# Patient Record
Sex: FEMALE | Race: BLACK OR AFRICAN AMERICAN | Employment: UNEMPLOYED | URBAN - METROPOLITAN AREA
[De-identification: names, ages, dates, MRNs, and addresses within clinical notes are randomized per-mention and may not be internally consistent; named-entity substitution may affect disease eponyms.]

---

## 2017-01-12 ENCOUNTER — HOSPITAL ENCOUNTER (EMERGENCY)
Age: 37
Discharge: HOME | End: 2017-01-12
Attending: EMERGENCY MEDICINE
Payer: MEDICAID

## 2017-01-12 VITALS
HEART RATE: 77 BPM | RESPIRATION RATE: 16 BRPM | WEIGHT: 235 LBS | SYSTOLIC BLOOD PRESSURE: 151 MMHG | DIASTOLIC BLOOD PRESSURE: 93 MMHG | TEMPERATURE: 98 F | OXYGEN SATURATION: 97 % | BODY MASS INDEX: 40.12 KG/M2 | HEIGHT: 64 IN

## 2017-01-12 DIAGNOSIS — F43.9 STRESS AT HOME: ICD-10-CM

## 2017-01-12 DIAGNOSIS — R73.9 HYPERGLYCEMIA: ICD-10-CM

## 2017-01-12 DIAGNOSIS — M10.072 ACUTE IDIOPATHIC GOUT INVOLVING TOE OF LEFT FOOT: Primary | ICD-10-CM

## 2017-01-12 LAB
GLUCOSE BLD STRIP.AUTO-MCNC: 463 MG/DL (ref 65–100)
SERVICE CMNT-IMP: ABNORMAL

## 2017-01-12 PROCEDURE — 99283 EMERGENCY DEPT VISIT LOW MDM: CPT

## 2017-01-12 PROCEDURE — 82962 GLUCOSE BLOOD TEST: CPT

## 2017-01-12 PROCEDURE — 90791 PSYCH DIAGNOSTIC EVALUATION: CPT

## 2017-01-12 RX ORDER — TRAMADOL HYDROCHLORIDE 50 MG/1
50 TABLET ORAL
Qty: 20 TAB | Refills: 0 | Status: SHIPPED | OUTPATIENT
Start: 2017-01-12 | End: 2017-05-09

## 2017-01-12 RX ORDER — COLCHICINE 0.6 MG/1
0.6 CAPSULE ORAL DAILY
Qty: 3 CAP | Refills: 0 | Status: SHIPPED | OUTPATIENT
Start: 2017-01-12 | End: 2020-10-13

## 2017-01-12 NOTE — ED PROVIDER NOTES
HPI Comments: 39 y.o. female with past medical history significant for DM, gout, hypertension, UTI, depression and PTSD who presents from home with chief complaint of toe pain. Patient arrives today complaining of 3 days of left great toe pain. She rates her pain at 6/10 and states it is tender to the touch and painful with movement. She reports history of gout and has taken colchicine in the past. She denies any trauma or injury to the foot. Patient additionally states she has not been compliant with her insulin recently. She reports her blood sugars normally run in the 200s-300s but recent have been higher due to not taking insulin because of stress. She says she is on a sliding scale, has not taken her insulin yet today but will as soon as she gets home. She denies fever, chills, nausea, vomiting, diarrhea, constipation and shortness of breath. There are no other acute medical concerns at this time. PCP: Jackeline Shirley MD    Note written by reina De La Cruz, as dictated by Albina Baum MD 9:10 AM      The history is provided by the patient.         Past Medical History:   Diagnosis Date    Depression     Diabetes ([facility] [address]     Edema     Gout     Herpes genitalis     Hypertension     Ill-defined condition      gout    Mood disorder (HCC)     Other ill-defined conditions(799.89)      cholesterol     Psychiatric disorder      depression    Psychiatric disorder      PTSD    Sleep disorder     UTI (lower urinary tract infection)        Past Surgical History:   Procedure Laterality Date    Hx gyn        X 3         Family History:   Problem Relation Age of Onset    Hypertension Mother     Coronary Artery Disease Mother     Diabetes Father     Stroke Father 46     pt estimates    Kidney Disease Father      secondary to diabetes       Social History     Social History    Marital status: SINGLE     Spouse name: N/A    Number of children: N/A    Years of education: N/A     Occupational History    [employer]      [employer]    nursing student      [employer]     Social History Main Topics    Smoking status: Never Smoker    Smokeless tobacco: Never Used    Alcohol use No    Drug use: No    Sexual activity: Yes     Partners: Male     Birth control/ protection: IUD     Other Topics Concern    Exercise Yes     Line Dancing with Son 1x/week, walking regularly, yoga occasionally     Social History Narrative    Lives with son (12 yo). Boyfriend x 2 years is currently staying with pt and helping her. ALLERGIES: Review of patient's allergies indicates no known allergies. Review of Systems   Constitutional: Negative for chills, diaphoresis and fever. HENT: Negative for congestion, postnasal drip, rhinorrhea and sore throat. Eyes: Negative for photophobia, discharge, redness and visual disturbance. Respiratory: Negative for cough, chest tightness, shortness of breath and wheezing. Cardiovascular: Negative for chest pain, palpitations and leg swelling. Gastrointestinal: Negative for abdominal distention, abdominal pain, blood in stool, constipation, diarrhea, nausea and vomiting. Genitourinary: Negative for difficulty urinating, dysuria, frequency, hematuria and urgency. Musculoskeletal: Positive for arthralgias and joint swelling. Negative for back pain and myalgias. Skin: Negative for color change and rash. Neurological: Negative for dizziness, speech difficulty, weakness, light-headedness, numbness and headaches. Psychiatric/Behavioral: Negative for confusion. The patient is not nervous/anxious. All other systems reviewed and are negative. Vitals:    01/12/17 0836   BP: (!) 151/93   Pulse: 77   Resp: 16   Temp: 98 °F (36.7 °C)   SpO2: 97%   Weight: 106.6 kg (235 lb)   Height: 5' 4\" (1.626 m)            Physical Exam   Constitutional: She is oriented to person, place, and time. She appears well-developed and well-nourished. No distress.    HENT:   Head: Normocephalic and atraumatic. Right Ear: External ear normal.   Left Ear: External ear normal.   Nose: Nose normal.   Mouth/Throat: Oropharynx is clear and moist.   Eyes: Conjunctivae and EOM are normal. Pupils are equal, round, and reactive to light. No scleral icterus. Neck: Normal range of motion. Neck supple. No JVD present. No tracheal deviation present. No thyromegaly present. Cardiovascular: Normal rate, regular rhythm and normal heart sounds. Exam reveals no gallop and no friction rub. No murmur heard. Pulmonary/Chest: Effort normal and breath sounds normal. No respiratory distress. She has no wheezes. She has no rales. She exhibits no tenderness. Abdominal: Soft. Bowel sounds are normal. She exhibits no distension and no mass. There is no tenderness. There is no rebound and no guarding. Musculoskeletal: Normal range of motion. She exhibits tenderness. She exhibits no edema. Left great toe; tender to the touch and pain with movement, no erythema or increased heat. Lymphadenopathy:     She has no cervical adenopathy. Neurological: She is alert and oriented to person, place, and time. She has normal strength. She displays no atrophy and no tremor. No cranial nerve deficit. She exhibits normal muscle tone. Coordination and gait normal.   Skin: Skin is warm and dry. No rash noted. She is not diaphoretic. No erythema. Psychiatric: She has a normal mood and affect. Her behavior is normal. Judgment and thought content normal.   Nursing note and vitals reviewed. Note written by reina Schumacher, as dictated by Madisyn Price MD 9:12 AM      MDM  Number of Diagnoses or Management Options  Acute idiopathic gout involving toe of left foot:   Hyperglycemia:   Stress at home:   Diagnosis management comments: Impression: 67-year-old female here for painful left great toe at the MTP joint all consistent with an exacerbation of her acute gout.  In addition the patient is hyperglycemic has not used her insulin this morning and would like to speak with a rales. Physical examination is consistent with an acute gouty flare, we'll start treatment patient will use her insulin when she returns home to follow prescribed outpatient plan for behavioral health.     ED Course       Procedures
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
## 2017-01-12 NOTE — LETTER
[address] 
[address] [facility] [id number] 
[phone] Work/School Note Date: 1/12/2017 To Whom It May concern: 
 
Audi Titus was seen and treated today in the emergency room by the following provider(s): 
Attending Provider: Elia Ding MD. uAdi Titus may return to work on 1/14/17.  
 
Sincerely, 
 
 
 
 
Elia Ding MD

## 2017-01-12 NOTE — DISCHARGE INSTRUCTIONS
We hope that we have addressed all of your medical concerns. The examination and treatment you received in the Emergency Department were for an emergent problem and were not intended as complete care. It is important that you follow up with your healthcare provider(s) for ongoing care. If your symptoms worsen or do not improve as expected, and you are unable to reach your usual health care provider(s), you should return to the Emergency Department. Today's healthcare is undergoing tremendous change, and patient satisfaction surveys are one of the many tools to assess the quality of medical care. You may receive a survey from the [facility] regarding your experience in the Emergency Department. I hope that your experience has been completely positive, particularly the medical care that I provided. As such, please participate in the survey; anything less than excellent does not meet my expectations or intentions. [facility] and [address] [place] [address] participate in nationally recognized quality of care measures. If your blood pressure is greater than 120/80, as reported below, we urge that you seek medical care to address the potential of high blood pressure, commonly known as hypertension. Hypertension can be hereditary or can be caused by certain medical conditions, pain, stress, or \"white coat syndrome. \"       Please make an appointment with your health care provider(s) for follow up of your Emergency Department visit. VITALS:   Patient Vitals for the past 8 hrs:   Temp Pulse Resp BP SpO2   01/12/17 0836 98 °F (36.7 °C) 77 16 (!) 151/93 97 %          Thank you for allowing us to provide you with medical care today. We realize that you have many choices for your emergency care needs. Please choose us in the future for any continued health care needs. Carly Angeles M.D.  [facility] [place]: [phone]            Recent Results (from the past 24 hour(s))   GLUCOSE, POC    Collection Time: 01/12/17  8:57 AM   Result Value Ref Range    Glucose (POC) 463 (H) 65 - 100 mg/dL    Performed by Ariana Dowd        No results found. Gout: Care Instructions  Your Care Instructions  Gout is a form of arthritis caused by a buildup of uric acid crystals in a joint. It causes sudden attacks of pain, swelling, redness, and stiffness, usually in one joint, especially the big toe. Gout usually comes on without a cause. But it can be brought on by drinking alcohol (especially beer) or eating seafood and red meat. Taking certain medicines, such as diuretics or aspirin, also can bring on an attack of gout. Taking your medicines as prescribed and following up with your doctor regularly can help you avoid gout attacks in the future. Follow-up care is a key part of your treatment and safety. Be sure to make and go to all appointments, and call your doctor if you are having problems. Its also a good idea to know your test results and keep a list of the medicines you take. How can you care for yourself at home? · If the joint is swollen, put ice or a cold pack on the area for 10 to 20 minutes at a time. Put a thin cloth between the ice and your skin. · Prop up the sore limb on a pillow when you ice it or anytime you sit or lie down during the next 3 days. Try to keep it above the level of your heart. This will help reduce swelling. · Rest sore joints. Avoid activities that put weight or strain on the joints for a few days. Take short rest breaks from your regular activities during the day. · Take your medicines exactly as prescribed. Call your doctor if you think you are having a problem with your medicine. · Take pain medicines exactly as directed. ¨ If the doctor gave you a prescription medicine for pain, take it as prescribed.   ¨ If you are not taking a prescription pain medicine, ask your doctor if you can take an over-the-counter medicine. · Eat less seafood and red meat. · Check with your doctor before drinking alcohol. · Losing weight, if you are overweight, may help reduce attacks of gout. But do not go on a [employer]. \" Losing a lot of weight in a short amount of time can cause a gout attack. When should you call for help? Call your doctor now or seek immediate medical care if:  · You have a fever. · The joint is so painful you cannot use it. · You have sudden, unexplained swelling, redness, warmth, or severe pain in one or more joints. Watch closely for changes in your health, and be sure to contact your doctor if:  · You have joint pain. · Your symptoms get worse or are not improving after 2 or 3 days. Where can you learn more? Go to http://tommyJobTalentszane.info/. Enter V387 in the search box to learn more about \"Gout: Care Instructions. \"  Current as of: February 24, 2016  Content Version: 11.1  © 3105-1663 [employer]. Care instructions adapted under license by [employer] (which disclaims liability or warranty for this information). If you have questions about a medical condition or this instruction, always ask your healthcare professional. [address] any warranty or liability for your use of this information. Learning About High Blood Sugar  What is high blood sugar? Your body turns the food you eat into glucose (sugar), which it uses for energy. But if your body isn't able to use the sugar right away, it can build up in your blood and lead to high blood sugar. When the amount of sugar in your blood stays too high for too much of the time, you may have diabetes. Diabetes is a disease that can cause serious health problems. The good news is that lifestyle changes may help you get your blood sugar back to normal and avoid or delay diabetes. What causes high blood sugar?   Sugar (glucose) can build up in your blood if you:  · Are overweight. · Have a family history of diabetes. · Take certain medicines, such as steroids. What are the symptoms? Having high blood sugar may not cause any symptoms at all. Or it may make you feel very thirsty or very hungry. You may also urinate more often than usual, have blurry vision, or lose weight without trying. How is high blood sugar treated? You can take steps to lower your blood sugar level if you understand what makes it get higher. Your doctor may want you to learn how to test your blood sugar level at home. Then you can see how illness, stress, or different kinds of food or medicine raise or lower your blood sugar level. Other tests may be needed to see if you have diabetes. How can you prevent high blood sugar? · Watch your weight. If you're overweight, losing just a small amount of weight may help. Reducing fat around your waist is most important. · Limit the amount of calories, sweets, and unhealthy fat you eat. Ask your doctor if a dietitian can help you. A registered dietitian can help you create meal plans that fit your lifestyle. · Get at least 30 minutes of exercise on most days of the week. Exercise helps control your blood sugar. It also helps you maintain a healthy weight. Walking is a good choice. You also may want to do other activities, such as running, swimming, cycling, or playing tennis or team sports. · If your doctor prescribed medicines, take them exactly as prescribed. Call your doctor if you think you are having a problem with your medicine. You will get more details on the specific medicines your doctor prescribes. Follow-up care is a key part of your treatment and safety. Be sure to make and go to all appointments, and call your doctor if you are having problems. It's also a good idea to know your test results and keep a list of the medicines you take. Where can you learn more?   Go to http://tommy-zane.info/. Enter O108 in the search box to learn more about \"Learning About High Blood Sugar. \"  Current as of: May 23, 2016  Content Version: 11.1  © 2294-8442 [employer]. Care instructions adapted under license by [employer] (which disclaims liability or warranty for this information). If you have questions about a medical condition or this instruction, always ask your healthcare professional. [place] [address] any warranty or liability for your use of this information.

## 2017-01-12 NOTE — ED TRIAGE NOTES
Pt arrives with L toe pain which pt believe to be a gout flare. Pt also reports feeling like her BG is elevated because she just \"doesn't feel good\". Can not recall last time she checked her BG although she does take insulin. Pt also wanting to speak with mental health counselor regarding stress. Denies SI/HI.

## 2017-01-23 ENCOUNTER — HOSPITAL ENCOUNTER (EMERGENCY)
Age: 37
Discharge: HOME | End: 2017-01-23
Attending: EMERGENCY MEDICINE

## 2017-01-23 VITALS
OXYGEN SATURATION: 97 % | HEART RATE: 77 BPM | WEIGHT: 235 LBS | HEIGHT: 64 IN | TEMPERATURE: 97 F | BODY MASS INDEX: 40.12 KG/M2 | RESPIRATION RATE: 18 BRPM | DIASTOLIC BLOOD PRESSURE: 95 MMHG | SYSTOLIC BLOOD PRESSURE: 139 MMHG

## 2017-01-23 DIAGNOSIS — M54.6 ACUTE MIDLINE THORACIC BACK PAIN: Primary | ICD-10-CM

## 2017-01-23 RX ORDER — HYDROCODONE BITARTRATE AND ACETAMINOPHEN 5; 325 MG/1; MG/1
1 TABLET ORAL AS NEEDED
COMMUNITY
End: 2017-05-09

## 2017-01-23 RX ORDER — CYCLOBENZAPRINE HCL 5 MG
10 TABLET ORAL
COMMUNITY
End: 2017-05-09

## 2017-01-23 RX ORDER — NAPROXEN 500 MG/1
500 TABLET ORAL 2 TIMES DAILY WITH MEALS
Qty: 20 TAB | Refills: 0 | Status: SHIPPED | OUTPATIENT
Start: 2017-01-23 | End: 2017-02-02

## 2017-01-23 NOTE — DISCHARGE INSTRUCTIONS
Back Pain: Care Instructions  Your Care Instructions    Back pain has many possible causes. It is often related to problems with muscles and ligaments of the back. It may also be related to problems with the nerves, discs, or bones of the back. Moving, lifting, standing, sitting, or sleeping in an awkward way can strain the back. Sometimes you don't notice the injury until later. Arthritis is another common cause of back pain. Although it may hurt a lot, back pain usually improves on its own within several weeks. Most people recover in 12 weeks or less. Using good home treatment and being careful not to stress your back can help you feel better sooner. Follow-up care is a key part of your treatment and safety. Be sure to make and go to all appointments, and call your doctor if you are having problems. Its also a good idea to know your test results and keep a list of the medicines you take. How can you care for yourself at home? · Sit or lie in positions that are most comfortable and reduce your pain. Try one of these positions when you lie down:  ¨ Lie on your back with your knees bent and supported by large pillows. ¨ Lie on the floor with your legs on the seat of a sofa or chair. Jolyne Laser on your side with your knees and hips bent and a pillow between your legs. ¨ Lie on your stomach if it does not make pain worse. · Do not sit up in bed, and avoid soft couches and twisted positions. Bed rest can help relieve pain at first, but it delays healing. Avoid bed rest after the first day of back pain. · Change positions every 30 minutes. If you must sit for long periods of time, take breaks from sitting. Get up and walk around, or lie in a comfortable position. · Try using a heating pad on a low or medium setting for 15 to 20 minutes every 2 or 3 hours. Try a warm shower in place of one session with the heating pad. · You can also try an ice pack for 10 to 15 minutes every 2 to 3 hours.  Put a thin cloth between the ice pack and your skin. · Take pain medicines exactly as directed. ¨ If the doctor gave you a prescription medicine for pain, take it as prescribed. ¨ If you are not taking a prescription pain medicine, ask your doctor if you can take an over-the-counter medicine. · Take short walks several times a day. You can start with 5 to 10 minutes, 3 or 4 times a day, and work up to longer walks. Walk on level surfaces and avoid hills and stairs until your back is better. · Return to work and other activities as soon as you can. Continued rest without activity is usually not good for your back. · To prevent future back pain, do exercises to stretch and strengthen your back and stomach. Learn how to use good posture, safe lifting techniques, and proper body mechanics. When should you call for help? Call your doctor now or seek immediate medical care if:  · You have new or worsening numbness in your legs. · You have new or worsening weakness in your legs. (This could make it hard to stand up.)  · You lose control of your bladder or bowels. Watch closely for changes in your health, and be sure to contact your doctor if:  · Your pain gets worse. · You are not getting better after 2 weeks. Where can you learn more? Go to http://tommy-zane.info/. Enter O119 in the search box to learn more about \"Back Pain: Care Instructions. \"  Current as of: May 23, 2016  Content Version: 11.1  © 0029-3088 [employer]. Care instructions adapted under license by [employer] (which disclaims liability or warranty for this information). If you have questions about a medical condition or this instruction, always ask your healthcare professional. [address] any warranty or liability for your use of this information.
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
## 2017-01-23 NOTE — LETTER
[place] [address] [address] [address] [facility] [address] 
[phone] Work/School Note Date: 1/23/2017 To Whom It May concern: 
 
Edmund Ruiz was seen and treated today in the emergency room by the following provider(s): 
Attending Provider: Abhishek Stewart DO Physician Assistant: Leola Spurling, PA. Edmund Ruiz may return to work on 01/25/17. Sincerely, Leola Spurling, [place]

## 2017-02-27 ENCOUNTER — OFFICE VISIT (OUTPATIENT)
Dept: BEHAVIORAL/MENTAL HEALTH CLINIC | Age: 37
End: 2017-02-27

## 2017-02-27 VITALS
OXYGEN SATURATION: 98 % | SYSTOLIC BLOOD PRESSURE: 153 MMHG | HEIGHT: 64 IN | DIASTOLIC BLOOD PRESSURE: 85 MMHG | BODY MASS INDEX: 40.12 KG/M2 | HEART RATE: 85 BPM | WEIGHT: 235 LBS

## 2017-02-27 DIAGNOSIS — F33.2 SEVERE EPISODE OF RECURRENT MAJOR DEPRESSIVE DISORDER, WITHOUT PSYCHOTIC FEATURES (HCC): ICD-10-CM

## 2017-02-27 RX ORDER — TRAZODONE HYDROCHLORIDE 50 MG/1
TABLET ORAL
Qty: 30 TAB | Refills: 1 | Status: SHIPPED | OUTPATIENT
Start: 2017-02-27 | End: 2017-04-11 | Stop reason: SDUPTHER

## 2017-02-27 RX ORDER — FLUOXETINE 20 MG/1
20 TABLET ORAL DAILY
Qty: 60 TAB | Refills: 1 | Status: SHIPPED | OUTPATIENT
Start: 2017-02-27 | End: 2017-04-11 | Stop reason: SDUPTHER
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
## 2017-03-02 ENCOUNTER — DOCUMENTATION ONLY (OUTPATIENT)
Dept: BEHAVIORAL/MENTAL HEALTH CLINIC | Age: 37
End: 2017-03-02

## 2017-03-02 NOTE — PROGRESS NOTES
Message received this AM that patient had called a suicide hotline last night and said that she was thinking of drinking antifreeze. 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 Called her when I got in and left . Called her mother Anshul Emery, #[phone], release in chart) and she reports patient texted her that she was going to the hospital. Called patient back who answered and affirmed that a  had dropped her off at [facility]. She is tearful and affirms SI and is checking herself in. She gives verbal ok to discuss details with her mother. Her children are in school. Her mother is in the process of driving down from [place] to meet her at the hospital and will then take care of the children.

## 2017-03-16 ENCOUNTER — OFFICE VISIT (OUTPATIENT)
Dept: INTERNAL MEDICINE CLINIC | Facility: CLINIC | Age: 37
End: 2017-03-16

## 2017-03-16 VITALS
RESPIRATION RATE: 18 BRPM | TEMPERATURE: 97.8 F | BODY MASS INDEX: 39.78 KG/M2 | DIASTOLIC BLOOD PRESSURE: 82 MMHG | HEIGHT: 64 IN | SYSTOLIC BLOOD PRESSURE: 122 MMHG | WEIGHT: 233 LBS | OXYGEN SATURATION: 98 % | HEART RATE: 89 BPM

## 2017-03-16 DIAGNOSIS — A08.4 VIRAL GASTROENTERITIS: Primary | ICD-10-CM

## 2017-03-16 DIAGNOSIS — Z13.5 SCREENING FOR EYE CONDITION: ICD-10-CM

## 2017-03-16 RX ORDER — ONDANSETRON 4 MG/1
4 TABLET, ORALLY DISINTEGRATING ORAL
Qty: 30 TAB | Refills: 0 | Status: SHIPPED | OUTPATIENT
Start: 2017-03-16 | End: 2017-05-09

## 2017-03-16 NOTE — MR AVS SNAPSHOT
Visit Information Date & Time Provider Department Dept. Phone Encounter #  
 3/16/2017  3:00 PM Mally Knowles PA-C [facility] [phone] [id number] Follow-up Instructions Return if symptoms worsen or fail to improve. Your Appointments 4/11/2017  3:30 PM  
ESTABLISHED PATIENT with Sixto Rudolph NP [facility] ([address] [facility]) Appt Note: 6 week follow-up [address] [facility] [address] [facility] [address]  
[phone]  
  
   
 [address] [facility] [address]  
  
    
 4/18/2017  2:30 PM  
COUNSELING with Pauline Garcia LCSW [facility] ([facility]) Appt Note: new pt for therapy referred by ySeda Marie [address] [place] [address] [address] [facility] Rosalia Moran [address]  
  
   
 [address] [place] [address] [address] [facility] [address] [facility] [phone] Upcoming Health Maintenance Date Due  
 FOOT EXAM Q1 8/3/1989 EYE EXAM RETINAL OR DILATED Q1 8/3/1989 MICROALBUMIN Q1 11/25/2016 LIPID PANEL Q1 11/25/2016 HEMOGLOBIN A1C Q6M 5/3/2017 PAP AKA CERVICAL CYTOLOGY 1/26/2018 DTaP/Tdap/Td series (2 - Td) 1/5/2026 Allergies as of 3/16/2017  Review Complete On: 3/16/2017 By: Mally Knowles PA-C No Known Allergies Current Immunizations  Reviewed on 11/3/2016 Name Date Influenza Vaccine 10/1/2014 Influenza Vaccine Cutchoguesandeep Obrien) 11/3/2016 Pneumococcal Polysaccharide (PPSV-23) 6/30/2014  9:23 PM  
 Tdap 1/5/2016 Not reviewed this visit You Were Diagnosed With   
  
 Codes Comments Viral gastroenteritis    -  Primary ICD-10-CM: A08.4 ICD-9-CM: 946. 8 Screening for eye condition     ICD-10-CM: Z13.5 ICD-9-CM: V80.2 Vitals BP Pulse Temp Resp Height(growth percentile) Weight(growth percentile) 122/82 89 97.8 °F (36.6 °C) (Oral) 18 5' 4\" (1.626 m) 233 lb (105.7 kg) SpO2 BMI OB Status Smoking Status 98% 39.99 kg/m2 IUD Never Smoker Vitals History BMI and BSA Data Body Mass Index Body Surface Area  
 39.99 kg/m 2 2.18 m 2 Preferred Pharmacy Pharmacy Name Phone Marialuisa Dejesus [address], [address] [facility] [phone] Your Updated Medication List  
  
   
This list is accurate as of: 3/16/17  3:20 PM.  Always use your most recent med list.  
  
  
  
  
 aspirin delayed-release 81 mg tablet Take 1 Tab by mouth daily. colchicine 0.6 mg capsule Commonly known as:  Finis Spark Take 1 Cap by mouth daily. Take two tablets by mouth now and the one tablet by mouth one hour later  
  
 cyclobenzaprine 5 mg tablet Commonly known as:  FLEXERIL Take 10 mg by mouth three (3) times daily as needed for Muscle Spasm(s). FLUoxetine 20 mg tablet Commonly known as:  PROzac Take 1 Tab by mouth daily. furosemide 20 mg tablet Commonly known as:  LASIX Take 1 Tab by mouth daily. gabapentin 300 mg capsule Commonly known as:  NEURONTIN Take 1 Cap by mouth three (3) times daily. HumaLOG KwikPen 100 unit/mL kwikpen Generic drug:  insulin lispro  
by SubCUTAneous route Before breakfast, lunch, dinner and at bedtime. Sliding Scale HYDROcodone-acetaminophen 5-325 mg per tablet Commonly known as:  Barnet Cuff Take 1 Tab by mouth as needed for Pain. ibuprofen 200 mg tablet Commonly known as:  MOTRIN Take 3 Tabs by mouth three (3) times daily (with meals). LANTUS SOLOSTAR 100 unit/mL (3 mL) pen Generic drug:  insulin glargine INJECT 40 UNITS UNDER THE SKIN D  
  
 levonorgestrel 20 mcg/24 hr (5 years) IUD Commonly known as:  MIRENA  
1 Each by IntraUTERine route once. lisinopril 10 mg tablet Commonly known as:  Ace Economy Take 1 Tab by mouth daily. ondansetron 4 mg disintegrating tablet Commonly known as:  ZOFRAN ODT Take 1 Tab by mouth every eight (8) hours as needed for Nausea. [facility] LANCETS [address] Generic drug:  lancets TEST SIX TIMES A DAY  
  
 ONETOUCH VERIO strip Generic drug:  glucose blood VI test strips TEST SIX TIMES A DAY  
  
 simvastatin 20 mg tablet Commonly known as:  ZOCOR Take 1 Tab by mouth nightly. traMADol 50 mg tablet Commonly known as:  ULTRAM  
Take 1 Tab by mouth every six (6) hours as needed for Pain. Max Daily Amount: 200 mg.  
  
 traZODone 50 mg tablet Commonly known as:  Norma Cast Take 1/2 to 1 tablet by mouth at bedtime PRN insomnia. valACYclovir 1 gram tablet Commonly known as:  VALTREX Take 1 Tab by mouth daily. Prescriptions Sent to Pharmacy Refills  
 ondansetron (ZOFRAN ODT) 4 mg disintegrating tablet 0 Sig: Take 1 Tab by mouth every eight (8) hours as needed for Nausea. Class: Normal  
 Pharmacy: [address] [place] [address] #: [phone] Route: Oral  
  
We Performed the Following REFERRAL TO OPTOMETRY [id number] Custom] Comments:  
 Or  
Dr. General Hernández 
[address] [place] [address] Or Dr. Nichole Alcocer at [place] [address] Phone: [phone] Follow-up Instructions Return if symptoms worsen or fail to improve. Referral Information Referral ID Referred By Referred To  
  
 [id number] Lexi Medeiros MD   
   [address]  Phone: [phone] Fax: [phone] Visits Status Start Date End Date 1 New Request 3/16/17 3/16/18 If your referral has a status of pending review or denied, additional information will be sent to support the outcome of this decision. Patient Instructions Gastroenteritis: Care Instructions Your Care Instructions Gastroenteritis is an illness that may cause nausea, vomiting, and diarrhea. It is sometimes called \"stomach flu. \" It can be caused by bacteria or a virus. You will probably begin to feel better in 1 to 2 days. In the meantime, get plenty of rest and make sure you do not become dehydrated. Dehydration occurs when your body loses too much fluid. Follow-up care is a key part of your treatment and safety. Be sure to make and go to all appointments, and call your doctor if you are having problems. Its also a good idea to know your test results and keep a list of the medicines you take. How can you care for yourself at home? · If your doctor prescribed antibiotics, take them as directed. Do not stop taking them just because you feel better. You need to take the full course of antibiotics. · Drink plenty of fluids to prevent dehydration, enough so that your urine is light yellow or clear like water. Choose water and other caffeine-free clear liquids until you feel better. If you have kidney, heart, or liver disease and have to limit fluids, talk with your doctor before you increase your fluid intake. · Drink fluids slowly, in frequent, small amounts, because drinking too much too fast can cause vomiting. · Begin eating mild foods, such as dry toast, yogurt, applesauce, bananas, and rice. Avoid spicy, hot, or high-fat foods, and do not drink alcohol or caffeine for a day or two. Do not drink milk or eat ice cream until you are feeling better. How to prevent gastroenteritis · Keep hot foods hot and cold foods cold. · Do not eat meats, dressings, salads, or other foods that have been kept at room temperature for more than 2 hours. · Use a thermometer to check your refrigerator. It should be between 34°F and 40°F. 
· Defrost meats in the refrigerator or microwave, not on the kitchen counter. · Keep your hands and your kitchen clean. Wash your hands, cutting boards, and countertops with hot soapy water frequently. · Cook meat until it is well done. · Do not eat raw eggs or uncooked sauces made with raw eggs. · Do not take chances. If food looks or tastes spoiled, throw it out. When should you call for help? Call 911 anytime you think you may need emergency care. For example, call if: 
· You vomit blood or what looks like coffee grounds. · You passed out (lost consciousness). · You pass maroon or very bloody stools. Call your doctor now or seek immediate medical care if: 
· You have severe belly pain. · You have signs of needing more fluids. You have sunken eyes, a dry mouth, and pass only a little dark urine. · You feel like you are going to faint. · You have increased belly pain that does not go away in 1 to 2 days. · You have new or increased nausea, or you are vomiting. · You have a new or higher fever. · Your stools are black and tarlike or have streaks of blood. Watch closely for changes in your health, and be sure to contact your doctor if: 
· You are dizzy or lightheaded. · You urinate less than usual, or your urine is dark yellow or brown. · You do not feel better with each day that goes by. Where can you learn more? Go to http://tommy-zane.info/. Enter N142 in the search box to learn more about \"Gastroenteritis: Care Instructions. \" Current as of: May 24, 2016 Content Version: 11.1 © 0003-2636 [employer]attood[employer]. Care instructions adapted under license by [employer]ention[employer] (which disclaims liability or warranty for this information). If you have questions about a medical condition or this instruction, always ask your healthcare professional. [address] any warranty or liability for your use of this information. Introducing [facility]! [facility] introduces First Coverage patient portal. Now you can access parts of your medical record, email your doctor's office, and request medication refills online.    
 
1. In your internet browser, go to [email or web address] 2. Click on the First Time User? Click Here link in the Sign In box. You will see the New Member Sign Up page. 3. Enter your StageMark Access Code exactly as it appears below. You will not need to use this code after youve completed the sign-up process. If you do not sign up before the expiration date, you must request a new code. · StageMark Access Code: [email or web address] Expires: 6/14/2017  3:20 PM 
 
4. Enter the last four digits of your Social Security Number (xxxx) and Date of Birth (mm/dd/yyyy) as indicated and click Submit. You will be taken to the next sign-up page. 5. Create a Appiest ID. This will be your StageMark login ID and cannot be changed, so think of one that is secure and easy to remember. 6. Create a StageMark password. You can change your password at any time. 7. Enter your Password Reset Question and Answer. This can be used at a later time if you forget your password. 8. Enter your e-mail address. You will receive e-mail notification when new information is available in [address] 9. Click Sign Up. You can now view and download portions of your medical record. 10. Click the Download Summary menu link to download a portable copy of your medical information. If you have questions, please visit the Frequently Asked Questions section of the StageMark website. Remember, StageMark is NOT to be used for urgent needs. For medical emergencies, dial 911. Now available from your iPhone and Android! Please provide this summary of care documentation to your next provider. Your primary care clinician is listed as Tyrone Hanson. If you have any questions after today's visit, please call [phone].

## 2017-03-16 NOTE — PROGRESS NOTES
Room 8    Chief Complaint   Patient presents with    Vomiting     Patient states she started having diarrhea at work yesterday     1. Have you been to the ER, urgent care clinic since your last visit? Hospitalized since your last visit? No    2. Have you seen or consulted any other health care providers outside of the [address] [place] [address] since your last visit? Include any pap smears or colon screening.  No     Health Maintenance Due   Topic Date Due    FOOT EXAM Q1  08/03/1989    EYE EXAM RETINAL OR DILATED Q1  08/03/1989    MICROALBUMIN Q1  11/25/2016    LIPID PANEL Q1  11/25/2016

## 2017-03-16 NOTE — PATIENT INSTRUCTIONS
Gastroenteritis: Care Instructions  Your Care Instructions  Gastroenteritis is an illness that may cause nausea, vomiting, and diarrhea. It is sometimes called \"stomach flu. \" It can be caused by bacteria or a virus. You will probably begin to feel better in 1 to 2 days. In the meantime, get plenty of rest and make sure you do not become dehydrated. Dehydration occurs when your body loses too much fluid. Follow-up care is a key part of your treatment and safety. Be sure to make and go to all appointments, and call your doctor if you are having problems. Its also a good idea to know your test results and keep a list of the medicines you take. How can you care for yourself at home? · If your doctor prescribed antibiotics, take them as directed. Do not stop taking them just because you feel better. You need to take the full course of antibiotics. · Drink plenty of fluids to prevent dehydration, enough so that your urine is light yellow or clear like water. Choose water and other caffeine-free clear liquids until you feel better. If you have kidney, heart, or liver disease and have to limit fluids, talk with your doctor before you increase your fluid intake. · Drink fluids slowly, in frequent, small amounts, because drinking too much too fast can cause vomiting. · Begin eating mild foods, such as dry toast, yogurt, applesauce, bananas, and rice. Avoid spicy, hot, or high-fat foods, and do not drink alcohol or caffeine for a day or two. Do not drink milk or eat ice cream until you are feeling better. How to prevent gastroenteritis  · Keep hot foods hot and cold foods cold. · Do not eat meats, dressings, salads, or other foods that have been kept at room temperature for more than 2 hours. · Use a thermometer to check your refrigerator. It should be between 34°F and 40°F.  · Defrost meats in the refrigerator or microwave, not on the kitchen counter. · Keep your hands and your kitchen clean.  Wash your hands, cutting boards, and countertops with hot soapy water frequently. · Cook meat until it is well done. · Do not eat raw eggs or uncooked sauces made with raw eggs. · Do not take chances. If food looks or tastes spoiled, throw it out. When should you call for help? Call 911 anytime you think you may need emergency care. For example, call if:  · You vomit blood or what looks like coffee grounds. · You passed out (lost consciousness). · You pass maroon or very bloody stools. Call your doctor now or seek immediate medical care if:  · You have severe belly pain. · You have signs of needing more fluids. You have sunken eyes, a dry mouth, and pass only a little dark urine. · You feel like you are going to faint. · You have increased belly pain that does not go away in 1 to 2 days. · You have new or increased nausea, or you are vomiting. · You have a new or higher fever. · Your stools are black and tarlike or have streaks of blood. Watch closely for changes in your health, and be sure to contact your doctor if:  · You are dizzy or lightheaded. · You urinate less than usual, or your urine is dark yellow or brown. · You do not feel better with each day that goes by. Where can you learn more? Go to http://tommy-zane.info/. Enter N142 in the search box to learn more about \"Gastroenteritis: Care Instructions. \"  Current as of: May 24, 2016  Content Version: 11.1  © 7881-0308 [employer]. Care instructions adapted under license by [employer] (which disclaims liability or warranty for this information). If you have questions about a medical condition or this instruction, always ask your healthcare professional. [address] any warranty or liability for your use of this information.

## 2017-03-16 NOTE — LETTER
NOTIFICATION RETURN TO WORK / SCHOOL 
 
3/16/2017 3:20 PM 
 
Ms. Dulce Cordova [facility] [address] 
[address] [phone] To Whom It May Concern: 
 
Dulce Cordova is currently under the care of Meche Bernard.. Please excuse Ms. Verlin Koyanagi from work & school 3/15/17-3/17/17. She will return to work/school on: 3/18/17. If there are questions or concerns please have the patient contact our office. Sincerely, Erika Flanagan PA-C

## 2017-03-16 NOTE — PROGRESS NOTES
HISTORY OF PRESENT ILLNESS  Alma Garcia is a 39 y.o. female. HPI   1) Vomiting & Diarrhea - started yesterday evening during her shift at work. Last episode Vomitin AM last night - given 2 Zofran at work (coworker's Rx) - still vomited once afterwards. Last episode Diarrhea: This morning. Stomach still feels \"gurgely\". Hasn't eaten anything. Last Urinated: This morning. Trying to drink fluids, but hasn't actually had much to drink because afraid of vomiting. Still nauseated. 2) R inner eye has been red x 1 month. Asymptomatic. Due for eye exam.     Review of Systems   Constitutional: Negative for fever. Eyes: Positive for redness. Negative for blurred vision, pain and discharge. Gastrointestinal: Positive for diarrhea, nausea and vomiting. Negative for abdominal pain. Physical Exam   Constitutional: She is oriented to person, place, and time. She appears well-developed and well-nourished. No distress. HENT:   Head: Normocephalic and atraumatic. Eyes:   B sclera slightly red. No discharge. Conjunctiva normal.    Neck: Neck supple. No JVD present. Cardiovascular: Normal rate, regular rhythm and normal heart sounds. Pulmonary/Chest: Effort normal and breath sounds normal. No respiratory distress. Abdominal: Soft. Bowel sounds are normal. She exhibits no distension and no mass. There is no tenderness. There is no rebound and no guarding. Musculoskeletal: She exhibits no edema. Neurological: She is alert and oriented to person, place, and time. Skin: Skin is warm and dry. Psychiatric: She has a normal mood and affect. Her behavior is normal. Judgment and thought content normal.   Nursing note and vitals reviewed. ASSESSMENT and PLAN    ICD-10-CM ICD-9-CM    1. Viral gastroenteritis A08.4 008.8 ondansetron (ZOFRAN ODT) 4 mg disintegrating tablet    Rest, fluids, school & work notes written.     2. Screening for eye condition Z13.5 V80.2 REFERRAL TO OPTOMETRY

## 2017-04-11 ENCOUNTER — OFFICE VISIT (OUTPATIENT)
Dept: BEHAVIORAL/MENTAL HEALTH CLINIC | Age: 37
End: 2017-04-11

## 2017-04-11 VITALS
BODY MASS INDEX: 40.8 KG/M2 | DIASTOLIC BLOOD PRESSURE: 91 MMHG | SYSTOLIC BLOOD PRESSURE: 131 MMHG | WEIGHT: 239 LBS | OXYGEN SATURATION: 97 % | HEART RATE: 97 BPM | HEIGHT: 64 IN

## 2017-04-11 DIAGNOSIS — F33.2 SEVERE EPISODE OF RECURRENT MAJOR DEPRESSIVE DISORDER, WITHOUT PSYCHOTIC FEATURES (HCC): ICD-10-CM

## 2017-04-11 RX ORDER — TRAZODONE HYDROCHLORIDE 50 MG/1
TABLET ORAL
Qty: 30 TAB | Refills: 1 | Status: SHIPPED | OUTPATIENT
Start: 2017-04-11 | End: 2017-07-11 | Stop reason: SDUPTHER

## 2017-04-11 RX ORDER — FLUOXETINE HYDROCHLORIDE 60 MG/1
60 TABLET, FILM COATED ORAL; ORAL DAILY
Qty: 30 TAB | Refills: 1 | Status: SHIPPED | OUTPATIENT
Start: 2017-04-11 | End: 2017-07-11 | Stop reason: SDUPTHER
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
## 2017-04-21 ENCOUNTER — OFFICE VISIT (OUTPATIENT)
Dept: BEHAVIORAL/MENTAL HEALTH CLINIC | Age: 37
End: 2017-04-21

## 2017-04-21 VITALS — HEIGHT: 64 IN

## 2017-04-21 DIAGNOSIS — F43.10 PTSD (POST-TRAUMATIC STRESS DISORDER): ICD-10-CM

## 2017-04-21 DIAGNOSIS — F33.2 SEVERE EPISODE OF RECURRENT MAJOR DEPRESSIVE DISORDER, WITHOUT PSYCHOTIC FEATURES (HCC): Primary | ICD-10-CM

## 2017-04-21 DIAGNOSIS — F60.9 PERSONALITY DISORDER (HCC): ICD-10-CM
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
## 2017-04-21 NOTE — MR AVS SNAPSHOT
Visit Information Date & Time Provider Department Dept. Phone Encounter #  
 4/21/2017  9:30 AM Giovanna Notice, [facility] [phone] [id number] Your Appointments 5/10/2017  1:00 PM  
ESTABLISHED PATIENT with Daylin Blanchard NP [facility] ([address]) Appt Note: follow up [address] [facility] [address] [facility] Rulibia Silva [address]  
  
   
 [address] [facility] [address] [facility] [address] [facility] [address] Upcoming Health Maintenance Date Due  
 FOOT EXAM Q1 8/3/1989 EYE EXAM RETINAL OR DILATED Q1 8/3/1989 MICROALBUMIN Q1 11/25/2016 LIPID PANEL Q1 11/25/2016 HEMOGLOBIN A1C Q6M 5/3/2017 PAP AKA CERVICAL CYTOLOGY 1/26/2018 DTaP/Tdap/Td series (2 - Td) 1/5/2026 Allergies as of 4/21/2017  Review Complete On: 4/11/2017 By: Daylin Blanchard NP No Known Allergies Current Immunizations  Reviewed on 11/3/2016 Name Date Influenza Vaccine 10/1/2014 Influenza Vaccine Estanislado Pineda) 11/3/2016 Pneumococcal Polysaccharide (PPSV-23) 6/30/2014  9:23 PM  
 Tdap 1/5/2016 Not reviewed this visit Vitals Height(growth percentile) OB Status Smoking Status 5' 4\" (1.626 m) IUD Never Smoker Preferred Pharmacy Pharmacy Name Phone [facility] [address], [address] [place] [address] [phone] Your Updated Medication List  
  
   
This list is accurate as of: 4/21/17 10:16 AM.  Always use your most recent med list.  
  
  
  
  
 aspirin delayed-release 81 mg tablet Take 1 Tab by mouth daily. colchicine 0.6 mg capsule Commonly known as:  Ameena Thien Take 1 Cap by mouth daily. Take two tablets by mouth now and the one tablet by mouth one hour later  
  
 cyclobenzaprine 5 mg tablet Commonly known as:  FLEXERIL Take 10 mg by mouth three (3) times daily as needed for Muscle Spasm(s). FLUoxetine 60 mg Tab Take 60 mg by mouth daily. furosemide 20 mg tablet Commonly known as:  LASIX Take 1 Tab by mouth daily. gabapentin 300 mg capsule Commonly known as:  NEURONTIN Take 1 Cap by mouth three (3) times daily. HumaLOG KwikPen 100 unit/mL kwikpen Generic drug:  insulin lispro  
by SubCUTAneous route Before breakfast, lunch, dinner and at bedtime. Sliding Scale HYDROcodone-acetaminophen 5-325 mg per tablet Commonly known as:  [place] Take 1 Tab by mouth as needed for Pain. ibuprofen 200 mg tablet Commonly known as:  MOTRIN Take 3 Tabs by mouth three (3) times daily (with meals). LANTUS SOLOSTAR 100 unit/mL (3 mL) pen Generic drug:  insulin glargine INJECT 40 UNITS UNDER THE SKIN D  
  
 levonorgestrel 20 mcg/24 hr (5 years) IUD Commonly known as:  MIRENA  
1 Each by IntraUTERine route once. lisinopril 10 mg tablet Commonly known as:  Catrachito Headings Take 1 Tab by mouth daily. ondansetron 4 mg disintegrating tablet Commonly known as:  ZOFRAN ODT Take 1 Tab by mouth every eight (8) hours as needed for Nausea. [place] Mathews LANCETS [facility] Generic drug:  lancets TEST SIX TIMES A DAY  
  
 ONETOUCH VERIO strip Generic drug:  glucose blood VI test strips TEST SIX TIMES A DAY  
  
 simvastatin 20 mg tablet Commonly known as:  ZOCOR Take 1 Tab by mouth nightly. traMADol 50 mg tablet Commonly known as:  ULTRAM  
Take 1 Tab by mouth every six (6) hours as needed for Pain. Max Daily Amount: 200 mg.  
  
 traZODone 50 mg tablet Commonly known as:  Maralyn Lowers Take 1/2 to 1 tablet by mouth at bedtime PRN insomnia. valACYclovir 1 gram tablet Commonly known as:  VALTREX Take 1 Tab by mouth daily. Introducing [facility]! [place] [employer] introduces [employer] patient portal. Now you can access parts of your medical record, email your doctor's office, and request medication refills online. 1. In your internet browser, go to [email or web address] 2. Click on the First Time User? Click Here link in the Sign In box. You will see the New Member Sign Up page. 3. Enter your [employer] Access Code exactly as it appears below. You will not need to use this code after youve completed the sign-up process. If you do not sign up before the expiration date, you must request a new code. · [employer] Access Code: [email or web address] Expires: 6/14/2017  3:20 PM 
 
4. Enter the last four digits of your Social Security Number (xxxx) and Date of Birth (mm/dd/yyyy) as indicated and click Submit. You will be taken to the next sign-up page. 5. Create a Jobspottingt ID. This will be your [employer] login ID and cannot be changed, so think of one that is secure and easy to remember. 6. Create a [employer] password. You can change your password at any time. 7. Enter your Password Reset Question and Answer. This can be used at a later time if you forget your password. 8. Enter your e-mail address. You will receive e-mail notification when new information is available in [address] 9. Click Sign Up. You can now view and download portions of your medical record. 10. Click the Download Summary menu link to download a portable copy of your medical information. If you have questions, please visit the Frequently Asked Questions section of the [employer] website. Remember, [employer] is NOT to be used for urgent needs. For medical emergencies, dial 911. Now available from your iPhone and Android! Please provide this summary of care documentation to your next provider. Your primary care clinician is listed as Frances Lopez. If you have any questions after today's visit, please call [phone].

## 2017-05-09 ENCOUNTER — APPOINTMENT (OUTPATIENT)
Dept: GENERAL RADIOLOGY | Age: 37
End: 2017-05-09
Attending: NURSE PRACTITIONER

## 2017-05-09 ENCOUNTER — HOSPITAL ENCOUNTER (EMERGENCY)
Age: 37
Discharge: OTHER INSTITUTION | End: 2017-05-09
Attending: FAMILY MEDICINE

## 2017-05-09 ENCOUNTER — HOSPITAL ENCOUNTER (EMERGENCY)
Age: 37
Discharge: HOME | End: 2017-05-09
Attending: INTERNAL MEDICINE
Payer: MEDICAID

## 2017-05-09 VITALS
DIASTOLIC BLOOD PRESSURE: 89 MMHG | OXYGEN SATURATION: 99 % | HEIGHT: 64 IN | TEMPERATURE: 98.9 F | HEART RATE: 88 BPM | WEIGHT: 240 LBS | SYSTOLIC BLOOD PRESSURE: 144 MMHG | RESPIRATION RATE: 18 BRPM | BODY MASS INDEX: 40.97 KG/M2

## 2017-05-09 VITALS
RESPIRATION RATE: 18 BRPM | BODY MASS INDEX: 40.97 KG/M2 | OXYGEN SATURATION: 98 % | SYSTOLIC BLOOD PRESSURE: 138 MMHG | WEIGHT: 240 LBS | DIASTOLIC BLOOD PRESSURE: 88 MMHG | TEMPERATURE: 97.9 F | HEIGHT: 64 IN | HEART RATE: 92 BPM

## 2017-05-09 DIAGNOSIS — E11.65 UNCONTROLLED TYPE 2 DIABETES MELLITUS WITH COMPLICATION, UNSPECIFIED LONG TERM INSULIN USE STATUS: ICD-10-CM

## 2017-05-09 DIAGNOSIS — E11.8 UNCONTROLLED TYPE 2 DIABETES MELLITUS WITH COMPLICATION, UNSPECIFIED LONG TERM INSULIN USE STATUS: ICD-10-CM

## 2017-05-09 DIAGNOSIS — J06.9 ACUTE UPPER RESPIRATORY INFECTION: Primary | ICD-10-CM

## 2017-05-09 DIAGNOSIS — R05.9 COUGH: ICD-10-CM

## 2017-05-09 DIAGNOSIS — R73.9 HYPERGLYCEMIA: Primary | ICD-10-CM

## 2017-05-09 LAB
BASOPHILS # BLD AUTO: 0 K/UL (ref 0–0.1)
BASOPHILS # BLD: 0 % (ref 0–1)
EOSINOPHIL # BLD: 0.2 K/UL (ref 0–0.4)
EOSINOPHIL NFR BLD: 2 % (ref 0–7)
ERYTHROCYTE [DISTWIDTH] IN BLOOD BY AUTOMATED COUNT: 13.4 % (ref 11.5–14.5)
GLUCOSE BLD STRIP.AUTO-MCNC: 302 MG/DL (ref 65–100)
GLUCOSE BLD STRIP.AUTO-MCNC: 348 MG/DL (ref 65–100)
GLUCOSE BLD STRIP.AUTO-MCNC: 469 MG/DL (ref 65–100)
GLUCOSE BLD STRIP.AUTO-MCNC: 498 MG/DL (ref 65–100)
HCT VFR BLD AUTO: 33.6 % (ref 35–47)
HGB BLD-MCNC: 11.1 G/DL (ref 11.5–16)
KETONES SERPL QL: NEGATIVE
LYMPHOCYTES # BLD AUTO: 26 % (ref 12–49)
LYMPHOCYTES # BLD: 2.8 K/UL (ref 0.8–3.5)
MCH RBC QN AUTO: 25.6 PG (ref 26–34)
MCHC RBC AUTO-ENTMCNC: 33 G/DL (ref 30–36.5)
MCV RBC AUTO: 77.4 FL (ref 80–99)
MONOCYTES # BLD: 0.6 K/UL (ref 0–1)
MONOCYTES NFR BLD AUTO: 6 % (ref 5–13)
NEUTS SEG # BLD: 7.2 K/UL (ref 1.8–8)
NEUTS SEG NFR BLD AUTO: 66 % (ref 32–75)
PLATELET # BLD AUTO: 219 K/UL (ref 150–400)
RBC # BLD AUTO: 4.34 M/UL (ref 3.8–5.2)
SERVICE CMNT-IMP: ABNORMAL
WBC # BLD AUTO: 10.7 K/UL (ref 3.6–11)

## 2017-05-09 PROCEDURE — 74011636637 HC RX REV CODE- 636/637: Performed by: INTERNAL MEDICINE

## 2017-05-09 PROCEDURE — 36415 COLL VENOUS BLD VENIPUNCTURE: CPT | Performed by: INTERNAL MEDICINE

## 2017-05-09 PROCEDURE — 82962 GLUCOSE BLOOD TEST: CPT

## 2017-05-09 PROCEDURE — 99284 EMERGENCY DEPT VISIT MOD MDM: CPT

## 2017-05-09 PROCEDURE — 96361 HYDRATE IV INFUSION ADD-ON: CPT

## 2017-05-09 PROCEDURE — 85025 COMPLETE CBC W/AUTO DIFF WBC: CPT | Performed by: INTERNAL MEDICINE

## 2017-05-09 PROCEDURE — 74011250636 HC RX REV CODE- 250/636: Performed by: INTERNAL MEDICINE

## 2017-05-09 PROCEDURE — 96374 THER/PROPH/DIAG INJ IV PUSH: CPT

## 2017-05-09 PROCEDURE — 80047 BASIC METABLC PNL IONIZED CA: CPT

## 2017-05-09 PROCEDURE — 82009 KETONE BODYS QUAL: CPT | Performed by: INTERNAL MEDICINE

## 2017-05-09 RX ORDER — FLUTICASONE PROPIONATE 50 MCG
2 SPRAY, SUSPENSION (ML) NASAL DAILY
Qty: 1 BOTTLE | Refills: 0 | Status: SHIPPED | OUTPATIENT
Start: 2017-05-09 | End: 2017-05-14

## 2017-05-09 RX ORDER — SODIUM CHLORIDE 0.9 % (FLUSH) 0.9 %
5-10 SYRINGE (ML) INJECTION AS NEEDED
Status: DISCONTINUED | OUTPATIENT
Start: 2017-05-09 | End: 2017-05-10 | Stop reason: HOSPADM

## 2017-05-09 RX ORDER — SODIUM CHLORIDE 0.9 % (FLUSH) 0.9 %
5-10 SYRINGE (ML) INJECTION EVERY 8 HOURS
Status: DISCONTINUED | OUTPATIENT
Start: 2017-05-09 | End: 2017-05-10 | Stop reason: HOSPADM

## 2017-05-09 RX ORDER — HYDROCODONE POLISTIREX AND CHLORPHENIRAMINE POLISTIREX 10; 8 MG/5ML; MG/5ML
2.5 SUSPENSION, EXTENDED RELEASE ORAL
Qty: 15 ML | Refills: 0 | Status: SHIPPED | OUTPATIENT
Start: 2017-05-09 | End: 2017-05-12

## 2017-05-09 RX ADMIN — SODIUM CHLORIDE 1000 ML: 900 INJECTION, SOLUTION INTRAVENOUS at 20:02

## 2017-05-09 RX ADMIN — HUMAN INSULIN 11 UNITS: 100 INJECTION, SOLUTION SUBCUTANEOUS at 20:02

## 2017-05-09 NOTE — DISCHARGE INSTRUCTIONS
Cough: Care Instructions  Your Care Instructions  A cough is your body's response to something that bothers your throat or airways. Many things can cause a cough. You might cough because of a cold or the flu, bronchitis, or asthma. Smoking, postnasal drip, allergies, and stomach acid that backs up into your throat also can cause coughs. A cough is a symptom, not a disease. Most coughs stop when the cause, such as a cold, goes away. You can take a few steps at home to cough less and feel better. Follow-up care is a key part of your treatment and safety. Be sure to make and go to all appointments, and call your doctor if you are having problems. It's also a good idea to know your test results and keep a list of the medicines you take. How can you care for yourself at home? · Drink lots of water and other fluids. This helps thin the mucus and soothes a dry or sore throat. Honey or lemon juice in hot water or tea may ease a dry cough. · Take cough medicine as directed by your doctor. · Prop up your head on pillows to help you breathe and ease a dry cough. · Try cough drops to soothe a dry or sore throat. Cough drops don't stop a cough. Medicine-flavored cough drops are no better than candy-flavored drops or hard candy. · Do not smoke. Avoid secondhand smoke. If you need help quitting, talk to your doctor about stop-smoking programs and medicines. These can increase your chances of quitting for good. When should you call for help? Call 911 anytime you think you may need emergency care. For example, call if:  · You have severe trouble breathing. Call your doctor now or seek immediate medical care if:  · You cough up blood. · You have new or worse trouble breathing. · You have a new or higher fever. · You have a new rash.   Watch closely for changes in your health, and be sure to contact your doctor if:  · You cough more deeply or more often, especially if you notice more mucus or a change in the color of your mucus. · You have new symptoms, such as a sore throat, an earache, or sinus pain. · You do not get better as expected. Where can you learn more? Go to http://tommy-zane.info/. Enter D279 in the search box to learn more about \"Cough: Care Instructions. \"  Current as of: May 27, 2016  Content Version: 11.2  © 2112-3112 [employer]. Care instructions adapted under license by [employer] (which disclaims liability or warranty for this information). If you have questions about a medical condition or this instruction, always ask your healthcare professional. [place] [address] any warranty or liability for your use of this information. Learning About High Blood Sugar  What is high blood sugar? Your body turns the food you eat into glucose (sugar), which it uses for energy. But if your body isn't able to use the sugar right away, it can build up in your blood and lead to high blood sugar. When the amount of sugar in your blood stays too high for too much of the time, you may have diabetes. Diabetes is a disease that can cause serious health problems. The good news is that lifestyle changes may help you get your blood sugar back to normal and avoid or delay diabetes. What causes high blood sugar? Sugar (glucose) can build up in your blood if you:  · Are overweight. · Have a family history of diabetes. · Take certain medicines, such as steroids. What are the symptoms? Having high blood sugar may not cause any symptoms at all. Or it may make you feel very thirsty or very hungry. You may also urinate more often than usual, have blurry vision, or lose weight without trying. How is high blood sugar treated? You can take steps to lower your blood sugar level if you understand what makes it get higher. Your doctor may want you to learn how to test your blood sugar level at home.  Then you can see how illness, stress, or different kinds of food or medicine raise or lower your blood sugar level. Other tests may be needed to see if you have diabetes. How can you prevent high blood sugar? · Watch your weight. If you're overweight, losing just a small amount of weight may help. Reducing fat around your waist is most important. · Limit the amount of calories, sweets, and unhealthy fat you eat. Ask your doctor if a dietitian can help you. A registered dietitian can help you create meal plans that fit your lifestyle. · Get at least 30 minutes of exercise on most days of the week. Exercise helps control your blood sugar. It also helps you maintain a healthy weight. Walking is a good choice. You also may want to do other activities, such as running, swimming, cycling, or playing tennis or team sports. · If your doctor prescribed medicines, take them exactly as prescribed. Call your doctor if you think you are having a problem with your medicine. You will get more details on the specific medicines your doctor prescribes. Follow-up care is a key part of your treatment and safety. Be sure to make and go to all appointments, and call your doctor if you are having problems. It's also a good idea to know your test results and keep a list of the medicines you take. Where can you learn more? Go to http://tommy-zane.info/. Enter O108 in the search box to learn more about \"Learning About High Blood Sugar. \"  Current as of: May 23, 2016  Content Version: 11.2  © 2672-0907 [employer]. Care instructions adapted under license by [employer] (which disclaims liability or warranty for this information). If you have questions about a medical condition or this instruction, always ask your healthcare professional. [address] any warranty or liability for your use of this information.

## 2017-05-09 NOTE — UC PROVIDER NOTE
Patient is a 39 y.o. female presenting with cold symptoms. The history is provided by the patient. No  was used. Cold Symptoms    This is a new problem. The current episode started more than 1 week ago. The problem has not changed since onset. There has been no fever. Associated symptoms include congestion, sinus pain and cough. Pertinent negatives include no chest pain, no abdominal pain, no diarrhea, no nausea and no wheezing. Associated symptoms comments: Elevated blood sugar. She has tried nothing for the symptoms. The treatment provided no relief. Past Medical History:   Diagnosis Date    Depression     Diabetes ([address]     Edema     Gout     Herpes genitalis     Hypertension     Ill-defined condition     gout    Mood disorder (HCC)     Other ill-defined conditions     cholesterol     Psychiatric disorder     depression    Psychiatric disorder     PTSD    Sleep disorder     UTI (lower urinary tract infection)         Past Surgical History:   Procedure Laterality Date    HX GYN       X 3         Family History   Problem Relation Age of Onset    Hypertension Mother     Coronary Artery Disease Mother     Diabetes Father     Stroke Father 46     pt estimates    Kidney Disease Father      secondary to diabetes        Social History     Social History    Marital status: SINGLE     Spouse name: N/A    Number of children: N/A    Years of education: N/A     Occupational History    CNA      [employer]    nursing student      [employer]     Social History Main Topics    Smoking status: Never Smoker    Smokeless tobacco: Never Used    Alcohol use No    Drug use: No    Sexual activity: Yes     Partners: Male     Birth control/ protection: IUD     Other Topics Concern    Exercise Yes     Line Dancing with Son 1x/week, walking regularly, yoga occasionally     Social History Narrative    Lives with son (12 yo).      Boyfriend x 2 years is currently staying with pt and helping her. ALLERGIES: Review of patient's allergies indicates no known allergies. Review of Systems   Constitutional: Negative. HENT: Positive for congestion and sinus pressure. Eyes: Negative. Respiratory: Positive for cough. Negative for wheezing. Cardiovascular: Negative. Negative for chest pain. Gastrointestinal: Negative. Negative for abdominal pain, diarrhea and nausea. Endocrine: Negative. Genitourinary: Negative. Musculoskeletal: Negative. Skin: Negative. Allergic/Immunologic: Negative. Neurological: Negative. Hematological: Negative. Psychiatric/Behavioral: Negative. Vitals:    05/09/17 1801   BP: 138/88   Pulse: 92   Resp: 18   Temp: 97.9 °F (36.6 °C)   SpO2: 98%   Weight: 108.9 kg (240 lb)   Height: 5' 4\" (1.626 m)       Physical Exam   Constitutional: She is oriented to person, place, and time. She appears well-developed and well-nourished. HENT:   Head: Normocephalic and atraumatic. Right Ear: External ear normal.   Left Ear: External ear normal.   Nose: Nose normal.   Mouth/Throat: Oropharynx is clear and moist.   Eyes: Conjunctivae and EOM are normal. Pupils are equal, round, and reactive to light. Neck: Normal range of motion. Neck supple. Cardiovascular: Normal rate, regular rhythm and normal heart sounds. Pulmonary/Chest: Effort normal and breath sounds normal.   Musculoskeletal: Normal range of motion. Neurological: She is alert and oriented to person, place, and time. Skin: Skin is warm and dry. Psychiatric: She has a normal mood and affect. Her behavior is normal. Judgment and thought content normal.   Nursing note and vitals reviewed. MDM     Differential Diagnosis; Clinical Impression; Plan:     CLINICAL IMPRESSION:  Hyperglycemia  (primary encounter diagnosis)  Cough    Plan:  1. Uncontrolled Diabetes in T2DM  2. I am recommending that you go to the ER for further management of your diabetes.  You have conveyed an understanding and state that you will go to [facility]. 3.   Amount and/or Complexity of Data Reviewed:   Clinical lab tests:  Ordered and reviewed  Tests in the radiology section of CPT®:  Ordered and reviewed  Risk of Significant Complications, Morbidity, and/or Mortality:   Presenting problems: Moderate  Diagnostic procedures:   Moderate  Progress:   Patient progress:  Stable      Procedures

## 2017-05-09 NOTE — ED PROVIDER NOTES
HPI Comments: Noah Jasmine is a 39 y.o. female with PMHx of DM / HTN / gout who presents ambulatory to the ED seeking treatment for elevated blood glucose levels. Pt states that she has also been experiencing chills, unproductive cough, and generalized myalgia x 1 week. Pt states that she went to the [facility] clinic earlier today seeking treatment for cold symptoms. She notes that they tested her BGL while she was there and found that it was 498, at which time they referred her to the ED. Pt notes that she has a history of DM and regularly takes Humalog and takes Lantus insulin every evening. She states that she tests her BGL before and after every meal and denies any recent changes in her eating habits. Pt states that she has BL ankle edema but that this is not beyond baseline. She states that her LMP was two years ago but that this is typical since she has started treatment with Mirena. Pt specifically denies nausea, vomiting, diarrhea, SOB, or CP. Social hx: - Tobacco use, - EtOH use, - Illicit drug use    PCP: Eugene Montemayor MD    There are no other complaints, changes or physical findings at this time. The history is provided by the patient. No  was used.         Past Medical History:   Diagnosis Date    Depression     Diabetes ([facility] [address]     Edema     Gout     Herpes genitalis     Hypertension     Ill-defined condition     gout    Mood disorder (HCC)     Other ill-defined conditions     cholesterol     Psychiatric disorder     depression    Psychiatric disorder     PTSD    Sleep disorder     UTI (lower urinary tract infection)        Past Surgical History:   Procedure Laterality Date    HX GYN       X 3         Family History:   Problem Relation Age of Onset    Hypertension Mother     Coronary Artery Disease Mother     Diabetes Father     Stroke Father 46     pt estimates    Kidney Disease Father      secondary to diabetes       Social History Social History    Marital status: SINGLE     Spouse name: N/A    Number of children: N/A    Years of education: N/A     Occupational History    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 Boyfriend x 2 years is currently staying with pt and helping her. ALLERGIES: Review of patient's allergies indicates no known allergies. Review of Systems   Constitutional: Positive for chills. Negative for fever. HENT: Negative. Eyes: Negative. Respiratory: Positive for cough. Negative for shortness of breath and wheezing. Cardiovascular: Negative. Negative for chest pain. Gastrointestinal: Negative. Negative for abdominal pain, diarrhea, nausea and vomiting. Genitourinary: Negative. Negative for difficulty urinating, dysuria and vaginal pain. Musculoskeletal: Positive for myalgias. Skin: Negative. Neurological: Negative. Psychiatric/Behavioral: Negative. Patient Vitals for the past 12 hrs:   Temp Pulse Resp BP SpO2   05/09/17 1934 98.9 °F (37.2 °C) 88 18 144/89 99 %       Physical Exam   Constitutional: She is oriented to person, place, and time. She appears well-developed and well-nourished. No distress. Moderate obesity   HENT:   Head: Normocephalic and atraumatic. Nasal congestion   Eyes: EOM are normal. Pupils are equal, round, and reactive to light. Neck: Normal range of motion. Neck supple. Cardiovascular: Normal rate, normal heart sounds and intact distal pulses.     1+ pitting edema BL ankles   Pulmonary/Chest: Effort normal and breath sounds normal. No respiratory distress. Abdominal: Soft. Bowel sounds are normal. She exhibits no distension. There is no tenderness. Musculoskeletal: Normal range of motion. She exhibits no edema or tenderness. Neurological: She is alert and oriented to person, place, and time. Skin: Skin is warm and dry. No rash noted. Psychiatric: She has a normal mood and affect. Her behavior is normal.   Nursing note and vitals reviewed. MDM  Number of Diagnoses or Management Options  Diagnosis management comments: DDx: diabetics ketoacidosis, uncontrolled type II DM, hyperosmolar state, URI, PNA       Amount and/or Complexity of Data Reviewed  Clinical lab tests: ordered and reviewed  Review and summarize past medical records: yes    Patient Progress  Patient progress: stable    ED Course     Procedures    LABORATORY TESTS:  Recent Results (from the past 12 hour(s))   GLUCOSE, POC    Collection Time: 05/09/17  6:14 PM   Result Value Ref Range    Glucose (POC) 498 (H) 65 - 100 mg/dL    Performed by Ezio Buenrostro, POC    Collection Time: 05/09/17  7:40 PM   Result Value Ref Range    Glucose (POC) 469 (H) 65 - 100 mg/dL    Performed by Dayna Barajas    CBC WITH AUTOMATED DIFF    Collection Time: 05/09/17  8:02 PM   Result Value Ref Range    WBC 10.7 3.6 - 11.0 K/uL    RBC 4.34 3.80 - 5.20 M/uL    HGB 11.1 (L) 11.5 - 16.0 g/dL    HCT 33.6 (L) 35.0 - 47.0 %    MCV 77.4 (L) 80.0 - 99.0 FL    MCH 25.6 (L) 26.0 - 34.0 PG    MCHC 33.0 30.0 - 36.5 g/dL    RDW 13.4 11.5 - 14.5 %    PLATELET 610 608 - 835 K/uL    NEUTROPHILS 66 32 - 75 %    LYMPHOCYTES 26 12 - 49 %    MONOCYTES 6 5 - 13 %    EOSINOPHILS 2 0 - 7 %    BASOPHILS 0 0 - 1 %    ABS. NEUTROPHILS 7.2 1.8 - 8.0 K/UL    ABS. LYMPHOCYTES 2.8 0.8 - 3.5 K/UL    ABS. MONOCYTES 0.6 0.0 - 1.0 K/UL    ABS. EOSINOPHILS 0.2 0.0 - 0.4 K/UL    ABS.  BASOPHILS 0.0 0.0 - 0.1 K/UL   ACETONE/KETONE, QL    Collection Time: 05/09/17  8:03 PM   Result Value Ref Range    Acetone/Ketone serum, Ql. NEGATIVE  NEG        GLUCOSE, POC    Collection Time: 05/09/17  8:31 PM   Result Value Ref Range    Glucose (POC) 348 (H) 65 - 100 mg/dL    Performed by Rachana Espana    GLUCOSE, POC    Collection Time: 05/09/17  9:04 PM   Result Value Ref Range    Glucose (POC) 302 (H) 65 - 100 mg/dL    Performed by Rachana Espana        MEDICATIONS GIVEN:  Medications   sodium chloride (NS) flush 5-10 mL (not administered)   sodium chloride (NS) flush 5-10 mL (not administered)   sodium chloride 0.9 % bolus infusion 1,000 mL (1,000 mL IntraVENous New Bag 5/9/17 2002)   insulin regular (NOVOLIN R, HUMULIN R) injection 11 Units (11 Units IntraVENous Given 5/9/17 2002)       IMPRESSION:  No diagnosis found. PLAN:  1. Current Discharge Medication List        2. Follow-up Information     None        Return to ED if worse     DISCHARGE NOTE  9:08 PM  The patient has been re-evaluated and is ready for discharge. Reviewed available results with patient. Counseled pt on diagnosis and care plan. Pt has expressed understanding, and all questions have been answered. Pt agrees with plan and agrees to F/U as recommended, or return to the ED if their sxs worsen. Discharge instructions have been provided and explained to the pt, along with reasons to return to the ED. This note is prepared by Pat Sams, acting as Scribe for Nathalie Carrington MD.    Nathalie Carrington MD: The scribe's documentation has been prepared under my direction and personally reviewed by me in its entirety. I confirm that the note above accurately reflects all work, treatment, procedures, and medical decision making performed by me.

## 2017-05-10 LAB
ANION GAP BLD CALC-SCNC: 16 MMOL/L (ref 5–15)
BUN BLD-MCNC: 7 MG/DL (ref 9–20)
CA-I BLD-MCNC: 1.18 MMOL/L (ref 1.12–1.32)
CHLORIDE BLD-SCNC: 97 MMOL/L (ref 98–107)
CO2 BLD-SCNC: 25 MMOL/L (ref 21–32)
CREAT BLD-MCNC: 0.6 MG/DL (ref 0.6–1.3)
GLUCOSE BLD-MCNC: 493 MG/DL (ref 65–100)
HCT VFR BLD CALC: 36 % (ref 35–47)
HGB BLD-MCNC: 12.2 GM/DL (ref 11.5–16)
POTASSIUM BLD-SCNC: 3.9 MMOL/L (ref 3.5–5.1)
SERVICE CMNT-IMP: ABNORMAL
SODIUM BLD-SCNC: 133 MMOL/L (ref 136–145)

## 2017-05-10 NOTE — ED NOTES
Pt presents ambulatory to ED complaining of hyperglycemia. Pt states she was seen at urgent care today for her cold symptoms, however they stated they could not treat her due to her BG being elevated. Pt states she did take her insulin today and her lantus last night. Pt is alert and oriented x 4, RR even and unlabored, skin is warm and dry. Assesment completed and pt updated on plan of care. Emergency Department Nursing Plan of Care       The Nursing Plan of Care is developed from the Nursing assessment and Emergency Department Attending provider initial evaluation. The plan of care may be reviewed in the ED Provider note.     The Plan of Care was developed with the following considerations:   Patient / Family readiness to learn indicated by:verbalized understanding  Persons(s) to be included in education: patient  Barriers to Learning/Limitations:No    Signed     Ashlyn Gallagher RN    5/9/2017   8:18 PM

## 2017-05-10 NOTE — DISCHARGE INSTRUCTIONS
Learning About Diabetes Food Guidelines  Your Care Instructions  Meal planning is important to manage diabetes. It helps keep your blood sugar at a target level (which you set with your doctor). You don't have to eat special foods. You can eat what your family eats, including sweets once in a while. But you do have to pay attention to how often you eat and how much you eat of certain foods. You may want to work with a dietitian or a certified diabetes educator (CDE) to help you plan meals and snacks. A dietitian or CDE can also help you lose weight if that is one of your goals. What should you know about eating carbs? Managing the amount of carbohydrate (carbs) you eat is an important part of healthy meals when you have diabetes. Carbohydrate is found in many foods. · Learn which foods have carbs. And learn the amounts of carbs in different foods. ¨ Bread, cereal, pasta, and rice have about 15 grams of carbs in a serving. A serving is 1 slice of bread (1 ounce), ½ cup of cooked cereal, or 1/3 cup of cooked pasta or rice. ¨ Fruits have 15 grams of carbs in a serving. A serving is 1 small fresh fruit, such as an apple or orange; ½ of a banana; ½ cup of cooked or canned fruit; ½ cup of fruit juice; 1 cup of melon or raspberries; or 2 tablespoons of dried fruit. ¨ Milk and no-sugar-added yogurt have 15 grams of carbs in a serving. A serving is 1 cup of milk or 2/3 cup of no-sugar-added yogurt. ¨ Starchy vegetables have 15 grams of carbs in a serving. A serving is ½ cup of mashed potatoes or sweet potato; 1 cup winter squash; ½ of a small baked potato; ½ cup of cooked beans; or ½ cup cooked corn or green peas. · Learn how much carbs to eat each day and at each meal. A dietitian or CDE can teach you how to keep track of the amount of carbs you eat. This is called carbohydrate counting. · If you are not sure how to count carbohydrate grams, use the Plate Method to plan meals.  It is a good, quick way to make sure that you have a balanced meal. It also helps you spread carbs throughout the day. ¨ Divide your plate by types of foods. Put non-starchy vegetables on half the plate, meat or other protein food on one-quarter of the plate, and a grain or starchy vegetable in the final quarter of the plate. To this you can add a small piece of fruit and 1 cup of milk or yogurt, depending on how many carbs you are supposed to eat at a meal.  · Try to eat about the same amount of carbs at each meal. Do not \"save up\" your daily allowance of carbs to eat at one meal.  · Proteins have very little or no carbs per serving. Examples of proteins are beef, chicken, turkey, fish, eggs, tofu, cheese, cottage cheese, and peanut butter. A serving size of meat is 3 ounces, which is about the size of a deck of cards. Examples of meat substitute serving sizes (equal to 1 ounce of meat) are 1/4 cup of cottage cheese, 1 egg, 1 tablespoon of peanut butter, and ½ cup of tofu. How can you eat out and still eat healthy? · Learn to estimate the serving sizes of foods that have carbohydrate. If you measure food at home, it will be easier to estimate the amount in a serving of restaurant food. · If the meal you order has too much carbohydrate (such as potatoes, corn, or baked beans), ask to have a low-carbohydrate food instead. Ask for a salad or green vegetables. · If you use insulin, check your blood sugar before and after eating out to help you plan how much to eat in the future. · If you eat more carbohydrate at a meal than you had planned, take a walk or do other exercise. This will help lower your blood sugar. What else should you know? · Limit saturated fat, such as the fat from meat and dairy products. This is a healthy choice because people who have diabetes are at higher risk of heart disease. So choose lean cuts of meat and nonfat or low-fat dairy products. Use olive or canola oil instead of butter or shortening when cooking.   · Don't skip meals. Your blood sugar may drop too low if you skip meals and take insulin or certain medicines for diabetes. · Check with your doctor before you drink alcohol. Alcohol can cause your blood sugar to drop too low. Alcohol can also cause a bad reaction if you take certain diabetes medicines. Follow-up care is a key part of your treatment and safety. Be sure to make and go to all appointments, and call your doctor if you are having problems. It's also a good idea to know your test results and keep a list of the medicines you take. Where can you learn more? Go to http://tommyOpen Utilityzane.info/. Enter K121 in the search box to learn more about \"Learning About Diabetes Food Guidelines. \"  Current as of: May 23, 2016  Content Version: 11.2  © 3713-4322 [email or web address]. Care instructions adapted under license by [employer] (which disclaims liability or warranty for this information). If you have questions about a medical condition or this instruction, always ask your healthcare professional. [place] [address] any warranty or liability for your use of this information. Learning About COPD and Upper Respiratory Infections  What are upper respiratory infections? An upper respiratory infection, also called a URI, is an infection of the nose, sinuses, or throat. Viruses or bacteria can cause URIs. Colds, the flu, and sinusitis are examples of URIs. These infections are spread by coughs, sneezes, and close contact. How do these infections affect COPD? A URI can worsen COPD symptoms, such as having too much mucus in your lungs, coughing, or being short of breath. What can you do to manage most infections at home? · Do not smoke. Avoid secondhand smoke. · Take an over-the-counter pain medicine, such as acetaminophen (Tylenol), ibuprofen (Advil, Motrin), or naproxen (Aleve). Read and follow all instructions on the label.   · Be careful when taking over-the-counter cold or flu medicines and Tylenol at the same time. Many of these medicines have acetaminophen, which is Tylenol. Read the labels to make sure that you are not taking more than the recommended dose. Too much acetaminophen (Tylenol) can be harmful. · If your doctor prescribed antibiotics, take them as directed. Do not stop taking them just because you feel better. You need to take the full course of antibiotics. · Ask your doctor about cough medicines and decongestants. Some doctors recommend these medicines, while others feel that they do not help. · Learn breathing techniques for COPD, such as breathing through pursed lips. These techniques can help you breathe easier. What can you do to prevent these infections? Stay healthy  · Do not smoke. This is the most important step you can take to prevent more damage to your lungs. If you need help quitting, talk to your doctor about stop-smoking programs and medicines. These can increase your chances of quitting for good. · Avoid secondhand smoke, air pollution, and high altitudes. Also avoid cold, dry air and hot, humid air. Stay at home with your windows closed when air pollution is bad. · Get a flu shot every year. · Get a pneumococcal vaccine shot. If you have had one before, ask your doctor whether you need another dose. · If you must be around people with colds or the flu, wash your hands often. Exercise and eat well  · If your doctor recommends it, get more exercise. Walking is a good choice. Bit by bit, increase the amount you walk every day. Try for at least 30 minutes on most days of the week. · Eat regular, well-balanced meals. Eating right keeps your energy levels up and helps your body fight infection. · Get plenty of rest and sleep. Follow-up care is a key part of your treatment and safety. Be sure to make and go to all appointments, and call your doctor if you are having problems.  It's also a good idea to know your test results and keep a list of the medicines you take. Where can you learn more? Go to http://tommy-zane.info/. Enter R221 in the search box to learn more about \"Learning About COPD and Upper Respiratory Infections. \"  Current as of: May 23, 2016  Content Version: 11.2  © 4429-8294 [employer]. Care instructions adapted under license by [employer] (which disclaims liability or warranty for this information). If you have questions about a medical condition or this instruction, always ask your healthcare professional. [address] any warranty or liability for your use of this information. Upper Respiratory Infection (Cold): Care Instructions  Your Care Instructions    An upper respiratory infection, or URI, is an infection of the nose, sinuses, or throat. URIs are spread by coughs, sneezes, and direct contact. The common cold is the most frequent kind of URI. The flu and sinus infections are other kinds of URIs. Almost all URIs are caused by viruses. Antibiotics won't cure them. But you can treat most infections with home care. This may include drinking lots of fluids and taking over-the-counter pain medicine. You will probably feel better in 4 to 10 days. The doctor has checked you carefully, but problems can develop later. If you notice any problems or new symptoms, get medical treatment right away. Follow-up care is a key part of your treatment and safety. Be sure to make and go to all appointments, and call your doctor if you are having problems. It's also a good idea to know your test results and keep a list of the medicines you take. How can you care for yourself at home? · To prevent dehydration, drink plenty of fluids, enough so that your urine is light yellow or clear like water. Choose water and other caffeine-free clear liquids until you feel better.  If you have kidney, heart, or liver disease and have to limit fluids, talk with your doctor before you increase the amount of fluids you drink. · Take an over-the-counter pain medicine, such as acetaminophen (Tylenol), ibuprofen (Advil, Motrin), or naproxen (Aleve). Read and follow all instructions on the label. · Before you use cough and cold medicines, check the label. These medicines may not be safe for young children or for people with certain health problems. · Be careful when taking over-the-counter cold or flu medicines and Tylenol at the same time. Many of these medicines have acetaminophen, which is Tylenol. Read the labels to make sure that you are not taking more than the recommended dose. Too much acetaminophen (Tylenol) can be harmful. · Get plenty of rest.  · Do not smoke or allow others to smoke around you. If you need help quitting, talk to your doctor about stop-smoking programs and medicines. These can increase your chances of quitting for good. When should you call for help? Call 911 anytime you think you may need emergency care. For example, call if:  · You have severe trouble breathing. Call your doctor now or seek immediate medical care if:  · You seem to be getting much sicker. · You have new or worse trouble breathing. · You have a new or higher fever. · You have a new rash. Watch closely for changes in your health, and be sure to contact your doctor if:  · You have a new symptom, such as a sore throat, an earache, or sinus pain. · You cough more deeply or more often, especially if you notice more mucus or a change in the color of your mucus. · You do not get better as expected. Where can you learn more? Go to http://tommy-zane.info/. Enter F042 in the search box to learn more about \"Upper Respiratory Infection (Cold): Care Instructions. \"  Current as of: June 30, 2016  Content Version: 11.2  © 2813-0326 [employer].  Care instructions adapted under license by [employer] (which disclaims liability or warranty for this information). If you have questions about a medical condition or this instruction, always ask your healthcare professional. [address] any warranty or liability for your use of this information. Upper Respiratory Infection (Cold): Care Instructions  Your Care Instructions    An upper respiratory infection, or URI, is an infection of the nose, sinuses, or throat. URIs are spread by coughs, sneezes, and direct contact. The common cold is the most frequent kind of URI. The flu and sinus infections are other kinds of URIs. Almost all URIs are caused by viruses. Antibiotics won't cure them. But you can treat most infections with home care. This may include drinking lots of fluids and taking over-the-counter pain medicine. You will probably feel better in 4 to 10 days. The doctor has checked you carefully, but problems can develop later. If you notice any problems or new symptoms, get medical treatment right away. Follow-up care is a key part of your treatment and safety. Be sure to make and go to all appointments, and call your doctor if you are having problems. It's also a good idea to know your test results and keep a list of the medicines you take. How can you care for yourself at home? · To prevent dehydration, drink plenty of fluids, enough so that your urine is light yellow or clear like water. Choose water and other caffeine-free clear liquids until you feel better. If you have kidney, heart, or liver disease and have to limit fluids, talk with your doctor before you increase the amount of fluids you drink. · Take an over-the-counter pain medicine, such as acetaminophen (Tylenol), ibuprofen (Advil, Motrin), or naproxen (Aleve). Read and follow all instructions on the label. · Before you use cough and cold medicines, check the label. These medicines may not be safe for young children or for people with certain health problems.   · Be careful when taking over-the-counter cold or flu medicines and Tylenol at the same time. Many of these medicines have acetaminophen, which is Tylenol. Read the labels to make sure that you are not taking more than the recommended dose. Too much acetaminophen (Tylenol) can be harmful. · Get plenty of rest.  · Do not smoke or allow others to smoke around you. If you need help quitting, talk to your doctor about stop-smoking programs and medicines. These can increase your chances of quitting for good. When should you call for help? Call 911 anytime you think you may need emergency care. For example, call if:  · You have severe trouble breathing. Call your doctor now or seek immediate medical care if:  · You seem to be getting much sicker. · You have new or worse trouble breathing. · You have a new or higher fever. · You have a new rash. Watch closely for changes in your health, and be sure to contact your doctor if:  · You have a new symptom, such as a sore throat, an earache, or sinus pain. · You cough more deeply or more often, especially if you notice more mucus or a change in the color of your mucus. · You do not get better as expected. Where can you learn more? Go to http://tommy-zane.info/. Enter U720 in the search box to learn more about \"Upper Respiratory Infection (Cold): Care Instructions. \"  Current as of: June 30, 2016  Content Version: 11.2  © 7415-2873 [employer]. Care instructions adapted under license by [employer] (which disclaims liability or warranty for this information). If you have questions about a medical condition or this instruction, always ask your healthcare professional. [address] any warranty or liability for your use of this information.

## 2017-05-10 NOTE — ED NOTES
Discharge instructions were given to the patient by Radha Pulliam RN. The patient left the Emergency Department ambulatory, alert and oriented and in no acute distress with 2 prescriptions. The patient was encouraged to call or return to the ED for worsening issues or problems and was encouraged to schedule a follow up appointment for continuing care. The patient verbalized understanding of discharge instructions and prescriptions, all questions were answered. The patient has no further concerns at this time.

## 2017-05-10 NOTE — ED NOTES
Pt noted to be resting comfortably with family at bedside. Pt updated on plan of care, has no questions or concerns at this time.

## 2017-05-14 ENCOUNTER — APPOINTMENT (OUTPATIENT)
Dept: GENERAL RADIOLOGY | Age: 37
End: 2017-05-14
Attending: EMERGENCY MEDICINE
Payer: MEDICAID

## 2017-05-14 ENCOUNTER — HOSPITAL ENCOUNTER (EMERGENCY)
Age: 37
Discharge: HOME | End: 2017-05-15
Attending: EMERGENCY MEDICINE | Admitting: EMERGENCY MEDICINE
Payer: MEDICAID

## 2017-05-14 DIAGNOSIS — R73.9 HYPERGLYCEMIA: Primary | ICD-10-CM

## 2017-05-14 LAB
ANION GAP BLD CALC-SCNC: 11 MMOL/L (ref 5–15)
APPEARANCE UR: CLEAR
BACTERIA URNS QL MICRO: ABNORMAL /HPF
BASOPHILS # BLD AUTO: 0 K/UL (ref 0–0.1)
BASOPHILS # BLD: 0 % (ref 0–1)
BILIRUB UR QL: NEGATIVE
BUN SERPL-MCNC: 7 MG/DL (ref 6–20)
BUN/CREAT SERPL: 7 (ref 12–20)
CALCIUM SERPL-MCNC: 8.8 MG/DL (ref 8.5–10.1)
CHLORIDE SERPL-SCNC: 99 MMOL/L (ref 97–108)
CO2 SERPL-SCNC: 28 MMOL/L (ref 21–32)
COLOR UR: ABNORMAL
CREAT SERPL-MCNC: 1 MG/DL (ref 0.55–1.02)
EOSINOPHIL # BLD: 0.2 K/UL (ref 0–0.4)
EOSINOPHIL NFR BLD: 2 % (ref 0–7)
EPITH CASTS URNS QL MICRO: ABNORMAL /LPF
ERYTHROCYTE [DISTWIDTH] IN BLOOD BY AUTOMATED COUNT: 13.4 % (ref 11.5–14.5)
GLUCOSE BLD STRIP.AUTO-MCNC: 383 MG/DL (ref 65–100)
GLUCOSE BLD STRIP.AUTO-MCNC: 500 MG/DL (ref 65–100)
GLUCOSE BLD STRIP.AUTO-MCNC: 512 MG/DL (ref 65–100)
GLUCOSE SERPL-MCNC: 493 MG/DL (ref 65–100)
GLUCOSE UR STRIP.AUTO-MCNC: >1000 MG/DL
HCG UR QL: NEGATIVE
HCT VFR BLD AUTO: 35.5 % (ref 35–47)
HGB BLD-MCNC: 11.7 G/DL (ref 11.5–16)
HGB UR QL STRIP: ABNORMAL
KETONES UR QL STRIP.AUTO: NEGATIVE MG/DL
LEUKOCYTE ESTERASE UR QL STRIP.AUTO: NEGATIVE
LYMPHOCYTES # BLD AUTO: 25 % (ref 12–49)
LYMPHOCYTES # BLD: 2.3 K/UL (ref 0.8–3.5)
MCH RBC QN AUTO: 25.4 PG (ref 26–34)
MCHC RBC AUTO-ENTMCNC: 33 G/DL (ref 30–36.5)
MCV RBC AUTO: 77.2 FL (ref 80–99)
MONOCYTES # BLD: 0.5 K/UL (ref 0–1)
MONOCYTES NFR BLD AUTO: 5 % (ref 5–13)
NEUTS SEG # BLD: 6.2 K/UL (ref 1.8–8)
NEUTS SEG NFR BLD AUTO: 68 % (ref 32–75)
NITRITE UR QL STRIP.AUTO: NEGATIVE
PH UR STRIP: 6 [PH] (ref 5–8)
PLATELET # BLD AUTO: 243 K/UL (ref 150–400)
POTASSIUM SERPL-SCNC: 3.9 MMOL/L (ref 3.5–5.1)
PROT UR STRIP-MCNC: NEGATIVE MG/DL
RBC # BLD AUTO: 4.6 M/UL (ref 3.8–5.2)
RBC #/AREA URNS HPF: ABNORMAL /HPF (ref 0–5)
SERVICE CMNT-IMP: ABNORMAL
SODIUM SERPL-SCNC: 138 MMOL/L (ref 136–145)
SP GR UR REFRACTOMETRY: 1.01 (ref 1–1.03)
UA: UC IF INDICATED,UAUC: ABNORMAL
UROBILINOGEN UR QL STRIP.AUTO: 0.2 EU/DL (ref 0.2–1)
WBC # BLD AUTO: 9.2 K/UL (ref 3.6–11)
WBC URNS QL MICRO: ABNORMAL /HPF (ref 0–4)
YEAST URNS QL MICRO: PRESENT

## 2017-05-14 PROCEDURE — 74011250637 HC RX REV CODE- 250/637: Performed by: EMERGENCY MEDICINE

## 2017-05-14 PROCEDURE — 96361 HYDRATE IV INFUSION ADD-ON: CPT

## 2017-05-14 PROCEDURE — 36415 COLL VENOUS BLD VENIPUNCTURE: CPT | Performed by: EMERGENCY MEDICINE

## 2017-05-14 PROCEDURE — 87086 URINE CULTURE/COLONY COUNT: CPT | Performed by: EMERGENCY MEDICINE

## 2017-05-14 PROCEDURE — 74011636637 HC RX REV CODE- 636/637: Performed by: EMERGENCY MEDICINE

## 2017-05-14 PROCEDURE — 71010 XR CHEST PORT: CPT

## 2017-05-14 PROCEDURE — 80048 BASIC METABOLIC PNL TOTAL CA: CPT | Performed by: EMERGENCY MEDICINE

## 2017-05-14 PROCEDURE — 81025 URINE PREGNANCY TEST: CPT

## 2017-05-14 PROCEDURE — 74011250636 HC RX REV CODE- 250/636: Performed by: EMERGENCY MEDICINE

## 2017-05-14 PROCEDURE — 81001 URINALYSIS AUTO W/SCOPE: CPT | Performed by: EMERGENCY MEDICINE

## 2017-05-14 PROCEDURE — 85025 COMPLETE CBC W/AUTO DIFF WBC: CPT | Performed by: EMERGENCY MEDICINE

## 2017-05-14 PROCEDURE — 82962 GLUCOSE BLOOD TEST: CPT

## 2017-05-14 PROCEDURE — 96374 THER/PROPH/DIAG INJ IV PUSH: CPT

## 2017-05-14 PROCEDURE — 96376 TX/PRO/DX INJ SAME DRUG ADON: CPT

## 2017-05-14 PROCEDURE — 99285 EMERGENCY DEPT VISIT HI MDM: CPT

## 2017-05-14 RX ORDER — ACETAMINOPHEN 500 MG
1000 TABLET ORAL ONCE
Status: COMPLETED | OUTPATIENT
Start: 2017-05-14 | End: 2017-05-14

## 2017-05-14 RX ORDER — IBUPROFEN 400 MG/1
800 TABLET ORAL
Status: COMPLETED | OUTPATIENT
Start: 2017-05-14 | End: 2017-05-14

## 2017-05-14 RX ADMIN — ACETAMINOPHEN 1000 MG: 500 TABLET ORAL at 21:55

## 2017-05-14 RX ADMIN — HUMAN INSULIN 12 UNITS: 100 INJECTION, SOLUTION SUBCUTANEOUS at 23:54

## 2017-05-14 RX ADMIN — HUMAN INSULIN 14 UNITS: 100 INJECTION, SOLUTION SUBCUTANEOUS at 21:43

## 2017-05-14 RX ADMIN — SODIUM CHLORIDE 1000 ML: 900 INJECTION, SOLUTION INTRAVENOUS at 21:43

## 2017-05-14 RX ADMIN — SODIUM CHLORIDE 1000 ML: 900 INJECTION, SOLUTION INTRAVENOUS at 22:39

## 2017-05-14 RX ADMIN — SODIUM CHLORIDE 1000 ML: 900 INJECTION, SOLUTION INTRAVENOUS at 23:50

## 2017-05-14 RX ADMIN — IBUPROFEN 800 MG: 400 TABLET, FILM COATED ORAL at 21:55

## 2017-05-14 NOTE — LETTER
[facility] EMERGENCY DEPT 
[address] [facility] [address] [id number] [phone] Work/School Note Date: 5/14/2017 To Whom It May concern: 
 
Jany Shanks was seen and treated today in the emergency room by the following provider(s): 
Attending Provider: Vance Gregory MD. Jany Shanks may return to work on 05/16/2017. Sincerely, Aleksandr Gipson RN

## 2017-05-15 VITALS
WEIGHT: 240 LBS | HEART RATE: 77 BPM | DIASTOLIC BLOOD PRESSURE: 90 MMHG | TEMPERATURE: 97.9 F | HEIGHT: 69 IN | BODY MASS INDEX: 35.55 KG/M2 | SYSTOLIC BLOOD PRESSURE: 156 MMHG | OXYGEN SATURATION: 98 % | RESPIRATION RATE: 18 BRPM

## 2017-05-15 LAB
GLUCOSE BLD STRIP.AUTO-MCNC: 289 MG/DL (ref 65–100)
SERVICE CMNT-IMP: ABNORMAL

## 2017-05-15 PROCEDURE — 82962 GLUCOSE BLOOD TEST: CPT

## 2017-05-15 NOTE — ED NOTES
Pt presents to ED ambulatory with complaint(s) of high blood pressure, high blood sugar, headache, and productive cough x 1 day. Pt report last dose of insulin was this morning Humalog 12 units. Pt reports she takes her medication everyday. Pt is alert and oriented x4 and in no apparent distress. Emergency Department Nursing Plan of Care       The Nursing Plan of Care is developed from the Nursing assessment and Emergency Department Attending provider initial evaluation. The plan of care may be reviewed in the ED Provider note.     The Plan of Care was developed with the following considerations:   Patient / Family readiness to learn indicated by:verbalized understanding  Persons(s) to be included in education: patient  Barriers to Learning/Limitations:No    Signed     Aleksandr Gipson RN    5/14/2017   9:07 PM

## 2017-05-15 NOTE — DISCHARGE INSTRUCTIONS
Learning About High Blood Sugar  What is high blood sugar? Your body turns the food you eat into glucose (sugar), which it uses for energy. But if your body isn't able to use the sugar right away, it can build up in your blood and lead to high blood sugar. When the amount of sugar in your blood stays too high for too much of the time, you may have diabetes. Diabetes is a disease that can cause serious health problems. The good news is that lifestyle changes may help you get your blood sugar back to normal and avoid or delay diabetes. What causes high blood sugar? Sugar (glucose) can build up in your blood if you:  · Are overweight. · Have a family history of diabetes. · Take certain medicines, such as steroids. What are the symptoms? Having high blood sugar may not cause any symptoms at all. Or it may make you feel very thirsty or very hungry. You may also urinate more often than usual, have blurry vision, or lose weight without trying. How is high blood sugar treated? You can take steps to lower your blood sugar level if you understand what makes it get higher. Your doctor may want you to learn how to test your blood sugar level at home. Then you can see how illness, stress, or different kinds of food or medicine raise or lower your blood sugar level. Other tests may be needed to see if you have diabetes. How can you prevent high blood sugar? · Watch your weight. If you're overweight, losing just a small amount of weight may help. Reducing fat around your waist is most important. · Limit the amount of calories, sweets, and unhealthy fat you eat. Ask your doctor if a dietitian can help you. A registered dietitian can help you create meal plans that fit your lifestyle. · Get at least 30 minutes of exercise on most days of the week. Exercise helps control your blood sugar. It also helps you maintain a healthy weight. Walking is a good choice.  You also may want to do other activities, such as running, swimming, cycling, or playing tennis or team sports. · If your doctor prescribed medicines, take them exactly as prescribed. Call your doctor if you think you are having a problem with your medicine. You will get more details on the specific medicines your doctor prescribes. Follow-up care is a key part of your treatment and safety. Be sure to make and go to all appointments, and call your doctor if you are having problems. It's also a good idea to know your test results and keep a list of the medicines you take. Where can you learn more? Go to http://tommy-zane.info/. Enter O108 in the search box to learn more about \"Learning About High Blood Sugar. \"  Current as of: May 23, 2016  Content Version: 11.2  © 2970-6988 [employer]. Care instructions adapted under license by [employer] (which disclaims liability or warranty for this information). If you have questions about a medical condition or this instruction, always ask your healthcare professional. [address] any warranty or liability for your use of this information.

## 2017-05-15 NOTE — ED PROVIDER NOTES
HPI Comments: Mahendra Foley is a 39 y.o. female with hx of IDDM, HTN, HLD who presents via EMS to the ED c/o hyperglycemia and HTN since 9:30AM today. Pt reports her glucose level was in the 505 this morning and her BP level was in the 150's. She also notes associated symptoms of swelling in BL legs, productive cough, urinary frequency and headache since 5:30PM. Per ED notes, pt visited [facility] ED 17 for elevated glucose levels and was prescribed flonase and hydrocodone/chlorphen. Pt states she is currently taking gabapentin, lisinopril, simvastatin, trazodone, and flonase. She specifically denies fever, chills, N/V/D, and dysuria. Social hx: - Tobacco use, - EtOH use, - Illicit drug use    PCP: Titus Centeno MD    There are no other complaints, changes or physical findings at this time. The history is provided by the patient. No  was used.         Past Medical History:   Diagnosis Date    Depression     Diabetes ([address]     Edema     Gout     Herpes genitalis     Hypertension     Ill-defined condition     gout    Mood disorder (HCC)     Other ill-defined conditions     cholesterol     Psychiatric disorder     depression    Psychiatric disorder     PTSD    Sleep disorder     UTI (lower urinary tract infection)        Past Surgical History:   Procedure Laterality Date    HX GYN       X 3         Family History:   Problem Relation Age of Onset    Hypertension Mother     Coronary Artery Disease Mother     Diabetes Father     Stroke Father 46     pt estimates    Kidney Disease Father      secondary to diabetes       Social History     Social History    Marital status: SINGLE     Spouse name: N/A    Number of children: N/A    Years of education: N/A     Occupational History    [employer]      [employer]    nursing student      [employer]     Social History Main Topics    Smoking status: Never Smoker    Smokeless tobacco: Never Used    Alcohol use No    Drug use: No    Sexual activity: Yes     Partners: Male     Birth control/ protection: IUD     Other Topics Concern    Exercise Yes     Line Dancing with Son 1x/week, walking regularly, yoga occasionally     Social History Narrative    Lives with son (14 yo). Boyfriend x 2 years is currently staying with pt and helping her. ALLERGIES: Review of patient's allergies indicates no known allergies. Review of Systems   Constitutional: Negative for appetite change, chills, diaphoresis, fatigue and fever. HENT: Negative for sore throat and trouble swallowing. Respiratory: Negative for cough and shortness of breath. Cardiovascular: Positive for leg swelling (BL). Negative for chest pain. Positive for HTN   Gastrointestinal: Negative for abdominal pain, diarrhea, nausea and vomiting. Endocrine:        Positive for hyperglycemia   Genitourinary: Positive for frequency. Negative for difficulty urinating and dysuria. Musculoskeletal: Negative for arthralgias, back pain and myalgias. Skin: Negative for color change and rash. Neurological: Positive for headaches. Negative for weakness and numbness. Hematological: Does not bruise/bleed easily. All other systems reviewed and are negative. Vitals:    05/14/17 2103 05/14/17 2349 05/14/17 2350 05/14/17 2350   BP: (!) 142/91 156/90  156/90   Pulse: 85   77   Resp: 18   18   Temp: 97.9 °F (36.6 °C)      SpO2: 96%  98% 98%   Weight: 108.9 kg (240 lb)      Height: 5' 9\" (1.753 m)               Physical Exam   Constitutional: She is oriented to person, place, and time. She appears well-developed and well-nourished. No distress. Hyperglycemic    HENT:   Head: Normocephalic. Right Ear: External ear normal.   Left Ear: External ear normal.   Nose: Nose normal.   Mouth/Throat: Oropharynx is clear and moist and mucous membranes are normal. No oropharyngeal exudate, posterior oropharyngeal edema or posterior oropharyngeal erythema.    Congestion Cardiovascular: Normal rate, regular rhythm and normal heart sounds. Exam reveals no gallop, no distant heart sounds and no friction rub. No murmur heard. Pulmonary/Chest: Effort normal and breath sounds normal. No respiratory distress. She has no decreased breath sounds. She has no wheezes. She has no rhonchi. She has no rales. Abdominal: Soft. Normal appearance and bowel sounds are normal. She exhibits no distension. There is no tenderness. There is no rebound, no guarding and no CVA tenderness. Musculoskeletal:   No bony or point tenderness  Mild edema to lower BL extermities   Neurological: She is alert and oriented to person, place, and time. Skin: Skin is warm and dry. She is not diaphoretic. Nursing note and vitals reviewed.        MDM  Number of Diagnoses or Management Options  Diagnosis management comments: DDx: URI, hypoglycemia, DM, UTI       Amount and/or Complexity of Data Reviewed  Clinical lab tests: reviewed and ordered  Tests in the radiology section of CPT®: ordered and reviewed  Review and summarize past medical records: yes    Patient Progress  Patient progress: stable    ED Course       Procedures    LABORATORY TESTS:  Recent Results (from the past 12 hour(s))   GLUCOSE, POC    Collection Time: 05/14/17  9:04 PM   Result Value Ref Range    Glucose (POC) 512 (H) 65 - 100 mg/dL    Performed by Doris Mar, POC    Collection Time: 05/14/17  9:05 PM   Result Value Ref Range    Glucose (POC) 500 (H) 65 - 100 mg/dL    Performed by Liliana Longoria    CBC WITH AUTOMATED DIFF    Collection Time: 05/14/17  9:33 PM   Result Value Ref Range    WBC 9.2 3.6 - 11.0 K/uL    RBC 4.60 3.80 - 5.20 M/uL    HGB 11.7 11.5 - 16.0 g/dL    HCT 35.5 35.0 - 47.0 %    MCV 77.2 (L) 80.0 - 99.0 FL    MCH 25.4 (L) 26.0 - 34.0 PG    MCHC 33.0 30.0 - 36.5 g/dL    RDW 13.4 11.5 - 14.5 %    PLATELET 653 489 - 672 K/uL    NEUTROPHILS 68 32 - 75 %    LYMPHOCYTES 25 12 - 49 %    MONOCYTES 5 5 - 13 % EOSINOPHILS 2 0 - 7 %    BASOPHILS 0 0 - 1 %    ABS. NEUTROPHILS 6.2 1.8 - 8.0 K/UL    ABS. LYMPHOCYTES 2.3 0.8 - 3.5 K/UL    ABS. MONOCYTES 0.5 0.0 - 1.0 K/UL    ABS. EOSINOPHILS 0.2 0.0 - 0.4 K/UL    ABS.  BASOPHILS 0.0 0.0 - 0.1 K/UL   METABOLIC PANEL, BASIC    Collection Time: 05/14/17  9:33 PM   Result Value Ref Range    Sodium 138 136 - 145 mmol/L    Potassium 3.9 3.5 - 5.1 mmol/L    Chloride 99 97 - 108 mmol/L    CO2 28 21 - 32 mmol/L    Anion gap 11 5 - 15 mmol/L    Glucose 493 (H) 65 - 100 mg/dL    BUN 7 6 - 20 MG/DL    Creatinine 1.00 0.55 - 1.02 MG/DL    BUN/Creatinine ratio 7 (L) 12 - 20      GFR est AA >60 >60 ml/min/1.73m2    GFR est non-AA >60 >60 ml/min/1.73m2    Calcium 8.8 8.5 - 10.1 MG/DL   HCG URINE, QL. - POC    Collection Time: 05/14/17 10:14 PM   Result Value Ref Range    Pregnancy test,urine (POC) NEGATIVE  NEG     URINALYSIS W/ REFLEX CULTURE    Collection Time: 05/14/17 10:16 PM   Result Value Ref Range    Color YELLOW/STRAW      Appearance CLEAR CLEAR      Specific gravity 1.010 1.003 - 1.030      pH (UA) 6.0 5.0 - 8.0      Protein NEGATIVE  NEG mg/dL    Glucose >1000 (A) NEG mg/dL    Ketone NEGATIVE  NEG mg/dL    Bilirubin NEGATIVE  NEG      Blood LARGE (A) NEG      Urobilinogen 0.2 0.2 - 1.0 EU/dL    Nitrites NEGATIVE  NEG      Leukocyte Esterase NEGATIVE  NEG      WBC 0-4 0 - 4 /hpf    RBC 5-10 0 - 5 /hpf    Epithelial cells MANY (A) FEW /lpf    Bacteria 1+ (A) NEG /hpf    UA:UC IF INDICATED URINE CULTURE ORDERED (A) CNI      Yeast w/hyphae PRESENT (A) NEG     GLUCOSE, POC    Collection Time: 05/14/17 11:27 PM   Result Value Ref Range    Glucose (POC) 383 (H) 65 - 100 mg/dL    Performed by Carlos Contreras    GLUCOSE, POC    Collection Time: 05/15/17 12:57 AM   Result Value Ref Range    Glucose (POC) 289 (H) 65 - 100 mg/dL    Performed by Zulema Dodd (PCT)        IMAGING RESULTS:    CXR Results  (Last 48 hours)               05/14/17 2231  XR CHEST PORT Final result    Impression: IMPRESSION: Normal chest. No change compared with 5 days ago. Narrative:  EXAM:  XR CHEST PORT. INDICATION: Chest pain. COMPARISON: 5/9/2017. FINDINGS:    A portable AP radiograph of the chest was obtained at 1030 hours. The   positioning is lordotic. Lines and tubes: None. Lungs: The lungs are clear. Pleura: There is no pneumothorax or pleural effusion. Mediastinum: The cardiac and mediastinal contours and pulmonary vascularity are   normal.   Bones and soft tissues: The bones and soft tissues are grossly within normal   limits. MEDICATIONS GIVEN:  Medications   sodium chloride 0.9 % bolus infusion 1,000 mL (0 mL IntraVENous IV Completed 5/14/17 2320)   sodium chloride 0.9 % bolus infusion 1,000 mL (0 mL IntraVENous IV Completed 5/14/17 2239)   sodium chloride 0.9 % bolus infusion 1,000 mL (0 mL IntraVENous IV Completed 5/15/17 0055)   insulin regular (NOVOLIN R, HUMULIN R) injection 14 Units (14 Units IntraVENous Given 5/14/17 2143)   acetaminophen (TYLENOL) tablet 1,000 mg (1,000 mg Oral Given 5/14/17 2155)   ibuprofen (MOTRIN) tablet 800 mg (800 mg Oral Given 5/14/17 2155)   insulin regular (NOVOLIN R, HUMULIN R) injection 12 Units (12 Units IntraVENous Given 5/14/17 2354)       IMPRESSION:  1. Hyperglycemia        PLAN:  1. Discharge home  Current Discharge Medication List        2. Follow-up Information     Follow up With Details [address] [place], [place] [address]P[place]mouthKobi Piedra MD   [address],[facility] [address]  [address] [place] [address]  [phone]          3. Return to ED if worse     DISCHARGE NOTE  1:02 AM  The patient has been re-evaluated and is ready for discharge. Reviewed available results with patient. Counseled pt on diagnosis and care plan. Pt has expressed understanding, and all questions have been answered. Pt agrees with plan and agrees to F/U as recommended, or return to the ED if their sxs worsen.  Discharge instructions have been provided and explained to the pt, along with reasons to return to the ED. This note is prepared by Netta Fritz, acting as Scribe for Patric Ramirez MD.    Patric Ramirez MD: The scribe's documentation has been prepared under my direction and personally reviewed by me in its entirety. I confirm that the note above accurately reflects all work, treatment, procedures, and medical decision making performed by me.

## 2017-05-15 NOTE — ED NOTES
Patient given copy of dc instructions and 0 paper script(s) and 0 electronic scripts. Patient verbalized understanding of instructions and script(s). Patient given a current medication reconciliation form and verbalized understanding of their medications. Patient verbalized understanding of the importance of discussing medications with his or her physician or clinic they will be following up with. Patient alert and oriented and in no acute distress. Patient verbalizes pain of 0 out of 10. Patient offered wheelchair from treatment area to hospital entrance, patient declined wheelchair. Patient discharged home with friend.

## 2017-05-16 LAB
BACTERIA SPEC CULT: ABNORMAL
BACTERIA SPEC CULT: ABNORMAL
CC UR VC: ABNORMAL
SERVICE CMNT-IMP: ABNORMAL

## 2017-06-07 ENCOUNTER — OFFICE VISIT (OUTPATIENT)
Dept: INTERNAL MEDICINE CLINIC | Facility: CLINIC | Age: 37
End: 2017-06-07

## 2017-06-07 VITALS
SYSTOLIC BLOOD PRESSURE: 107 MMHG | RESPIRATION RATE: 18 BRPM | HEART RATE: 100 BPM | TEMPERATURE: 98.6 F | DIASTOLIC BLOOD PRESSURE: 71 MMHG | WEIGHT: 243 LBS | HEIGHT: 69 IN | BODY MASS INDEX: 35.99 KG/M2

## 2017-06-07 DIAGNOSIS — I10 ESSENTIAL HYPERTENSION WITH GOAL BLOOD PRESSURE LESS THAN 130/85: ICD-10-CM

## 2017-06-07 DIAGNOSIS — Z79.4 TYPE 2 DIABETES MELLITUS WITH HYPOGLYCEMIA WITHOUT COMA, WITH LONG-TERM CURRENT USE OF INSULIN (HCC): Primary | ICD-10-CM

## 2017-06-07 DIAGNOSIS — R60.0 LOCALIZED EDEMA: ICD-10-CM

## 2017-06-07 DIAGNOSIS — E11.649 TYPE 2 DIABETES MELLITUS WITH HYPOGLYCEMIA WITHOUT COMA, WITH LONG-TERM CURRENT USE OF INSULIN (HCC): Primary | ICD-10-CM

## 2017-06-07 DIAGNOSIS — E66.09 OBESITY DUE TO EXCESS CALORIES, UNSPECIFIED OBESITY SEVERITY: ICD-10-CM

## 2017-06-07 LAB
BILIRUB UR QL STRIP: NORMAL
GLUCOSE POC: 289 MG/DL
GLUCOSE UR-MCNC: NORMAL MG/DL
HBA1C MFR BLD HPLC: 13.3 %
KETONES P FAST UR STRIP-MCNC: NEGATIVE MG/DL
PH UR STRIP: 5.5 [PH] (ref 4.6–8)
PROT UR QL STRIP: NORMAL MG/DL
SP GR UR STRIP: 1.03 (ref 1–1.03)
UA UROBILINOGEN AMB POC: NORMAL (ref 0.2–1)
URINALYSIS CLARITY POC: CLEAR
URINALYSIS COLOR POC: NORMAL
URINE BLOOD POC: NEGATIVE
URINE LEUKOCYTES POC: NEGATIVE
URINE NITRITES POC: NEGATIVE

## 2017-06-07 RX ORDER — FUROSEMIDE 20 MG/1
20 TABLET ORAL DAILY
Qty: 30 TAB | Refills: 3 | Status: SHIPPED | OUTPATIENT
Start: 2017-06-07 | End: 2017-11-06 | Stop reason: SDUPTHER

## 2017-06-07 NOTE — MR AVS SNAPSHOT
Visit Information Date & Time Provider Department Dept. Phone Encounter #  
 6/7/2017  1:30 PM Mikael Edge, [address] [facility] [phone] [id number] Follow-up Instructions Return in about 1 week (around 6/14/2017) for follow up diabetes. Your Appointments 6/21/2017  9:30 AM  
COUNSELING with Marcello Titus LCSW [facility] ([facility]) Appt Note: counseling [address] [facility] [address] [facility] [address]  
[phone]  
  
   
 [address] [facility] [address] [place] [id number]  
  
    
 6/22/2017  4:00 PM  
ESTABLISHED PATIENT with Michael Mera NP [facility] ([address] [facility]) Appt Note: follow-up; follow-up; follow-up [address] [facility] [address] [facility] Rosalia Silva 178  
  
   
 [address] [facility] [id number] Upcoming Health Maintenance Date Due  
 FOOT EXAM Q1 8/3/1989 EYE EXAM RETINAL OR DILATED Q1 8/3/1989 MICROALBUMIN Q1 11/25/2016 LIPID PANEL Q1 11/25/2016 HEMOGLOBIN A1C Q6M 5/3/2017 INFLUENZA AGE 9 TO ADULT 8/1/2017 PAP AKA CERVICAL CYTOLOGY 1/26/2018 DTaP/Tdap/Td series (2 - Td) 1/5/2026 Allergies as of 6/7/2017  Review Complete On: 5/14/2017 By: Alyssa Fletcher RN No Known Allergies Current Immunizations  Reviewed on 11/3/2016 Name Date Influenza Vaccine 10/1/2014 Influenza Vaccine Santiago Lass) 11/3/2016 Pneumococcal Polysaccharide (PPSV-23) 6/30/2014  9:23 PM  
 Tdap 1/5/2016 Not reviewed this visit You Were Diagnosed With   
  
 Codes Comments Type 2 diabetes mellitus with hypoglycemia without coma, with long-term current use of insulin (HCC)    -  Primary ICD-10-CM: E11.649, Z79.4 ICD-9-CM: 250.80, 251.2, V58.67 Essential hypertension with goal blood pressure less than 130/85     ICD-10-CM: I10 
ICD-9-CM: 401.9  Obesity due to excess calories, unspecified obesity severity ICD-10-CM: E66.09 
ICD-9-CM: 278.00 Localized edema     ICD-10-CM: R60.0 ICD-9-CM: 156. 3 Vitals BP Pulse Temp Resp Height(growth percentile) Weight(growth percentile) 107/71 (BP 1 Location: Left arm, BP Patient Position: Sitting) 100 98.6 °F (37 °C) (Oral) 18 5' 9\" (1.753 m) 243 lb (110.2 kg) BMI OB Status Smoking Status 35.88 kg/m2 IUD Never Smoker Vitals History BMI and BSA Data Body Mass Index Body Surface Area  
 35.88 kg/m 2 2.32 m 2 Preferred Pharmacy Pharmacy Name Phone [facility]  [address], [address] [place] [address] [phone] Your Updated Medication List  
  
   
This list is accurate as of: 6/7/17  3:03 PM.  Always use your most recent med list.  
  
  
  
  
 aspirin delayed-release 81 mg tablet Take 1 Tab by mouth daily. colchicine 0.6 mg capsule Commonly known as:  Keri Quiet Take 1 Cap by mouth daily. Take two tablets by mouth now and the one tablet by mouth one hour later FLUoxetine 60 mg Tab Take 60 mg by mouth daily. * furosemide 20 mg tablet Commonly known as:  LASIX Take 1 Tab by mouth daily. * furosemide 20 mg tablet Commonly known as:  LASIX Take 1 Tab by mouth daily. gabapentin 300 mg capsule Commonly known as:  NEURONTIN Take 1 Cap by mouth three (3) times daily. HumaLOG KwikPen 100 unit/mL kwikpen Generic drug:  insulin lispro  
by SubCUTAneous route Before breakfast, lunch, dinner and at bedtime. Sliding Scale  
  
 ibuprofen 200 mg tablet Commonly known as:  MOTRIN Take 3 Tabs by mouth three (3) times daily (with meals). LANTUS SOLOSTAR 100 unit/mL (3 mL) Inpn Generic drug:  insulin glargine INJECT 40 UNITS UNDER THE SKIN D  
  
 levonorgestrel 20 mcg/24 hr (5 years) IUD Commonly known as:  MIRENA  
1 Each by IntraUTERine route once. lisinopril 10 mg tablet Commonly known as:  Deja Knapp  
 Take 1 Tab by mouth daily. Ellen Poole LANCETS [address] Generic drug:  lancets TEST SIX TIMES A DAY  
  
 ONETOUCH VERIO strip Generic drug:  glucose blood VI test strips TEST SIX TIMES A DAY  
  
 simvastatin 20 mg tablet Commonly known as:  ZOCOR Take 1 Tab by mouth nightly. traZODone 50 mg tablet Commonly known as:  Kaylee Chroman Take 1/2 to 1 tablet by mouth at bedtime PRN insomnia. valACYclovir 1 gram tablet Commonly known as:  VALTREX Take 1 Tab by mouth daily. * Notice: This list has 2 medication(s) that are the same as other medications prescribed for you. Read the directions carefully, and ask your doctor or other care provider to review them with you. Prescriptions Sent to Pharmacy Refills  
 furosemide (LASIX) 20 mg tablet 3 Sig: Take 1 Tab by mouth daily. Class: Normal  
 Pharmacy: [facility] [address] [place] [address] #: [phone] Route: Oral  
  
We Performed the Following AMB POC GLUCOSE BLOOD, BY GLUCOSE MONITORING DEVICE [14097 CPT(R)] AMB POC HEMOGLOBIN A1C [03077 CPT(R)] AMB POC URINALYSIS DIP STICK AUTO W/O MICRO [36125 CPT(R)] METABOLIC PANEL, BASIC [31850 CPT(R)] REFERRAL TO DIABETIC EDUCATION [REF20 Custom] Comments:  
 [facility] SUPPORT [facility]  
  
Follow-up Instructions Return in about 1 week (around 6/14/2017) for follow up diabetes. Referral Information Referral ID Referred By Referred To  
  
 [id number] Aliya Engel Not Available Visits Status Start Date End Date 1 New Request 6/7/17 6/7/18 If your referral has a status of pending review or denied, additional information will be sent to support the outcome of this decision. Introducing [facility]!    
 [facility] introduces "[employer] patient portal. Now you can access parts of your medical record, email your doctor's office, and request medication refills online. 1. In your internet browser, go to [email or web address] 2. Click on the First Time User? Click Here link in the Sign In box. You will see the New Member Sign Up page. 3. Enter your [employer] Access Code exactly as it appears below. You will not need to use this code after youve completed the sign-up process. If you do not sign up before the expiration date, you must request a new code. · [employer] Access Code: [email or web address] Expires: 6/14/2017  3:20 PM 
 
4. Enter the last four digits of your Social Security Number (xxxx) and Date of Birth (mm/dd/yyyy) as indicated and click Submit. You will be taken to the next sign-up page. 5. Create a [employer] ID. This will be your [employer] login ID and cannot be changed, so think of one that is secure and easy to remember. 6. Create a [employer] password. You can change your password at any time. 7. Enter your Password Reset Question and Answer. This can be used at a later time if you forget your password. 8. Enter your e-mail address. You will receive e-mail notification when new information is available in [address] 9. Click Sign Up. You can now view and download portions of your medical record. 10. Click the Download Summary menu link to download a portable copy of your medical information. If you have questions, please visit the Frequently Asked Questions section of the [employer] website. Remember, [employer]pan is NOT to be used for urgent needs. For medical emergencies, dial 911. Now available from your iPhone and Android! Please provide this summary of care documentation to your next provider. Your primary care clinician is listed as Sallie Brand. If you have any questions after today's visit, please call [phone].

## 2017-06-07 NOTE — PROGRESS NOTES
Subjective:      iVjay Go is a 39 y.o. female who presents today for   Chief Complaint   Patient presents with   [facility] Follow Up     Patient in today for f/u she was seen at ER yesterday. [facility]. She went there after stopping at [facility] due to not feeling well. Her glucose was 498 and bp was also elevated. She went to hospital for eval she was given iv fluids and insulin. When she went home from hospital  last night she took 40 units of Lantus. This morning blood glucose had come down to 266. She has taken humalog also which she takes 12 units with each meal. She has been seeing Dr. Arlene Treadwell at [facility]. That doctor has now left the practice. She also has noticed more swelling in her legs and feet. She says EKG was done at hospital and was normal  Of note on review of EMR it shows she was also evaluated on 5/14/17 at  [facility] for glucose of 500. Last evaluated here in November and A1C 12.5--   A1C is 13.3 today    Patient brings labs for review from ER visit  Glucose 426  urnalysis >500 glucose  Wbc 13  Troponin <0.015  BNP< 15  Normal kidney and liver function    She says she has gained  A significant amount of weight. not \"eating like she should\" but has been compliant with her insulin          Patient Active Problem List    Diagnosis Date Noted    PTSD (post-traumatic stress disorder) 04/26/2016    Essential hypertension with goal blood pressure less than 130/85 04/25/2016    Benign heart murmur 02/12/2016    Genital herpes 01/22/2016    Type 2 diabetes mellitus with hypoglycemia without coma ([facility] 01/05/2016    Diabetes mellitus type 2 with neurological manifestations ([facility] 01/05/2016    Type II diabetes mellitus with complication, uncontrolled ([facility] 01/05/2016    Depression 08/07/2014    Personality disorder 06/30/2014    Obesity 06/30/2014     Current Outpatient Prescriptions   Medication Sig Dispense Refill    FLUoxetine 60 mg tab Take 60 mg by mouth daily.  30 Tab 1    traZODone (DESYREL) 50 mg tablet Take 1/2 to 1 tablet by mouth at bedtime PRN insomnia. 30 Tab 1    colchicine (MITIGARE) 0.6 mg capsule Take 1 Cap by mouth daily. Take two tablets by mouth now and the one tablet by mouth one hour later 3 Cap 0    LANTUS SOLOSTAR 100 unit/mL (3 mL) pen INJECT 40 UNITS UNDER THE SKIN D  11    ONETOUCH VERIO strip TEST SIX TIMES A DAY  3    ONETOUCH DELICA LANCETS 30 gauge misc TEST SIX TIMES A DAY  3    ibuprofen (MOTRIN) 200 mg tablet Take 3 Tabs by mouth three (3) times daily (with meals).  furosemide (LASIX) 20 mg tablet Take 1 Tab by mouth daily. (Patient taking differently: Take 40 mg by mouth daily.) 30 Tab 1    gabapentin (NEURONTIN) 300 mg capsule Take 1 Cap by mouth three (3) times daily. 90 Cap 3    lisinopril (PRINIVIL, ZESTRIL) 10 mg tablet Take 1 Tab by mouth daily. 30 Tab 2    HUMALOG KWIKPEN 100 unit/mL kwikpen by SubCUTAneous route Before breakfast, lunch, dinner and at bedtime. Sliding Scale      valACYclovir (VALTREX) 1 gram tablet Take 1 Tab by mouth daily. (Patient taking differently: Take 1,000 mg by mouth as needed.) 30 Tab 5    levonorgestrel (MIRENA) 20 mcg/24 hr (5 years) IUD 1 Each by IntraUTERine route once.  aspirin delayed-release 81 mg tablet Take 1 Tab by mouth daily. 30 Tab 11    simvastatin (ZOCOR) 20 mg tablet Take 1 Tab by mouth nightly.  30 Tab 3     No Known Allergies  Past Medical History:   Diagnosis Date    Depression     Diabetes ([address]     Edema     Gout     Herpes genitalis     Hypertension     Ill-defined condition     gout    Mood disorder (HCC)     Other ill-defined conditions     cholesterol     Psychiatric disorder     depression    Psychiatric disorder     PTSD    Sleep disorder     UTI (lower urinary tract infection)      Past Surgical History:   Procedure Laterality Date    HX GYN       X 3     Family History   Problem Relation Age of Onset    Hypertension Mother     Coronary Artery Disease Mother    [address] [facility] Diabetes Father     Stroke Father 46     pt estimates    Kidney Disease Father      secondary to diabetes     Social History   Substance Use Topics    Smoking status: Never Smoker    Smokeless tobacco: Never Used    Alcohol use No        Review of Systems    A comprehensive review of systems was negative except for that written in the HPI. Objective:     Visit Vitals    /71 (BP 1 Location: Left arm, BP Patient Position: Sitting)    Pulse 100    Temp 98.6 °F (37 °C) (Oral)    Resp 18    Ht 5' 9\" (1.753 m)    Wt 243 lb (110.2 kg)    BMI 35.88 kg/m2     General:  Alert, cooperative, no distress, appears stated age. Seems lethargic   Head:  Normocephalic, without obvious abnormality, atraumatic. Eyes:  Conjunctivae/corneas clear. PERRL, EOMs intact. Fundi benign. Ears:  Normal TMs and external ear canals both ears. Nose: Nares normal. Septum midline. Mucosa normal. No drainage or sinus tenderness. Throat: Lips, mucosa, and tongue normal. Teeth and gums normal.   Neck: Supple, symmetrical, trachea midline, no adenopathy, thyroid: no enlargement/tenderness/nodules, no carotid bruit and no JVD. Back:   Symmetric, no curvature. ROM normal. No CVA tenderness. Lungs:   Clear to auscultation bilaterally. Chest wall:  No tenderness or deformity. Heart:  Regular rate and rhythm, S1, S2 normal, no murmur, click, rub or gallop. Abdomen:   Soft, non-tender. Obese. Bowel sounds normal. No masses,  No organomegaly. Extremities: Extremities normal, atraumatic, no cyanosis , bilateral 2+ lower extremity edema   Pulses: 2+ and symmetric all extremities. Skin: Skin color, texture, turgor normal. No rashes or lesions. Lymph nodes: Cervical, supraclavicular, and axillary nodes normal.   Neurologic: CNII-XII intact. Normal strength, sensation and reflexes throughout. Assessment/Plan:       ICD-10-CM ICD-9-CM    1.  Type 2 diabetes mellitus with hypoglycemia without coma, with long-term current use of insulin ([facility]) E11.649 250.80 AMB POC HEMOGLOBIN A1C    H12.8 593.1 METABOLIC PANEL, BASIC     V58.67 AMB POC GLUCOSE BLOOD, BY GLUCOSE MONITORING DEVICE      AMB POC URINALYSIS DIP STICK AUTO W/O MICRO      REFERRAL TO DIABETIC EDUCATION   2. Essential hypertension with goal blood pressure less than 130/85 I10 401.9    3. Obesity due to excess calories, unspecified obesity severity E66.09 278.00    4. Localized edema R60.0 782.3      Monitor diet  Counseled regarding diet and stressed importance of compliance  Set up Diabetic teaching at [facility]  Patient has an appt to see endocrine in july    Every 2 days increase lantus by 2 units to reach goal of < 150 fasting  Follow up here in one week    Increase lasix to 40 mg for next 3 days then resu,me 20 mg daily  Keep legs elevated  Reduce salt  Compression stockings    Provided work note to excuse  6/5/17 through 6/12/17    Follow-up Disposition: Not on File   Advised her to call back or return to office if symptoms worsen/change/persist.  Discussed expected course/resolution/complications of diagnosis in detail with patient. Medication risks/benefits/costs/interactions/alternatives discussed with patient. She was given an after visit summary which includes diagnoses, current medications, & vitals. She expressed understanding with the diagnosis and plan.

## 2017-06-07 NOTE — LETTER
NOTIFICATION OF RETURN TO WORK / SCHOOL 
 
6/7/2017 Ms. Saw Richardson [address] 
[phone] To Whom It May Concern: 
 
Saw Richardson was under the care of [facility]. Please excuse from work 6/5/17 through 6/12/17 due to illness. If there are questions or concerns please have the patient contact our office. Sincerely, Melly Nunes MD

## 2017-06-07 NOTE — PROGRESS NOTES
Chief Complaint   Patient presents with   [facility] Follow Up     1. Have you been to the ER, urgent care clinic since your last visit? Hospitalized since your last visit? Yes When: 6/6/17 Where: [address] [facility] Reason for visit: Hyperglycemia    2. Have you seen or consulted any other health care providers outside of the [address] [place] [address] since your last visit? Include any pap smears or colon screening.  No

## 2017-06-08 LAB
BUN SERPL-MCNC: 8 MG/DL (ref 6–20)
BUN/CREAT SERPL: 13 (ref 9–23)
CALCIUM SERPL-MCNC: 9.2 MG/DL (ref 8.7–10.2)
CHLORIDE SERPL-SCNC: 100 MMOL/L (ref 96–106)
CO2 SERPL-SCNC: 23 MMOL/L (ref 18–29)
CREAT SERPL-MCNC: 0.62 MG/DL (ref 0.57–1)
GLUCOSE SERPL-MCNC: 271 MG/DL (ref 65–99)
POTASSIUM SERPL-SCNC: 4.1 MMOL/L (ref 3.5–5.2)
SODIUM SERPL-SCNC: 141 MMOL/L (ref 134–144)

## 2017-06-14 ENCOUNTER — OFFICE VISIT (OUTPATIENT)
Dept: INTERNAL MEDICINE CLINIC | Facility: CLINIC | Age: 37
End: 2017-06-14

## 2017-06-14 ENCOUNTER — TELEPHONE (OUTPATIENT)
Dept: INTERNAL MEDICINE CLINIC | Facility: CLINIC | Age: 37
End: 2017-06-14

## 2017-06-14 VITALS
BODY MASS INDEX: 36.73 KG/M2 | WEIGHT: 248 LBS | RESPIRATION RATE: 18 BRPM | DIASTOLIC BLOOD PRESSURE: 87 MMHG | HEIGHT: 69 IN | HEART RATE: 94 BPM | SYSTOLIC BLOOD PRESSURE: 154 MMHG | TEMPERATURE: 98.2 F

## 2017-06-14 DIAGNOSIS — R60.0 BILATERAL EDEMA OF LOWER EXTREMITY: ICD-10-CM

## 2017-06-14 DIAGNOSIS — E11.649 TYPE 2 DIABETES MELLITUS WITH HYPOGLYCEMIA WITHOUT COMA, WITH LONG-TERM CURRENT USE OF INSULIN (HCC): Primary | ICD-10-CM

## 2017-06-14 DIAGNOSIS — I10 ESSENTIAL HYPERTENSION WITH GOAL BLOOD PRESSURE LESS THAN 130/85: ICD-10-CM

## 2017-06-14 DIAGNOSIS — Z79.4 TYPE 2 DIABETES MELLITUS WITH HYPOGLYCEMIA WITHOUT COMA, WITH LONG-TERM CURRENT USE OF INSULIN (HCC): Primary | ICD-10-CM

## 2017-06-14 NOTE — TELEPHONE ENCOUNTER
Patient called to give report of blood sugars. Reported that current bood sugar reading was 381.   Please advise with any further instructions/information

## 2017-06-14 NOTE — PROGRESS NOTES
Chief Complaint   Patient presents with    Diabetes     1. Have you been to the ER, urgent care clinic since your last visit? Hospitalized since your last visit? No    2. Have you seen or consulted any other health care providers outside of the [address] [place] [address] since your last visit? Include any pap smears or colon screening.  No

## 2017-06-14 NOTE — PROGRESS NOTES
Subjective:      Sugar Sanz is a 39 y.o. female who presents today for   Chief Complaint   Patient presents with    Diabetes     Patient in today for follow up. She says her sugars have been going down since her last visit on 6/7. She has gone from 40 to 46 units of Lantus since her last visit. She has been titrating up. She has diabetic education scheduled for the end of the month. She denies excessive urination or thirst.  It is noted that today at office glucose is undetectable on monitor. She says she did not eat last night did not take her humalog or Lantus since yesterday morning. 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 She does not eat regularly and sometimes misses her insulin    Edema improved with short course of lasix. She increased it from 20- 40 mg  for a few days. . She has been wearing her compression stockings. htn- bp is up but she has not taken her meds today    Patient Active Problem List    Diagnosis Date Noted    PTSD (post-traumatic stress disorder) 04/26/2016    Essential hypertension with goal blood pressure less than 130/85 04/25/2016    Benign heart murmur 02/12/2016    Genital herpes 01/22/2016    Type 2 diabetes mellitus with hypoglycemia without coma ([facility] 01/05/2016    Diabetes mellitus type 2 with neurological manifestations ([facility] 01/05/2016    Type II diabetes mellitus with complication, uncontrolled ([facility] 01/05/2016    Depression 08/07/2014    Personality disorder 06/30/2014    Obesity 06/30/2014     Current Outpatient Prescriptions   Medication Sig Dispense Refill    furosemide (LASIX) 20 mg tablet Take 1 Tab by mouth daily. 30 Tab 3    FLUoxetine 60 mg tab Take 60 mg by mouth daily. 30 Tab 1    traZODone (DESYREL) 50 mg tablet Take 1/2 to 1 tablet by mouth at bedtime PRN insomnia. 30 Tab 1    colchicine (MITIGARE) 0.6 mg capsule Take 1 Cap by mouth daily.  Take two tablets by mouth now and the one tablet by mouth one hour later 3 Cap 0    LANTUS SOLOSTAR 100 unit/mL (3 mL) pen INJECT 40 UNITS UNDER THE SKIN D  11    ONETOUCH VERIO strip TEST SIX TIMES A DAY  3    ONETOUCH DELICA LANCETS 30 gauge misc TEST SIX TIMES A DAY  3    ibuprofen (MOTRIN) 200 mg tablet Take 3 Tabs by mouth three (3) times daily (with meals).  furosemide (LASIX) 20 mg tablet Take 1 Tab by mouth daily. (Patient taking differently: Take 40 mg by mouth daily.) 30 Tab 1    gabapentin (NEURONTIN) 300 mg capsule Take 1 Cap by mouth three (3) times daily. 90 Cap 3    lisinopril (PRINIVIL, ZESTRIL) 10 mg tablet Take 1 Tab by mouth daily. 30 Tab 2    HUMALOG KWIKPEN 100 unit/mL kwikpen by SubCUTAneous route Before breakfast, lunch, dinner and at bedtime. Sliding Scale      valACYclovir (VALTREX) 1 gram tablet Take 1 Tab by mouth daily. (Patient taking differently: Take 1,000 mg by mouth as needed.) 30 Tab 5    levonorgestrel (MIRENA) 20 mcg/24 hr (5 years) IUD 1 Each by IntraUTERine route once.  aspirin delayed-release 81 mg tablet Take 1 Tab by mouth daily. 30 Tab 11    simvastatin (ZOCOR) 20 mg tablet Take 1 Tab by mouth nightly.  30 Tab 3     No Known Allergies  Past Medical History:   Diagnosis Date    Depression     Diabetes ([address]     Edema     Gout     Herpes genitalis     Hypertension     Ill-defined condition     gout    Mood disorder (HCC)     Other ill-defined conditions     cholesterol     Psychiatric disorder     depression    Psychiatric disorder     PTSD    Sleep disorder     UTI (lower urinary tract infection)      Past Surgical History:   Procedure Laterality Date    HX GYN       X 3     Family History   Problem Relation Age of Onset    Hypertension Mother     Coronary Artery Disease Mother     Diabetes Father     Stroke Father 46     pt estimates    Kidney Disease Father      secondary to diabetes     Social History   Substance Use Topics    Smoking status: Never Smoker    Smokeless tobacco: Never Used    Alcohol use No Review of Systems    A comprehensive review of systems was negative except for that written in the HPI. Objective:     Visit Vitals    /87 (BP 1 Location: Left arm, BP Patient Position: Sitting)    Pulse 94    Temp 98.2 °F (36.8 °C) (Oral)    Resp 18    Ht 5' 9\" (1.753 m)    Wt 248 lb (112.5 kg)    BMI 36.62 kg/m2     General:  Alert, cooperative, no distress, appears stated age. Head:  Normocephalic, without obvious abnormality, atraumatic. Eyes:  Conjunctivae/corneas clear. PERRL, EOMs intact. Fundi benign. Ears:  Normal TMs and external ear canals both ears. Nose: Nares normal. Septum midline. Mucosa normal. No drainage or sinus tenderness. Throat: Lips, mucosa, and tongue normal. Teeth and gums normal.   Neck: Supple, symmetrical, trachea midline, no adenopathy, thyroid: no enlargement/tenderness/nodules, no carotid bruit and no JVD. Back:   Symmetric, no curvature. ROM normal. No CVA tenderness. Lungs:   Clear to auscultation bilaterally. Chest wall:  No tenderness or deformity. Heart:  Regular rate and rhythm, S1, S2 normal, no murmur, click, rub or gallop. Abdomen:   Soft, non-tender. Bowel sounds normal. No masses,  No organomegaly. Extremities: Extremities normal, atraumatic, no cyanosis or edema. Pulses: 2+ and symmetric all extremities. Skin: Skin color, texture, turgor normal. No rashes or lesions. Lymph nodes: Cervical, supraclavicular, and axillary nodes normal.   Neurologic: CNII-XII intact. Normal strength, sensation and reflexes throughout. Assessment/Plan:       ICD-10-CM ICD-9-CM    1. Type 2 diabetes mellitus with hypoglycemia without coma, with long-term current use of insulin ([facility]) L21.059 910.37 METABOLIC PANEL, BASIC    Z62.3 251. 2      V58.67    2. Bilateral edema of lower extremity R25.9 466.5 METABOLIC PANEL, BASIC   3.  Essential hypertension with goal blood pressure less than 130/85 T41 366.7 METABOLIC PANEL, BASIC     Significant glucose elevation, no ketones in urine  Take humalog and Lantus when she gets home this morning this morning  Call back at noon with repeat blood glucose  Eat meals on regular basis and take humalog  Continue titrating up on Lantus to goal of fasting glucose <150 as we discussed    Follow-up Disposition: Not on File   Advised her to call back or return to office if symptoms worsen/change/persist.  Discussed expected course/resolution/complications of diagnosis in detail with patient. Medication risks/benefits/costs/interactions/alternatives discussed with patient. She was given an after visit summary which includes diagnoses, current medications, & vitals. She expressed understanding with the diagnosis and plan.

## 2017-06-14 NOTE — MR AVS SNAPSHOT
Visit Information Date & Time Provider Department Dept. Phone Encounter #  
 6/14/2017  9:15 AM Eloy Llanos  [facility] [phone] [id number] Follow-up Instructions Return in about 7 days (around 6/21/2017) for follow up diabetes and bp check. Your Appointments 6/21/2017  9:30 AM  
COUNSELING with Adrian Greer LCSW [facility] ([address] [place] [address]) Appt Note: counseling [address] [facility] [address] [facility] [address]  
[phone]  
  
   
 [address] [place] [address] [address] [place] [id number]  
  
    
 6/22/2017  4:00 PM  
ESTABLISHED PATIENT with Ronal Ruggiero NP [facility] ([address] [place] [address]) Appt Note: follow-up; follow-up; follow-up [address] [facility] [address] [facility] Rosalia Silva [address]  
  
   
 [address] [place] [address] [address] [facility] [phone] Upcoming Health Maintenance Date Due  
 FOOT EXAM Q1 8/3/1989 EYE EXAM RETINAL OR DILATED Q1 8/3/1989 MICROALBUMIN Q1 11/25/2016 LIPID PANEL Q1 11/25/2016 INFLUENZA AGE 9 TO ADULT 8/1/2017 HEMOGLOBIN A1C Q6M 12/7/2017 PAP AKA CERVICAL CYTOLOGY 1/26/2018 DTaP/Tdap/Td series (2 - Td) 1/5/2026 Allergies as of 6/14/2017  Review Complete On: 5/14/2017 By: Mac Santos RN No Known Allergies Current Immunizations  Reviewed on 11/3/2016 Name Date Influenza Vaccine 10/1/2014 Influenza Vaccine Darrel Camps) 11/3/2016 Pneumococcal Polysaccharide (PPSV-23) 6/30/2014  9:23 PM  
 Tdap 1/5/2016 Not reviewed this visit You Were Diagnosed With   
  
 Codes Comments Type 2 diabetes mellitus with hypoglycemia without coma, with long-term current use of insulin (HCC)    -  Primary ICD-10-CM: E11.649, Z79.4 ICD-9-CM: 250.80, 251.2, V58.67 Bilateral edema of lower extremity     ICD-10-CM: R60.0 ICD-9-CM: 782.3  Essential hypertension with goal blood pressure less than 130/85 ICD-10-CM: I10 
ICD-9-CM: 401.9 Vitals BP Pulse Temp Resp Height(growth percentile) Weight(growth percentile) 154/87 (BP 1 Location: Left arm, BP Patient Position: Sitting) 94 98.2 °F (36.8 °C) (Oral) 18 5' 9\" (1.753 m) 248 lb (112.5 kg) BMI OB Status Smoking Status 36.62 kg/m2 IUD Never Smoker Vitals History BMI and BSA Data Body Mass Index Body Surface Area  
 36.62 kg/m 2 2.34 m 2 Preferred Pharmacy Pharmacy Name Phone [facility] [address], [address] [facility] [phone] Your Updated Medication List  
  
   
This list is accurate as of: 6/14/17 10:03 AM.  Always use your most recent med list.  
  
  
  
  
 aspirin delayed-release 81 mg tablet Take 1 Tab by mouth daily. colchicine 0.6 mg capsule Commonly known as:  Melissa Calico Take 1 Cap by mouth daily. Take two tablets by mouth now and the one tablet by mouth one hour later FLUoxetine 60 mg Tab Take 60 mg by mouth daily. * furosemide 20 mg tablet Commonly known as:  LASIX Take 1 Tab by mouth daily. * furosemide 20 mg tablet Commonly known as:  LASIX Take 1 Tab by mouth daily. gabapentin 300 mg capsule Commonly known as:  NEURONTIN Take 1 Cap by mouth three (3) times daily. HumaLOG KwikPen 100 unit/mL kwikpen Generic drug:  insulin lispro  
by SubCUTAneous route Before breakfast, lunch, dinner and at bedtime. Sliding Scale  
  
 ibuprofen 200 mg tablet Commonly known as:  MOTRIN Take 3 Tabs by mouth three (3) times daily (with meals). LANTUS SOLOSTAR 100 unit/mL (3 mL) Inpn Generic drug:  insulin glargine INJECT 40 UNITS UNDER THE SKIN D  
  
 levonorgestrel 20 mcg/24 hr (5 years) IUD Commonly known as:  MIRENA  
1 Each by IntraUTERine route once. lisinopril 10 mg tablet Commonly known as:  Hawthorne Neve Take 1 Tab by mouth daily. Huong Avendaño LANCETS [facility] Generic drug:  lancets TEST SIX TIMES A DAY  
  
 ONETOUCH VERIO strip Generic drug:  glucose blood VI test strips TEST SIX TIMES A DAY  
  
 simvastatin 20 mg tablet Commonly known as:  ZOCOR Take 1 Tab by mouth nightly. traZODone 50 mg tablet Commonly known as:  Pedro Cushing Take 1/2 to 1 tablet by mouth at bedtime PRN insomnia. valACYclovir 1 gram tablet Commonly known as:  VALTREX Take 1 Tab by mouth daily. * Notice: This list has 2 medication(s) that are the same as other medications prescribed for you. Read the directions carefully, and ask your doctor or other care provider to review them with you. Follow-up Instructions Return in about 7 days (around 6/21/2017) for follow up diabetes and bp check. To-Do List   
 07/06/2017 8:00 AM  
  Appointment with Augusta Bourgeois RD at [address] ([phone]) Introducing [facility] SERVICES! [employer] introduces [employer] patient portal. Now you can access parts of your medical record, email your doctor's office, and request medication refills online. 1. In your internet browser, go to [email or web address] 2. Click on the First Time User? Click Here link in the Sign In box. You will see the New Member Sign Up page. 3. Enter your [employer] Access Code exactly as it appears below. You will not need to use this code after youve completed the sign-up process. If you do not sign up before the expiration date, you must request a new code. · [employer] Access Code: [email or web address] Expires: 6/14/2017  3:20 PM 
 
4. Enter the last four digits of your Social Security Number (xxxx) and Date of Birth (mm/dd/yyyy) as indicated and click Submit. You will be taken to the next sign-up page. 5. Create a [employer] ID. This will be your [employer] login ID and cannot be changed, so think of one that is secure and easy to remember. 6. Create a [employer] password. You can change your password at any time. 7. Enter your Password Reset Question and Answer. This can be used at a later time if you forget your password. 8. Enter your e-mail address. You will receive e-mail notification when new information is available in [address] 9. Click Sign Up. You can now view and download portions of your medical record. 10. Click the Download Summary menu link to download a portable copy of your medical information. If you have questions, please visit the Frequently Asked Questions section of the [employer] website. Remember, [employer] is NOT to be used for urgent needs. For medical emergencies, dial 911. Now available from your iPhone and Android! Please provide this summary of care documentation to your next provider. Your primary care clinician is listed as Tiffany Ledezma. If you have any questions after today's visit, please call [phone].

## 2017-06-15 LAB
BUN SERPL-MCNC: 10 MG/DL (ref 6–20)
BUN/CREAT SERPL: 14 (ref 9–23)
CALCIUM SERPL-MCNC: 9.4 MG/DL (ref 8.7–10.2)
CHLORIDE SERPL-SCNC: 95 MMOL/L (ref 96–106)
CO2 SERPL-SCNC: 23 MMOL/L (ref 18–29)
CREAT SERPL-MCNC: 0.74 MG/DL (ref 0.57–1)
GLUCOSE SERPL-MCNC: 430 MG/DL (ref 65–99)
POTASSIUM SERPL-SCNC: 4.3 MMOL/L (ref 3.5–5.2)
SODIUM SERPL-SCNC: 137 MMOL/L (ref 134–144)

## 2017-06-16 LAB
BILIRUB UR QL STRIP: NEGATIVE
GLUCOSE UR-MCNC: NORMAL MG/DL
KETONES P FAST UR STRIP-MCNC: NEGATIVE MG/DL
PH UR STRIP: 5 [PH] (ref 4.6–8)
PROT UR QL STRIP: NEGATIVE MG/DL
SP GR UR STRIP: 1.01 (ref 1–1.03)
UA UROBILINOGEN AMB POC: NORMAL (ref 0.2–1)
URINALYSIS CLARITY POC: CLEAR
URINALYSIS COLOR POC: YELLOW
URINE BLOOD POC: NEGATIVE
URINE LEUKOCYTES POC: NEGATIVE
URINE NITRITES POC: NEGATIVE

## 2017-06-21 ENCOUNTER — OFFICE VISIT (OUTPATIENT)
Dept: BEHAVIORAL/MENTAL HEALTH CLINIC | Age: 37
End: 2017-06-21

## 2017-06-21 VITALS — HEIGHT: 69 IN

## 2017-06-21 DIAGNOSIS — F43.10 PTSD (POST-TRAUMATIC STRESS DISORDER): Primary | ICD-10-CM

## 2017-06-21 DIAGNOSIS — F33.2 SEVERE EPISODE OF RECURRENT MAJOR DEPRESSIVE DISORDER, WITHOUT PSYCHOTIC FEATURES (HCC): ICD-10-CM
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
## 2017-07-11 ENCOUNTER — OFFICE VISIT (OUTPATIENT)
Dept: BEHAVIORAL/MENTAL HEALTH CLINIC | Age: 37
End: 2017-07-11

## 2017-07-11 VITALS
HEART RATE: 97 BPM | WEIGHT: 239 LBS | DIASTOLIC BLOOD PRESSURE: 94 MMHG | BODY MASS INDEX: 35.4 KG/M2 | OXYGEN SATURATION: 99 % | SYSTOLIC BLOOD PRESSURE: 133 MMHG | HEIGHT: 69 IN

## 2017-07-11 DIAGNOSIS — F33.2 SEVERE EPISODE OF RECURRENT MAJOR DEPRESSIVE DISORDER, WITHOUT PSYCHOTIC FEATURES (HCC): ICD-10-CM

## 2017-07-11 RX ORDER — TRAZODONE HYDROCHLORIDE 50 MG/1
TABLET ORAL
Qty: 30 TAB | Refills: 2 | Status: SHIPPED | OUTPATIENT
Start: 2017-07-11 | End: 2017-10-10 | Stop reason: SDUPTHER

## 2017-07-11 RX ORDER — DULOXETIN HYDROCHLORIDE 60 MG/1
60 CAPSULE, DELAYED RELEASE ORAL 2 TIMES DAILY
COMMUNITY
End: 2020-10-13

## 2017-07-11 RX ORDER — FLUOXETINE HYDROCHLORIDE 60 MG/1
60 TABLET, FILM COATED ORAL; ORAL DAILY
Qty: 30 TAB | Refills: 2 | Status: SHIPPED | OUTPATIENT
Start: 2017-07-11 | End: 2017-10-10 | Stop reason: SDUPTHER
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
## 2017-07-18 ENCOUNTER — OFFICE VISIT (OUTPATIENT)
Dept: BEHAVIORAL/MENTAL HEALTH CLINIC | Age: 37
End: 2017-07-18

## 2017-07-18 VITALS — HEIGHT: 69 IN

## 2017-07-18 DIAGNOSIS — F43.10 PTSD (POST-TRAUMATIC STRESS DISORDER): ICD-10-CM

## 2017-07-18 DIAGNOSIS — F33.2 SEVERE EPISODE OF RECURRENT MAJOR DEPRESSIVE DISORDER, WITHOUT PSYCHOTIC FEATURES (HCC): Primary | ICD-10-CM
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
## 2017-08-02 ENCOUNTER — OFFICE VISIT (OUTPATIENT)
Dept: BEHAVIORAL/MENTAL HEALTH CLINIC | Age: 37
End: 2017-08-02

## 2017-08-02 VITALS
DIASTOLIC BLOOD PRESSURE: 102 MMHG | WEIGHT: 240 LBS | HEART RATE: 93 BPM | HEIGHT: 69 IN | OXYGEN SATURATION: 97 % | SYSTOLIC BLOOD PRESSURE: 146 MMHG | BODY MASS INDEX: 35.55 KG/M2

## 2017-08-02 DIAGNOSIS — F33.2 SEVERE EPISODE OF RECURRENT MAJOR DEPRESSIVE DISORDER, WITHOUT PSYCHOTIC FEATURES (HCC): Primary | ICD-10-CM

## 2017-08-02 DIAGNOSIS — F43.10 PTSD (POST-TRAUMATIC STRESS DISORDER): ICD-10-CM
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
## 2017-10-10 ENCOUNTER — OFFICE VISIT (OUTPATIENT)
Dept: BEHAVIORAL/MENTAL HEALTH CLINIC | Age: 37
End: 2017-10-10

## 2017-10-10 VITALS
WEIGHT: 233 LBS | BODY MASS INDEX: 34.51 KG/M2 | SYSTOLIC BLOOD PRESSURE: 130 MMHG | DIASTOLIC BLOOD PRESSURE: 84 MMHG | HEIGHT: 69 IN | OXYGEN SATURATION: 99 % | HEART RATE: 86 BPM

## 2017-10-10 DIAGNOSIS — F33.2 SEVERE EPISODE OF RECURRENT MAJOR DEPRESSIVE DISORDER, WITHOUT PSYCHOTIC FEATURES (HCC): Primary | ICD-10-CM

## 2017-10-10 RX ORDER — TRAZODONE HYDROCHLORIDE 50 MG/1
TABLET ORAL
Qty: 30 TAB | Refills: 2 | Status: SHIPPED | OUTPATIENT
Start: 2017-10-10 | End: 2020-10-13

## 2017-10-10 RX ORDER — FLUOXETINE HYDROCHLORIDE 60 MG/1
60 TABLET, FILM COATED ORAL; ORAL DAILY
Qty: 30 TAB | Refills: 2 | Status: SHIPPED | OUTPATIENT
Start: 2017-10-10 | End: 2020-10-13
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
## 2017-11-02 ENCOUNTER — APPOINTMENT (OUTPATIENT)
Dept: GENERAL RADIOLOGY | Age: 37
End: 2017-11-02
Attending: EMERGENCY MEDICINE
Payer: MEDICAID

## 2017-11-02 ENCOUNTER — HOSPITAL ENCOUNTER (EMERGENCY)
Age: 37
Discharge: HOME | End: 2017-11-03
Attending: EMERGENCY MEDICINE
Payer: MEDICAID

## 2017-11-02 DIAGNOSIS — J06.9 VIRAL UPPER RESPIRATORY TRACT INFECTION: ICD-10-CM

## 2017-11-02 DIAGNOSIS — E11.65 UNCONTROLLED TYPE 2 DIABETES MELLITUS WITH HYPERGLYCEMIA, WITH LONG-TERM CURRENT USE OF INSULIN (HCC): Primary | ICD-10-CM

## 2017-11-02 DIAGNOSIS — Z79.4 UNCONTROLLED TYPE 2 DIABETES MELLITUS WITH HYPERGLYCEMIA, WITH LONG-TERM CURRENT USE OF INSULIN (HCC): Primary | ICD-10-CM

## 2017-11-02 LAB
ALBUMIN SERPL-MCNC: 3.1 G/DL (ref 3.5–5)
ALBUMIN/GLOB SERPL: 0.7 {RATIO} (ref 1.1–2.2)
ALP SERPL-CCNC: 153 U/L (ref 45–117)
ALT SERPL-CCNC: 13 U/L (ref 12–78)
ANION GAP SERPL CALC-SCNC: 10 MMOL/L (ref 5–15)
AST SERPL-CCNC: 9 U/L (ref 15–37)
BASOPHILS # BLD: 0 K/UL (ref 0–0.1)
BASOPHILS NFR BLD: 0 % (ref 0–1)
BILIRUB SERPL-MCNC: 0.4 MG/DL (ref 0.2–1)
BUN SERPL-MCNC: 7 MG/DL (ref 6–20)
BUN/CREAT SERPL: 8 (ref 12–20)
CALCIUM SERPL-MCNC: 8.9 MG/DL (ref 8.5–10.1)
CHLORIDE SERPL-SCNC: 100 MMOL/L (ref 97–108)
CO2 SERPL-SCNC: 26 MMOL/L (ref 21–32)
CREAT SERPL-MCNC: 0.93 MG/DL (ref 0.55–1.02)
EOSINOPHIL # BLD: 0.2 K/UL (ref 0–0.4)
EOSINOPHIL NFR BLD: 1 % (ref 0–7)
ERYTHROCYTE [DISTWIDTH] IN BLOOD BY AUTOMATED COUNT: 13.4 % (ref 11.5–14.5)
GLOBULIN SER CALC-MCNC: 4.2 G/DL (ref 2–4)
GLUCOSE BLD STRIP.AUTO-MCNC: 394 MG/DL (ref 65–100)
GLUCOSE SERPL-MCNC: 374 MG/DL (ref 65–100)
HCG SERPL QL: NEGATIVE
HCG UR QL: NEGATIVE
HCT VFR BLD AUTO: 32.4 % (ref 35–47)
HGB BLD-MCNC: 10.7 G/DL (ref 11.5–16)
LACTATE SERPL-SCNC: 1.3 MMOL/L (ref 0.4–2)
LYMPHOCYTES # BLD: 3 K/UL (ref 0.8–3.5)
LYMPHOCYTES NFR BLD: 25 % (ref 12–49)
MCH RBC QN AUTO: 25.1 PG (ref 26–34)
MCHC RBC AUTO-ENTMCNC: 33 G/DL (ref 30–36.5)
MCV RBC AUTO: 76.1 FL (ref 80–99)
MONOCYTES # BLD: 0.5 K/UL (ref 0–1)
MONOCYTES NFR BLD: 4 % (ref 5–13)
NEUTS SEG # BLD: 8.6 K/UL (ref 1.8–8)
NEUTS SEG NFR BLD: 70 % (ref 32–75)
PLATELET # BLD AUTO: 226 K/UL (ref 150–400)
POTASSIUM SERPL-SCNC: 3.3 MMOL/L (ref 3.5–5.1)
PROT SERPL-MCNC: 7.3 G/DL (ref 6.4–8.2)
RBC # BLD AUTO: 4.26 M/UL (ref 3.8–5.2)
SERVICE CMNT-IMP: ABNORMAL
SODIUM SERPL-SCNC: 136 MMOL/L (ref 136–145)
WBC # BLD AUTO: 12.3 K/UL (ref 3.6–11)

## 2017-11-02 PROCEDURE — 71010 XR CHEST PORT: CPT

## 2017-11-02 PROCEDURE — 99285 EMERGENCY DEPT VISIT HI MDM: CPT

## 2017-11-02 PROCEDURE — 81001 URINALYSIS AUTO W/SCOPE: CPT | Performed by: EMERGENCY MEDICINE

## 2017-11-02 PROCEDURE — 96376 TX/PRO/DX INJ SAME DRUG ADON: CPT

## 2017-11-02 PROCEDURE — 80053 COMPREHEN METABOLIC PANEL: CPT | Performed by: EMERGENCY MEDICINE

## 2017-11-02 PROCEDURE — 82962 GLUCOSE BLOOD TEST: CPT

## 2017-11-02 PROCEDURE — 74011000250 HC RX REV CODE- 250: Performed by: EMERGENCY MEDICINE

## 2017-11-02 PROCEDURE — 77030029684 HC NEB SM VOL KT MONA -A

## 2017-11-02 PROCEDURE — 83605 ASSAY OF LACTIC ACID: CPT | Performed by: EMERGENCY MEDICINE

## 2017-11-02 PROCEDURE — 96361 HYDRATE IV INFUSION ADD-ON: CPT

## 2017-11-02 PROCEDURE — 87086 URINE CULTURE/COLONY COUNT: CPT | Performed by: EMERGENCY MEDICINE

## 2017-11-02 PROCEDURE — 85025 COMPLETE CBC W/AUTO DIFF WBC: CPT | Performed by: EMERGENCY MEDICINE

## 2017-11-02 PROCEDURE — 81025 URINE PREGNANCY TEST: CPT

## 2017-11-02 PROCEDURE — 94640 AIRWAY INHALATION TREATMENT: CPT

## 2017-11-02 PROCEDURE — 36415 COLL VENOUS BLD VENIPUNCTURE: CPT | Performed by: EMERGENCY MEDICINE

## 2017-11-02 PROCEDURE — 96374 THER/PROPH/DIAG INJ IV PUSH: CPT

## 2017-11-02 PROCEDURE — 84703 CHORIONIC GONADOTROPIN ASSAY: CPT | Performed by: EMERGENCY MEDICINE

## 2017-11-02 RX ORDER — IPRATROPIUM BROMIDE AND ALBUTEROL SULFATE 2.5; .5 MG/3ML; MG/3ML
3 SOLUTION RESPIRATORY (INHALATION) ONCE
Status: COMPLETED | OUTPATIENT
Start: 2017-11-02 | End: 2017-11-02

## 2017-11-02 RX ADMIN — IPRATROPIUM BROMIDE AND ALBUTEROL SULFATE 3 ML: .5; 3 SOLUTION RESPIRATORY (INHALATION) at 23:17

## 2017-11-02 NOTE — LETTER
[address] 
[facility] EMERGENCY DEPT 
[address] [place] [address] [id number] 
[phone] Work/School Note Date: 11/2/2017 -11/3/2017 To Whom It May concern: 
 
Julio Loco was seen and treated today in the emergency room by the following provider(s): 
Attending Provider: Lurdes Mckeon MD. Julio Loco can return to work Monday, Nov. 6th. Sincerely, Lurdes Mckeon MD

## 2017-11-03 VITALS
DIASTOLIC BLOOD PRESSURE: 77 MMHG | SYSTOLIC BLOOD PRESSURE: 123 MMHG | RESPIRATION RATE: 14 BRPM | OXYGEN SATURATION: 96 % | TEMPERATURE: 98.3 F | HEART RATE: 93 BPM | HEIGHT: 64 IN | BODY MASS INDEX: 40.97 KG/M2 | WEIGHT: 240 LBS

## 2017-11-03 LAB
APPEARANCE UR: CLEAR
BACTERIA URNS QL MICRO: ABNORMAL /HPF
BILIRUB UR QL: NEGATIVE
COLOR UR: ABNORMAL
EPITH CASTS URNS QL MICRO: ABNORMAL /LPF
GLUCOSE BLD STRIP.AUTO-MCNC: 283 MG/DL (ref 65–100)
GLUCOSE BLD STRIP.AUTO-MCNC: 331 MG/DL (ref 65–100)
GLUCOSE UR STRIP.AUTO-MCNC: >1000 MG/DL
HGB UR QL STRIP: NEGATIVE
HYALINE CASTS URNS QL MICRO: ABNORMAL /LPF (ref 0–5)
KETONES UR QL STRIP.AUTO: NEGATIVE MG/DL
LEUKOCYTE ESTERASE UR QL STRIP.AUTO: NEGATIVE
NITRITE UR QL STRIP.AUTO: NEGATIVE
PH UR STRIP: 5.5 [PH] (ref 5–8)
PROT UR STRIP-MCNC: NEGATIVE MG/DL
RBC #/AREA URNS HPF: ABNORMAL /HPF (ref 0–5)
SERVICE CMNT-IMP: ABNORMAL
SERVICE CMNT-IMP: ABNORMAL
SP GR UR REFRACTOMETRY: 1.01 (ref 1–1.03)
UA: UC IF INDICATED,UAUC: ABNORMAL
UROBILINOGEN UR QL STRIP.AUTO: 0.2 EU/DL (ref 0.2–1)
WBC URNS QL MICRO: ABNORMAL /HPF (ref 0–4)

## 2017-11-03 PROCEDURE — 74011250636 HC RX REV CODE- 250/636: Performed by: EMERGENCY MEDICINE

## 2017-11-03 PROCEDURE — 74011250637 HC RX REV CODE- 250/637: Performed by: EMERGENCY MEDICINE

## 2017-11-03 PROCEDURE — 82962 GLUCOSE BLOOD TEST: CPT

## 2017-11-03 PROCEDURE — 74011636637 HC RX REV CODE- 636/637: Performed by: EMERGENCY MEDICINE

## 2017-11-03 PROCEDURE — 74011636637 HC RX REV CODE- 636/637

## 2017-11-03 RX ORDER — BUTALBITAL, ACETAMINOPHEN AND CAFFEINE 50; 325; 40 MG/1; MG/1; MG/1
2 TABLET ORAL
Status: COMPLETED | OUTPATIENT
Start: 2017-11-03 | End: 2017-11-03

## 2017-11-03 RX ADMIN — SODIUM CHLORIDE 1000 ML: 900 INJECTION, SOLUTION INTRAVENOUS at 00:27

## 2017-11-03 RX ADMIN — BUTALBITAL, ACETAMINOPHEN AND CAFFEINE 2 TABLET: 50; 325; 40 TABLET ORAL at 00:34

## 2017-11-03 RX ADMIN — SODIUM CHLORIDE 1000 ML: 900 INJECTION, SOLUTION INTRAVENOUS at 02:13

## 2017-11-03 RX ADMIN — INSULIN HUMAN 10 UNITS: 100 INJECTION, SOLUTION PARENTERAL at 02:11

## 2017-11-03 RX ADMIN — INSULIN HUMAN 10 UNITS: 100 INJECTION, SOLUTION PARENTERAL at 00:27

## 2017-11-03 NOTE — ED TRIAGE NOTES
Chief Complaint: high blood sugar   Patient states sick with cold symptoms with productive cough  Onset 1 week  Blood sugars high today  Pt arrived via EMS

## 2017-11-03 NOTE — ED PROVIDER NOTES
[address]  EMERGENCY DEPARTMENT HISTORY AND PHYSICAL EXAM       Date of Service: 11/2/2017   Patient Name: Huey Rashid   YOB: 1980  Medical Record Number: [id number]    History of Presenting Illness     Chief Complaint   Patient presents with    High Blood Sugar     blood sugar about 450 today; normally 250-300 takes insulin and metformin    Cough     1 week productive cough yellow sputum    Eye Pain     pressure behind right eye about 1 hour. History Provided By:  patient    Additional History:   Huey Rashid is a 44 y.o. female with PMhx significant for DM, gout, HTN, depression, PTSD who presents via EMS to the ED with cc of cold-like sx symptoms, mainly consisting of persistent productive cough with green sputum present x 1 week. Pt reports associated sx of elevated blood glucose today. Pt states that she has DM and normally takes metformin 2x per day and humalog. She notes that she takes these medications on a sliding scale. She reports taking her DM medications diligently and denies any recent changes to her regimen. She reports a blood glucose value of 453 today and states that her sugars often become elevated when she is ill. She denies any hx of asthma or RAD. She denies any recent steroid use. She notes that she has some leg swelling in her bilateral lower extremities at baseline, but that it has been greater than normal over the past few days. She reports that she has had US done in the past with no evidence of DVT. Her DM is managed by Dr. J Luis Kahn at [facility]. She denies fever. Social Hx: - Tobacco, - EtOH, - Illicit Drugs    There are no other complaints, changes or physical findings at this time.     Primary Care Provider: Jackeline Shirley MD   Specialist: Dr J Luis Kahn [facility])    Past History     Past Medical History:   Past Medical History:   Diagnosis Date    Depression     Diabetes ([facility] [address]     Edema     Gout     Herpes genitalis  Hypertension     Ill-defined condition     gout    Mood disorder (HCC)     Other ill-defined conditions(799.89)     cholesterol     Psychiatric disorder     depression    Psychiatric disorder     PTSD    Sleep disorder     UTI (lower urinary tract infection)         Past Surgical History:   Past Surgical History:   Procedure Laterality Date    HX GYN       X 3        Family History:   Family History   Problem Relation Age of Onset    Hypertension Mother     Coronary Artery Disease Mother     Diabetes Father     Stroke Father 46     pt estimates    Kidney Disease Father      secondary to diabetes        Social History:   Social History   Substance Use Topics    Smoking status: Never Smoker    Smokeless tobacco: Never Used    Alcohol use No        Allergies:   No Known Allergies      Review of Systems   Review of Systems   Constitutional: Negative for activity change, appetite change, fatigue and fever. HENT: Negative. Negative for congestion, rhinorrhea and sore throat. Respiratory: Positive for cough. Negative for shortness of breath and wheezing. Cardiovascular: Positive for leg swelling. Negative for chest pain. Gastrointestinal: Negative. Negative for abdominal distention, abdominal pain, constipation, diarrhea, nausea and vomiting. Endocrine: Negative.         + elevated blood glucose   Genitourinary: Negative for difficulty urinating, dysuria, menstrual problem, vaginal bleeding and vaginal discharge. Musculoskeletal: Negative. Negative for arthralgias, joint swelling and myalgias. Skin: Negative. Negative for rash. Neurological: Negative. Negative for dizziness, weakness, light-headedness and headaches. Psychiatric/Behavioral: Negative. Physical Exam  Physical Exam   Constitutional: She is oriented to person, place, and time. She appears well-developed and well-nourished.    Non toxic, vital signs stable   HENT:   Head: Atraumatic.   + Bilateral nasal congestion   Eyes: EOM are normal.   Cardiovascular: Normal rate, regular rhythm, normal heart sounds and intact distal pulses. Exam reveals no gallop and no friction rub. No murmur heard.  + Severe bilateral lower extremity edema, symmetric  No pitting   Pulmonary/Chest: No respiratory distress. She exhibits no tenderness.   + [place] scattered wheezing  + Intermittent cough  No increased work of breathing  No accessory muscle use   Abdominal: Soft. Bowel sounds are normal. She exhibits no distension and no mass. There is no tenderness. There is no rebound and no guarding. Musculoskeletal: Normal range of motion. She exhibits no edema or tenderness. Neurological: She is oriented to person, place, and time. Skin: Skin is warm. Psychiatric: She has a normal mood and affect. Nursing note and vitals reviewed. Medical Decision Making   I am the first provider for this patient. I reviewed the vital signs, available nursing notes, past medical history, past surgical history, family history and social history. Old Medical Records: Old medical records. Provider Notes:   DDx: URI, PNA, bronchitis, RAD, uncontrolled DM possibly due to URI. Will need treatment for URI sx/uncontrolled DM. Unlikely management of hyperglycemia. ED Course:  10:36 PM   Initial assessment performed. The patients presenting problems have been discussed, and they are in agreement with the care plan formulated and outlined with them. I have encouraged them to ask questions as they arise throughout their visit. Progress Notes:   4:00 AM  Pt continues to feel better compared to time of arrival. Blood sugar was slightly refractory to treatment. Despite 2 IV fluid boluses and 2 doses of IV insulin, only improved about 100 points. Encouraged pt to f/u with Endocrinology for continuous management.    Written by PHILLIP Mckayiblibia, as dictated by Patti Spear MD      Diagnostic Study Results     Labs - Recent Results (from the past 12 hour(s))   CBC WITH AUTOMATED DIFF    Collection Time: 11/02/17 10:46 PM   Result Value Ref Range    WBC 12.3 (H) 3.6 - 11.0 K/uL    RBC 4.26 3.80 - 5.20 M/uL    HGB 10.7 (L) 11.5 - 16.0 g/dL    HCT 32.4 (L) 35.0 - 47.0 %    MCV 76.1 (L) 80.0 - 99.0 FL    MCH 25.1 (L) 26.0 - 34.0 PG    MCHC 33.0 30.0 - 36.5 g/dL    RDW 13.4 11.5 - 14.5 %    PLATELET 535 845 - 672 K/uL    NEUTROPHILS 70 32 - 75 %    LYMPHOCYTES 25 12 - 49 %    MONOCYTES 4 (L) 5 - 13 %    EOSINOPHILS 1 0 - 7 %    BASOPHILS 0 0 - 1 %    ABS. NEUTROPHILS 8.6 (H) 1.8 - 8.0 K/UL    ABS. LYMPHOCYTES 3.0 0.8 - 3.5 K/UL    ABS. MONOCYTES 0.5 0.0 - 1.0 K/UL    ABS. EOSINOPHILS 0.2 0.0 - 0.4 K/UL    ABS. BASOPHILS 0.0 0.0 - 0.1 K/UL   METABOLIC PANEL, COMPREHENSIVE    Collection Time: 11/02/17 10:46 PM   Result Value Ref Range    Sodium 136 136 - 145 mmol/L    Potassium 3.3 (L) 3.5 - 5.1 mmol/L    Chloride 100 97 - 108 mmol/L    CO2 26 21 - 32 mmol/L    Anion gap 10 5 - 15 mmol/L    Glucose 374 (H) 65 - 100 mg/dL    BUN 7 6 - 20 MG/DL    Creatinine 0.93 0.55 - 1.02 MG/DL    BUN/Creatinine ratio 8 (L) 12 - 20      GFR est AA >60 >60 ml/min/1.73m2    GFR est non-AA >60 >60 ml/min/1.73m2    Calcium 8.9 8.5 - 10.1 MG/DL    Bilirubin, total 0.4 0.2 - 1.0 MG/DL    ALT (SGPT) 13 12 - 78 U/L    AST (SGOT) 9 (L) 15 - 37 U/L    Alk.  phosphatase 153 (H) 45 - 117 U/L    Protein, total 7.3 6.4 - 8.2 g/dL    Albumin 3.1 (L) 3.5 - 5.0 g/dL    Globulin 4.2 (H) 2.0 - 4.0 g/dL    A-G Ratio 0.7 (L) 1.1 - 2.2     LACTIC ACID    Collection Time: 11/02/17 10:46 PM   Result Value Ref Range    Lactic acid 1.3 0.4 - 2.0 MMOL/L   HCG QL SERUM    Collection Time: 11/02/17 10:46 PM   Result Value Ref Range    HCG, Ql. NEGATIVE  NEG     URINALYSIS W/ REFLEX CULTURE    Collection Time: 11/02/17 11:00 PM   Result Value Ref Range    Color YELLOW/STRAW      Appearance CLEAR CLEAR      Specific gravity 1.015 1.003 - 1.030      pH (UA) 5.5 5.0 - 8.0 Protein NEGATIVE  NEG mg/dL    Glucose >1000 (A) NEG mg/dL    Ketone NEGATIVE  NEG mg/dL    Bilirubin NEGATIVE  NEG      Blood NEGATIVE  NEG      Urobilinogen 0.2 0.2 - 1.0 EU/dL    Nitrites NEGATIVE  NEG      Leukocyte Esterase NEGATIVE  NEG      WBC 5-10 0 - 4 /hpf    RBC 0-5 0 - 5 /hpf    Epithelial cells MODERATE (A) FEW /lpf    Bacteria 1+ (A) NEG /hpf    UA:UC IF INDICATED URINE CULTURE ORDERED (A) CNI      Hyaline cast 0-2 0 - 5 /lpf   HCG URINE, QL. - POC    Collection Time: 11/02/17 11:02 PM   Result Value Ref Range    Pregnancy test,urine (POC) NEGATIVE  NEG     GLUCOSE, POC    Collection Time: 11/02/17 11:04 PM   Result Value Ref Range    Glucose (POC) 394 (H) 65 - 100 mg/dL    Performed by Lance Rasmussen, POC    Collection Time: 11/03/17  1:54 AM   Result Value Ref Range    Glucose (POC) 331 (H) 65 - 100 mg/dL    Performed by Hattie Cuevas (PCT)    GLUCOSE, POC    Collection Time: 11/03/17  3:54 AM   Result Value Ref Range    Glucose (POC) 283 (H) 65 - 100 mg/dL    Performed by Devin Cerrato        Radiologic Studies -  The following have been ordered and reviewed:  No orders to display     CT Results  (Last 48 hours)    None        CXR Results  (Last 48 hours)               11/02/17 2300  XR CHEST PORT Final result    Impression:  Impression:   No acute cardiopulmonary process. Narrative:  Chest portable AP       History: Chest pain. Cough with sputum production       Comparison: 5/14/2017       Findings: The lungs are well expanded. No focal consolidation, pleural   effusion, or pneumothorax. The cardiomediastinal silhouette is unremarkable. The visualized osseous structures are unremarkable. Vital Signs-Reviewed the patient's vital signs.    Patient Vitals for the past 12 hrs:   Temp Pulse Resp BP SpO2   11/03/17 0330 - 84 16 117/81 94 %   11/03/17 0300 - 83 19 (!) 82/39 92 %   11/03/17 0230 - 90 22 108/63 95 %   11/03/17 0200 - 82 13 107/58 94 %   11/03/17 0130 - 87 21 107/59 95 %   11/03/17 0100 - 91 22 107/48 94 %   11/03/17 0030 - 99 23 98/52 95 %   11/03/17 0000 - (!) 105 21 125/68 94 %   11/02/17 2345 - (!) 107 21 - 93 %   11/02/17 2330 - 98 20 133/81 95 %   11/02/17 2300 - 92 15 (!) 144/95 95 %   11/02/17 2230 - 87 17 (!) 140/92 94 %   11/02/17 2216 98.3 °F (36.8 °C) - 12 (!) 138/93 96 %       Medications Given in the ED:  Medications   albuterol-ipratropium (DUO-NEB) 2.5 MG-0.5 MG/3 ML (3 mL Nebulization Given 11/2/17 2317)   sodium chloride 0.9 % bolus infusion 1,000 mL (0 mL IntraVENous IV Completed 11/3/17 0154)   insulin regular (NOVOLIN R, HUMULIN R) injection 10 Units (10 Units IntraVENous Given 11/3/17 0027)   butalbital-acetaminophen-caffeine (FIORICET, ESGIC) -40 mg per tablet 2 Tab (2 Tabs Oral Given 11/3/17 0034)   sodium chloride 0.9 % bolus infusion 1,000 mL (0 mL IntraVENous IV Completed 11/3/17 0353)   insulin regular (NOVOLIN R, HUMULIN R) injection 10 Units (10 Units IntraVENous Given 11/3/17 0211)       Pulse Oximetry Analysis - Normal 96% on room air     Cardiac Monitor:   Rate: 86      Diagnosis   Clinical Impression:   1. Uncontrolled type 2 diabetes mellitus with hyperglycemia, with long-term current use of insulin ([address]    2. Viral upper respiratory tract infection         Plan:  1:   Follow-up Information     Follow up With Details Comments Contact Info    Anshul Killian MD In 3 days  [address] [facility]  [phone]        Follow-up with your Endocrinologist, Dr Shilpa Francis today regarding your blood sugars. [facility] EMERGENCY DEPT  As needed, If symptoms worsen [address] [place] [address]  [phone]        2:   Current Discharge Medication List        Return to ED if Worse    Disposition Note:  Discharge Note:  4:24 AM  The pt is ready for discharge. The pt's signs, symptoms, diagnosis, and discharge instructions have been discussed and pt has conveyed their understanding.  The pt is to follow up as recommended or return to ER should their symptoms worsen. Plan has been discussed and pt is in agreement. This note is prepared by Conchis Tate, acting as a Scribe for MD Nadir Ricks MD: The scribe's documentation has been prepared under my direction and personally reviewed by me in its entirety. I confirm that the notes above accurately reflects all work, treatment, procedures, and medical decision making performed by me.    _______________________________   Attestations: This note is prepared by Conchis Tate, acting as a Scribe for MD Nadir Ricks MD: The scribe's documentation has been prepared under my direction and personally reviewed by me in its entirety.  I confirm that the notes above accurately reflects all work, treatment, procedures, and medical decision making performed by me.  _______________________________

## 2017-11-04 LAB
BACTERIA SPEC CULT: NORMAL
CC UR VC: NORMAL
SERVICE CMNT-IMP: NORMAL

## 2017-11-06 ENCOUNTER — OFFICE VISIT (OUTPATIENT)
Dept: INTERNAL MEDICINE CLINIC | Facility: CLINIC | Age: 37
End: 2017-11-06

## 2017-11-06 VITALS
WEIGHT: 233.2 LBS | HEIGHT: 64 IN | RESPIRATION RATE: 18 BRPM | BODY MASS INDEX: 39.81 KG/M2 | DIASTOLIC BLOOD PRESSURE: 90 MMHG | HEART RATE: 75 BPM | SYSTOLIC BLOOD PRESSURE: 170 MMHG | TEMPERATURE: 97.7 F | OXYGEN SATURATION: 98 %

## 2017-11-06 DIAGNOSIS — I10 ESSENTIAL HYPERTENSION WITH GOAL BLOOD PRESSURE LESS THAN 130/85: ICD-10-CM

## 2017-11-06 DIAGNOSIS — I10 ESSENTIAL HYPERTENSION: ICD-10-CM

## 2017-11-06 DIAGNOSIS — J06.9 VIRAL UPPER RESPIRATORY TRACT INFECTION: ICD-10-CM

## 2017-11-06 DIAGNOSIS — R60.9 EDEMA, UNSPECIFIED TYPE: ICD-10-CM

## 2017-11-06 RX ORDER — FUROSEMIDE 20 MG/1
20 TABLET ORAL
Qty: 30 TAB | Refills: 3 | OUTPATIENT
Start: 2017-11-06 | End: 2019-08-12

## 2017-11-06 NOTE — MR AVS SNAPSHOT
Visit Information Date & Time Provider Department Dept. Phone Encounter #  
 11/6/2017  3:00 PM Tangela Dwyer PA-C [facility] [phone] [id number] Follow-up Instructions Return in about 2 weeks (around 11/20/2017) for HTN follow up with Dr. Ann-Marie Bills.  
  
Your Appointments 1/9/2018 11:30 AM  
COUNSELING with Christina Cabello LCSW [facility] ([address] [facility]) Appt Note: therapy [address] [facility] [address] [facility] [address]  
[phone]  
  
   
 [address] [place] [address] [address]  
  
    
 1/10/2018 11:30 AM  
ESTABLISHED PATIENT with Fredericka Merlin, NP [facility] ([facility]) Appt Note: 3 month f/u  
 [address] [place] [address] Rosalia Moran [address]  
  
   
 [facility] [address] [facility] [address] [facility] [address] Upcoming Health Maintenance Date Due  
 FOOT EXAM Q1 8/3/1989 EYE EXAM RETINAL OR DILATED Q1 8/3/1989 MICROALBUMIN Q1 11/25/2016 LIPID PANEL Q1 11/25/2016 INFLUENZA AGE 9 TO ADULT 8/1/2017 PAP AKA CERVICAL CYTOLOGY 1/26/2018 HEMOGLOBIN A1C Q6M 12/7/2017 DTaP/Tdap/Td series (2 - Td) 1/5/2026 Allergies as of 11/6/2017  Review Complete On: 11/6/2017 By: Tangela Dwyer PA-C No Known Allergies Current Immunizations  Reviewed on 11/3/2016 Name Date Influenza Vaccine 10/1/2014 Influenza Vaccine Caleb Push) 11/3/2016 Pneumococcal Polysaccharide (PPSV-23) 6/30/2014  9:23 PM  
 Tdap 1/5/2016 Not reviewed this visit You Were Diagnosed With   
  
 Codes Comments Uncontrolled type 2 diabetes mellitus with complication, with long-term current use of insulin (HCC)    -  Primary ICD-10-CM: E11.8, E11.65, Z79.4 ICD-9-CM: 250.82, V58.67 Essential hypertension with goal blood pressure less than 130/85     ICD-10-CM: I10 
ICD-9-CM: 401.9  Class 3 obesity due to excess calories with serious comorbidity and body mass index (BMI) of 40.0 to 44.9 in adult [facility])     ICD-10-CM: E66.09, Z68.41 
ICD-9-CM: 278.00, V85.41 Viral upper respiratory tract infection     ICD-10-CM: J06.9, B97.89 ICD-9-CM: 465.9 Edema, unspecified type     ICD-10-CM: R60.9 ICD-9-CM: 782.3 Essential hypertension     ICD-10-CM: I10 
ICD-9-CM: 401.9 Vitals BP Pulse Temp Resp Height(growth percentile) Weight(growth percentile) 170/90 75 97.7 °F (36.5 °C) (Oral) 18 5' 4\" (1.626 m) 233 lb 3.2 oz (105.8 kg) SpO2 BMI OB Status Smoking Status 98% 40.03 kg/m2 IUD Never Smoker Vitals History BMI and BSA Data Body Mass Index Body Surface Area 40.03 kg/m 2 2.19 m 2 Preferred Pharmacy Pharmacy Name [facility] [address] [place] [phone] Your Updated Medication List  
  
   
This list is accurate as of: 11/6/17  3:48 PM.  Always use your most recent med list.  
  
  
  
  
 aspirin delayed-release 81 mg tablet Take 1 Tab by mouth daily. colchicine 0.6 mg capsule Commonly known as:  Kelly Gals Take 1 Cap by mouth daily. Take two tablets by mouth now and the one tablet by mouth one hour later DULoxetine 60 mg capsule Commonly known as:  CYMBALTA Take 60 mg by mouth two (2) times a day. FLUoxetine 60 mg Tab Take 60 mg by mouth daily. furosemide 20 mg tablet Commonly known as:  LASIX Take 1 Tab by mouth daily as needed. Indications: Edema  
  
 gabapentin 300 mg capsule Commonly known as:  NEURONTIN Take 1 Cap by mouth three (3) times daily. HumaLOG KwikPen 100 unit/mL kwikpen Generic drug:  insulin lispro  
by SubCUTAneous route Before breakfast, lunch, dinner and at bedtime. Sliding Scale  
  
 ibuprofen 200 mg tablet Commonly known as:  MOTRIN Take 3 Tabs by mouth three (3) times daily (with meals). LANTUS SOLOSTAR 100 unit/mL (3 mL) Inpn Generic drug:  insulin glargine INJECT 40 UNITS UNDER THE SKIN D  
  
 levonorgestrel 20 mcg/24 hr (5 years) IUD Commonly known as:  MIRENA  
1 Each by IntraUTERine route once. lisinopril 10 mg tablet Commonly known as:  Royer Hu Take 1 Tab by mouth daily. Del Gomez LANCETS [facility] Generic drug:  lancets TEST SIX TIMES A DAY  
  
 ONETOUCH VERIO strip Generic drug:  glucose blood VI test strips TEST SIX TIMES A DAY  
  
 simvastatin 20 mg tablet Commonly known as:  ZOCOR Take 1 Tab by mouth nightly. traZODone 50 mg tablet Commonly known as:  True [place] Take 1/2 to 1 tablet by mouth at bedtime PRN insomnia. valACYclovir 1 gram tablet Commonly known as:  VALTREX Take 1 Tab by mouth daily. Prescriptions Sent to Pharmacy Refills  
 furosemide (LASIX) 20 mg tablet 3 Sig: Take 1 Tab by mouth daily as needed. Indications: Edema Class: Normal  
 Pharmacy: [address] [place] [address], [address] [place] [address] Ph #: [phone] Route: Oral  
  
Follow-up Instructions Return in about 2 weeks (around 11/20/2017) for HTN follow up with Dr. Vishnu Holliday.  
  
  
Introducing [facility]! Kristi Blair introduces [employer] patient portal. Now you can access parts of your medical record, email your doctor's office, and request medication refills online. 1. In your internet browser, go to [email or web address] 2. Click on the First Time User? Click Here link in the Sign In box. You will see the New Member Sign Up page. 3. Enter your [employer] Access Code exactly as it appears below. You will not need to use this code after youve completed the sign-up process. If you do not sign up before the expiration date, you must request a new code. · [employer] Access Code: [email or web address] Expires: 2/1/2018  3:30 AM 
 
4.  Enter the last four digits of your Social Security Number (xxxx) and Date of Birth (mm/dd/yyyy) as indicated and click Submit. You will be taken to the next sign-up page. 5. Create a [employer] ID. This will be your [employer] login ID and cannot be changed, so think of one that is secure and easy to remember. 6. Create a [employer] password. You can change your password at any time. 7. Enter your Password Reset Question and Answer. This can be used at a later time if you forget your password. 8. Enter your e-mail address. You will receive e-mail notification when new information is available in [address] 9. Click Sign Up. You can now view and download portions of your medical record. 10. Click the Download Summary menu link to download a portable copy of your medical information. If you have questions, please visit the Frequently Asked Questions section of the [employer] website. Remember, [employer] is NOT to be used for urgent needs. For medical emergencies, dial 911. Now available from your iPhone and Android! Please provide this summary of care documentation to your next provider. Your primary care clinician is listed as Nehemias Mathew. If you have any questions after today's visit, please call [phone].

## 2017-11-06 NOTE — PROGRESS NOTES
Chief Complaint   Patient presents with   [facility] Follow Up     Patient was at [address] [facility] ER on last Thursday. Bilateral feet sweeling and pain Room 8     1. Have you been to the ER, urgent care clinic since your last visit? Hospitalized since your last visit? Yes Reason for visit: To [id number] [place] [address] for pain and cough on last Thursday    2. Have you seen or consulted any other health care providers outside of the [address] [place] [address] since your last visit? Include any pap smears or colon screening.  No     Health Maintenance Due   Topic Date Due    FOOT EXAM Q1  08/03/1989    EYE EXAM RETINAL OR DILATED Q1  08/03/1989    MICROALBUMIN Q1  11/25/2016    LIPID PANEL Q1  11/25/2016    INFLUENZA AGE 9 TO ADULT  08/01/2017    PAP AKA CERVICAL CYTOLOGY  01/26/2018

## 2017-11-06 NOTE — PROGRESS NOTES
HISTORY OF PRESENT ILLNESS  Benjamin Kelly is a 44 y.o. female. Chief Complaint   Patient presents with   [facility] Follow Up     Patient was at [facility] ER on last Thursday. Bilateral feet sweeling and pain Room 8     HPI  1) [facility] ER note reviewed. Pt dx with uncontrolled DM II, Viral URI, and B edema. DM II - uncontrolled. Managed by endocrine - due for follow up next month. Lab Results   Component Value Date/Time    Hemoglobin A1c 12.3 11/30/2015 11:01 PM    Hemoglobin A1c (POC) 13.3 06/07/2017 03:18 PM   BS in high 250s  Walking for exercise 30 minutes 3x/week. Eating bread and rice frequently. 2) Viral URI - improving over time. Still coughing, but cough is now less severe. 3) B Edema - better with propping feet and resting. Does not seem improved with wearing compression stockings. Has been fitted for compression stockings in the past. Not hydrating regularly. Urine is usually dark. 4) HTN - uncontrolled today. BP at recheck 170/90. Pt notes that she did take her Lisinopril today. BP Readings from Last 3 Encounters:   11/06/17 148/90   11/03/17 123/77   10/10/17 130/84     4) Obesity - Pt has lost 7 lbs since ER visit, and is back to her previous weight from her visit last month. Wt Readings from Last 3 Encounters:   11/06/17 233 lb 3.2 oz (105.8 kg)   11/02/17 240 lb (108.9 kg)   10/10/17 233 lb (105.7 kg)     Review of Systems   HENT: Positive for congestion. Respiratory: Positive for cough and sputum production. Negative for shortness of breath. Cardiovascular: Positive for leg swelling. Negative for chest pain and palpitations. Neurological: Negative for dizziness, loss of consciousness and weakness. Physical Exam   Constitutional: She is oriented to person, place, and time. She appears well-developed and well-nourished. No distress. HENT:   Head: Normocephalic and atraumatic. Mouth/Throat: Oropharynx is clear and moist.   Bilateral TMs with fluid.  No erythema. Nasal mucosa red, inflamed. Eyes: Conjunctivae are normal.   Neck: Neck supple. No JVD present. Cardiovascular: Normal rate, regular rhythm and normal heart sounds. Pulmonary/Chest: Effort normal and breath sounds normal. No respiratory distress. Musculoskeletal: She exhibits edema. B feet with severe edema. Pt is wearing slippers. Lymphadenopathy:     She has no cervical adenopathy. Neurological: She is alert and oriented to person, place, and time. Skin: Skin is warm and dry. Psychiatric: She has a normal mood and affect. Her behavior is normal. Judgment and thought content normal.   Nursing note and vitals reviewed. ASSESSMENT and PLAN    ICD-10-CM ICD-9-CM    1. Uncontrolled type 2 diabetes mellitus with complication, with long-term current use of insulin ([facility]) E11.8 250.82 Follow up with endocrine. Insulin pump options discussed. E11.65 V58.67     Z79.4     2. Essential hypertension with goal blood pressure less than 130/85 I10 401.9 Uncontrolled today. Follow up in 2 weeks. If still high, will start second agent. 3. Class 3 obesity due to excess calories with serious comorbidity and body mass index (BMI) of 40.0 to 44.9 in adult [facility]) E66.09 278.00 Discussed diet/exercise. Corrected pt's misconceptions about low carb diet. Z68.41 V85.41    4. Viral upper respiratory tract infection J06.9 465.9 Resolving     B97.89     5. Edema, unspecified type R60.9 782.3 Increase hydration, continue exercising. Add furosemide (LASIX) 20 mg tablet PRN. 6. Essential hypertension I10 401.9 See above.

## 2017-11-18 ENCOUNTER — HOSPITAL ENCOUNTER (EMERGENCY)
Age: 37
Discharge: HOME | End: 2017-11-18
Attending: EMERGENCY MEDICINE

## 2017-11-18 VITALS
SYSTOLIC BLOOD PRESSURE: 138 MMHG | DIASTOLIC BLOOD PRESSURE: 89 MMHG | HEART RATE: 81 BPM | HEIGHT: 64 IN | RESPIRATION RATE: 16 BRPM | BODY MASS INDEX: 40.29 KG/M2 | OXYGEN SATURATION: 96 % | TEMPERATURE: 97.4 F | WEIGHT: 236 LBS

## 2017-11-18 DIAGNOSIS — M79.672 PAIN IN BOTH FEET: Primary | ICD-10-CM

## 2017-11-18 DIAGNOSIS — M79.671 PAIN IN BOTH FEET: Primary | ICD-10-CM

## 2017-11-18 RX ORDER — GABAPENTIN 100 MG/1
100 CAPSULE ORAL 3 TIMES DAILY
Qty: 30 CAP | Refills: 0 | Status: SHIPPED | OUTPATIENT
Start: 2017-11-18 | End: 2020-10-13

## 2017-11-18 NOTE — UC PROVIDER NOTE
Patient is a 44 y.o. female presenting with foot pain. The history is provided by the patient (increased pain and tingling and buring in her feet. It is getting worse). Foot Pain    This is a new problem. The current episode started more than 1 week ago. The problem occurs constantly. The problem has been gradually worsening. Pain location: BL feet. The quality of the pain is described as constant. The pain is moderate. Associated symptoms include tingling. Treatments tried: home meds and topical creams. The treatment provided no relief. There has been no history of extremity trauma. Past Medical History:   Diagnosis Date    Depression     Diabetes ([facility] [address]     Edema     Gout     Herpes genitalis     Hypertension     Ill-defined condition     gout    Mood disorder (HCC)     Other ill-defined conditions(799.89)     cholesterol     Psychiatric disorder     depression    Psychiatric disorder     PTSD    Sleep disorder     UTI (lower urinary tract infection)         Past Surgical History:   Procedure Laterality Date    HX GYN       X 3         Family History   Problem Relation Age of Onset    Hypertension Mother     Coronary Artery Disease Mother     Diabetes Father     Stroke Father 46     pt estimates    Kidney Disease Father      secondary to diabetes        Social History     Social History    Marital status: SINGLE     Spouse name: N/A    Number of children: N/A    Years of education: N/A     Occupational History    [employer]      [employer]    nursing student      [employer]     Social History Main Topics    Smoking status: Never Smoker    Smokeless tobacco: Never Used    Alcohol use No    Drug use: No    Sexual activity: Yes     Partners: Male     Birth control/ protection: IUD     Other Topics Concern    Exercise Yes     Line Dancing with Son 1x/week, walking regularly, yoga occasionally     Social History Narrative    Lives with son (14 yo).      Boyfriend x 2 years is currently staying with pt and helping her. ALLERGIES: Review of patient's allergies indicates no known allergies. Review of Systems   Constitutional: Negative. Neurological: Positive for tingling. Decreased sensation in BL feet       Vitals:    11/18/17 1134   BP: (!) 168/104   Pulse: 81   Resp: 16   Temp: 97.4 °F (36.3 °C)   SpO2: 96%   Weight: 107 kg (236 lb)   Height: 5' 4\" (1.626 m)       Physical Exam   Constitutional: She is oriented to person, place, and time. She appears well-developed and well-nourished. No distress. Musculoskeletal: She exhibits edema. She exhibits no tenderness or deformity. Non pitting edema BL feet, warm, good cap refill, NT to touch, Pt describes general parasthesias BL feet    Neurological: She is alert and oriented to person, place, and time. Skin: Skin is warm and dry. No rash noted. No erythema. Psychiatric: She has a normal mood and affect. Her behavior is normal. Judgment and thought content normal.   Nursing note and vitals reviewed. MDM     Differential Diagnosis; Clinical Impression; Plan:     CLINICAL IMPRESSION:  Pain in both feet  (primary encounter diagnosis)    Plan:  1. Supportive care  2. Increase neurontin   3. Close fu    Risk of Significant Complications, Morbidity, and/or Mortality:   Presenting problems: Moderate  Management options:   Moderate  Progress:   Patient progress:  Stable      Procedures

## 2017-11-18 NOTE — DISCHARGE INSTRUCTIONS
Foot Pain: Care Instructions  Your Care Instructions  Foot injuries that cause pain and swelling are fairly common. Almost all sports or home repair projects can cause a misstep that ends up as foot pain. Normal wear and tear, especially as you get older, also can cause foot pain. Most minor foot injuries will heal on their own, and home treatment is usually all you need to do. If you have a severe injury, you may need tests and treatment. Follow-up care is a key part of your treatment and safety. Be sure to make and go to all appointments, and call your doctor if you are having problems. It's also a good idea to know your test results and keep a list of the medicines you take. How can you care for yourself at home? · Take pain medicines exactly as directed. ¨ If the doctor gave you a prescription medicine for pain, take it as prescribed. ¨ If you are not taking a prescription pain medicine, ask your doctor if you can take an over-the-counter medicine. · Rest and protect your foot. Take a break from any activity that may cause pain. · Put ice or a cold pack on your foot for 10 to 20 minutes at a time. Put a thin cloth between the ice and your skin. · Prop up the sore foot on a pillow when you ice it or anytime you sit or lie down during the next 3 days. Try to keep it above the level of your heart. This will help reduce swelling. · Your doctor may recommend that you wrap your foot with an elastic bandage. Keep your foot wrapped for as long as your doctor advises. · If your doctor recommends crutches, use them as directed. · Wear roomy footwear. · As soon as pain and swelling end, begin gentle exercises of your foot. Your doctor can tell you which exercises will help. When should you call for help? Call 911 anytime you think you may need emergency care. For example, call if:  ? · Your foot turns pale, white, blue, or cold.    ?Call your doctor now or seek immediate medical care if:  ? · You cannot move or stand on your foot. ? · Your foot looks twisted or out of its normal position. ? · Your foot is not stable when you step down. ? · You have signs of infection, such as:  ¨ Increased pain, swelling, warmth, or redness. ¨ Red streaks leading from the sore area. ¨ Pus draining from a place on your foot. ¨ A fever. ? · Your foot is numb or tingly. ? Watch closely for changes in your health, and be sure to contact your doctor if:  ? · You do not get better as expected. ? · You have bruises from an injury that last longer than 2 weeks. Where can you learn more? Go to http://tommy-zane.info/. Enter V659 in the search box to learn more about \"Foot Pain: Care Instructions. \"  Current as of: March 21, 2017  Content Version: 11.4  © 1969-0118 [employer]. Care instructions adapted under license by [employer] (which disclaims liability or warranty for this information). If you have questions about a medical condition or this instruction, always ask your healthcare professional. [address] any warranty or liability for your use of this information. Diabetic Neuropathy: Care Instructions  Your Care Instructions    When you have diabetes, your blood sugar level may get too high. Over time, high blood sugar levels can damage nerves. This is called diabetic neuropathy. Nerve damage can cause pain, burning, tingling, and numbness and may leave you feeling weak. The feet are often affected. When you have nerve damage in your feet, you cannot feel your feet and toes as well as normal and may not notice cuts or sores. Even a small injury can lead to a serious infection. It is very important that you follow your doctor's advice on foot care. Sometimes diabetes damages nerves that help the body function. If this happens, your blood pressure, sweating, digestion, and urination might be affected.  Your doctor may give you a target blood sugar level that is higher or lower than you are used to. Try to keep your blood sugar very close to this target level to prevent more damage. Follow-up care is a key part of your treatment and safety. Be sure to make and go to all appointments, and call your doctor if you are having problems. It's also a good idea to know your test results and keep a list of the medicines you take. How can you care for yourself at home? · Take your medicines exactly as prescribed. Call your doctor if you think you are having a problem with your medicine. It is very important that you take your insulin or diabetes pills as your doctor tells you. · Try to keep blood sugar at your target level. ¨ Eat a variety of healthy foods, with carbohydrate spread out in your meals. A dietitian can help you plan meals. ¨ Try to get at least 30 minutes of exercise on most days. ¨ Check your blood sugar as many times each day as your doctor recommends. · Take and record your blood pressure at home if your doctor tells you to. Learn the importance of the two measures of blood pressure (such as 130 over 80, or 130/80). To take your blood pressure at home:  ¨ Ask your doctor to check your blood pressure monitor to be sure it is accurate and the cuff fits you. Also ask your doctor to watch you to make sure that you are using it right. ¨ Do not use medicine known to raise blood pressure (such as some nasal decongestant sprays) before taking your blood pressure. ¨ Avoid taking your blood pressure if you have just exercised or are nervous or upset. Rest at least 15 minutes before you take a reading. · Take pain medicines exactly as directed. ¨ If the doctor gave you a prescription medicine for pain, take it as prescribed. ¨ If you are not taking a prescription pain medicine, ask your doctor if you can take an over-the-counter medicine. · Do not smoke. Smoking can increase your chance for a heart attack or stroke.  If you need help quitting, talk to your doctor about stop-smoking programs and medicines. These can increase your chances of quitting for good. · Limit alcohol to 2 drinks a day for men and 1 drink a day for women. Too much alcohol can cause health problems. · Eat small meals often, rather than 2 or 3 large meals a day. To care for your feet  · Prevent injury by wearing shoes at all times, even when you are indoors. · Do foot care as part of your daily routine. Wash your feet and then rub lotion on your feet, but not between your toes. Use a handheld mirror or magnifying mirror to inspect your feet for blisters, cuts, cracks, or sores. · Have your toenails trimmed and filed straight across. · Wear shoes and socks that fit well. Soft shoes that have good support and that fit well (such as tennis shoes) are best for your feet. · Check your shoes for any loose objects or rough edges before you put them on. · Ask your doctor to check your feet during each visit. Your doctor may notice a foot problem you have missed. · Get early treatment for any foot problem, even a minor one. When should you call for help? Call your doctor now or seek immediate medical care if:  ? · You have symptoms of infection, such as:  ¨ Increased pain, swelling, warmth, or redness. ¨ Red streaks leading from the area. ¨ Pus draining from the area. ¨ A fever. ? · You have new or worse numbness, pain, or tingling in any part of your body. ? Watch closely for changes in your health, and be sure to contact your doctor if:  ? · You have a new problem with your feet, such as:  ¨ A new sore or ulcer. ¨ A break in the skin that is not healing after several days. ¨ Bleeding corns or calluses. ¨ An ingrown toenail. ? · You do not get better as expected. Where can you learn more? Go to http://tommy-zane.info/. Enter O604 in the search box to learn more about \"Diabetic Neuropathy: Care Instructions. \"  Current as of: March 13, 2017  Content Version: 11.4  © 7884-7895 [employer]. Care instructions adapted under license by [employer] (which disclaims liability or warranty for this information). If you have questions about a medical condition or this instruction, always ask your healthcare professional. [address] any warranty or liability for your use of this information.

## 2018-02-02 ENCOUNTER — HOSPITAL ENCOUNTER (EMERGENCY)
Age: 38
Discharge: HOME | End: 2018-02-02
Attending: FAMILY MEDICINE

## 2018-02-02 VITALS
OXYGEN SATURATION: 98 % | HEIGHT: 64 IN | TEMPERATURE: 98.6 F | WEIGHT: 227 LBS | RESPIRATION RATE: 18 BRPM | BODY MASS INDEX: 38.76 KG/M2 | HEART RATE: 86 BPM | DIASTOLIC BLOOD PRESSURE: 91 MMHG | SYSTOLIC BLOOD PRESSURE: 172 MMHG

## 2018-02-02 DIAGNOSIS — R60.0 BILATERAL LEG EDEMA: ICD-10-CM

## 2018-02-02 DIAGNOSIS — I10 ESSENTIAL HYPERTENSION WITH GOAL BLOOD PRESSURE LESS THAN 130/85: ICD-10-CM

## 2018-02-02 DIAGNOSIS — J06.9 ACUTE UPPER RESPIRATORY INFECTION: ICD-10-CM

## 2018-02-02 LAB
ANION GAP BLD CALC-SCNC: 17 MMOL/L (ref 5–15)
BUN BLD-MCNC: 7 MG/DL (ref 9–20)
CA-I BLD-MCNC: 1.29 MMOL/L (ref 1.12–1.32)
CHLORIDE BLD-SCNC: 97 MMOL/L (ref 98–107)
CO2 BLD-SCNC: 27 MMOL/L (ref 21–32)
CREAT BLD-MCNC: 0.6 MG/DL (ref 0.6–1.3)
GLUCOSE BLD-MCNC: 428 MG/DL (ref 65–100)
HCT VFR BLD CALC: 40 % (ref 35–47)
HGB BLD-MCNC: 13.6 GM/DL (ref 11.5–16)
POTASSIUM BLD-SCNC: 3.8 MMOL/L (ref 3.5–5.1)
SERVICE CMNT-IMP: ABNORMAL
SODIUM BLD-SCNC: 136 MMOL/L (ref 136–145)

## 2018-02-02 RX ORDER — LISINOPRIL 10 MG/1
20 TABLET ORAL DAILY
Qty: 30 TAB | Refills: 2 | Status: SHIPPED | OUTPATIENT
Start: 2018-02-02 | End: 2022-03-14

## 2018-02-02 RX ORDER — INSULIN GLARGINE 100 [IU]/ML
INJECTION, SOLUTION SUBCUTANEOUS
Qty: 1 ADJUSTABLE DOSE PRE-FILLED PEN SYRINGE | Refills: 11 | Status: SHIPPED
Start: 2018-02-02 | End: 2020-10-13

## 2018-02-02 RX ORDER — BUMETANIDE 2 MG/1
2 TABLET ORAL DAILY
Qty: 30 TAB | Refills: 0 | Status: SHIPPED | OUTPATIENT
Start: 2018-02-02 | End: 2020-10-13

## 2018-02-02 NOTE — UC PROVIDER NOTE
Patient is a 44 y.o. female presenting with cold symptoms, foot swelling, and hyperglycemia. The history is provided by the patient. Cold Symptoms    This is a new problem. The current episode started 2 days ago. The problem has not changed since onset. There has been no fever. Associated symptoms include congestion, rhinorrhea, sneezing and rash. Pertinent negatives include no chest pain, no nausea, no vomiting and no dysuria. She has tried nothing for the symptoms. Foot Swelling    This is a chronic problem. The current episode started more than 1 week ago. The problem occurs constantly. The problem has been gradually worsening. The pain is present in the left lower leg, left ankle, left foot, right lower leg, right ankle and right foot. The quality of the pain is described as aching. The pain is at a severity of 4/10. The pain is moderate. Associated symptoms include stiffness. Pertinent negatives include full range of motion. The symptoms are aggravated by standing. She has tried nothing for the symptoms. There has been no history of extremity trauma. High Blood Sugar    This is a chronic problem. The current episode started more than 1 week ago. The problem occurs daily. The problem has been gradually worsening. The pain is associated with an unknown factor. Pertinent negatives include no anorexia, no fever, no nausea, no vomiting, no dysuria, no frequency and no chest pain. Her past medical history is significant for DM.         Past Medical History:   Diagnosis Date    Depression     Diabetes ([address]     Edema     Gout     Herpes genitalis     Hypertension     Ill-defined condition     gout    Mood disorder (HCC)     Other ill-defined conditions(799.89)     cholesterol     Psychiatric disorder     depression    Psychiatric disorder     PTSD    Sleep disorder     UTI (lower urinary tract infection)         Past Surgical History:   Procedure Laterality Date    HX GYN       X 3 Family History   Problem Relation Age of Onset    Hypertension Mother     Coronary Artery Disease Mother     Diabetes Father     Stroke Father 46     pt estimates    Kidney Disease Father      secondary to diabetes        Social History     Social History    Marital status: SINGLE     Spouse name: N/A    Number of children: N/A    Years of education: N/A     Occupational History    [employer]    nursing student      [employer]     Social History Main Topics    Smoking status: Never Smoker    Smokeless tobacco: Never Used    Alcohol use No    Drug use: No    Sexual activity: Yes     Partners: Male     Birth control/ protection: IUD     Other Topics Concern    Exercise Yes     Line Dancing with Son 1x/week, walking regularly, yoga occasionally     Social History Narrative    Lives with son (12 yo). Boyfriend x 2 years is currently staying with pt and helping her. ALLERGIES: Review of patient's allergies indicates no known allergies. Review of Systems   Constitutional: Negative for fever. HENT: Positive for congestion, rhinorrhea and sneezing. Cardiovascular: Negative for chest pain. Gastrointestinal: Negative for anorexia, nausea and vomiting. Genitourinary: Negative for dysuria and frequency. Musculoskeletal: Positive for stiffness. Skin: Positive for rash. All other systems reviewed and are negative. Vitals:    02/02/18 0940   BP: (!) 172/91   Pulse: 86   Resp: 18   Temp: 98.6 °F (37 °C)   SpO2: 98%   Weight: 103 kg (227 lb)   Height: 5' 4\" (1.626 m)       Physical Exam   Constitutional: She is oriented to person, place, and time. She appears well-developed and well-nourished. HENT:   Head: Normocephalic and atraumatic. Right Ear: External ear normal.   Left Ear: External ear normal.   Mouth/Throat: Oropharynx is clear and moist. No oropharyngeal exudate. Eyes: Conjunctivae and EOM are normal. Pupils are equal, round, and reactive to light. Right eye exhibits no discharge. Left eye exhibits no discharge. No scleral icterus. Neck: Normal range of motion. No tracheal deviation present. No thyromegaly present. Cardiovascular: Normal rate, regular rhythm and normal heart sounds. No murmur heard. Pulmonary/Chest: Effort normal and breath sounds normal. No respiratory distress. She has no wheezes. She has no rales. She exhibits no tenderness. Abdominal: Soft. Bowel sounds are normal. She exhibits no distension. There is no tenderness. There is no rebound and no guarding. Musculoskeletal: Normal range of motion. She exhibits edema (1-2 + pitting on leg/ feet ). She exhibits no tenderness. Lymphadenopathy:     She has no cervical adenopathy. Neurological: She is alert and oriented to person, place, and time. No cranial nerve deficit. Coordination normal.   Skin: Skin is warm. No erythema. Psychiatric: She has a normal mood and affect. Her behavior is normal. Judgment and thought content normal.   Nursing note and vitals reviewed. MDM     Differential Diagnosis; Clinical Impression; Plan:     CLINICAL IMPRESSION:  Uncontrolled type 2 diabetes mellitus with complication, with long-term current use of insulin ([facility])  (primary encounter diagnosis)  Bilateral leg edema  Acute upper respiratory infection  Essential hypertension with goal blood pressure less than 130/85      DDX    Plan:    Chem 8- elevated glucose 428- BUN./ Cr and Hb- unremarkable. D/c lasix- start bumex 2 mg  Increase lantus to 30 units twice daily  Increase lisinopril to 20 mg  Watch diet/ exercise  Follow with PCP in 3 days if sxs not better    Go to nearest ED ASAP if sxs worsen   Amount and/or Complexity of Data Reviewed:   Clinical lab tests:  Ordered and reviewed   Review and summarize past medical records:  Yes  Risk of Significant Complications, Morbidity, and/or Mortality:   Presenting problems: Moderate  Diagnostic procedures:   Moderate  Management options: Moderate  Progress:   Patient progress:  Stable      Procedures

## 2018-02-02 NOTE — LETTER
[facility] 
[address] [place] [id number] 
[phone] Work/School Note Date: 2/2/2018 To Whom It May concern: 
 
Jim Craven was seen and treated today in the urgent care center by the following provider(s): 
Attending Provider: Sophia Vazquez MD. Jim Craven may return to work on 2/5/18. . 
 
Sincerely, Sophia Vazquez MD

## 2018-02-02 NOTE — DISCHARGE INSTRUCTIONS
Learning About Diabetes Food Guidelines  Your Care Instructions    Meal planning is important to manage diabetes. It helps keep your blood sugar at a target level (which you set with your doctor). You don't have to eat special foods. You can eat what your family eats, including sweets once in a while. But you do have to pay attention to how often you eat and how much you eat of certain foods. You may want to work with a dietitian or a certified diabetes educator (CDE) to help you plan meals and snacks. A dietitian or CDE can also help you lose weight if that is one of your goals. What should you know about eating carbs? Managing the amount of carbohydrate (carbs) you eat is an important part of healthy meals when you have diabetes. Carbohydrate is found in many foods. · Learn which foods have carbs. And learn the amounts of carbs in different foods. ¨ Bread, cereal, pasta, and rice have about 15 grams of carbs in a serving. A serving is 1 slice of bread (1 ounce), ½ cup of cooked cereal, or 1/3 cup of cooked pasta or rice. ¨ Fruits have 15 grams of carbs in a serving. A serving is 1 small fresh fruit, such as an apple or orange; ½ of a banana; ½ cup of cooked or canned fruit; ½ cup of fruit juice; 1 cup of melon or raspberries; or 2 tablespoons of dried fruit. ¨ Milk and no-sugar-added yogurt have 15 grams of carbs in a serving. A serving is 1 cup of milk or 2/3 cup of no-sugar-added yogurt. ¨ Starchy vegetables have 15 grams of carbs in a serving. A serving is ½ cup of mashed potatoes or sweet potato; 1 cup winter squash; ½ of a small baked potato; ½ cup of cooked beans; or ½ cup cooked corn or green peas. · Learn how much carbs to eat each day and at each meal. A dietitian or CDE can teach you how to keep track of the amount of carbs you eat. This is called carbohydrate counting. · If you are not sure how to count carbohydrate grams, use the Plate Method to plan meals.  It is a good, quick way to make sure that you have a balanced meal. It also helps you spread carbs throughout the day. ¨ Divide your plate by types of foods. Put non-starchy vegetables on half the plate, meat or other protein food on one-quarter of the plate, and a grain or starchy vegetable in the final quarter of the plate. To this you can add a small piece of fruit and 1 cup of milk or yogurt, depending on how many carbs you are supposed to eat at a meal.  · Try to eat about the same amount of carbs at each meal. Do not \"save up\" your daily allowance of carbs to eat at one meal.  · Proteins have very little or no carbs per serving. Examples of proteins are beef, chicken, turkey, fish, eggs, tofu, cheese, cottage cheese, and peanut butter. A serving size of meat is 3 ounces, which is about the size of a deck of cards. Examples of meat substitute serving sizes (equal to 1 ounce of meat) are 1/4 cup of cottage cheese, 1 egg, 1 tablespoon of peanut butter, and ½ cup of tofu. How can you eat out and still eat healthy? · Learn to estimate the serving sizes of foods that have carbohydrate. If you measure food at home, it will be easier to estimate the amount in a serving of restaurant food. · If the meal you order has too much carbohydrate (such as potatoes, corn, or baked beans), ask to have a low-carbohydrate food instead. Ask for a salad or green vegetables. · If you use insulin, check your blood sugar before and after eating out to help you plan how much to eat in the future. · If you eat more carbohydrate at a meal than you had planned, take a walk or do other exercise. This will help lower your blood sugar. What else should you know? · Limit saturated fat, such as the fat from meat and dairy products. This is a healthy choice because people who have diabetes are at higher risk of heart disease. So choose lean cuts of meat and nonfat or low-fat dairy products.  Use olive or canola oil instead of butter or shortening when cooking. · Don't skip meals. Your blood sugar may drop too low if you skip meals and take insulin or certain medicines for diabetes. · Check with your doctor before you drink alcohol. Alcohol can cause your blood sugar to drop too low. Alcohol can also cause a bad reaction if you take certain diabetes medicines. Follow-up care is a key part of your treatment and safety. Be sure to make and go to all appointments, and call your doctor if you are having problems. It's also a good idea to know your test results and keep a list of the medicines you take. Where can you learn more? Go to http://tommy-zane.info/. Enter F540 in the search box to learn more about \"Learning About Diabetes Food Guidelines. \"  Current as of: March 13, 2017  Content Version: 11.4  © 2595-5412 [employer]. Care instructions adapted under license by [employer]) (which disclaims liability or warranty for this information). If you have questions about a medical condition or this instruction, always ask your healthcare professional. [place] [address] any warranty or liability for your use of this information. Leg and Ankle Edema: Care Instructions  Your Care Instructions  Swelling in the legs, ankles, and feet is called edema. It is common after you sit or stand for a while. Long plane flights or car rides often cause swelling in the legs and feet. You may also have swelling if you have to stand for long periods of time at your job. Problems with the veins in the legs (varicose veins) and changes in hormones can also cause swelling. Sometimes the swelling in the ankles and feet is caused by a more serious problem, such as heart failure, infection, blood clots, or liver or kidney disease. Follow-up care is a key part of your treatment and safety. Be sure to make and go to all appointments, and call your doctor if you are having problems.  It's also a good idea to know your test results and keep a list of the medicines you take. How can you care for yourself at home? · If your doctor gave you medicine, take it as prescribed. Call your doctor if you think you are having a problem with your medicine. · Whenever you are resting, raise your legs up. Try to keep the swollen area higher than the level of your heart. · Take breaks from standing or sitting in one position. ¨ Walk around to increase the blood flow in your lower legs. ¨ Move your feet and ankles often while you stand, or tighten and relax your leg muscles. · Wear support stockings. Put them on in the morning, before swelling gets worse. · Eat a balanced diet. Lose weight if you need to. · Limit the amount of salt (sodium) in your diet. Salt holds fluid in the body and may increase swelling. When should you call for help? Call 911 anytime you think you may need emergency care. For example, call if:  ? · You have symptoms of a blood clot in your lung (called a pulmonary embolism). These may include:  ¨ Sudden chest pain. ¨ Trouble breathing. ¨ Coughing up blood. ?Call your doctor now or seek immediate medical care if:  ? · You have signs of a blood clot, such as:  ¨ Pain in your calf, back of the knee, thigh, or groin. ¨ Redness and swelling in your leg or groin. ? · You have symptoms of infection, such as:  ¨ Increased pain, swelling, warmth, or redness. ¨ Red streaks or pus. ¨ A fever. ? Watch closely for changes in your health, and be sure to contact your doctor if:  ? · Your swelling is getting worse. ? · You have new or worsening pain in your legs. ? · You do not get better as expected. Where can you learn more? Go to http://tommy-zane.info/. Enter N593 in the search box to learn more about \"Leg and Ankle Edema: Care Instructions. \"  Current as of: March 20, 2017  Content Version: 11.4  © 6755-8717 [employer], [employer].  Care instructions adapted under license by [employer] (which disclaims liability or warranty for this information). If you have questions about a medical condition or this instruction, always ask your healthcare professional. [address] any warranty or liability for your use of this information.

## 2019-08-08 ENCOUNTER — HOSPITAL ENCOUNTER (OUTPATIENT)
Dept: MAMMOGRAPHY | Age: 39
Discharge: HOME | End: 2019-08-08
Attending: NURSE PRACTITIONER
Payer: MEDICAID

## 2019-08-08 DIAGNOSIS — Z12.39 BREAST CANCER SCREENING: ICD-10-CM

## 2019-08-08 DIAGNOSIS — Z80.3 FAMILY HISTORY OF BREAST CANCER: ICD-10-CM

## 2019-08-08 PROCEDURE — 77067 SCR MAMMO BI INCL CAD: CPT

## 2019-12-07 ENCOUNTER — OFFICE VISIT (OUTPATIENT)
Dept: URGENT CARE | Age: 39
End: 2019-12-07

## 2019-12-07 VITALS
WEIGHT: 218 LBS | HEIGHT: 64 IN | SYSTOLIC BLOOD PRESSURE: 130 MMHG | HEART RATE: 94 BPM | RESPIRATION RATE: 19 BRPM | TEMPERATURE: 99 F | DIASTOLIC BLOOD PRESSURE: 75 MMHG | BODY MASS INDEX: 37.22 KG/M2 | OXYGEN SATURATION: 98 %

## 2019-12-07 DIAGNOSIS — J06.9 UPPER RESPIRATORY TRACT INFECTION, UNSPECIFIED TYPE: ICD-10-CM

## 2019-12-07 DIAGNOSIS — J02.9 SORE THROAT: Primary | ICD-10-CM

## 2019-12-07 LAB
FLUAV+FLUBV AG NOSE QL IA.RAPID: NEGATIVE POS/NEG
FLUAV+FLUBV AG NOSE QL IA.RAPID: NEGATIVE POS/NEG
S PYO AG THROAT QL: NEGATIVE
VALID INTERNAL CONTROL?: YES
VALID INTERNAL CONTROL?: YES

## 2019-12-07 RX ORDER — ALBUTEROL SULFATE 90 UG/1
2 AEROSOL, METERED RESPIRATORY (INHALATION)
Qty: 1 INHALER | Refills: 0 | Status: SHIPPED | OUTPATIENT
Start: 2019-12-07 | End: 2022-03-14

## 2019-12-07 RX ORDER — METFORMIN HYDROCHLORIDE 1000 MG/1
1000 TABLET ORAL 2 TIMES DAILY WITH MEALS
COMMUNITY
End: 2021-05-25

## 2019-12-07 RX ORDER — BENZONATATE 200 MG/1
200 CAPSULE ORAL
Qty: 21 CAP | Refills: 0 | Status: SHIPPED | OUTPATIENT
Start: 2019-12-07 | End: 2019-12-14

## 2019-12-07 NOTE — PATIENT INSTRUCTIONS
Rest and seek medical care for increased problems, any questions or concern including but not limited to the ones discussed with you here today. Drink plenty of fluids, take zinc cold remedy 2-3 times a day, use saline nasal spray, muccinex as directed and, and lemon tea. Please, monitor your blood sugars and symptoms closely. Elevated blood sugars or increase or persistent symptoms warrant an immediate re-evaluation. Tylenol and motrin and over the counter diabetic cough medication (cleared by your pharmacist) taken as directed may help your symptoms Viral Respiratory Infection: Care Instructions Your Care Instructions Viruses are very small organisms. They grow in number after they enter your body. There are many types that cause different illnesses, such as colds and the mumps. The symptoms of a viral respiratory infection often start quickly. They include a fever, sore throat, and runny nose. You may also just not feel well. Or you may not want to eat much. Most viral respiratory infections are not serious. They usually get better with time and self-care. Antibiotics are not used to treat a viral infection. That's because antibiotics will not help cure a viral illness. In some cases, antiviral medicine can help your body fight a serious viral infection. Follow-up care is a key part of your treatment and safety. Be sure to make and go to all appointments, and call your doctor if you are having problems. It's also a good idea to know your test results and keep a list of the medicines you take. How can you care for yourself at home? · Rest as much as possible until you feel better. · Be safe with medicines. Take your medicine exactly as prescribed. Call your doctor if you think you are having a problem with your medicine. You will get more details on the specific medicine your doctor prescribes.  
· Take an over-the-counter pain medicine, such as acetaminophen (Tylenol), ibuprofen (Advil, Motrin), or naproxen (Aleve), as needed for pain and fever. Read and follow all instructions on the label. Do not give aspirin to anyone younger than 20. It has been linked to Reye syndrome, a serious illness. · Drink plenty of fluids, enough so that your urine is light yellow or clear like water. Hot fluids, such as tea or soup, may help relieve congestion in your nose and throat. If you have kidney, heart, or liver disease and have to limit fluids, talk with your doctor before you increase the amount of fluids you drink. · Try to clear mucus from your lungs by breathing deeply and coughing. · Gargle with warm salt water once an hour. This can help reduce swelling and throat pain. Use 1 teaspoon of salt mixed in 1 cup of warm water. · Do not smoke or allow others to smoke around you. If you need help quitting, talk to your doctor about stop-smoking programs and medicines. These can increase your chances of quitting for good. To avoid spreading the virus · Cough or sneeze into a tissue. Then throw the tissue away. · If you don't have a tissue, use your hand to cover your cough or sneeze. Then clean your hand. You can also cough into your sleeve. · Wash your hands often. Use soap and warm water. Wash for 15 to 20 seconds each time. · If you don't have soap and water near you, you can clean your hands with alcohol wipes or gel. When should you call for help? Call your doctor now or seek immediate medical care if: 
  · You have a new or higher fever.  
  · Your fever lasts more than 48 hours.  
  · You have trouble breathing.  
  · You have a fever with a stiff neck or a severe headache.  
  · You are sensitive to light.  
  · You feel very sleepy or confused.  
 Watch closely for changes in your health, and be sure to contact your doctor if: 
  · You do not get better as expected. Where can you learn more? Go to http://tommy-zane.info/. Enter G693 in the search box to learn more about \"Viral Respiratory Infection: Care Instructions. \" Current as of: June 9, 2019 Content Version: 12.2 © 8476-3636 [employer]. Care instructions adapted under license by [employer] (which disclaims liability or warranty for this information). If you have questions about a medical condition or this instruction, always ask your healthcare professional. [address] any warranty or liability for your use of this information. Sore Throat: Care Instructions Your Care Instructions Infection by bacteria or a virus causes most sore throats. Cigarette smoke, dry air, air pollution, allergies, and yelling can also cause a sore throat. Sore throats can be painful and annoying. Fortunately, most sore throats go away on their own. If you have a bacterial infection, your doctor may prescribe antibiotics. Follow-up care is a key part of your treatment and safety. Be sure to make and go to all appointments, and call your doctor if you are having problems. It's also a good idea to know your test results and keep a list of the medicines you take. How can you care for yourself at home? · If your doctor prescribed antibiotics, take them as directed. Do not stop taking them just because you feel better. You need to take the full course of antibiotics. · Gargle with warm salt water once an hour to help reduce swelling and relieve discomfort. Use 1 teaspoon of salt mixed in 1 cup of warm water. · Take an over-the-counter pain medicine, such as acetaminophen (Tylenol), ibuprofen (Advil, Motrin), or naproxen (Aleve). Read and follow all instructions on the label. · Be careful when taking over-the-counter cold or flu medicines and Tylenol at the same time. Many of these medicines have acetaminophen, which is Tylenol.  Read the labels to make sure that you are not taking more than the recommended dose. Too much acetaminophen (Tylenol) can be harmful. · Drink plenty of fluids. Fluids may help soothe an irritated throat. Hot fluids, such as tea or soup, may help decrease throat pain. · Use over-the-counter throat lozenges to soothe pain. Regular cough drops or hard candy may also help. These should not be given to young children because of the risk of choking. · Do not smoke or allow others to smoke around you. If you need help quitting, talk to your doctor about stop-smoking programs and medicines. These can increase your chances of quitting for good. · Use a vaporizer or humidifier to add moisture to your bedroom. Follow the directions for cleaning the machine. When should you call for help? Call your doctor now or seek immediate medical care if: 
  · You have new or worse trouble swallowing.  
  · Your sore throat gets much worse on one side.  
 Watch closely for changes in your health, and be sure to contact your doctor if you do not get better as expected. Where can you learn more? Go to http://tommy-zane.info/. Enter [id number] [id number] in the search box to learn more about \"Sore Throat: Care Instructions. \" Current as of: October 21, 2018 Content Version: 12.2 © 3907-3766 [employer]. Care instructions adapted under license by [employer] (which disclaims liability or warranty for this information). If you have questions about a medical condition or this instruction, always ask your healthcare professional. [place] [address] any warranty or liability for your use of this information.

## 2019-12-07 NOTE — PROGRESS NOTES
HPI     Past Medical History:   Diagnosis Date    Depression     Diabetes ([facility] [address]     Edema     Gout     Herpes genitalis     Hypertension     Ill-defined condition     gout    Mood disorder (HCC)     Other ill-defined conditions(799.89)     cholesterol     Psychiatric disorder     depression    Psychiatric disorder     PTSD    PTSD (post-traumatic stress disorder)     Sleep disorder     UTI (lower urinary tract infection)         Past Surgical History:   Procedure Laterality Date    HX GYN       X 3         Family History   Problem Relation Age of Onset    Hypertension Mother     Coronary Artery Disease Mother     Diabetes Father     Stroke Father 46        pt estimates    Kidney Disease Father         secondary to diabetes    Breast Cancer Paternal Grandmother     Breast Cancer Cousin         numerous paternal cousins        Social History     Socioeconomic History    Marital status:      Spouse name: Not on file    Number of children: Not on file    Years of education: Not on file    Highest education level: Not on file   Occupational History    Occupation: CNA     Comment: Nirmala Eckert    Occupation: nursing student     Comment: [employer]   Social Needs    Financial resource strain: Not on file    Food insecurity:     Worry: Not on file     Inability: Not on file   [facility] needs:     Medical: Not on file     Non-medical: Not on file   Tobacco Use    Smoking status: Never Smoker    Smokeless tobacco: Never Used   Substance and Sexual Activity    Alcohol use: No     Alcohol/week: 0.0 standard drinks    Drug use: No    Sexual activity: Yes     Partners: Male     Birth control/protection: I.U.D.    Lifestyle    Physical activity:     Days per week: Not on file     Minutes per session: Not on file    Stress: Not on file   Relationships    Social connections:     Talks on phone: Not on file     Gets together: Not on file     Attends Alevism service: Not on file     Active member of club or organization: Not on file     Attends meetings of clubs or organizations: Not on file     Relationship status: Not on file    Intimate partner violence:     Fear of current or ex partner: Not on file     Emotionally abused: Not on file     Physically abused: Not on file     Forced sexual activity: Not on file   Other Topics Concern     Service Not Asked    Blood Transfusions Not Asked    Caffeine Concern Not Asked    Occupational Exposure Not Asked   [place] Hazards Not Asked    Sleep Concern Not Asked    Stress Concern Not Asked    Weight Concern Not Asked    Special Diet Not Asked    Back Care Not Asked    Exercise Yes     Comment: Line Dancing with Son 1x/week, walking regularly, yoga occasionally    Bike Helmet Not Asked   [address] [facility] Not Asked    Self-Exams Not Asked   Social History Narrative    Lives with son (14 yo). Boyfriend x 2 years is currently staying with pt and helping her. ALLERGIES: Patient has no known allergies. Review of Systems   Constitutional: Positive for chills (felt chilly yesterday). Negative for appetite change, diaphoresis, fatigue and fever. HENT: Positive for congestion, postnasal drip, rhinorrhea and sore throat. Negative for ear discharge, ear pain, sinus pressure, sinus pain, tinnitus and trouble swallowing. Eyes: Negative. Respiratory: Positive for cough. Negative for apnea, chest tightness, shortness of breath, wheezing and stridor. Cardiovascular: Negative. Musculoskeletal: Negative. Skin: Negative. Neurological: Negative. Negative for weakness, light-headedness, numbness and headaches. Vitals:    12/07/19 1139   BP: 130/75   Pulse: 94   Resp: 19   Temp: 99 °F (37.2 °C)   SpO2: 98%   Weight: 218 lb (98.9 kg)   Height: 5' 4\" (1.626 m)       Physical Exam  Vitals signs and nursing note reviewed. Constitutional:       General: She is not in acute distress.      Appearance: She is well-developed. She is obese. She is not ill-appearing, toxic-appearing or diaphoretic. HENT:      Head: Normocephalic and atraumatic. Right Ear: External ear normal.      Left Ear: External ear normal.      Ears:      Comments: Nasal congestion, No sinus pain, PND, BL fluid behind the TM's without erythema       Nose: Congestion and rhinorrhea (clear) present. Mouth/Throat:      Mouth: Mucous membranes are moist.      Pharynx: No oropharyngeal exudate or posterior oropharyngeal erythema. Comments: PND, mild post pharynx injection, no swelling or exudate  Eyes:      General: No scleral icterus. Right eye: No discharge. Left eye: No discharge. Conjunctiva/sclera: Conjunctivae normal.      Pupils: Pupils are equal, round, and reactive to light. Neck:      Musculoskeletal: Normal range of motion and neck supple. No neck rigidity. Thyroid: No thyromegaly. Trachea: No tracheal deviation. Comments: No adenopathy  Cardiovascular:      Rate and Rhythm: Normal rate and regular rhythm. Heart sounds: Normal heart sounds. No murmur. Pulmonary:      Effort: Pulmonary effort is normal. No respiratory distress. Breath sounds: Normal breath sounds. No stridor. No wheezing, rhonchi or rales. Comments: Respirations nonlabored and no retractions, no cough, speaking in full sentences, ambulatory without dyspnea  Chest:      Chest wall: No tenderness. Abdominal:      General: Bowel sounds are normal. There is no distension. Palpations: Abdomen is soft. Tenderness: There is no tenderness. There is no guarding or rebound. Musculoskeletal: Normal range of motion. General: No tenderness. Lymphadenopathy:      Cervical: No cervical adenopathy. Skin:     General: Skin is warm. Findings: No erythema. Neurological:      Mental Status: She is alert and oriented to person, place, and time. Cranial Nerves: No cranial nerve deficit. Coordination: Coordination normal.   Psychiatric:         Behavior: Behavior normal.         Thought Content: Thought content normal.         Judgment: Judgment normal.         MDM    ICD-10-CM ICD-9-CM    1. Sore throat J02.9 462 AMB POC RAPID STREP A      AMB POC ANDREA INFLUENZA A/B TEST   2. Upper respiratory tract infection, unspecified type J06.9 465.9      Medications Ordered Today   Medications    benzonatate (TESSALON) 200 mg capsule     Sig: Take 1 Cap by mouth three (3) times daily as needed for Cough for up to 7 days. Dispense:  21 Cap     Refill:  0    albuterol (PROVENTIL HFA, VENTOLIN HFA, PROAIR HFA) 90 mcg/actuation inhaler     Sig: Take 2 Puffs by inhalation every four (4) hours as needed for Wheezing. Dispense:  1 Inhaler     Refill:  0     The patients condition was discussed with the patient and they understand. The patient is to follow up with primary care doctor ,If signs and symptoms become worse the pt is to go to the ER. The patient is to take medications as prescribed. Pt instructed to follow sugars closely and close f/u. ED for questions or concerns or any SOB.  All meds need to be cleared by pharmacist      Procedures

## 2020-10-13 ENCOUNTER — HOSPITAL ENCOUNTER (EMERGENCY)
Age: 40
Discharge: HOME | End: 2020-10-13
Attending: EMERGENCY MEDICINE
Payer: MEDICAID

## 2020-10-13 VITALS
RESPIRATION RATE: 18 BRPM | WEIGHT: 237.44 LBS | TEMPERATURE: 98.2 F | SYSTOLIC BLOOD PRESSURE: 145 MMHG | OXYGEN SATURATION: 99 % | BODY MASS INDEX: 40.54 KG/M2 | HEART RATE: 78 BPM | HEIGHT: 64 IN | DIASTOLIC BLOOD PRESSURE: 93 MMHG

## 2020-10-13 DIAGNOSIS — L02.91 ABSCESS: Primary | ICD-10-CM

## 2020-10-13 LAB
ALBUMIN SERPL-MCNC: 2.7 G/DL (ref 3.5–5)
ALBUMIN/GLOB SERPL: 0.6 {RATIO} (ref 1.1–2.2)
ALP SERPL-CCNC: 137 U/L (ref 45–117)
ALT SERPL-CCNC: 17 U/L (ref 12–78)
ANION GAP SERPL CALC-SCNC: 10 MMOL/L (ref 5–15)
AST SERPL-CCNC: 11 U/L (ref 15–37)
BASOPHILS # BLD: 0 K/UL (ref 0–0.1)
BASOPHILS NFR BLD: 0 % (ref 0–1)
BILIRUB SERPL-MCNC: 0.4 MG/DL (ref 0.2–1)
BUN SERPL-MCNC: 9 MG/DL (ref 6–20)
BUN/CREAT SERPL: 10 (ref 12–20)
CALCIUM SERPL-MCNC: 8.6 MG/DL (ref 8.5–10.1)
CHLORIDE SERPL-SCNC: 98 MMOL/L (ref 97–108)
CO2 SERPL-SCNC: 26 MMOL/L (ref 21–32)
COMMENT, HOLDF: NORMAL
CREAT SERPL-MCNC: 0.94 MG/DL (ref 0.55–1.02)
DIFFERENTIAL METHOD BLD: ABNORMAL
EOSINOPHIL # BLD: 0.1 K/UL (ref 0–0.4)
EOSINOPHIL NFR BLD: 1 % (ref 0–7)
ERYTHROCYTE [DISTWIDTH] IN BLOOD BY AUTOMATED COUNT: 13.2 % (ref 11.5–14.5)
GLOBULIN SER CALC-MCNC: 4.5 G/DL (ref 2–4)
GLUCOSE SERPL-MCNC: 360 MG/DL (ref 65–100)
HCT VFR BLD AUTO: 34.1 % (ref 35–47)
HGB BLD-MCNC: 10.8 G/DL (ref 11.5–16)
IMM GRANULOCYTES # BLD AUTO: 0.1 K/UL (ref 0–0.04)
IMM GRANULOCYTES NFR BLD AUTO: 1 % (ref 0–0.5)
LYMPHOCYTES # BLD: 1.9 K/UL (ref 0.8–3.5)
LYMPHOCYTES NFR BLD: 17 % (ref 12–49)
MCH RBC QN AUTO: 25.9 PG (ref 26–34)
MCHC RBC AUTO-ENTMCNC: 31.7 G/DL (ref 30–36.5)
MCV RBC AUTO: 81.8 FL (ref 80–99)
MONOCYTES # BLD: 0.5 K/UL (ref 0–1)
MONOCYTES NFR BLD: 5 % (ref 5–13)
NEUTS SEG # BLD: 8.3 K/UL (ref 1.8–8)
NEUTS SEG NFR BLD: 76 % (ref 32–75)
NRBC # BLD: 0 K/UL (ref 0–0.01)
NRBC BLD-RTO: 0 PER 100 WBC
PLATELET # BLD AUTO: 214 K/UL (ref 150–400)
PMV BLD AUTO: 11 FL (ref 8.9–12.9)
POTASSIUM SERPL-SCNC: 3.9 MMOL/L (ref 3.5–5.1)
PROT SERPL-MCNC: 7.2 G/DL (ref 6.4–8.2)
RBC # BLD AUTO: 4.17 M/UL (ref 3.8–5.2)
SAMPLES BEING HELD,HOLD: NORMAL
SODIUM SERPL-SCNC: 134 MMOL/L (ref 136–145)
WBC # BLD AUTO: 10.9 K/UL (ref 3.6–11)

## 2020-10-13 PROCEDURE — 80053 COMPREHEN METABOLIC PANEL: CPT

## 2020-10-13 PROCEDURE — 75810000117 HC INC/DRN VULVA/PERINEUM

## 2020-10-13 PROCEDURE — 74011000250 HC RX REV CODE- 250: Performed by: EMERGENCY MEDICINE

## 2020-10-13 PROCEDURE — 87205 SMEAR GRAM STAIN: CPT

## 2020-10-13 PROCEDURE — 36415 COLL VENOUS BLD VENIPUNCTURE: CPT

## 2020-10-13 PROCEDURE — 96365 THER/PROPH/DIAG IV INF INIT: CPT

## 2020-10-13 PROCEDURE — 99283 EMERGENCY DEPT VISIT LOW MDM: CPT

## 2020-10-13 PROCEDURE — 74011000258 HC RX REV CODE- 258: Performed by: EMERGENCY MEDICINE

## 2020-10-13 PROCEDURE — 87185 SC STD ENZYME DETCJ PER NZM: CPT

## 2020-10-13 PROCEDURE — 85025 COMPLETE CBC W/AUTO DIFF WBC: CPT

## 2020-10-13 PROCEDURE — 74011250636 HC RX REV CODE- 250/636: Performed by: EMERGENCY MEDICINE

## 2020-10-13 RX ORDER — DOXYCYCLINE HYCLATE 100 MG
100 TABLET ORAL 2 TIMES DAILY
Qty: 14 TAB | Refills: 0 | Status: SHIPPED | OUTPATIENT
Start: 2020-10-13 | End: 2020-10-20

## 2020-10-13 RX ORDER — LIDOCAINE HYDROCHLORIDE 10 MG/ML
5 INJECTION INFILTRATION; PERINEURAL ONCE
Status: COMPLETED | OUTPATIENT
Start: 2020-10-13 | End: 2020-10-13

## 2020-10-13 RX ORDER — CEPHALEXIN 500 MG/1
500 CAPSULE ORAL 4 TIMES DAILY
Qty: 28 CAP | Refills: 0 | Status: SHIPPED | OUTPATIENT
Start: 2020-10-13 | End: 2020-10-13

## 2020-10-13 RX ORDER — CEPHALEXIN 500 MG/1
500 CAPSULE ORAL 4 TIMES DAILY
Qty: 28 CAP | Refills: 0 | Status: SHIPPED | OUTPATIENT
Start: 2020-10-13 | End: 2020-10-20

## 2020-10-13 RX ORDER — FUROSEMIDE 20 MG/1
TABLET ORAL DAILY
COMMUNITY
End: 2021-11-17

## 2020-10-13 RX ADMIN — CEFTRIAXONE 1 G: 1 INJECTION, POWDER, FOR SOLUTION INTRAMUSCULAR; INTRAVENOUS at 08:45

## 2020-10-13 RX ADMIN — LIDOCAINE HYDROCHLORIDE 5 ML: 10 INJECTION, SOLUTION INFILTRATION; PERINEURAL at 08:09

## 2020-10-13 NOTE — DISCHARGE INSTRUCTIONS

## 2020-10-13 NOTE — ED PROVIDER NOTES
HPI   The patient is a 65-year-old black female who presents with acute onset of a groin abscess in the right groin area. She is been placed on Bactrim for 1 or 2 days but without improvement it is spontaneously draining a small amount of pus. She has a history of gout diabetes hypertension. She denies fever or chills.   Past Medical History:   Diagnosis Date    Depression     Diabetes ([address]     Edema     Gout     Herpes genitalis     Hypertension     Ill-defined condition     gout    Mood disorder (HCC)     Other ill-defined conditions(799.89)     cholesterol     Psychiatric disorder     depression    Psychiatric disorder     PTSD    PTSD (post-traumatic stress disorder)     Sleep disorder     UTI (lower urinary tract infection)        Past Surgical History:   Procedure Laterality Date    HX GYN       X 3         Family History:   Problem Relation Age of Onset    Hypertension Mother     Coronary Artery Disease Mother     Diabetes Father     Stroke Father 46        pt estimates    Kidney Disease Father         secondary to diabetes    Breast Cancer Paternal Grandmother     Breast Cancer Cousin         numerous paternal cousins       Social History     Socioeconomic History    Marital status:      Spouse name: Not on file    Number of children: Not on file    Years of education: Not on file    Highest education level: Not on file   Occupational History    Occupation: CNA     Comment: Nirmala Eckert    Occupation: nursing student     Comment: [employer]   Social Needs    Financial resource strain: Not on file    Food insecurity     Worry: Not on file     Inability: Not on file   [employer] needs     Medical: Not on file     Non-medical: Not on file   Tobacco Use    Smoking status: Never Smoker    Smokeless tobacco: Never Used   Substance and Sexual Activity    Alcohol use: No     Alcohol/week: 0.0 standard drinks    Drug use: No    Sexual activity: Yes     Partners: Male     Birth control/protection: I.U.D. Lifestyle    Physical activity     Days per week: Not on file     Minutes per session: Not on file    Stress: Not on file   Relationships    Social connections     Talks on phone: Not on file     Gets together: Not on file     Attends Zoroastrian service: Not on file     Active member of club or organization: Not on file     Attends meetings of clubs or organizations: Not on file     Relationship status: Not on file    Intimate partner violence     Fear of current or ex partner: Not on file     Emotionally abused: Not on file     Physically abused: Not on file     Forced sexual activity: Not on file   Other Topics Concern     Service Not Asked    Blood Transfusions Not Asked    Caffeine Concern Not Asked    Occupational Exposure Not Asked   [place] Hazards Not Asked    Sleep Concern Not Asked    Stress Concern Not Asked    Weight Concern Not Asked    Special Diet Not Asked    Back Care Not Asked    Exercise Yes     Comment: Line Dancing with Son 1x/week, walking regularly, yoga occasionally    Bike Helmet Not Asked   [address] [facility] Not Asked    Self-Exams Not Asked   Social History Narrative    Lives with son (14 yo). Boyfriend x 2 years is currently staying with pt and helping her. ALLERGIES: Patient has no known allergies. Review of Systems   All other systems reviewed and are negative. Vitals:    10/13/20 0752   BP: (!) 140/80   Pulse: 89   Resp: 17   Temp: 98.2 °F (36.8 °C)   SpO2: 99%   Weight: 107.7 kg (237 lb 7 oz)   Height: 5' 4\" (1.626 m)            Physical Exam  Vitals signs reviewed. HENT:      Head: Normocephalic. Nose: Nose normal.      Mouth/Throat:      Mouth: Mucous membranes are moist.   Eyes:      Pupils: Pupils are equal, round, and reactive to light. Genitourinary:     Comments: Abscess in the mons pubis area approximately 3 cm diameter  Musculoskeletal: Normal range of motion.    Skin:     General: Skin is warm. Capillary Refill: Capillary refill takes less than 2 seconds. Neurological:      Mental Status: She is alert. Psychiatric:         Mood and Affect: Mood normal.         Thought Content: Thought content normal.          [facility]       I&D Abcess Complex    Date/Time: 10/13/2020 8:52 AM  Performed by: Tsering Melissa MD  Authorized by: Tsering Melissa MD     Consent:     Consent obtained:  Verbal    Consent given by:  Patient    Risks discussed:  Bleeding  Location:     Type:  Abscess    Size:  3cm    Location:  Anogenital    Anogenital location:  Vulva  Pre-procedure details:     Skin preparation:  Antiseptic wash and Betadine  Anesthesia (see MAR for exact dosages): Anesthesia method:  Local infiltration    Local anesthetic:  Lidocaine 1% w/o epi  Procedure type:     Complexity:  Complex  Procedure details:     Needle aspiration: yes      Needle size:  18 G    Incision types:  Stab incision    Incision depth:  Submucosal    Scalpel blade:  11    Wound management:  Probed and deloculated    Drainage:  Bloody and purulent    Drainage amount:  Scant    Wound treatment:  Wound left open    Packing materials:  None  Post-procedure details:     Patient tolerance of procedure: Tolerated well, no immediate complications          Assessment and plan       the patient has an abscess in her groin area which I drained with a significant amount of pus obtained I will start her on Keflex and doxy and have close follow-up tomorrow here as she is a diabetic.

## 2020-10-13 NOTE — ED TRIAGE NOTES
TRIAGE NOTE: Patient arrived from home with c/o vaginal pain since yesterday. Patient given bactrim. Denies any urinary issues.  \"I think they are treating an abscess\"

## 2020-10-13 NOTE — LETTER
[facility] EMERGENCY [address] [place] [facility] [id number] 
[phone] Work/School Note Date: 10/13/2020 To Whom It May concern: 
 
Britta Stevens was seen and treated today in the emergency room by the following provider(s): 
Attending Provider: Olegario Lincoln MD. Britta Stackain {Return to school/sport/work:85059} Sincerely, Josselyn Wells MD

## 2020-10-14 ENCOUNTER — HOSPITAL ENCOUNTER (EMERGENCY)
Age: 40
Discharge: HOME | End: 2020-10-14
Attending: EMERGENCY MEDICINE
Payer: MEDICAID

## 2020-10-14 VITALS
HEART RATE: 93 BPM | TEMPERATURE: 97.7 F | WEIGHT: 239.86 LBS | BODY MASS INDEX: 40.95 KG/M2 | OXYGEN SATURATION: 98 % | DIASTOLIC BLOOD PRESSURE: 89 MMHG | SYSTOLIC BLOOD PRESSURE: 158 MMHG | HEIGHT: 64 IN | RESPIRATION RATE: 18 BRPM

## 2020-10-14 DIAGNOSIS — Z51.89 WOUND CHECK, ABSCESS: Primary | ICD-10-CM

## 2020-10-14 PROCEDURE — 75810000275 HC EMERGENCY DEPT VISIT NO LEVEL OF CARE

## 2020-10-14 NOTE — ED PROVIDER NOTES
Ms. Javed Heath is a 42yo female who presents to the ER for a wound check of her abscess. She was seen in the ER and had an abscess incised and drained. She was told to come back today to have it rechecked. Said that she is feeling better. Said the pain has decreased to the abscess site. She said it has been draining some. She said that she started the Keflex yesterday does not feel the doxycycline. He states that it was not covered by insurance. The [address] [place] [address] Street, according to the patient, called the ER and her prescription was changed. She said that the new prescription is waiting for her at the pharmacy.            Past Medical History:   Diagnosis Date    Depression     Diabetes ([address]     Edema     Gout     Herpes genitalis     Hypertension     Ill-defined condition     gout    Mood disorder (HCC)     Other ill-defined conditions(799.89)     cholesterol     Psychiatric disorder     depression    Psychiatric disorder     PTSD    PTSD (post-traumatic stress disorder)     Sleep disorder     UTI (lower urinary tract infection)        Past Surgical History:   Procedure Laterality Date    HX GYN       X 3         Family History:   Problem Relation Age of Onset    Hypertension Mother     Coronary Artery Disease Mother     Diabetes Father     Stroke Father 46        pt estimates    Kidney Disease Father         secondary to diabetes    Breast Cancer Paternal Grandmother     Breast Cancer Cousin         numerous paternal cousins       Social History     Socioeconomic History    Marital status:      Spouse name: Not on file    Number of children: Not on file    Years of education: Not on file    Highest education level: Not on file   Occupational History    Occupation: [employer]     Comment: Nirmala Eckert    Occupation: nursing student     Comment: [employer]   Social Needs    Financial resource strain: Not on file    Food insecurity     Worry: Not on file     Inability: Not on file    Transportation needs     Medical: Not on file     Non-medical: Not on file   Tobacco Use    Smoking status: Never Smoker    Smokeless tobacco: Never Used   Substance and Sexual Activity    Alcohol use: No     Alcohol/week: 0.0 standard drinks    Drug use: No    Sexual activity: Yes     Partners: Male     Birth control/protection: I.U.D. Lifestyle    Physical activity     Days per week: Not on file     Minutes per session: Not on file    Stress: Not on file   Relationships    Social connections     Talks on phone: Not on file     Gets together: Not on file     Attends Adventist service: Not on file     Active member of club or organization: Not on file     Attends meetings of clubs or organizations: Not on file     Relationship status: Not on file    Intimate partner violence     Fear of current or ex partner: Not on file     Emotionally abused: Not on file     Physically abused: Not on file     Forced sexual activity: Not on file   Other Topics Concern     Service Not Asked    Blood Transfusions Not Asked    Caffeine Concern Not Asked    Occupational Exposure Not Asked   [employer] Hazards Not Asked    Sleep Concern Not Asked    Stress Concern Not Asked    Weight Concern Not Asked    Special Diet Not Asked    Back Care Not Asked    Exercise Yes     Comment: Line Dancing with Son 1x/week, walking regularly, yoga occasionally    Bike Helmet Not Asked   [address] Not Asked    Self-Exams Not Asked   Social History Narrative    Lives with son (12 yo). Boyfriend x 2 years is currently staying with pt and helping her. ALLERGIES: Patient has no known allergies. Review of Systems   Constitutional: Negative for chills and fever. HENT: Negative for rhinorrhea and sore throat. Respiratory: Negative for cough and shortness of breath. Cardiovascular: Negative for chest pain. Gastrointestinal: Negative for abdominal pain, diarrhea, nausea and vomiting. Genitourinary: Negative for dysuria and urgency. Musculoskeletal: Negative for arthralgias and back pain. Skin: Positive for wound. Neurological: Negative for dizziness, weakness and light-headedness. Vitals:    10/14/20 1305   BP: (!) 158/89   Pulse: 93   Resp: 18   Temp: 97.7 °F (36.5 °C)   SpO2: 98%   Weight: 108.8 kg (239 lb 13.8 oz)   Height: 5' 4\" (1.626 m)            Physical Exam     Const:  No acute distress, well developed, well nourished  Head:  Atraumatic, normocephalic  Eyes:  PERRL, conjunctiva normal, no scleral icterus  Neck:  Supple, trachea midline  Cardiovascular:  Regular rate  Resp:  No resp distress, no increased work of breathing  Abd:  non-distended  :  Deferred  MSK:  No pedal edema, normal ROM  Neuro:  Alert and oriented x3, no cranial nerve defect  Skin: Area of incision and drainage is indurated, no purulent drainage was able to be expressed, mild surrounding erythema   Psych: normal mood and affect, behavior is normal, judgement and thought content is normal        MDM  Number of Diagnoses or Management Options  Wound check, abscess:      Amount and/or Complexity of Data Reviewed  Review and summarize past medical records: yes            Ms. Gay Najjar is a 42yo female who presents to the ER to have her wound re-checked. She states that she is feeling better and her wound has improved. Pt's abscess is still open and draining some. She agrees to get her doxy this evening. Pt. To return to the ER with new or worsening sx, and she will contact the ER if she continues to have issues getting her abx.       Procedures

## 2020-10-16 LAB
BACTERIA SPEC CULT: ABNORMAL
BACTERIA SPEC CULT: ABNORMAL
GRAM STN SPEC: ABNORMAL
GRAM STN SPEC: ABNORMAL
SERVICE CMNT-IMP: ABNORMAL

## 2020-12-18 ENCOUNTER — HOSPITAL ENCOUNTER (EMERGENCY)
Age: 40
Discharge: HOME | End: 2020-12-19
Attending: EMERGENCY MEDICINE
Payer: MEDICAID

## 2020-12-18 DIAGNOSIS — E83.42 HYPOMAGNESEMIA: ICD-10-CM

## 2020-12-18 DIAGNOSIS — N30.00 ACUTE CYSTITIS WITHOUT HEMATURIA: ICD-10-CM

## 2020-12-18 DIAGNOSIS — R53.83 MALAISE AND FATIGUE: Primary | ICD-10-CM

## 2020-12-18 DIAGNOSIS — R53.81 MALAISE AND FATIGUE: Primary | ICD-10-CM

## 2020-12-18 DIAGNOSIS — R26.2 AMBULATORY DYSFUNCTION: ICD-10-CM

## 2020-12-18 LAB
ALBUMIN SERPL-MCNC: 3.2 G/DL (ref 3.5–5)
ALBUMIN/GLOB SERPL: 0.8 {RATIO} (ref 1.1–2.2)
ALP SERPL-CCNC: 150 U/L (ref 45–117)
ALT SERPL-CCNC: 16 U/L (ref 12–78)
ANION GAP SERPL CALC-SCNC: 3 MMOL/L (ref 5–15)
AST SERPL-CCNC: 8 U/L (ref 15–37)
BILIRUB SERPL-MCNC: 0.4 MG/DL (ref 0.2–1)
BUN SERPL-MCNC: 11 MG/DL (ref 6–20)
BUN/CREAT SERPL: 11 (ref 12–20)
CALCIUM SERPL-MCNC: 9.4 MG/DL (ref 8.5–10.1)
CHLORIDE SERPL-SCNC: 100 MMOL/L (ref 97–108)
CO2 SERPL-SCNC: 31 MMOL/L (ref 21–32)
CREAT SERPL-MCNC: 1.04 MG/DL (ref 0.55–1.02)
ERYTHROCYTE [DISTWIDTH] IN BLOOD BY AUTOMATED COUNT: 12.6 % (ref 11.5–14.5)
GLOBULIN SER CALC-MCNC: 4.1 G/DL (ref 2–4)
GLUCOSE BLD STRIP.AUTO-MCNC: 266 MG/DL (ref 65–100)
GLUCOSE SERPL-MCNC: 380 MG/DL (ref 65–100)
HCT VFR BLD AUTO: 33.3 % (ref 35–47)
HGB BLD-MCNC: 10.8 G/DL (ref 11.5–16)
MAGNESIUM SERPL-MCNC: 1.5 MG/DL (ref 1.6–2.4)
MCH RBC QN AUTO: 25.8 PG (ref 26–34)
MCHC RBC AUTO-ENTMCNC: 32.4 G/DL (ref 30–36.5)
MCV RBC AUTO: 79.7 FL (ref 80–99)
NRBC # BLD: 0 K/UL (ref 0–0.01)
NRBC BLD-RTO: 0 PER 100 WBC
PHOSPHATE SERPL-MCNC: 3.4 MG/DL (ref 2.6–4.7)
PLATELET # BLD AUTO: 257 K/UL (ref 150–400)
PMV BLD AUTO: 10.8 FL (ref 8.9–12.9)
POTASSIUM SERPL-SCNC: 3.6 MMOL/L (ref 3.5–5.1)
PROT SERPL-MCNC: 7.3 G/DL (ref 6.4–8.2)
RBC # BLD AUTO: 4.18 M/UL (ref 3.8–5.2)
SERVICE CMNT-IMP: ABNORMAL
SODIUM SERPL-SCNC: 134 MMOL/L (ref 136–145)
WBC # BLD AUTO: 10.4 K/UL (ref 3.6–11)

## 2020-12-18 PROCEDURE — 80053 COMPREHEN METABOLIC PANEL: CPT

## 2020-12-18 PROCEDURE — 74011250636 HC RX REV CODE- 250/636: Performed by: EMERGENCY MEDICINE

## 2020-12-18 PROCEDURE — 36415 COLL VENOUS BLD VENIPUNCTURE: CPT

## 2020-12-18 PROCEDURE — 74011636637 HC RX REV CODE- 636/637: Performed by: EMERGENCY MEDICINE

## 2020-12-18 PROCEDURE — 99285 EMERGENCY DEPT VISIT HI MDM: CPT

## 2020-12-18 PROCEDURE — 96365 THER/PROPH/DIAG IV INF INIT: CPT

## 2020-12-18 PROCEDURE — 83735 ASSAY OF MAGNESIUM: CPT

## 2020-12-18 PROCEDURE — 82962 GLUCOSE BLOOD TEST: CPT

## 2020-12-18 PROCEDURE — 85027 COMPLETE CBC AUTOMATED: CPT

## 2020-12-18 PROCEDURE — 84100 ASSAY OF PHOSPHORUS: CPT

## 2020-12-18 PROCEDURE — 93005 ELECTROCARDIOGRAM TRACING: CPT

## 2020-12-18 RX ORDER — MAGNESIUM SULFATE 1 G/100ML
1 INJECTION INTRAVENOUS ONCE
Status: COMPLETED | OUTPATIENT
Start: 2020-12-18 | End: 2020-12-18

## 2020-12-18 RX ORDER — MECLIZINE HCL 12.5 MG 12.5 MG/1
25 TABLET ORAL
Status: COMPLETED | OUTPATIENT
Start: 2020-12-18 | End: 2020-12-18

## 2020-12-18 RX ADMIN — HUMAN INSULIN 10 UNITS: 100 INJECTION, SOLUTION SUBCUTANEOUS at 22:38

## 2020-12-18 RX ADMIN — SODIUM CHLORIDE 1000 ML: 9 INJECTION, SOLUTION INTRAVENOUS at 22:38

## 2020-12-18 RX ADMIN — MECLIZINE 25 MG: 12.5 TABLET ORAL at 23:26

## 2020-12-18 RX ADMIN — MAGNESIUM SULFATE HEPTAHYDRATE 1 G: 1 INJECTION, SOLUTION INTRAVENOUS at 22:37

## 2020-12-19 VITALS
BODY MASS INDEX: 37.56 KG/M2 | SYSTOLIC BLOOD PRESSURE: 155 MMHG | HEIGHT: 64 IN | RESPIRATION RATE: 15 BRPM | DIASTOLIC BLOOD PRESSURE: 103 MMHG | HEART RATE: 83 BPM | WEIGHT: 220 LBS | TEMPERATURE: 98.6 F | OXYGEN SATURATION: 99 %

## 2020-12-19 LAB
APPEARANCE UR: ABNORMAL
ATRIAL RATE: 88 BPM
BACTERIA URNS QL MICRO: ABNORMAL /HPF
BILIRUB UR QL: NEGATIVE
CALCULATED P AXIS, ECG09: 60 DEGREES
CALCULATED R AXIS, ECG10: -82 DEGREES
CALCULATED T AXIS, ECG11: 35 DEGREES
COLOR UR: ABNORMAL
DIAGNOSIS, 93000: NORMAL
EPITH CASTS URNS QL MICRO: ABNORMAL /LPF
GLUCOSE UR STRIP.AUTO-MCNC: >1000 MG/DL
HGB UR QL STRIP: ABNORMAL
KETONES UR QL STRIP.AUTO: ABNORMAL MG/DL
LEUKOCYTE ESTERASE UR QL STRIP.AUTO: NEGATIVE
MUCOUS THREADS URNS QL MICRO: ABNORMAL /LPF
NITRITE UR QL STRIP.AUTO: NEGATIVE
P-R INTERVAL, ECG05: 140 MS
PH UR STRIP: 5.5 [PH] (ref 5–8)
PROT UR STRIP-MCNC: 30 MG/DL
Q-T INTERVAL, ECG07: 386 MS
QRS DURATION, ECG06: 76 MS
QTC CALCULATION (BEZET), ECG08: 467 MS
RBC #/AREA URNS HPF: ABNORMAL /HPF (ref 0–5)
SP GR UR REFRACTOMETRY: 1.02 (ref 1–1.03)
UA: UC IF INDICATED,UAUC: ABNORMAL
UROBILINOGEN UR QL STRIP.AUTO: 0.2 EU/DL (ref 0.2–1)
VENTRICULAR RATE, ECG03: 88 BPM
WBC URNS QL MICRO: ABNORMAL /HPF (ref 0–4)

## 2020-12-19 PROCEDURE — 87086 URINE CULTURE/COLONY COUNT: CPT

## 2020-12-19 PROCEDURE — 81001 URINALYSIS AUTO W/SCOPE: CPT

## 2020-12-19 RX ORDER — MECLIZINE HYDROCHLORIDE 25 MG/1
25 TABLET ORAL
Qty: 30 TAB | Refills: 0 | Status: SHIPPED | OUTPATIENT
Start: 2020-12-19 | End: 2020-12-29

## 2020-12-19 RX ORDER — CEPHALEXIN 500 MG/1
500 CAPSULE ORAL 2 TIMES DAILY
Qty: 14 CAP | Refills: 0 | Status: SHIPPED | OUTPATIENT
Start: 2020-12-19 | End: 2020-12-26

## 2020-12-19 NOTE — ED NOTES
Pt reports she is upset because she had to take her own leads off prior to discharge. Pt also displeased because she could hear noise in the perez. Charge nurse informed.

## 2020-12-19 NOTE — ED PROVIDER NOTES
EMERGENCY DEPARTMENT HISTORY AND PHYSICAL EXAM      Date: 12/18/2020  Patient Name: Rita Remy    History of Presenting Illness     Chief Complaint   Patient presents with    Gait Problem     Pt reports being unable to walk well for x 1week. and had a fall today. HX of TIA    Fall       History Provided By: Patient    HPI: Rita Remy, 38 y.o. female  With past medical history of diabetes, depression, mood disorder presenting today with generalized weakness and feeling of imbalance. The patient states that she been having symptoms for about 1 week. She has a history of vertigo and this feels similar to that. No other changes. She does state that she is been having decreased p.o. intake because she has just been feeling poorly. She has chronic lower extremity swelling and takes Lasix for this. She also has history of hypomagnesemia. No fevers, cough, chills. She says that she fell today due to feeling imbalance. There are no other complaints, changes, or physical findings at this time. PCP: None    No current facility-administered medications on file prior to encounter. Current Outpatient Medications on File Prior to Encounter   Medication Sig Dispense Refill    furosemide (Lasix) 20 mg tablet Take  by mouth daily.  metFORMIN (GLUCOPHAGE) 1,000 mg tablet Take 1,000 mg by mouth two (2) times daily (with meals).  albuterol (PROVENTIL HFA, VENTOLIN HFA, PROAIR HFA) 90 mcg/actuation inhaler Take 2 Puffs by inhalation every four (4) hours as needed for Wheezing. 1 Inhaler 0    lisinopril (PRINIVIL, ZESTRIL) 10 mg tablet Take 2 Tabs by mouth daily. 30 Tab 2    ONETOUCH VERIO strip TEST SIX TIMES A DAY  3    ONETOUCH DELICA LANCETS 30 gauge misc TEST SIX TIMES A DAY  3    valACYclovir (VALTREX) 1 gram tablet Take 1 Tab by mouth daily.  (Patient taking differently: Take 1,000 mg by mouth as needed.) 30 Tab 5    levonorgestrel (MIRENA) 20 mcg/24 hr (5 years) IUD 1 Each by IntraUTERine route once. Past History     Past Medical History:  Past Medical History:   Diagnosis Date    Depression     Diabetes ([address]     Edema     Gout     Herpes genitalis     Hypertension     Ill-defined condition     gout    Mood disorder (HCC)     Other ill-defined conditions(799.89)     cholesterol     Psychiatric disorder     depression    Psychiatric disorder     PTSD    PTSD (post-traumatic stress disorder)     Sleep disorder     UTI (lower urinary tract infection)        Past Surgical History:  Past Surgical History:   Procedure Laterality Date    HX GYN       X 3       Family History:  Family History   Problem Relation Age of Onset    Hypertension Mother     Coronary Artery Disease Mother     Diabetes Father     Stroke Father 46        pt estimates    Kidney Disease Father         secondary to diabetes    Breast Cancer Paternal Grandmother     Breast Cancer Cousin         numerous paternal cousins       Social History:  Social History     Tobacco Use    Smoking status: Never Smoker    Smokeless tobacco: Never Used   Substance Use Topics    Alcohol use: No     Alcohol/week: 0.0 standard drinks    Drug use: No       Allergies:  No Known Allergies      Review of Systems   Constitutional: No  fever  Skin: No  rash  HEENT: No  nasal congestion  Resp: No cough  CV: No chest pain  GI: No vomiting  : No dysuria  MSK: No joint pain  Neuro: No numbness  Psych: No anxiety      Physical Exam     Patient Vitals for the past 12 hrs:   Temp Pulse Resp BP SpO2   20 0118  83 15 (!) 155/103 99 %   20 0103  87 27 (!) 167/97 99 %   20 0045  79 12 135/89 99 %   20 0015  79 15 (!) 151/92 100 %   20 0010  89 19 (!) 164/96 98 %   20 2102 98.6 °F (37 °C) 87 19 139/88 98 %     General: alert, No acute distress  Eyes: EOMI, normal conjunctiva  ENT: moist mucous membranes. Neck: Active, full ROM of neck. Skin: No rashes. no jaundice Lungs: Equal chest expansion. no respiratory distress. Heart: regular rate     no peripheral edema     Abd:  non distended soft   Back: Full ROM  MSK: Full, active ROM in all 4 extremities. Neuro:   II: vision grossly intact  III, IV, VI: Extraocular motion intact, pupils reactive to light  V: facial sensation intact  VII: face symmetric with normal eye closure and smile  VIII: hearing intact to finger rub bilaterally  IX, X: palate elevates symmetrically, phonation normal  XII: tongue midline with normal movements  Motor: normal bulk, tone, and strength bilaterally, no pronator drift  Sensory: normal sensation to light touch in bilateral upper and lower extremities  Coordination: intact to rapid alternating movements  Gait: steady gait with normal steps, and arm swing. Normal romberg.   Psych: Cooperative with exam; Appropriate mood and affect             Diagnostic Study Results     Labs -     Recent Results (from the past 12 hour(s))   EKG, 12 LEAD, INITIAL    Collection Time: 12/18/20  9:04 PM   Result Value Ref Range    Ventricular Rate 88 BPM    Atrial Rate 88 BPM    P-R Interval 140 ms    QRS Duration 76 ms    Q-T Interval 386 ms    QTC Calculation (Bezet) 467 ms    Calculated P Axis 60 degrees    Calculated R Axis -82 degrees    Calculated T Axis 35 degrees    Diagnosis       Normal sinus rhythm  Possible Left atrial enlargement  Left axis deviation  Cannot rule out Anterior infarct , age undetermined  When compared with ECG of 02-AUG-2016 09:27,  No significant change was found     CBC W/O DIFF    Collection Time: 12/18/20  9:17 PM   Result Value Ref Range    WBC 10.4 3.6 - 11.0 K/uL    RBC 4.18 3.80 - 5.20 M/uL    HGB 10.8 (L) 11.5 - 16.0 g/dL    HCT 33.3 (L) 35.0 - 47.0 %    MCV 79.7 (L) 80.0 - 99.0 FL    MCH 25.8 (L) 26.0 - 34.0 PG    MCHC 32.4 30.0 - 36.5 g/dL    RDW 12.6 11.5 - 14.5 %    PLATELET 356 992 - 277 K/uL    MPV 10.8 8.9 - 12.9 FL    NRBC 0.0 0  WBC    ABSOLUTE NRBC 0.00 0.00 - 0.01 K/uL   PHOSPHORUS    Collection Time: 12/18/20  9:17 PM   Result Value Ref Range    Phosphorus 3.4 2.6 - 4.7 MG/DL   MAGNESIUM    Collection Time: 12/18/20  9:17 PM   Result Value Ref Range    Magnesium 1.5 (L) 1.6 - 2.4 mg/dL   METABOLIC PANEL, COMPREHENSIVE    Collection Time: 12/18/20  9:17 PM   Result Value Ref Range    Sodium 134 (L) 136 - 145 mmol/L    Potassium 3.6 3.5 - 5.1 mmol/L    Chloride 100 97 - 108 mmol/L    CO2 31 21 - 32 mmol/L    Anion gap 3 (L) 5 - 15 mmol/L    Glucose 380 (H) 65 - 100 mg/dL    BUN 11 6 - 20 MG/DL    Creatinine 1.04 (H) 0.55 - 1.02 MG/DL    BUN/Creatinine ratio 11 (L) 12 - 20      GFR est AA >60 >60 ml/min/1.73m2    GFR est non-AA 58 (L) >60 ml/min/1.73m2    Calcium 9.4 8.5 - 10.1 MG/DL    Bilirubin, total 0.4 0.2 - 1.0 MG/DL    ALT (SGPT) 16 12 - 78 U/L    AST (SGOT) 8 (L) 15 - 37 U/L    Alk.  phosphatase 150 (H) 45 - 117 U/L    Protein, total 7.3 6.4 - 8.2 g/dL    Albumin 3.2 (L) 3.5 - 5.0 g/dL    Globulin 4.1 (H) 2.0 - 4.0 g/dL    A-G Ratio 0.8 (L) 1.1 - 2.2     GLUCOSE, POC    Collection Time: 12/18/20 11:48 PM   Result Value Ref Range    Glucose (POC) 266 (H) 65 - 100 mg/dL    Performed by Chaz Conde RN    URINALYSIS W/ REFLEX CULTURE    Collection Time: 12/19/20 12:06 AM    Specimen: Urine   Result Value Ref Range    Color YELLOW/STRAW      Appearance CLOUDY (A) CLEAR      Specific gravity 1.025 1.003 - 1.030      pH (UA) 5.5 5.0 - 8.0      Protein 30 (A) NEG mg/dL    Glucose >1,000 (A) NEG mg/dL    Ketone TRACE (A) NEG mg/dL    Bilirubin Negative NEG      Blood LARGE (A) NEG      Urobilinogen 0.2 0.2 - 1.0 EU/dL    Nitrites Negative NEG      Leukocyte Esterase Negative NEG      WBC 10-20 0 - 4 /hpf    RBC 10-20 0 - 5 /hpf    Epithelial cells MANY (A) FEW /lpf    Bacteria 2+ (A) NEG /hpf    UA:UC IF INDICATED URINE CULTURE ORDERED (A) CNI      Mucus 3+ (A) NEG /lpf       Radiologic Studies -   No orders to display     CT Results  (Last 48 hours)    None        CXR Results  (Last 48 hours)    None          Medical Decision Making   I am the first provider for this patient. I reviewed the vital signs, available nursing notes, past medical history, past surgical history, family history and social history. Vital Signs-Reviewed the patient's vital signs. Patient Vitals for the past 12 hrs:   Temp Pulse Resp BP SpO2   12/19/20 0118  83 15 (!) 155/103 99 %   12/19/20 0103  87 27 (!) 167/97 99 %   12/19/20 0045  79 12 135/89 99 %   12/19/20 0015  79 15 (!) 151/92 100 %   12/19/20 0010  89 19 (!) 164/96 98 %   12/18/20 2102 98.6 °F (37 °C) 87 19 139/88 98 %       Pulse Oximetry Analysis - 98% on room air  -  Interpretation: Normal    Cardiac Monitor:   Rate: 87 bpm  Rhythm: Normal Sinus Rhythm      Provider Notes (Medical Decision Making):     Differential Diagnosis: Electrolyte derangement, dehydration, presyncope, vertigo    Initial Plan: We will obtain laboratory studies, treat the patient with IV fluids, meclizine, and reassess after this. ED Course:   Initial assessment performed. The patients presenting problems have been discussed, and they are in agreement with the care plan formulated and outlined with them. I have encouraged them to ask questions as they arise throughout their visit. ED Course as of Dec 19 0436   Fri Dec 18, 2020   2214 On my interpretation of the patient's EKG normal sinus rhythm at a rate of 88, QTc is 467, no ST elevation or depression.    [NW]   2214 On my interpretation of the patient's laboratory studies metabolic panel shows mild hyperglycemia, magnesium is slightly decreased, [place] normal, CBC is largely unremarkable. [NW]   Sat Dec 19, 2020   0000 Upon reassessment the patient notes mild improvement in symptoms, glucose has improved after treatment and is now 266. Will reassess after her fluids and magnesium.     [NW]      ED Course User Index  [NW] Harriet Melgar MD     Patient had significant improvement in symptoms she had no gait abnormality on exam in the emergency department. She will follow up with ENT as an outpatient, prescribed Keflex for UTI, and meclizine for her symptoms and ultimately is discharged. Ana Sandhu MD, am the attending of record for this patient encounter. Dispo: Discharged. The patient has been re-evaluated and is ready for discharge. Reviewed available results with patient. Counseled patient on diagnosis and care plan. Patient has expressed understanding, and all questions have been answered. Patient agrees with plan and agrees to follow up as recommended, or to return to the ED if their symptoms worsen. Discharge instructions have been provided and explained to the patient, along with reasons to return to the ED. PLAN:  Discharge Medication List as of 12/19/2020  1:43 AM      START taking these medications    Details   cephALEXin (Keflex) 500 mg capsule Take 1 Cap by mouth two (2) times a day for 7 days. , Normal, Disp-14 Cap, R-0      meclizine (ANTIVERT) 25 mg tablet Take 1 Tab by mouth three (3) times daily as needed for Dizziness for up to 10 days. , Normal, Disp-30 Tab, R-0         CONTINUE these medications which have NOT CHANGED    Details   furosemide (Lasix) 20 mg tablet Take  by mouth daily. , Historical Med      metFORMIN (GLUCOPHAGE) 1,000 mg tablet Take 1,000 mg by mouth two (2) times daily (with meals). , Historical Med      albuterol (PROVENTIL HFA, VENTOLIN HFA, PROAIR HFA) 90 mcg/actuation inhaler Take 2 Puffs by inhalation every four (4) hours as needed for Wheezing., Normal, Disp-1 Inhaler, R-0      lisinopril (PRINIVIL, ZESTRIL) 10 mg tablet Take 2 Tabs by mouth daily. , Normal, Disp-30 Tab, R-2      ONETOUCH VERIO strip TEST SIX TIMES A DAY, Historical Med, R-3, AUGUST      ONETOUCH DELICA LANCETS 30 gauge misc TEST SIX TIMES A DAY, Historical Med, R-3, AUGUST      valACYclovir (VALTREX) 1 gram tablet Take 1 Tab by mouth daily. , Normal, Disp-30 Tab, R-5      levonorgestrel (MIRENA) 20 mcg/24 hr (5 years) IUD 1 Each by IntraUTERine route once., Historical Med           2. Follow-up Information     Follow up With Specialties Details Why Contact Info    Sudarshan Mccarty MD Otolaryngology   [address]  [address] [place] [address]  [phone]          3. Return to ED if worse       Diagnosis     Clinical Impression:   1. Malaise and fatigue    2. Ambulatory dysfunction    3. Hypomagnesemia    4. Acute cystitis without hematuria        Attestations:    Aline Saldaña MD    Please note that this dictation was completed with [employer], the [employer] voice recognition software. Quite often unanticipated grammatical, syntax, homophones, and other interpretive errors are inadvertently transcribed by the computer software. Please disregard these errors. Please excuse any errors that have escaped final proofreading. Thank you.

## 2020-12-20 LAB
BACTERIA SPEC CULT: NORMAL
CC UR VC: NORMAL
SERVICE CMNT-IMP: NORMAL

## 2021-03-23 ENCOUNTER — HOSPITAL ENCOUNTER (EMERGENCY)
Age: 41
Discharge: HOME | End: 2021-03-23
Attending: EMERGENCY MEDICINE
Payer: MEDICAID

## 2021-03-23 ENCOUNTER — APPOINTMENT (OUTPATIENT)
Dept: GENERAL RADIOLOGY | Age: 41
End: 2021-03-23
Attending: EMERGENCY MEDICINE
Payer: MEDICAID

## 2021-03-23 VITALS
BODY MASS INDEX: 40.08 KG/M2 | OXYGEN SATURATION: 97 % | HEART RATE: 95 BPM | HEIGHT: 64 IN | WEIGHT: 234.79 LBS | TEMPERATURE: 98.5 F | DIASTOLIC BLOOD PRESSURE: 102 MMHG | RESPIRATION RATE: 18 BRPM | SYSTOLIC BLOOD PRESSURE: 149 MMHG

## 2021-03-23 DIAGNOSIS — M43.6 TORTICOLLIS, ACUTE: Primary | ICD-10-CM

## 2021-03-23 PROCEDURE — 72050 X-RAY EXAM NECK SPINE 4/5VWS: CPT

## 2021-03-23 PROCEDURE — 99283 EMERGENCY DEPT VISIT LOW MDM: CPT

## 2021-03-23 PROCEDURE — 71046 X-RAY EXAM CHEST 2 VIEWS: CPT

## 2021-03-23 RX ORDER — CYCLOBENZAPRINE HCL 5 MG
5 TABLET ORAL
Qty: 15 TAB | Refills: 0 | Status: SHIPPED | OUTPATIENT
Start: 2021-03-23 | End: 2021-05-20 | Stop reason: ALTCHOICE

## 2021-03-23 NOTE — ED PROVIDER NOTES
HPI   The patient is a 69-year-old black female with a history of depression diabetes edema gout and UTI who presents today with acute onset of some pain in her left side of her neck when she woke up from sleep this morning. There is spasm in the sternocleidomastoid muscle and is worse with rotation to either side. There was no trauma. She has no radicular symptoms no numbness tingling or other complaints. She also states she has had some swelling of both legs which has been ongoing for several months and is not new.   Past Medical History:   Diagnosis Date    Depression     Diabetes ([address]     Edema     Gout     Herpes genitalis     Hypertension     Ill-defined condition     gout    Mood disorder (HCC)     Other ill-defined conditions(799.89)     cholesterol     Psychiatric disorder     depression    Psychiatric disorder     PTSD    PTSD (post-traumatic stress disorder)     Sleep disorder     UTI (lower urinary tract infection)        Past Surgical History:   Procedure Laterality Date    HX GYN       X 3         Family History:   Problem Relation Age of Onset    Hypertension Mother     Coronary Artery Disease Mother     Diabetes Father     Stroke Father 46        pt estimates    Kidney Disease Father         secondary to diabetes    Breast Cancer Paternal Grandmother     Breast Cancer Cousin         numerous paternal cousins       Social History     Socioeconomic History    Marital status: SINGLE     Spouse name: Not on file    Number of children: Not on file    Years of education: Not on file    Highest education level: Not on file   Occupational History    Occupation: CNA     Comment: Nirmala Eckert    Occupation: nursing student     Comment: [employer]   Social Needs    Financial resource strain: Not on file    Food insecurity     Worry: Not on file     Inability: Not on file   [employer] needs     Medical: Not on file     Non-medical: Not on file   Tobacco Use    Smoking status: Never Smoker    Smokeless tobacco: Never Used   Substance and Sexual Activity    Alcohol use: No     Alcohol/week: 0.0 standard drinks    Drug use: No    Sexual activity: Yes     Partners: Male     Birth control/protection: I.U.D. Lifestyle    Physical activity     Days per week: Not on file     Minutes per session: Not on file    Stress: Not on file   Relationships    Social connections     Talks on phone: Not on file     Gets together: Not on file     Attends Christian service: Not on file     Active member of club or organization: Not on file     Attends meetings of clubs or organizations: Not on file     Relationship status: Not on file    Intimate partner violence     Fear of current or ex partner: Not on file     Emotionally abused: Not on file     Physically abused: Not on file     Forced sexual activity: Not on file   Other Topics Concern     Service Not Asked    Blood Transfusions Not Asked    Caffeine Concern Not Asked    Occupational Exposure Not Asked   [place] Hazards Not Asked    Sleep Concern Not Asked    Stress Concern Not Asked    Weight Concern Not Asked    Special Diet Not Asked    Back Care Not Asked    Exercise Yes     Comment: Line Dancing with Son 1x/week, walking regularly, yoga occasionally    Bike Helmet Not Asked   [address] [facility] Not Asked    Self-Exams Not Asked   Social History Narrative    Lives with son (14 yo). Boyfriend x 2 years is currently staying with pt and helping her. ALLERGIES: Patient has no known allergies. Review of Systems   All other systems reviewed and are negative. Vitals:    03/23/21 1222   BP: (!) 152/96   Pulse: 95   Resp: 18   Temp: 98.5 °F (36.9 °C)   SpO2: 97%   Weight: 106.5 kg (234 lb 12.6 oz)   Height: 5' 4\" (1.626 m)            Physical Exam  Vitals signs reviewed. HENT:      Head: Normocephalic.       Mouth/Throat:      Mouth: Mucous membranes are moist.   Eyes:      Pupils: Pupils are equal, round, and reactive to light. Neck:      Musculoskeletal: Normal range of motion. Comments: Tender with spasm of the left side of the neck muscles  Cardiovascular:      Rate and Rhythm: Normal rate. Pulmonary:      Effort: Pulmonary effort is normal.   Abdominal:      General: Abdomen is flat. Musculoskeletal: Normal range of motion. Comments: 1+ edema both lower extremities symmetrical   Skin:     General: Skin is warm. Capillary Refill: Capillary refill takes less than 2 seconds. Neurological:      General: No focal deficit present. Mental Status: She is alert. Mental status is at baseline. Psychiatric:         Mood and Affect: Mood normal.         Thought Content: Thought content normal.         Judgment: Judgment normal.          MDM  Number of Diagnoses or Management Options     Amount and/or Complexity of Data Reviewed  Tests in the radiology section of CPT®: ordered and reviewed    Risk of Complications, Morbidity, and/or Mortality  Presenting problems: moderate  Diagnostic procedures: moderate  Management options: moderate           Procedures          Assessment and plan the patient has dependent edema chest x-ray is negative for congestive heart failure. Her main complaint was pain in her neck which I believe is due to muscle spasm and torticollis.   She also has significant cervical spondylosis and was discharged on Flexeril 5 mg with close follow-up by her PCP

## 2021-03-23 NOTE — ED TRIAGE NOTES
TRIAGE NOTE: Pt woke up with left sided neck pain extending to shoulder since she woke up. Legs feeling tight for the last couple days.

## 2021-03-23 NOTE — LETTER
[address] 
[facility] 
[address] [address] [phone] Work/School Note Date: 3/23/2021 To Whom It May concern: 
 
Ottoniel Escobar was seen and treated today in the emergency room by the following provider(s): 
Attending Provider: Arthur Vail MD. Ottoniel Escobar may return to work on 3/25/21.  
 
Sincerely, 
 
 
 
 
Rachel Jacques RN

## 2021-05-20 ENCOUNTER — OFFICE VISIT (OUTPATIENT)
Dept: PRIMARY CARE CLINIC | Age: 41
End: 2021-05-20
Payer: MEDICAID

## 2021-05-20 VITALS
SYSTOLIC BLOOD PRESSURE: 149 MMHG | HEART RATE: 85 BPM | BODY MASS INDEX: 39.54 KG/M2 | OXYGEN SATURATION: 99 % | DIASTOLIC BLOOD PRESSURE: 95 MMHG | WEIGHT: 231.6 LBS | RESPIRATION RATE: 18 BRPM | TEMPERATURE: 97.8 F | HEIGHT: 64 IN

## 2021-05-20 DIAGNOSIS — G47.30 SLEEP APNEA, UNSPECIFIED TYPE: ICD-10-CM

## 2021-05-20 DIAGNOSIS — E11.9 TYPE 2 DIABETES MELLITUS WITHOUT COMPLICATION, WITH LONG-TERM CURRENT USE OF INSULIN (HCC): Primary | ICD-10-CM

## 2021-05-20 DIAGNOSIS — E78.5 HYPERLIPIDEMIA LDL GOAL <70: ICD-10-CM

## 2021-05-20 DIAGNOSIS — G45.9 TRANSIENT ISCHEMIC ATTACK (TIA): ICD-10-CM

## 2021-05-20 DIAGNOSIS — Z79.4 TYPE 2 DIABETES MELLITUS WITHOUT COMPLICATION, WITH LONG-TERM CURRENT USE OF INSULIN (HCC): Primary | ICD-10-CM

## 2021-05-20 DIAGNOSIS — I10 ESSENTIAL HYPERTENSION: ICD-10-CM

## 2021-05-20 LAB — HBA1C MFR BLD HPLC: 12.6 %

## 2021-05-20 PROCEDURE — 83036 HEMOGLOBIN GLYCOSYLATED A1C: CPT | Performed by: FAMILY MEDICINE

## 2021-05-20 PROCEDURE — 99204 OFFICE O/P NEW MOD 45 MIN: CPT | Performed by: FAMILY MEDICINE

## 2021-05-20 RX ORDER — AMLODIPINE BESYLATE 5 MG/1
5 TABLET ORAL DAILY
Qty: 30 TABLET | Refills: 1 | Status: SHIPPED | OUTPATIENT
Start: 2021-05-20 | End: 2022-03-14

## 2021-05-20 RX ORDER — INSULIN GLARGINE 100 [IU]/ML
15 INJECTION, SOLUTION SUBCUTANEOUS
Qty: 2 ADJUSTABLE DOSE PRE-FILLED PEN SYRINGE | Refills: 2 | Status: SHIPPED | OUTPATIENT
Start: 2021-05-20 | End: 2022-03-14

## 2021-05-20 RX ORDER — INSULIN PUMP SYRINGE, 3 ML
EACH MISCELLANEOUS
Qty: 1 KIT | Refills: 0 | Status: SHIPPED | OUTPATIENT
Start: 2021-05-20 | End: 2022-03-14

## 2021-05-20 RX ORDER — BLOOD-GLUCOSE METER
KIT MISCELLANEOUS
Qty: 100 STRIP | Refills: 2 | Status: SHIPPED | OUTPATIENT
Start: 2021-05-20 | End: 2022-03-14

## 2021-05-20 RX ORDER — LANCETS 28 GAUGE
EACH MISCELLANEOUS
Qty: 100 LANCET | Refills: 2 | Status: SHIPPED | OUTPATIENT
Start: 2021-05-20 | End: 2022-03-14

## 2021-05-20 NOTE — PROGRESS NOTES
Identified pt with two pt identifiers(name and ). Chief Complaint   Patient presents with   [address]    Diabetes Care Management      patient not on any meds right now     Hypertension    Cholesterol Problem    Foot Swelling     tingling and swelling     Tingling     fingers tingling as well     Eye Problem     from previous TIA in 2019 - having some discomfort. Doesn't like the metformin - bad stomach issues     3 most recent PHQ Screens 2021   Little interest or pleasure in doing things Not at all   Feeling down, depressed, irritable, or hopeless Not at all   Total Score PHQ 2 0        Vitals:    21 1414 21 1419   BP: (!) 145/90 (!) 149/95   Pulse: 85    Resp: 18    Temp: 97.8 °F (36.6 °C)    TempSrc: Temporal    SpO2: 99%    Weight: 231 lb 9.6 oz (105.1 kg)    Height: 5' 4\" (1.626 m)    PainSc:   8    PainLoc: Shoulder        Health Maintenance Due   Topic    Hepatitis C Screening     Foot Exam Q1     Eye Exam Retinal or Dilated     MICROALBUMIN Q1     PAP AKA CERVICAL CYTOLOGY     A1C test (Diabetic or Prediabetic)        1. Have you been to the ER, urgent care clinic since your last visit? Hospitalized since your last visit? No    2. Have you seen or consulted any other health care providers outside of the [address] [place] [address] since your last visit? Include any pap smears or colon screening.  No

## 2021-05-20 NOTE — PROGRESS NOTES
Subjective:     Chief Complaint   Patient presents with   [address]    Diabetes Care Management      patient not on any meds right now     Hypertension    Cholesterol Problem    Foot Swelling     tingling and swelling     Tingling     fingers tingling as well     Eye Problem     from previous TIA in 2019 - having some discomfort. She  is a 38y.o. year old female who presents as a new patient. Her medical hx is significant HTN, IDDM, TIA. She reports that she has not taken any med for the past 3 years. She was on Insulin in the past.   She has not had any blood work or on any med for the past three years. Notice random numbness, tingling in hands, foot. CPAP dependent. Patient reports that since she was diagnosed with TIA in 2019 she feels discomfort in left eye. Denies any blurry vision, pain. 12.6    A comprehensive review of systems was negative except for that written in the HPI.   Objective:     Vitals:    05/20/21 1414 05/20/21 1419   BP: (!) 145/90 (!) 149/95   Pulse: 85    Resp: 18    Temp: 97.8 °F (36.6 °C)    TempSrc: Temporal    SpO2: 99%    Weight: 231 lb 9.6 oz (105.1 kg)    Height: 5' 4\" (1.626 m)        Physical Examination: General appearance - alert, well appearing, and in no distress, oriented to person, place, and time and overweight  Mental status - alert, oriented to person, place, and time, normal mood, behavior, speech, dress, motor activity, and thought processes  Eyes - pupils equal and reactive, extraocular eye movements intact  Ears - bilateral TM's and external ear canals normal  Nose - normal and patent, no erythema, discharge or polyps  Mouth - mucous membranes moist, pharynx normal without lesions  Neck - supple, no significant adenopathy, thyroid exam: thyroid is normal in size without nodules or tenderness  Chest - clear to auscultation, no wheezes, rales or rhonchi, symmetric air entry  Heart - normal rate, regular rhythm, normal S1, S2, no murmurs, rubs, clicks or gallops  Abdomen - soft, nontender, nondistended, no masses or organomegaly  Neurological - alert, oriented, normal speech, no focal findings or movement disorder noted  Musculoskeletal - no muscular tenderness noted  Extremities - no pedal edema noted    No Known Allergies   Social History     Socioeconomic History    Marital status: SINGLE     Spouse name: Not on file    Number of children: Not on file    Years of education: Not on file    Highest education level: Not on file   Occupational History    Occupation: CNA     Comment: [employer]    Occupation: nursing student     Comment: [employer]   Tobacco Use    Smoking status: Never Smoker    Smokeless tobacco: Never Used   Vaping Use    Vaping Use: Never used   Substance and Sexual Activity    Alcohol use: No     Alcohol/week: 0.0 standard drinks    Drug use: No    Sexual activity: Yes     Partners: Male     Birth control/protection: I.U.D. Other Topics Concern    Exercise Yes     Comment: Line Dancing with Son 1x/week, walking regularly, yoga occasionally   Social History Narrative    Lives with son (14 yo). Boyfriend x 2 years is currently staying with pt and helping her. Social Determinants of Health     Financial Resource Strain:     Difficulty of Paying Living Expenses:    Food Insecurity:     Worried About Running Out of Food in the Last Year:     [address] in the Last Year:    Transportation Needs:     Lack of Transportation (Medical):      Lack of Transportation (Non-Medical):    Physical Activity:     Days of Exercise per Week:     Minutes of Exercise per Session:    Stress:     Feeling of Stress :    Social Connections:     Frequency of Communication with Friends and Family:     Frequency of Social Gatherings with Friends and Family:     Attends Bahai Services:     Active Member of Clubs or Organizations:     Attends Club or Organization Meetings:     Marital Status:       Family History Problem Relation Age of Onset    Hypertension Mother     Coronary Artery Disease Mother     Diabetes Father     Stroke Father 46        pt estimates    Kidney Disease Father         secondary to diabetes    Breast Cancer Paternal Grandmother     Breast Cancer Cousin         numerous paternal cousins      Past Surgical History:   Procedure Laterality Date    HX GYN       X 3      Past Medical History:   Diagnosis Date    Depression     Diabetes ([facility] [address]     Edema     Gout     Herpes genitalis     Hypertension     Ill-defined condition     gout    Mood disorder (HCC)     Other ill-defined conditions(799.89)     cholesterol     Psychiatric disorder     depression    Psychiatric disorder     PTSD    PTSD (post-traumatic stress disorder)     Sleep disorder     Stroke ([facility] [address] [facility]     UTI (lower urinary tract infection)       Current Outpatient Medications   Medication Sig Dispense Refill    furosemide (Lasix) 20 mg tablet Take  by mouth daily.  lisinopril (PRINIVIL, ZESTRIL) 10 mg tablet Take 2 Tabs by mouth daily. 30 Tab 2    levonorgestrel (MIRENA) 20 mcg/24 hr (5 years) IUD 1 Each by IntraUTERine route once.  cyclobenzaprine (FLEXERIL) 5 mg tablet Take 1 Tab by mouth three (3) times daily as needed for Muscle Spasm(s). (Patient not taking: Reported on 2021) 15 Tab 0    metFORMIN (GLUCOPHAGE) 1,000 mg tablet Take 1,000 mg by mouth two (2) times daily (with meals). (Patient not taking: Reported on 2021)      albuterol (PROVENTIL HFA, VENTOLIN HFA, PROAIR HFA) 90 mcg/actuation inhaler Take 2 Puffs by inhalation every four (4) hours as needed for Wheezing.  (Patient not taking: Reported on 2021) 1 Inhaler 0    ONETOUCH VERIO strip TEST SIX TIMES A DAY (Patient not taking: Reported on 2021)  3    ONETOUCH DELICA LANCETS 30 gauge misc TEST SIX TIMES A DAY (Patient not taking: Reported on 2021)  3    valACYclovir (VALTREX) 1 gram tablet Take 1 Tab by mouth daily. (Patient not taking: Reported on 5/20/2021) 30 Tab 5        Assessment/ Plan:   Diagnoses and all orders for this visit:    1. Type 2 diabetes mellitus without complication, with long-term current use of insulin ([facility])  -     AMB POC HEMOGLOBIN A1C          Last Point of Care HGB A1C  Hemoglobin A1c (POC)   Date Value Ref Range Status   05/20/2021 12.6 % Final        -     METABOLIC PANEL, COMPREHENSIVE; Future  -     MICROALBUMIN, UR, RAND W/ MICROALB/CREAT RATIO; Future  -    Start  insulin glargine (LANTUS,BASAGLAR) 100 unit/mL (3 mL) inpn; 15 Units by SubCUTAneous route nightly. -     lancets (FreeStyle Lancets) 28 gauge misc; Check BS three times/day. -     glucose blood VI test strips (FreeStyle Lite Strips) strip; Check BS three times/day. -      Will consider adding metformin depending on her BS and kidney function.  -     Blood-Glucose Meter monitoring kit; Check BS three times/day. 2. Essential hypertension  -     METABOLIC PANEL, COMPREHENSIVE; Future  -    Pt has not had any blood work for a while. Kidney function  unknown. Will start  amLODIPine (NORVASC) 5 mg tablet; Take 1 Tablet by mouth daily. Will consider adding ACE/ARB. 3. Hyperlipidemia LDL goal <70  -   She was on Lipitor but has not taken for the past 3 years she states. METABOLIC PANEL, COMPREHENSIVE; Future    4. Sleep apnea, unspecified type      CPAP dependent. Counseled on diet and exercise. 5. Transient ischemic attack (TIA)      Advised to make appointment with eye doctor. She will let me know if she need any assistance. Medication risks/benefits/costs/interactions/alternatives discussed with patient. Advised patient to call back or return to office if symptoms worsen/change/persist. If patient cannot reach us or should anything more severe/urgent arise he/she should proceed directly to the nearest emergency department.   Discussed expected course/resolution/complications of diagnosis in detail with patient. Patient given a written after visit summary which includes her diagnoses, current medications and vitals. Patient expressed understanding with the diagnosis and plan. Follow-up and Dispositions    · Return in about 1 month (around 6/20/2021), or if symptoms worsen or fail to improve, for Bring diabetic log book., Bring BP log book. Gemini Carranza

## 2021-05-21 LAB
ALBUMIN SERPL-MCNC: 4 G/DL (ref 3.8–4.8)
ALBUMIN/CREAT UR: 70 MG/G CREAT (ref 0–29)
ALBUMIN/GLOB SERPL: 1.4 {RATIO} (ref 1.2–2.2)
ALP SERPL-CCNC: 160 IU/L (ref 48–121)
ALT SERPL-CCNC: 10 IU/L (ref 0–32)
AST SERPL-CCNC: 13 IU/L (ref 0–40)
BILIRUB SERPL-MCNC: 0.2 MG/DL (ref 0–1.2)
BUN SERPL-MCNC: 7 MG/DL (ref 6–24)
BUN/CREAT SERPL: 8 (ref 9–23)
CALCIUM SERPL-MCNC: 9.3 MG/DL (ref 8.7–10.2)
CHLORIDE SERPL-SCNC: 95 MMOL/L (ref 96–106)
CO2 SERPL-SCNC: 25 MMOL/L (ref 20–29)
CREAT SERPL-MCNC: 0.84 MG/DL (ref 0.57–1)
CREAT UR-MCNC: 59.7 MG/DL
GLOBULIN SER CALC-MCNC: 2.8 G/DL (ref 1.5–4.5)
GLUCOSE SERPL-MCNC: 425 MG/DL (ref 65–99)
MICROALBUMIN UR-MCNC: 41.7 UG/ML
POTASSIUM SERPL-SCNC: 3.9 MMOL/L (ref 3.5–5.2)
PROT SERPL-MCNC: 6.8 G/DL (ref 6–8.5)
SODIUM SERPL-SCNC: 135 MMOL/L (ref 134–144)

## 2021-05-22 ENCOUNTER — HOSPITAL ENCOUNTER (EMERGENCY)
Age: 41
Discharge: HOME | End: 2021-05-22
Attending: EMERGENCY MEDICINE
Payer: MEDICAID

## 2021-05-22 ENCOUNTER — APPOINTMENT (OUTPATIENT)
Dept: CT IMAGING | Age: 41
End: 2021-05-22
Attending: EMERGENCY MEDICINE
Payer: MEDICAID

## 2021-05-22 VITALS
WEIGHT: 251 LBS | RESPIRATION RATE: 12 BRPM | BODY MASS INDEX: 42.85 KG/M2 | SYSTOLIC BLOOD PRESSURE: 171 MMHG | DIASTOLIC BLOOD PRESSURE: 91 MMHG | TEMPERATURE: 97.9 F | HEIGHT: 64 IN | OXYGEN SATURATION: 100 % | HEART RATE: 76 BPM

## 2021-05-22 DIAGNOSIS — R42 POSTURAL DIZZINESS: ICD-10-CM

## 2021-05-22 DIAGNOSIS — R10.9 ACUTE ABDOMINAL PAIN: Primary | ICD-10-CM

## 2021-05-22 DIAGNOSIS — R11.10 ACUTE VOMITING: ICD-10-CM

## 2021-05-22 DIAGNOSIS — I10 ACCELERATED HYPERTENSION: ICD-10-CM

## 2021-05-22 DIAGNOSIS — D21.9 LEIOMYOMA: ICD-10-CM

## 2021-05-22 DIAGNOSIS — E11.65 UNCONTROLLED TYPE 2 DIABETES MELLITUS WITH HYPERGLYCEMIA (HCC): ICD-10-CM

## 2021-05-22 PROBLEM — G45.9 TRANSIENT ISCHEMIC ATTACK (TIA): Status: ACTIVE | Noted: 2021-05-22

## 2021-05-22 PROBLEM — G47.30 SLEEP APNEA: Status: ACTIVE | Noted: 2021-05-22

## 2021-05-22 PROBLEM — E78.5 HYPERLIPIDEMIA LDL GOAL <70: Status: ACTIVE | Noted: 2021-05-22

## 2021-05-22 LAB
ALBUMIN SERPL-MCNC: 3 G/DL (ref 3.5–5)
ALBUMIN/GLOB SERPL: 0.8 {RATIO} (ref 1.1–2.2)
ALP SERPL-CCNC: 144 U/L (ref 45–117)
ALT SERPL-CCNC: 16 U/L (ref 12–78)
ANION GAP SERPL CALC-SCNC: 5 MMOL/L (ref 5–15)
APPEARANCE UR: ABNORMAL
AST SERPL-CCNC: 10 U/L (ref 15–37)
BACTERIA URNS QL MICRO: ABNORMAL /HPF
BASOPHILS # BLD: 0.1 K/UL (ref 0–0.1)
BASOPHILS NFR BLD: 1 % (ref 0–1)
BILIRUB SERPL-MCNC: 0.3 MG/DL (ref 0.2–1)
BILIRUB UR QL: NEGATIVE
BUN SERPL-MCNC: 6 MG/DL (ref 6–20)
BUN/CREAT SERPL: 7 (ref 12–20)
CALCIUM SERPL-MCNC: 9 MG/DL (ref 8.5–10.1)
CHLORIDE SERPL-SCNC: 99 MMOL/L (ref 97–108)
CO2 SERPL-SCNC: 29 MMOL/L (ref 21–32)
COLOR UR: ABNORMAL
CREAT SERPL-MCNC: 0.85 MG/DL (ref 0.55–1.02)
DIFFERENTIAL METHOD BLD: ABNORMAL
EOSINOPHIL # BLD: 0.1 K/UL (ref 0–0.4)
EOSINOPHIL NFR BLD: 1 % (ref 0–7)
EPITH CASTS URNS QL MICRO: ABNORMAL /LPF
ERYTHROCYTE [DISTWIDTH] IN BLOOD BY AUTOMATED COUNT: 12.8 % (ref 11.5–14.5)
GLOBULIN SER CALC-MCNC: 4 G/DL (ref 2–4)
GLUCOSE BLD STRIP.AUTO-MCNC: 258 MG/DL (ref 65–117)
GLUCOSE BLD STRIP.AUTO-MCNC: 353 MG/DL (ref 65–117)
GLUCOSE SERPL-MCNC: 369 MG/DL (ref 65–100)
GLUCOSE UR STRIP.AUTO-MCNC: >1000 MG/DL
HCG SERPL QL: NEGATIVE
HCG UR QL: NEGATIVE
HCT VFR BLD AUTO: 34.6 % (ref 35–47)
HGB BLD-MCNC: 10.8 G/DL (ref 11.5–16)
HGB UR QL STRIP: NEGATIVE
HYALINE CASTS URNS QL MICRO: ABNORMAL /LPF (ref 0–5)
IMM GRANULOCYTES # BLD AUTO: 0 K/UL (ref 0–0.04)
IMM GRANULOCYTES NFR BLD AUTO: 0 % (ref 0–0.5)
KETONES UR QL STRIP.AUTO: ABNORMAL MG/DL
LEUKOCYTE ESTERASE UR QL STRIP.AUTO: NEGATIVE
LIPASE SERPL-CCNC: 43 U/L (ref 73–393)
LYMPHOCYTES # BLD: 1.8 K/UL (ref 0.8–3.5)
LYMPHOCYTES NFR BLD: 18 % (ref 12–49)
MCH RBC QN AUTO: 25.6 PG (ref 26–34)
MCHC RBC AUTO-ENTMCNC: 31.2 G/DL (ref 30–36.5)
MCV RBC AUTO: 82 FL (ref 80–99)
MONOCYTES # BLD: 0.6 K/UL (ref 0–1)
MONOCYTES NFR BLD: 6 % (ref 5–13)
NEUTS SEG # BLD: 7.5 K/UL (ref 1.8–8)
NEUTS SEG NFR BLD: 74 % (ref 32–75)
NITRITE UR QL STRIP.AUTO: NEGATIVE
NRBC # BLD: 0 K/UL (ref 0–0.01)
NRBC BLD-RTO: 0 PER 100 WBC
PH UR STRIP: 5.5 [PH] (ref 5–8)
PLATELET # BLD AUTO: 225 K/UL (ref 150–400)
PMV BLD AUTO: 11.3 FL (ref 8.9–12.9)
POTASSIUM SERPL-SCNC: 3.5 MMOL/L (ref 3.5–5.1)
PROT SERPL-MCNC: 7 G/DL (ref 6.4–8.2)
PROT UR STRIP-MCNC: 100 MG/DL
RBC # BLD AUTO: 4.22 M/UL (ref 3.8–5.2)
RBC #/AREA URNS HPF: ABNORMAL /HPF (ref 0–5)
SERVICE CMNT-IMP: ABNORMAL
SERVICE CMNT-IMP: ABNORMAL
SODIUM SERPL-SCNC: 133 MMOL/L (ref 136–145)
SP GR UR REFRACTOMETRY: >1.03 (ref 1–1.03)
UA: UC IF INDICATED,UAUC: ABNORMAL
UROBILINOGEN UR QL STRIP.AUTO: 0.2 EU/DL (ref 0.2–1)
WBC # BLD AUTO: 10.2 K/UL (ref 3.6–11)
WBC URNS QL MICRO: ABNORMAL /HPF (ref 0–4)

## 2021-05-22 PROCEDURE — 83690 ASSAY OF LIPASE: CPT

## 2021-05-22 PROCEDURE — 80053 COMPREHEN METABOLIC PANEL: CPT

## 2021-05-22 PROCEDURE — 84703 CHORIONIC GONADOTROPIN ASSAY: CPT

## 2021-05-22 PROCEDURE — 99285 EMERGENCY DEPT VISIT HI MDM: CPT

## 2021-05-22 PROCEDURE — 74177 CT ABD & PELVIS W/CONTRAST: CPT

## 2021-05-22 PROCEDURE — 74011250636 HC RX REV CODE- 250/636: Performed by: EMERGENCY MEDICINE

## 2021-05-22 PROCEDURE — 96374 THER/PROPH/DIAG INJ IV PUSH: CPT

## 2021-05-22 PROCEDURE — 96375 TX/PRO/DX INJ NEW DRUG ADDON: CPT

## 2021-05-22 PROCEDURE — 74011636637 HC RX REV CODE- 636/637: Performed by: EMERGENCY MEDICINE

## 2021-05-22 PROCEDURE — 36415 COLL VENOUS BLD VENIPUNCTURE: CPT

## 2021-05-22 PROCEDURE — 74011000250 HC RX REV CODE- 250: Performed by: EMERGENCY MEDICINE

## 2021-05-22 PROCEDURE — 96361 HYDRATE IV INFUSION ADD-ON: CPT

## 2021-05-22 PROCEDURE — 85025 COMPLETE CBC W/AUTO DIFF WBC: CPT

## 2021-05-22 PROCEDURE — 82962 GLUCOSE BLOOD TEST: CPT

## 2021-05-22 PROCEDURE — 81001 URINALYSIS AUTO W/SCOPE: CPT

## 2021-05-22 PROCEDURE — 74011000636 HC RX REV CODE- 636: Performed by: EMERGENCY MEDICINE

## 2021-05-22 PROCEDURE — 81025 URINE PREGNANCY TEST: CPT

## 2021-05-22 RX ORDER — ONDANSETRON 4 MG/1
4 TABLET, ORALLY DISINTEGRATING ORAL
Qty: 20 TABLET | Refills: 0 | Status: SHIPPED | OUTPATIENT
Start: 2021-05-22 | End: 2021-05-22 | Stop reason: SDUPTHER

## 2021-05-22 RX ORDER — DICYCLOMINE HYDROCHLORIDE 20 MG/1
20 TABLET ORAL EVERY 6 HOURS
Qty: 20 TABLET | Refills: 0 | Status: SHIPPED | OUTPATIENT
Start: 2021-05-22 | End: 2022-03-14

## 2021-05-22 RX ORDER — ONDANSETRON 4 MG/1
4 TABLET, ORALLY DISINTEGRATING ORAL
Qty: 20 TABLET | Refills: 0 | Status: SHIPPED | OUTPATIENT
Start: 2021-05-22 | End: 2021-09-23

## 2021-05-22 RX ORDER — ONDANSETRON 2 MG/ML
4 INJECTION INTRAMUSCULAR; INTRAVENOUS
Status: COMPLETED | OUTPATIENT
Start: 2021-05-22 | End: 2021-05-22

## 2021-05-22 RX ORDER — KETOROLAC TROMETHAMINE 30 MG/ML
30 INJECTION, SOLUTION INTRAMUSCULAR; INTRAVENOUS
Status: COMPLETED | OUTPATIENT
Start: 2021-05-22 | End: 2021-05-22

## 2021-05-22 RX ORDER — MORPHINE SULFATE 2 MG/ML
4 INJECTION, SOLUTION INTRAMUSCULAR; INTRAVENOUS AS NEEDED
Status: DISCONTINUED | OUTPATIENT
Start: 2021-05-22 | End: 2021-05-22 | Stop reason: HOSPADM

## 2021-05-22 RX ORDER — METOCLOPRAMIDE 10 MG/1
10 TABLET ORAL
Qty: 12 TABLET | Refills: 0 | Status: SHIPPED | OUTPATIENT
Start: 2021-05-22 | End: 2021-06-01

## 2021-05-22 RX ORDER — IBUPROFEN 800 MG/1
800 TABLET ORAL
Qty: 20 TABLET | Refills: 0 | Status: SHIPPED | OUTPATIENT
Start: 2021-05-22 | End: 2021-05-29

## 2021-05-22 RX ORDER — METOCLOPRAMIDE HYDROCHLORIDE 5 MG/ML
10 INJECTION INTRAMUSCULAR; INTRAVENOUS
Status: COMPLETED | OUTPATIENT
Start: 2021-05-22 | End: 2021-05-22

## 2021-05-22 RX ADMIN — SODIUM CHLORIDE 1000 ML: 9 INJECTION, SOLUTION INTRAVENOUS at 03:56

## 2021-05-22 RX ADMIN — HUMAN INSULIN 10 UNITS: 100 INJECTION, SOLUTION SUBCUTANEOUS at 04:20

## 2021-05-22 RX ADMIN — IOPAMIDOL 100 ML: 755 INJECTION, SOLUTION INTRAVENOUS at 04:28

## 2021-05-22 RX ADMIN — ONDANSETRON 4 MG: 2 INJECTION INTRAMUSCULAR; INTRAVENOUS at 03:53

## 2021-05-22 RX ADMIN — METOCLOPRAMIDE 10 MG: 5 INJECTION, SOLUTION INTRAMUSCULAR; INTRAVENOUS at 05:20

## 2021-05-22 RX ADMIN — FAMOTIDINE 20 MG: 10 INJECTION, SOLUTION INTRAVENOUS at 03:57

## 2021-05-22 RX ADMIN — KETOROLAC TROMETHAMINE 30 MG: 30 INJECTION, SOLUTION INTRAMUSCULAR at 03:55

## 2021-05-22 NOTE — LETTER
NOTIFICATION RETURN TO WORK / SCHOOL 
 
5/22/2021 5:57 AM 
 
Ms. Rad Hong [id number] [facility] [id number] To Whom It May Concern: 
 
Rad Hong is currently under the care of [facility] EMERGENCY DEPT. She will return to work/school on: Monday May 24, 2021 Rad Hong may return to work/school with the following restrictions: No restrictions. If there are questions or concerns please have the patient contact our office.  
 
 
 
Sincerely, 
 
 
 
 
 
Angella Wilson RN

## 2021-05-22 NOTE — DISCHARGE INSTRUCTIONS
Thank you for allowing us to take care of you today! In this packet, I have included a copy of all your lab results and/or radiologic studies so you can have them easily available at your follow-up visit. We hope we addressed all of your concerns and needs. We strive to provide excellent quality care in the Emergency Department. You will receive a survey after your visit to evaluate the care you were provided. It was a pleasure serving you. Should you receive a survey from us, we invite you to share your experience with us. The exam and treatment you received in the Emergency Department were for an urgent problem and are not intended as complete care. It is important that you follow up with a doctor, nurse practitioner, or physician assistant for ongoing care. If your symptoms become worse or you do not improve as expected and you are unable to reach your usual health care provider, you should return to the Emergency Department. We are available 24 hours a day. Please take your discharge instructions with you when you go to your follow-up appointment. If you have any problem arranging a follow-up appointment, contact the Emergency Department immediately. If a prescription has been provided, please have it filled as soon as possible to prevent a delay in treatment. Read the entire medication instruction sheet provided to you by the pharmacy. If you have any questions or reservations about taking the medication due to side effects or interactions with other medications, please call your primary care physician or contact the ER to speak with the charge nurse. Make an appointment with your family doctor or the physician you were referred to for follow-up of this visit as instructed on your discharge paperwork. Return to the ER if you are unable to be seen or if you are unable to be seen in a timely manner.     If you have any problem arranging the follow-up visit, contact the Emergency Department immediately. I hope you feel better and thank you again for allowing us to provide you with excellent care today at [facility]! Warmest regards,    Gallito Iverson MD  Emergency Medicine Physician  [facility]      ______________________________________________________________________________________________    Vitals:    05/22/21 0334   BP: (!) 158/90   BP 1 Location: Right upper arm   BP Patient Position: At rest;Sitting   Pulse: 75   Resp: 18   Temp: 97.9 °F (36.6 °C)   SpO2: 100%   Weight: 113.9 kg (251 lb)   Height: 5' 4\" (1.626 m)       Recent Results (from the past 12 hour(s))   GLUCOSE, POC    Collection Time: 05/22/21  3:35 AM   Result Value Ref Range    Glucose (POC) 353 (H) 65 - 117 mg/dL    Performed by Bisi ROY    CBC WITH AUTOMATED DIFF    Collection Time: 05/22/21  3:53 AM   Result Value Ref Range    WBC 10.2 3.6 - 11.0 K/uL    RBC 4.22 3.80 - 5.20 M/uL    HGB 10.8 (L) 11.5 - 16.0 g/dL    HCT 34.6 (L) 35.0 - 47.0 %    MCV 82.0 80.0 - 99.0 FL    MCH 25.6 (L) 26.0 - 34.0 PG    MCHC 31.2 30.0 - 36.5 g/dL    RDW 12.8 11.5 - 14.5 %    PLATELET 034 437 - 527 K/uL    MPV 11.3 8.9 - 12.9 FL    NRBC 0.0 0  WBC    ABSOLUTE NRBC 0.00 0.00 - 0.01 K/uL    NEUTROPHILS 74 32 - 75 %    LYMPHOCYTES 18 12 - 49 %    MONOCYTES 6 5 - 13 %    EOSINOPHILS 1 0 - 7 %    BASOPHILS 1 0 - 1 %    IMMATURE GRANULOCYTES 0 0.0 - 0.5 %    ABS. NEUTROPHILS 7.5 1.8 - 8.0 K/UL    ABS. LYMPHOCYTES 1.8 0.8 - 3.5 K/UL    ABS. MONOCYTES 0.6 0.0 - 1.0 K/UL    ABS. EOSINOPHILS 0.1 0.0 - 0.4 K/UL    ABS. BASOPHILS 0.1 0.0 - 0.1 K/UL    ABS. IMM.  GRANS. 0.0 0.00 - 0.04 K/UL    DF AUTOMATED     METABOLIC PANEL, COMPREHENSIVE    Collection Time: 05/22/21  3:53 AM   Result Value Ref Range    Sodium 133 (L) 136 - 145 mmol/L    Potassium 3.5 3.5 - 5.1 mmol/L    Chloride 99 97 - 108 mmol/L    CO2 29 21 - 32 mmol/L    Anion gap 5 5 - 15 mmol/L    Glucose 369 (H) 65 - 100 mg/dL    BUN 6 6 - 20 MG/DL    Creatinine 0.85 0.55 - 1.02 MG/DL    BUN/Creatinine ratio 7 (L) 12 - 20      GFR est AA >60 >60 ml/min/1.73m2    GFR est non-AA >60 >60 ml/min/1.73m2    Calcium 9.0 8.5 - 10.1 MG/DL    Bilirubin, total 0.3 0.2 - 1.0 MG/DL    ALT (SGPT) 16 12 - 78 U/L    AST (SGOT) 10 (L) 15 - 37 U/L    Alk. phosphatase 144 (H) 45 - 117 U/L    Protein, total 7.0 6.4 - 8.2 g/dL    Albumin 3.0 (L) 3.5 - 5.0 g/dL    Globulin 4.0 2.0 - 4.0 g/dL    A-G Ratio 0.8 (L) 1.1 - 2.2     LIPASE    Collection Time: 05/22/21  3:53 AM   Result Value Ref Range    Lipase 43 (L) 73 - 393 U/L   HCG QL SERUM    Collection Time: 05/22/21  3:53 AM   Result Value Ref Range    HCG, Ql. Negative NEG     HCG URINE, QL. - POC    Collection Time: 05/22/21  4:12 AM   Result Value Ref Range    Pregnancy test,urine (POC) Negative NEG         CT ABD PELV W CONT   Final Result   1. No acute intra-abdominal pathology      2. Hyperdense lesions in the uterus likely represent uterine leiomyoma. CT Results  (Last 48 hours)                 05/22/21 0429  CT ABD PELV W CONT Final result    Impression:  1. No acute intra-abdominal pathology       2. Hyperdense lesions in the uterus likely represent uterine leiomyoma. Narrative:  EXAM: CT ABD PELV W CONT       INDICATION: acute lower abd pain, vomiting, diarrhea, ? appendicitis, ?colitis,   ?pelvic pathology       COMPARISON: 5/24/2007        CONTRAST: 100 mL of Isovue-370. TECHNIQUE:    Following the uneventful intravenous administration of contrast, thin axial   images were obtained through the abdomen and pelvis. Coronal and sagittal   reconstructions were generated. Oral contrast was not administered. CT dose   reduction was achieved through use of a standardized protocol tailored for this   examination and automatic exposure control for dose modulation.        FINDINGS:    LOWER THORAX: No significant abnormality in the incidentally imaged lower chest. LIVER: Hepatic steatosis   BILIARY TREE: Gallbladder is within normal limits. CBD is not dilated. SPLEEN: within normal limits. PANCREAS: No mass or ductal dilatation. ADRENALS: Unremarkable. KIDNEYS: No mass, calculus, or hydronephrosis. STOMACH: Unremarkable. SMALL BOWEL: No dilatation or wall thickening. COLON: No dilatation or wall thickening. APPENDIX: Unremarkable   PERITONEUM: No ascites or pneumoperitoneum. RETROPERITONEUM: No lymphadenopathy or aortic aneurysm. REPRODUCTIVE ORGANS: IUD in endometrial canal. Hyperintense lesion in the para   aspect of the uterus likely represents a leiomyoma. Second small defect   hyperdensity posteriorly in the uterus likely   URINARY BLADDER: No mass or calculus. BONES: No destructive bone lesion. ABDOMINAL WALL: No mass or hernia.    ADDITIONAL COMMENTS: N/A

## 2021-05-22 NOTE — ED PROVIDER NOTES
EMERGENCY DEPARTMENT HISTORY AND PHYSICAL EXAM      Please note that this dictation was completed with the assistance of \"Dragon\", the computer voice recognition software. Quite often unanticipated grammatical, syntax, homophones, and other interpretive errors are inadvertently transcribed by the computer software. Please disregard these errors and any errors that have escaped final proofreading. Thank you. Patient Name: Maria Hui  : 1980  MRN: [id number]  History of Presenting Illness     Chief Complaint   Patient presents with    Abdominal Pain     Patient brought in by EMS from home for nausea and vomiting. Patient also having LLQ abdominal pain. Patient stated that she woke up this morning and noticed she had soiled herself. When she got up and went to the bathroom, she became dizzy. Patient was diagnosed with diabetes by her PCP and was prescribed insulin but the patient stated that she hasn't taken any insulin since being diagnosed. Denies blood in the stool and vomit. History Provided By: Patient and EMS    HPI: Maria Hui, 38 y.o. female with past medical history as documented below presents to the ED with c/o of acute onset of nausea and vomiting and LLQ pain onset this morning. Reports associated dizziness and feeling weak. States recently diagnosed with DM and hasn't take her insulin for a few days. No sick contacts. States current pain at 7/10, throbbing, worse with movement. Pt denies any other alleviating or exacerbating factors. Additionally, pt specifically denies any recent fever, chills, headache, CP, SOB, lightheadedness,  numbness, weakness, lower extremity swelling, heart palpitations, urinary sxs, diarrhea, constipation, melena, hematochezia, cough, or congestion. There are no other complaints, changes or physical findings pertinent to the HPI at this time.     PCP: Adam Hauser MD    Past History   Past Medical History:  Past Medical History:   Diagnosis Date  Depression     Diabetes ([facility]     Edema     Gout     Herpes genitalis     Hypertension     Ill-defined condition     gout    Mood disorder (HCC)     Other ill-defined conditions(799.89)     cholesterol     Psychiatric disorder     depression    Psychiatric disorder     PTSD    PTSD (post-traumatic stress disorder)     Sleep disorder     Stroke ([facility]     UTI (lower urinary tract infection)        Past Surgical History:  Past Surgical History:   Procedure Laterality Date    HX GYN       X 3       Family History:  Family History   Problem Relation Age of Onset    Hypertension Mother     Coronary Artery Disease Mother     Diabetes Father     Stroke Father 46        pt estimates    Kidney Disease Father         secondary to diabetes    Breast Cancer Paternal Grandmother     Breast Cancer Cousin         numerous paternal cousins       Social History:  Social History     Tobacco Use    Smoking status: Never Smoker    Smokeless tobacco: Never Used   Vaping Use    Vaping Use: Never used   Substance Use Topics    Alcohol use: No     Alcohol/week: 0.0 standard drinks    Drug use: No       Allergies:  No Known Allergies    Current Medications:  No current facility-administered medications on file prior to encounter. Current Outpatient Medications on File Prior to Encounter   Medication Sig Dispense Refill    insulin glargine (LANTUS,BASAGLAR) 100 unit/mL (3 mL) inpn 15 Units by SubCUTAneous route nightly. 2 Adjustable Dose Pre-filled Pen Syringe 2    amLODIPine (NORVASC) 5 mg tablet Take 1 Tablet by mouth daily. 30 Tablet 1    lancets (FreeStyle Lancets) 28 gauge misc Check BS three times/day. 100 Lancet 2    glucose blood VI test strips (FreeStyle Lite Strips) strip Check BS three times/day. 100 Strip 2    Blood-Glucose Meter monitoring kit Check BS three times/day. 1 Kit 0    furosemide (Lasix) 20 mg tablet Take  by mouth daily.       metFORMIN (GLUCOPHAGE) 1,000 mg tablet Take 1,000 mg by mouth two (2) times daily (with meals). (Patient not taking: Reported on 5/20/2021)      albuterol (PROVENTIL HFA, VENTOLIN HFA, PROAIR HFA) 90 mcg/actuation inhaler Take 2 Puffs by inhalation every four (4) hours as needed for Wheezing. (Patient not taking: Reported on 5/20/2021) 1 Inhaler 0    lisinopril (PRINIVIL, ZESTRIL) 10 mg tablet Take 2 Tabs by mouth daily. (Patient not taking: Reported on 5/20/2021) 30 Tab 2    ONETOUCH VERIO strip TEST SIX TIMES A DAY (Patient not taking: Reported on 5/20/2021)  3    ONETOUCH DELICA LANCETS 30 gauge misc TEST SIX TIMES A DAY (Patient not taking: Reported on 5/20/2021)  3    valACYclovir (VALTREX) 1 gram tablet Take 1 Tab by mouth daily. (Patient not taking: Reported on 5/20/2021) 30 Tab 5    levonorgestrel (MIRENA) 20 mcg/24 hr (5 years) IUD 1 Each by IntraUTERine route once. Review of Systems   Review of Systems   Constitutional: Positive for appetite change. Negative for chills and fever. HENT: Negative. Negative for congestion and sore throat. Eyes: Negative. Respiratory: Negative. Negative for cough, chest tightness, shortness of breath and wheezing. Cardiovascular: Negative. Negative for chest pain, palpitations and leg swelling. Gastrointestinal: Positive for abdominal pain, nausea and vomiting. Negative for abdominal distention, blood in stool, constipation and diarrhea. Endocrine: Negative. Genitourinary: Negative. Negative for dysuria, flank pain, frequency, hematuria and urgency. Musculoskeletal: Negative. Negative for arthralgias, back pain and myalgias. Skin: Negative. Negative for color change and rash. Neurological: Positive for dizziness and light-headedness. Negative for syncope, speech difficulty, weakness, numbness and headaches. Hematological: Negative. Psychiatric/Behavioral: Negative. Negative for confusion and self-injury. The patient is not nervous/anxious.     All other systems reviewed and are negative. Physical Exam   Physical Exam  Vitals and nursing note reviewed. Constitutional:       General: She is not in acute distress. Appearance: She is well-developed. She is not diaphoretic. HENT:      Head: Normocephalic and atraumatic. Comments: Dry mucous membranes     Mouth/Throat:      Pharynx: No oropharyngeal exudate. Eyes:      Conjunctiva/sclera: Conjunctivae normal.      Pupils: Pupils are equal, round, and reactive to light. Cardiovascular:      Rate and Rhythm: Normal rate and regular rhythm. Heart sounds: Normal heart sounds. No murmur heard. No friction rub. No gallop. Pulmonary:      Effort: Pulmonary effort is normal. No respiratory distress. Breath sounds: Normal breath sounds. No wheezing or rales. Chest:      Chest wall: No tenderness. Abdominal:      General: Bowel sounds are normal. There is no distension. Palpations: Abdomen is soft. There is no mass. Tenderness: There is no abdominal tenderness. There is no guarding or rebound. Musculoskeletal:         General: No tenderness or deformity. Normal range of motion. Cervical back: Normal range of motion. Skin:     General: Skin is warm. Findings: No rash. Neurological:      Mental Status: She is alert and oriented to person, place, and time. Cranial Nerves: No cranial nerve deficit. Motor: No abnormal muscle tone. Coordination: Coordination normal.      Deep Tendon Reflexes: Reflexes normal.         Diagnostic Study Results     Labs -   I have personally reviewed and interpreted all available laboratory results.    Recent Results (from the past 24 hour(s))   GLUCOSE, POC    Collection Time: 05/22/21  3:35 AM   Result Value Ref Range    Glucose (POC) 353 (H) 65 - 117 mg/dL    Performed by Gracia ROY    CBC WITH AUTOMATED DIFF    Collection Time: 05/22/21  3:53 AM   Result Value Ref Range    WBC 10.2 3.6 - 11.0 K/uL    RBC 4.22 3.80 - 5.20 M/uL    HGB 10.8 (L) 11.5 - 16.0 g/dL    HCT 34.6 (L) 35.0 - 47.0 %    MCV 82.0 80.0 - 99.0 FL    MCH 25.6 (L) 26.0 - 34.0 PG    MCHC 31.2 30.0 - 36.5 g/dL    RDW 12.8 11.5 - 14.5 %    PLATELET 701 145 - 418 K/uL    MPV 11.3 8.9 - 12.9 FL    NRBC 0.0 0  WBC    ABSOLUTE NRBC 0.00 0.00 - 0.01 K/uL    NEUTROPHILS 74 32 - 75 %    LYMPHOCYTES 18 12 - 49 %    MONOCYTES 6 5 - 13 %    EOSINOPHILS 1 0 - 7 %    BASOPHILS 1 0 - 1 %    IMMATURE GRANULOCYTES 0 0.0 - 0.5 %    ABS. NEUTROPHILS 7.5 1.8 - 8.0 K/UL    ABS. LYMPHOCYTES 1.8 0.8 - 3.5 K/UL    ABS. MONOCYTES 0.6 0.0 - 1.0 K/UL    ABS. EOSINOPHILS 0.1 0.0 - 0.4 K/UL    ABS. BASOPHILS 0.1 0.0 - 0.1 K/UL    ABS. IMM. GRANS. 0.0 0.00 - 0.04 K/UL    DF AUTOMATED     METABOLIC PANEL, COMPREHENSIVE    Collection Time: 05/22/21  3:53 AM   Result Value Ref Range    Sodium 133 (L) 136 - 145 mmol/L    Potassium 3.5 3.5 - 5.1 mmol/L    Chloride 99 97 - 108 mmol/L    CO2 29 21 - 32 mmol/L    Anion gap 5 5 - 15 mmol/L    Glucose 369 (H) 65 - 100 mg/dL    BUN 6 6 - 20 MG/DL    Creatinine 0.85 0.55 - 1.02 MG/DL    BUN/Creatinine ratio 7 (L) 12 - 20      GFR est AA >60 >60 ml/min/1.73m2    GFR est non-AA >60 >60 ml/min/1.73m2    Calcium 9.0 8.5 - 10.1 MG/DL    Bilirubin, total 0.3 0.2 - 1.0 MG/DL    ALT (SGPT) 16 12 - 78 U/L    AST (SGOT) 10 (L) 15 - 37 U/L    Alk.  phosphatase 144 (H) 45 - 117 U/L    Protein, total 7.0 6.4 - 8.2 g/dL    Albumin 3.0 (L) 3.5 - 5.0 g/dL    Globulin 4.0 2.0 - 4.0 g/dL    A-G Ratio 0.8 (L) 1.1 - 2.2     LIPASE    Collection Time: 05/22/21  3:53 AM   Result Value Ref Range    Lipase 43 (L) 73 - 393 U/L   HCG QL SERUM    Collection Time: 05/22/21  3:53 AM   Result Value Ref Range    HCG, Ql. Negative NEG     URINALYSIS W/ REFLEX CULTURE    Collection Time: 05/22/21  4:09 AM    Specimen: Urine   Result Value Ref Range    Color YELLOW/STRAW      Appearance CLOUDY (A) CLEAR      Specific gravity >1.030 (H) 1.003 - 1.030    pH (UA) 5.5 5.0 - 8.0      Protein 100 (A) NEG mg/dL    Glucose >1,000 (A) NEG mg/dL    Ketone TRACE (A) NEG mg/dL    Bilirubin Negative NEG      Blood Negative NEG      Urobilinogen 0.2 0.2 - 1.0 EU/dL    Nitrites Negative NEG      Leukocyte Esterase Negative NEG      WBC 0-4 0 - 4 /hpf    RBC 0-5 0 - 5 /hpf    Epithelial cells MODERATE (A) FEW /lpf    Bacteria 2+ (A) NEG /hpf    UA:UC IF INDICATED CULTURE NOT INDICATED BY UA RESULT CNI      Hyaline cast 0-2 0 - 5 /lpf   HCG URINE, QL. - POC    Collection Time: 05/22/21  4:12 AM   Result Value Ref Range    Pregnancy test,urine (POC) Negative NEG     GLUCOSE, POC    Collection Time: 05/22/21  5:20 AM   Result Value Ref Range    Glucose (POC) 258 (H) 65 - 117 mg/dL    Performed by Jose Luis Rodas RN        Radiologic Studies -   I have personally reviewed and interpreted all available imaging studies and agree with radiology interpretation and report. CT ABD PELV W CONT   Final Result   1. No acute intra-abdominal pathology      2. Hyperdense lesions in the uterus likely represent uterine leiomyoma. CT Results  (Last 48 hours)               05/22/21 0429  CT ABD PELV W CONT Final result    Impression:  1. No acute intra-abdominal pathology       2. Hyperdense lesions in the uterus likely represent uterine leiomyoma. Narrative:  EXAM: CT ABD PELV W CONT       INDICATION: acute lower abd pain, vomiting, diarrhea, ? appendicitis, ?colitis,   ?pelvic pathology       COMPARISON: 5/24/2007        CONTRAST: 100 mL of Isovue-370. TECHNIQUE:    Following the uneventful intravenous administration of contrast, thin axial   images were obtained through the abdomen and pelvis. Coronal and sagittal   reconstructions were generated. Oral contrast was not administered. CT dose   reduction was achieved through use of a standardized protocol tailored for this   examination and automatic exposure control for dose modulation.        FINDINGS:    LOWER THORAX: No significant abnormality in the incidentally imaged lower chest.   LIVER: Hepatic steatosis   BILIARY TREE: Gallbladder is within normal limits. CBD is not dilated. SPLEEN: within normal limits. PANCREAS: No mass or ductal dilatation. ADRENALS: Unremarkable. KIDNEYS: No mass, calculus, or hydronephrosis. STOMACH: Unremarkable. SMALL BOWEL: No dilatation or wall thickening. COLON: No dilatation or wall thickening. APPENDIX: Unremarkable   PERITONEUM: No ascites or pneumoperitoneum. RETROPERITONEUM: No lymphadenopathy or aortic aneurysm. REPRODUCTIVE ORGANS: IUD in endometrial canal. Hyperintense lesion in the para   aspect of the uterus likely represents a leiomyoma. Second small defect   hyperdensity posteriorly in the uterus likely   URINARY BLADDER: No mass or calculus. BONES: No destructive bone lesion. ABDOMINAL WALL: No mass or hernia. ADDITIONAL COMMENTS: N/A               CXR Results  (Last 48 hours)    None          Medical Decision Making   I reviewed the vital signs, available nursing notes, past medical history, past surgical history, family history and social history. Vital Signs-Reviewed the patient's vital signs.   Patient Vitals for the past 24 hrs:   Temp Pulse Resp BP SpO2   05/22/21 0602  76 12 (!) 171/91 100 %   05/22/21 0334 97.9 °F (36.6 °C) 75 18 (!) 158/90 100 %     Pulse Oximetry Analysis - 100% on RA    Cardiac Monitor:   Rate: 75 bpm  The cardiac monitor revealed the following rhythm as interpreted by me: Normal Sinus Rhythm    The cardiac monitor was ordered secondary to the patient's history of abdominal pain and to monitor the patient for dysrhythmia    Records Reviewed: Nursing Notes, Old Medical Records, Previous electrocardiograms, Previous Radiology Studies and Previous Laboratory Studies    Provider Notes (Medical Decision Making):   Pt presents with acute abdominal pain; vital signs stable with currently a non-peritoneal exam; DDx includes: Gastroenteritis, hepatitis, pancreatitis, obstruction, appendicitis, viral illness, IBD, diverticulitis, mesenteric ischemia, AAA or descending dissection, ACS, kidney stone. Will get labs, treat symptomatically and obtain serial abdominal exams to determine if additional imaging is indicated. Will reassess and monitor closely. ED Course:   I am the first provider for this patient's ED visit today. Initial assessment performed. I discussed presenting problems, concerns and my formulated plan for today's visit with the patient and any available family members at bedside. I encouraged them to ask questions as they arise throughout the visit. I reviewed our electronic medical record system for any past medical records that were available that may contribute to the patient's current condition, the nursing notes and vital signs from today's visit.       ED Orders Placed :  Orders Placed This Encounter    CT ABD PELV W CONT    CBC WITH AUTOMATED DIFF    METABOLIC PANEL, COMPREHENSIVE    LIPASE    URINALYSIS W/ REFLEX CULTURE    HCG QL., SERUM    POC URINE PREGNANCY TEST    CARDIAC/RESPIRATORY MONITORING    POC GLUCOSE    POC GLUCOSE    PO CHALLENGE    GLUCOSE, POC    HCG URINE, QL. - POC    GLUCOSE, POC    INSERT PERIPHERAL IV ONE TIME STAT    sodium chloride 0.9 % bolus infusion 1,000 mL    ketorolac (TORADOL) injection 30 mg    famotidine (PF) (PEPCID) 20 mg in 0.9% sodium chloride 10 mL injection    ondansetron (ZOFRAN) injection 4 mg    DISCONTD: morphine injection 4 mg    iopamidoL (ISOVUE-370) 76 % injection 100 mL    insulin regular (NOVOLIN R, HUMULIN R) injection 10 Units    metoclopramide HCl (REGLAN) injection 10 mg    DISCONTD: ondansetron (Zofran ODT) 4 mg disintegrating tablet    metoclopramide HCl (Reglan) 10 mg tablet    dicyclomine (BENTYL) 20 mg tablet    ibuprofen (MOTRIN) 800 mg tablet    ondansetron (Zofran ODT) 4 mg disintegrating tablet       ED Medications Administered:  Medications   sodium chloride 0.9 % bolus infusion 1,000 mL (0 mL IntraVENous IV Completed 5/22/21 0607)   ketorolac (TORADOL) injection 30 mg (30 mg IntraVENous Given 5/22/21 0355)   famotidine (PF) (PEPCID) 20 mg in 0.9% sodium chloride 10 mL injection (20 mg IntraVENous Given 5/22/21 0357)   ondansetron (ZOFRAN) injection 4 mg (4 mg IntraVENous Given 5/22/21 0353)   iopamidoL (ISOVUE-370) 76 % injection 100 mL (100 mL IntraVENous Given 5/22/21 0428)   insulin regular (NOVOLIN R, HUMULIN R) injection 10 Units (10 Units IntraVENous Given 5/22/21 0420)   metoclopramide HCl (REGLAN) injection 10 mg (10 mg IntraVENous Given 5/22/21 0520)         Progress Note:  I have re-examined the patient. Pt states she feels much better and symptoms improved. Tolerating oral intake. Abdomen is soft and without guarding, rebound or other peritoneal signs. I have discussed with patient the importance of close f/u and to return to the ED if symptoms don't improve or worsen. Progress Note:  Patient has been reassessed and reports feeling better and symptoms have improved significantly after ED treatment. Milana Rivera final labs and imaging have been reviewed with her and available family and/or caregiver. They have been counseled regarding her diagnosis. She verbally conveys understanding and agreement of the signs, symptoms, diagnosis, treatment and prognosis and additionally agrees to follow up as recommended with Dr. Haroon Riddle MD and/or specialist in 24 - 48 hours. She also agrees with the care-plan we created together and conveys that all of her questions have been answered. I have also put together a packet of discharge instructions for her that include: 1) educational information regarding their diagnosis, 2) how to care for their diagnosis at home, as well a 3) list of reasons why they would want to return to the ED prior to their follow-up appointment should the patient's condition change or symptoms worsen.     I have answered all questions to the patient's satisfaction. Strict return precautions given. She both understood and agreed with plan as discussed. Vital signs stable for discharge. Disposition:   DISCHARGE  The pt is ready for discharge. The pt's signs, symptoms, diagnosis, and discharge instructions have been discussed and pt has conveyed their understanding. The pt is to follow up as recommended or return to ER should their symptoms worsen. Plan has been discussed and pt is in agreement. Plan:  1. Return precautions as discussed with patient and available family and/or caregiver. 2.   Discharge Medication List as of 5/22/2021  5:16 AM      START taking these medications    Details   metoclopramide HCl (Reglan) 10 mg tablet Take 1 Tablet by mouth every six (6) hours as needed for Nausea for up to 10 days. , Normal, Disp-12 Tablet, R-0      dicyclomine (BENTYL) 20 mg tablet Take 1 Tablet by mouth every six (6) hours. , Normal, Disp-20 Tablet, R-0      ibuprofen (MOTRIN) 800 mg tablet Take 1 Tablet by mouth every six (6) hours as needed for Pain for up to 7 days. , Normal, Disp-20 Tablet, R-0         CONTINUE these medications which have CHANGED    Details   ondansetron (Zofran ODT) 4 mg disintegrating tablet 1 Tablet by SubLINGual route every eight (8) hours as needed for Nausea or Vomiting., Normal, Disp-20 Tablet, R-0         CONTINUE these medications which have NOT CHANGED    Details   insulin glargine (LANTUS,BASAGLAR) 100 unit/mL (3 mL) inpn 15 Units by SubCUTAneous route nightly., Normal, Disp-2 Adjustable Dose Pre-filled Pen Syringe, R-2      amLODIPine (NORVASC) 5 mg tablet Take 1 Tablet by mouth daily. , Normal, Disp-30 Tablet, R-1      !! lancets (FreeStyle Lancets) 28 gauge misc Check BS three times/day., Normal, Disp-100 Lancet, R-2      !! glucose blood VI test strips (FreeStyle Lite Strips) strip Check BS three times/day., Normal, Disp-100 Strip, R-2      Blood-Glucose Meter monitoring kit Check BS three times/day. , Normal, Disp-1 Kit, R-0      furosemide (Lasix) 20 mg tablet Take  by mouth daily. , Historical Med      metFORMIN (GLUCOPHAGE) 1,000 mg tablet Take 1,000 mg by mouth two (2) times daily (with meals). , Historical Med      albuterol (PROVENTIL HFA, VENTOLIN HFA, PROAIR HFA) 90 mcg/actuation inhaler Take 2 Puffs by inhalation every four (4) hours as needed for Wheezing., Normal, Disp-1 Inhaler, R-0      lisinopril (PRINIVIL, ZESTRIL) 10 mg tablet Take 2 Tabs by mouth daily. , Normal, Disp-30 Tab, R-2      !! ONETOUCH VERIO strip TEST SIX TIMES A DAY, Historical Med, R-3, AUGUST      !! ONETOUCH DELICA LANCETS 30 gauge misc TEST SIX TIMES A DAY, Historical Med, R-3, AUGUST      valACYclovir (VALTREX) 1 gram tablet Take 1 Tab by mouth daily. , Normal, Disp-30 Tab, R-5      levonorgestrel (MIRENA) 20 mcg/24 hr (5 years) IUD 1 Each by IntraUTERine route once., Historical Med       !! - Potential duplicate medications found. Please discuss with provider. 3.   Follow-up Information     Follow up With Specialties Details Why Contact Info    [facility] EMERGENCY DEPT Emergency Medicine  As needed, If symptoms worsen [address] [place] [address]  [phone]    David Jean MD Family Medicine  As needed, If symptoms worsen [phone]       [address] [facility]   As needed, If symptoms worsen [address]    Ishan Otero MD Endocrinology   [address] [place] [address] 30 [facility]  [phone]            Instructed to return to ED if worse  Diagnosis   Clinical Impression:  1. Acute abdominal pain    2. Acute vomiting    3. Uncontrolled type 2 diabetes mellitus with hyperglycemia ([address]    4. Leiomyoma    5. Postural dizziness    6. Accelerated hypertension        Attestation:  Curt Hendrickson MD, am the attending of record for this patient.  I personally performed the services described in this documentation on this date, 5/22/2021 for patient, John Ramos. I have reviewed the chart and verified that the record is accurate and complete.

## 2021-05-25 DIAGNOSIS — Z79.4 TYPE 2 DIABETES MELLITUS WITHOUT COMPLICATION, WITH LONG-TERM CURRENT USE OF INSULIN (HCC): Primary | ICD-10-CM

## 2021-05-25 DIAGNOSIS — E11.9 TYPE 2 DIABETES MELLITUS WITHOUT COMPLICATION, WITH LONG-TERM CURRENT USE OF INSULIN (HCC): Primary | ICD-10-CM

## 2021-05-25 RX ORDER — METFORMIN HYDROCHLORIDE 500 MG/1
500 TABLET, EXTENDED RELEASE ORAL 2 TIMES DAILY WITH MEALS
Qty: 60 TABLET | Refills: 3 | Status: SHIPPED | OUTPATIENT
Start: 2021-05-25 | End: 2021-09-24 | Stop reason: ALTCHOICE

## 2021-05-25 NOTE — PROGRESS NOTES
Please call patient:     1. Kidney and liver function is fine. Blood sugar level is 425. Please take the Insulin as prescribed . I have also sent Metformin to the pharmacy as we have discussed. Follow up with me as scheduled on 6/24.

## 2021-08-03 PROBLEM — I10 ESSENTIAL HYPERTENSION: Status: RESOLVED | Noted: 2017-11-06 | Resolved: 2021-08-03

## 2021-08-30 NOTE — TELEPHONE ENCOUNTER
Patient went to the emergency room over the weekend and needs medication refills. I scheduled her a visit with Dr Drew Khan on the 24th of September.    Furosimide 20mg  Mag Oxide 400mg  Gabapentin 100mg  Gerikot 8.6  Aspirin low dose 81mg

## 2021-09-01 NOTE — TELEPHONE ENCOUNTER
I did not received records or any information regarding her conditio so I cannot prescribe any medication at this point. She needs follow up and make sure to bring all her discharge summaries.

## 2021-09-23 ENCOUNTER — HOSPITAL ENCOUNTER (EMERGENCY)
Age: 41
Discharge: HOME | End: 2021-09-24
Attending: EMERGENCY MEDICINE | Admitting: EMERGENCY MEDICINE
Payer: MEDICAID

## 2021-09-23 ENCOUNTER — APPOINTMENT (OUTPATIENT)
Dept: GENERAL RADIOLOGY | Age: 41
End: 2021-09-23
Attending: EMERGENCY MEDICINE
Payer: MEDICAID

## 2021-09-23 VITALS
HEIGHT: 64 IN | WEIGHT: 230 LBS | DIASTOLIC BLOOD PRESSURE: 85 MMHG | SYSTOLIC BLOOD PRESSURE: 134 MMHG | TEMPERATURE: 97.7 F | RESPIRATION RATE: 16 BRPM | BODY MASS INDEX: 39.27 KG/M2 | HEART RATE: 89 BPM | OXYGEN SATURATION: 97 %

## 2021-09-23 DIAGNOSIS — E86.0 DEHYDRATION: ICD-10-CM

## 2021-09-23 DIAGNOSIS — E16.2 HYPOGLYCEMIA: Primary | ICD-10-CM

## 2021-09-23 DIAGNOSIS — I95.1 ORTHOSTATIC HYPOTENSION: ICD-10-CM

## 2021-09-23 LAB
ALBUMIN SERPL-MCNC: 3 G/DL (ref 3.5–5)
ALBUMIN/GLOB SERPL: 0.7 {RATIO} (ref 1.1–2.2)
ALP SERPL-CCNC: 143 U/L (ref 45–117)
ALT SERPL-CCNC: 20 U/L (ref 12–78)
ANION GAP SERPL CALC-SCNC: 9 MMOL/L (ref 5–15)
APPEARANCE UR: CLEAR
AST SERPL-CCNC: 11 U/L (ref 15–37)
BACTERIA URNS QL MICRO: NEGATIVE /HPF
BASOPHILS # BLD: 0 K/UL (ref 0–0.1)
BASOPHILS NFR BLD: 0 % (ref 0–1)
BILIRUB SERPL-MCNC: 0.3 MG/DL (ref 0.2–1)
BILIRUB UR QL: NEGATIVE
BUN SERPL-MCNC: 30 MG/DL (ref 6–20)
BUN/CREAT SERPL: 22 (ref 12–20)
CALCIUM SERPL-MCNC: 8.8 MG/DL (ref 8.5–10.1)
CHLORIDE SERPL-SCNC: 104 MMOL/L (ref 97–108)
CO2 SERPL-SCNC: 26 MMOL/L (ref 21–32)
COLOR UR: NORMAL
COVID-19 RAPID TEST, COVR: NOT DETECTED
CREAT SERPL-MCNC: 1.37 MG/DL (ref 0.55–1.02)
DIFFERENTIAL METHOD BLD: ABNORMAL
EOSINOPHIL # BLD: 0 K/UL (ref 0–0.4)
EOSINOPHIL NFR BLD: 0 % (ref 0–7)
EPITH CASTS URNS QL MICRO: NORMAL /LPF
ERYTHROCYTE [DISTWIDTH] IN BLOOD BY AUTOMATED COUNT: 13.2 % (ref 11.5–14.5)
GLOBULIN SER CALC-MCNC: 4.2 G/DL (ref 2–4)
GLUCOSE BLD STRIP.AUTO-MCNC: 165 MG/DL (ref 65–117)
GLUCOSE BLD STRIP.AUTO-MCNC: 194 MG/DL (ref 65–117)
GLUCOSE SERPL-MCNC: 179 MG/DL (ref 65–100)
GLUCOSE UR STRIP.AUTO-MCNC: NEGATIVE MG/DL
HCT VFR BLD AUTO: 30 % (ref 35–47)
HGB BLD-MCNC: 9.3 G/DL (ref 11.5–16)
HGB UR QL STRIP: NEGATIVE
HYALINE CASTS URNS QL MICRO: NORMAL /LPF (ref 0–5)
IMM GRANULOCYTES # BLD AUTO: 0.1 K/UL (ref 0–0.04)
IMM GRANULOCYTES NFR BLD AUTO: 1 % (ref 0–0.5)
KETONES UR QL STRIP.AUTO: NEGATIVE MG/DL
LACTATE SERPL-SCNC: 1.8 MMOL/L (ref 0.4–2)
LACTATE SERPL-SCNC: 2.5 MMOL/L (ref 0.4–2)
LEUKOCYTE ESTERASE UR QL STRIP.AUTO: NEGATIVE
LIPASE SERPL-CCNC: 37 U/L (ref 73–393)
LYMPHOCYTES # BLD: 1.3 K/UL (ref 0.8–3.5)
LYMPHOCYTES NFR BLD: 9 % (ref 12–49)
MAGNESIUM SERPL-MCNC: 2.1 MG/DL (ref 1.6–2.4)
MCH RBC QN AUTO: 26.5 PG (ref 26–34)
MCHC RBC AUTO-ENTMCNC: 31 G/DL (ref 30–36.5)
MCV RBC AUTO: 85.5 FL (ref 80–99)
MONOCYTES # BLD: 0.5 K/UL (ref 0–1)
MONOCYTES NFR BLD: 3 % (ref 5–13)
NEUTS SEG # BLD: 12 K/UL (ref 1.8–8)
NEUTS SEG NFR BLD: 87 % (ref 32–75)
NITRITE UR QL STRIP.AUTO: NEGATIVE
NRBC # BLD: 0 K/UL (ref 0–0.01)
NRBC BLD-RTO: 0 PER 100 WBC
PH UR STRIP: 5 [PH] (ref 5–8)
PLATELET # BLD AUTO: 242 K/UL (ref 150–400)
PMV BLD AUTO: 10.9 FL (ref 8.9–12.9)
POTASSIUM SERPL-SCNC: 3.7 MMOL/L (ref 3.5–5.1)
PROT SERPL-MCNC: 7.2 G/DL (ref 6.4–8.2)
PROT UR STRIP-MCNC: NEGATIVE MG/DL
RBC # BLD AUTO: 3.51 M/UL (ref 3.8–5.2)
RBC #/AREA URNS HPF: NORMAL /HPF (ref 0–5)
SERVICE CMNT-IMP: ABNORMAL
SERVICE CMNT-IMP: ABNORMAL
SODIUM SERPL-SCNC: 139 MMOL/L (ref 136–145)
SOURCE, COVRS: NORMAL
SP GR UR REFRACTOMETRY: 1.02 (ref 1–1.03)
TROPONIN I SERPL-MCNC: <0.05 NG/ML
UR CULT HOLD, URHOLD: NORMAL
UROBILINOGEN UR QL STRIP.AUTO: 0.2 EU/DL (ref 0.2–1)
WBC # BLD AUTO: 13.8 K/UL (ref 3.6–11)
WBC URNS QL MICRO: NORMAL /HPF (ref 0–4)

## 2021-09-23 PROCEDURE — 36415 COLL VENOUS BLD VENIPUNCTURE: CPT

## 2021-09-23 PROCEDURE — 83735 ASSAY OF MAGNESIUM: CPT

## 2021-09-23 PROCEDURE — 71045 X-RAY EXAM CHEST 1 VIEW: CPT

## 2021-09-23 PROCEDURE — 87635 SARS-COV-2 COVID-19 AMP PRB: CPT

## 2021-09-23 PROCEDURE — 96360 HYDRATION IV INFUSION INIT: CPT

## 2021-09-23 PROCEDURE — 80053 COMPREHEN METABOLIC PANEL: CPT

## 2021-09-23 PROCEDURE — 83605 ASSAY OF LACTIC ACID: CPT

## 2021-09-23 PROCEDURE — 84484 ASSAY OF TROPONIN QUANT: CPT

## 2021-09-23 PROCEDURE — 85025 COMPLETE CBC W/AUTO DIFF WBC: CPT

## 2021-09-23 PROCEDURE — 83690 ASSAY OF LIPASE: CPT

## 2021-09-23 PROCEDURE — 81001 URINALYSIS AUTO W/SCOPE: CPT

## 2021-09-23 PROCEDURE — 93005 ELECTROCARDIOGRAM TRACING: CPT

## 2021-09-23 PROCEDURE — 96361 HYDRATE IV INFUSION ADD-ON: CPT

## 2021-09-23 PROCEDURE — 74011250636 HC RX REV CODE- 250/636: Performed by: EMERGENCY MEDICINE

## 2021-09-23 PROCEDURE — 99285 EMERGENCY DEPT VISIT HI MDM: CPT

## 2021-09-23 PROCEDURE — 82962 GLUCOSE BLOOD TEST: CPT

## 2021-09-23 RX ORDER — ONDANSETRON 4 MG/1
4 TABLET, ORALLY DISINTEGRATING ORAL
Qty: 10 TABLET | Refills: 0 | Status: SHIPPED | OUTPATIENT
Start: 2021-09-23 | End: 2021-09-24 | Stop reason: ALTCHOICE

## 2021-09-23 RX ADMIN — SODIUM CHLORIDE 1000 ML: 9 INJECTION, SOLUTION INTRAVENOUS at 20:08

## 2021-09-23 RX ADMIN — SODIUM CHLORIDE 1000 ML: 9 INJECTION, SOLUTION INTRAVENOUS at 19:21

## 2021-09-23 NOTE — ED PROVIDER NOTES
39-year-old female with history of prior CVA with residual right-sided deficits, DM, HTN, presents to the emergency department by EMS after she was found to have hypoglycemia with an initial blood glucose of 49. She was diaphoretic and mildly drowsy at that time. She was given oral glucose, soda crackers at home and while in route to the ED improved to 145. She had improvement in her mental status while in route. She also was seen to have transient orthostatic hypotension with blood pressure dropping into the seventies systolic but then improving while lying down. She denies any new focal neuro deficits. She states she has had mild nasal congestion and feeling she has a cold but denies any fever, cough, shortness of breath, nausea, vomiting, diarrhea, dysuria, hematuria. She does state that she has not been eating and drinking as much as she normally does. She denies any recent changes in her insulin regimen.            Past Medical History:   Diagnosis Date    Depression     [facility]     Edema     Gout     Herpes genitalis     Hypertension     Ill-defined condition     gout    Mood disorder (HCC)     Other ill-defined conditions(799.89)     cholesterol     Psychiatric disorder     depression    Psychiatric disorder     PTSD    PTSD (post-traumatic stress disorder)     Sleep disorder     Stroke ([facility]     UTI (lower urinary tract infection)        Past Surgical History:   Procedure Laterality Date    HX GYN       X 3         Family History:   Problem Relation Age of Onset    Hypertension Mother     Coronary Artery Disease Mother     Diabetes Father     Stroke Father 46        pt estimates    Kidney Disease Father         secondary to diabetes    Breast Cancer Paternal Grandmother     Breast Cancer Cousin         numerous paternal cousins       Social History     Socioeconomic History    Marital status: SINGLE     Spouse name: Not on file    Number of children: Not on file    Years of education: Not on file    Highest education level: Not on file   Occupational History    Occupation: CNA     Comment: [employer] Occupation: nursing student     Comment: [employer]   Tobacco Use    Smoking status: Never Smoker    Smokeless tobacco: Never Used   Vaping Use    Vaping Use: Never used   Substance and Sexual Activity    Alcohol use: No     Alcohol/week: 0.0 standard drinks    Drug use: No    Sexual activity: Yes     Partners: Male     Birth control/protection: I.U.D. Other Topics Concern     Service Not Asked    Blood Transfusions Not Asked    Caffeine Concern Not Asked    Occupational Exposure Not Asked    Hobby Hazards Not Asked    Sleep Concern Not Asked    Stress Concern Not Asked    Weight Concern Not Asked    Special Diet Not Asked    Back Care Not Asked    Exercise Yes     Comment: Line Dancing with Son 1x/week, walking regularly, yoga occasionally    Bike Helmet Not Asked   [address] Not Asked    Self-Exams Not Asked   Social History Narrative    Lives with son (12 yo). Boyfriend x 2 years is currently staying with pt and helping her. Social Determinants of Health     Financial Resource Strain:     Difficulty of Paying Living Expenses:    Food Insecurity:     Worried About Running Out of Food in the Last Year:     [address] in the Last Year:    Transportation Needs:     Lack of Transportation (Medical):      Lack of Transportation (Non-Medical):    Physical Activity:     Days of Exercise per Week:     Minutes of Exercise per Session:    Stress:     Feeling of Stress :    Social Connections:     Frequency of Communication with Friends and Family:     Frequency of Social Gatherings with Friends and Family:     Attends Jain Services:     Active Member of Clubs or Organizations:     Attends Club or Organization Meetings:     Marital Status:    Intimate Partner Violence:     Fear of Current or Ex-Partner:     Emotionally Abused:     Physically Abused:     Sexually Abused: ALLERGIES: Patient has no known allergies. Review of Systems   Constitutional: Positive for activity change, appetite change and diaphoresis. Negative for chills and fever. HENT: Negative for congestion, rhinorrhea, sinus pressure, sneezing and sore throat. Eyes: Negative for photophobia and visual disturbance. Respiratory: Negative for cough and shortness of breath. Cardiovascular: Negative for chest pain. Gastrointestinal: Negative for abdominal pain, blood in stool, constipation, diarrhea, nausea and vomiting. Genitourinary: Negative for difficulty urinating, dysuria, flank pain, frequency, hematuria, menstrual problem, urgency, vaginal bleeding and vaginal discharge. Musculoskeletal: Negative for arthralgias, back pain, myalgias and neck pain. Skin: Negative for rash and wound. Neurological: Positive for light-headedness. Negative for syncope, weakness, numbness and headaches. Psychiatric/Behavioral: Positive for confusion. Negative for self-injury and suicidal ideas. All other systems reviewed and are negative. Vitals:    09/23/21 1845 09/23/21 1900 09/23/21 1915 09/23/21 1930   BP: 98/62 110/67 97/67 116/71   Pulse: 72 87 70 72   Resp: 16 19 11 15   Temp:       SpO2:  93%     Weight:       Height:                Physical Exam  Vitals and nursing note reviewed. Constitutional:       General: She is not in acute distress. Appearance: Normal appearance. She is well-developed. She is obese. She is not diaphoretic. Comments: Pleasant, no acute distress. HENT:      Head: Normocephalic and atraumatic. Nose: Nose normal.   Eyes:      Extraocular Movements: Extraocular movements intact. Conjunctiva/sclera: Conjunctivae normal.      Pupils: Pupils are equal, round, and reactive to light. Cardiovascular:      Rate and Rhythm: Normal rate and regular rhythm.       Heart sounds: Normal heart sounds. Pulmonary:      Effort: Pulmonary effort is normal.      Breath sounds: Normal breath sounds. Abdominal:      General: There is no distension. Palpations: Abdomen is soft. Tenderness: There is no abdominal tenderness. Musculoskeletal:         General: No tenderness. Cervical back: Neck supple. Skin:     General: Skin is warm and dry. Neurological:      General: No focal deficit present. Mental Status: She is alert and oriented to person, place, and time. Mental status is at baseline. GCS: GCS eye subscore is 4. GCS verbal subscore is 5. GCS motor subscore is 6. Cranial Nerves: Dysarthria and facial asymmetry present. No cranial nerve deficit. Sensory: No sensory deficit. Motor: Weakness present. Coordination: Coordination normal.      Comments: Right-sided facial droop and mildly slurred speech with mild weakness 4.5/5 and right upper and lower extremities, 5/5 strength in left hemibody. Intact sensation. Reported baseline after prior stroke. MDM   69-year-old female with reported decreased p.o. intake at home presents with orthostatic hypotension and hypoglycemia. Hypoglycemia was treated by EMS and on arrival to the ED is improved, glucose 165. Labs returned showing mildly elevated lactic acid of 2.5, elevated creatinine at 1.37, BUN 30, normal anion gap, bicarb, glucose 179, negative troponin, mildly elevated WBC at 13.8, Hg 9.3.  UA negative  CXR viewed by myself and read by radiology showing no acute abnormalities. Rapid Covid negative  She was given food in the ED and tolerated p.o. without difficulty. Repeat glucose 194. Given IV fluid bolus x2 and had significant improvement in her symptoms. Feel that symptoms are likely secondary to dehydration and reported decreased p.o. intake. Patient states that she would much rather be discharged home as opposed to staying in the hospital.    Repeat lactic acid normalized at 1.8.   She was recommended to increase p.o. food and fluid intake and monitor her blood sugars closely. She is recommended PCP follow-up for further evaluation. This plan was discussed with the patient at the bedside she stated both understanding and agreement. Requests discharge home. Please note that this dictation was completed with [employer], the computer voice recognition software. Quite often unanticipated grammatical, syntax, homophones, and other interpretive errors are inadvertently transcribed by the computer software. Please disregard these errors. Please excuse any errors that have escaped final proofreading. Procedures    1841 EKG shows normal sinus rhythm with a rate of 72 bpm with no acute ST or T wave abnormalities suggestive of ischemia.

## 2021-09-23 NOTE — ED TRIAGE NOTES
Pt to ED for hypoglycemia, ems reports initial blood glucose 49. Pt was given 2 packs of oral glucose (15g each), soda, crackers etc at home. Blood glucose only improved to 54 after an hour. Most recent blood glucose 1545 en route to ED. Pt a/o x4 on arrival. Had CVA in July and has deficits on right side. Pt at baseline neuro status at this time.

## 2021-09-24 ENCOUNTER — OFFICE VISIT (OUTPATIENT)
Dept: PRIMARY CARE CLINIC | Age: 41
End: 2021-09-24
Payer: MEDICAID

## 2021-09-24 VITALS
HEART RATE: 91 BPM | WEIGHT: 223 LBS | RESPIRATION RATE: 18 BRPM | BODY MASS INDEX: 38.07 KG/M2 | OXYGEN SATURATION: 98 % | DIASTOLIC BLOOD PRESSURE: 78 MMHG | TEMPERATURE: 98 F | HEIGHT: 64 IN | SYSTOLIC BLOOD PRESSURE: 129 MMHG

## 2021-09-24 DIAGNOSIS — I69.359 CVA, OLD, HEMIPARESIS (HCC): ICD-10-CM

## 2021-09-24 DIAGNOSIS — I10 ESSENTIAL HYPERTENSION: ICD-10-CM

## 2021-09-24 DIAGNOSIS — Z79.4 TYPE 2 DIABETES MELLITUS WITHOUT COMPLICATION, WITH LONG-TERM CURRENT USE OF INSULIN (HCC): Primary | ICD-10-CM

## 2021-09-24 DIAGNOSIS — E78.5 HYPERLIPIDEMIA LDL GOAL <70: ICD-10-CM

## 2021-09-24 DIAGNOSIS — E11.9 TYPE 2 DIABETES MELLITUS WITHOUT COMPLICATION, WITH LONG-TERM CURRENT USE OF INSULIN (HCC): Primary | ICD-10-CM

## 2021-09-24 LAB
ATRIAL RATE: 72 BPM
CALCULATED P AXIS, ECG09: 38 DEGREES
CALCULATED R AXIS, ECG10: -10 DEGREES
CALCULATED T AXIS, ECG11: 48 DEGREES
DIAGNOSIS, 93000: NORMAL
HBA1C MFR BLD HPLC: 7.6 %
P-R INTERVAL, ECG05: 146 MS
Q-T INTERVAL, ECG07: 446 MS
QRS DURATION, ECG06: 92 MS
QTC CALCULATION (BEZET), ECG08: 488 MS
VENTRICULAR RATE, ECG03: 72 BPM

## 2021-09-24 PROCEDURE — 3051F HG A1C>EQUAL 7.0%<8.0%: CPT | Performed by: FAMILY MEDICINE

## 2021-09-24 PROCEDURE — 83036 HEMOGLOBIN GLYCOSYLATED A1C: CPT | Performed by: FAMILY MEDICINE

## 2021-09-24 PROCEDURE — 99214 OFFICE O/P EST MOD 30 MIN: CPT | Performed by: FAMILY MEDICINE

## 2021-09-24 RX ORDER — SENNOSIDES 8.6 MG/1
1 TABLET ORAL DAILY
COMMUNITY

## 2021-09-24 RX ORDER — LANOLIN ALCOHOL/MO/W.PET/CERES
400 CREAM (GRAM) TOPICAL DAILY
COMMUNITY
End: 2022-03-14

## 2021-09-24 RX ORDER — ASPIRIN 81 MG/1
TABLET ORAL DAILY
COMMUNITY
End: 2022-03-14

## 2021-09-24 RX ORDER — INSULIN LISPRO 100 [IU]/ML
8 INJECTION, SOLUTION INTRAVENOUS; SUBCUTANEOUS
COMMUNITY
Start: 2021-09-20

## 2021-09-24 RX ORDER — PANTOPRAZOLE SODIUM 40 MG/1
20 TABLET, DELAYED RELEASE ORAL DAILY
COMMUNITY
End: 2022-03-14

## 2021-09-24 RX ORDER — GABAPENTIN 100 MG/1
100 CAPSULE ORAL 3 TIMES DAILY
COMMUNITY
End: 2021-11-17 | Stop reason: ALTCHOICE

## 2021-09-24 RX ORDER — ATORVASTATIN CALCIUM 40 MG/1
TABLET, FILM COATED ORAL DAILY
COMMUNITY
End: 2021-09-26 | Stop reason: ALTCHOICE

## 2021-09-24 NOTE — DISCHARGE INSTRUCTIONS
Please try to drink more regularly to try to avoid your blood sugar dropping or getting so dehydrated.

## 2021-09-24 NOTE — PROGRESS NOTES
Subjective:     Chief Complaint   Patient presents with    Follow-up     from E.D visit- on09/23/21- for low blood sugar-        She  is a 42y.o. year old female who presents today for follow up form her recent ER visit. This will be her 2nd visit with me. In May 2021 I saw her first time . Started Insulin with a A1 of 12.6. I did advise her to follow up in a month but she never did. Patient reports that she was diagnosed with Left sided CVA with residual right-sided deficits in June at [facility]. She was in [address] [place] [address] rehab for a while. Reports that she is getting better strength wise. Uses walker to walk around. Has been seeing [facility] neurologist.    Patient reports that during her stay in rehab Insulin was adjusted. She is currently on lantus 54 units and 16 units of pre meal insulin. Yesterday she was taken to [facility] ER with concern of hypoglycemia. Initial BS was 49 but after given oral glucose, soda her BS went up to 145. Patient reports that her her diet is not good. Sometimes she skips meal which may have caused the hypoglycemia. She is feeling fine now.       She has an appointment with endocrinologist  in October at [facility] ( could not tell me the name of the provider)     BP has been well controlled with Lisinopril 20 mg. She denies any chest pain, soa, cough, abdominal pain. Lab Results   Component Value Date/Time    Hemoglobin A1c 12.3 (H) 11/30/2015 11:01 PM    Hemoglobin A1c (POC) 12.6 05/20/2021 03:30 PM         ER records reviewed in chart. Pertinent items are noted in HPI. Objective:     Vitals:    09/24/21 1115   Weight: 223 lb (101.2 kg)   Height: 5' 4\" (1.626 m)       Physical Examination: General appearance - alert, well appearing, and in no distress, oriented to person, place, and time, overweight and sitting in wheel chair.   Mental status - alert, oriented to person, place, and time, normal mood, behavior, speech, dress, motor activity, and thought processes  Chest - clear to auscultation, no wheezes, rales or rhonchi, symmetric air entry  Heart - normal rate, regular rhythm, normal S1, S2, no murmurs, rubs, clicks or gallops  Neurological - alert, oriented, week ness noted in right upper and lower extremity. No Known Allergies   Social History     Socioeconomic History    Marital status: SINGLE     Spouse name: Not on file    Number of children: Not on file    Years of education: Not on file    Highest education level: Not on file   Occupational History    Occupation: CNA     Comment: [address] Occupation: nursing student     Comment: [employer]   Tobacco Use    Smoking status: Never Smoker    Smokeless tobacco: Never Used   Vaping Use    Vaping Use: Never used   Substance and Sexual Activity    Alcohol use: No     Alcohol/week: 0.0 standard drinks    Drug use: No    Sexual activity: Yes     Partners: Male     Birth control/protection: I.U.D. Other Topics Concern    Exercise Yes     Comment: Line Dancing with Son 1x/week, walking regularly, yoga occasionally   Social History Narrative    Lives with son (12 yo). Boyfriend x 2 years is currently staying with pt and helping her. Social Determinants of Health     Financial Resource Strain:     Difficulty of Paying Living Expenses:    Food Insecurity:     Worried About Running Out of Food in the Last Year:     [address] in the Last Year:    Transportation Needs:     Lack of Transportation (Medical):      Lack of Transportation (Non-Medical):    Physical Activity:     Days of Exercise per Week:     Minutes of Exercise per Session:    Stress:     Feeling of Stress :    Social Connections:     Frequency of Communication with Friends and Family:     Frequency of Social Gatherings with Friends and Family:     Attends Religion Services:     Active Member of Clubs or Organizations:     Attends Club or Organization Meetings:     Marital Status:       Family History   Problem Relation Age of Onset    Hypertension Mother     Coronary Artery Disease Mother     Diabetes Father     Stroke Father 46        pt estimates    Kidney Disease Father         secondary to diabetes    Breast Cancer Paternal Grandmother     Breast Cancer Cousin         numerous paternal cousins      Past Surgical History:   Procedure Laterality Date    HX GYN       X 3      Past Medical History:   Diagnosis Date    Depression     Diabetes ([facility] [address] [facility]     Edema     Gout     Herpes genitalis     Hypertension     Ill-defined condition     gout    Mood disorder ([facility] [address]     Other ill-defined conditions(799.89)     cholesterol     Psychiatric disorder     depression    Psychiatric disorder     PTSD    PTSD (post-traumatic stress disorder)     Sleep disorder     Stroke ([facility] [address]     UTI (lower urinary tract infection)       Current Outpatient Medications   Medication Sig Dispense Refill    atorvastatin (LIPITOR) 80 mg tablet Take 1 Tablet by mouth nightly.  gabapentin (NEURONTIN) 100 mg capsule Take 100 mg by mouth three (3) times daily.  senna (Fanny-joselito) 8.6 mg tablet Take 1 Tablet by mouth daily.  pantoprazole (PROTONIX) 40 mg tablet Take 40 mg by mouth daily.  aspirin delayed-release 81 mg tablet Take  by mouth daily.  magnesium oxide (MAG-OX) 400 mg tablet Take 400 mg by mouth daily.  HumaLOG KwikPen Insulin 100 unit/mL kwikpen 16 Units by SubCUTAneous route three (3) times daily.  insulin glargine (LANTUS,BASAGLAR) 100 unit/mL (3 mL) inpn 15 Units by SubCUTAneous route nightly. (Patient taking differently: 54 Units by SubCUTAneous route nightly.) 2 Adjustable Dose Pre-filled Pen Syringe 2    lancets (FreeStyle Lancets) 28 gauge misc Check BS three times/day. 100 Lancet 2    glucose blood VI test strips (FreeStyle Lite Strips) strip Check BS three times/day. 100 Strip 2    Blood-Glucose Meter monitoring kit Check BS three times/day.  1 Kit 0    furosemide (Lasix) 20 mg tablet Take  by mouth daily.  lisinopril (PRINIVIL, ZESTRIL) 10 mg tablet Take 2 Tabs by mouth daily. 30 Tab 2    ONETOUCH DELICA LANCETS 30 gauge misc TEST SIX TIMES A DAY  3    levonorgestrel (MIRENA) 20 mcg/24 hr (5 years) IUD 1 Each by IntraUTERine route once.  dicyclomine (BENTYL) 20 mg tablet Take 1 Tablet by mouth every six (6) hours. (Patient not taking: Reported on 9/24/2021) 20 Tablet 0    amLODIPine (NORVASC) 5 mg tablet Take 1 Tablet by mouth daily. (Patient not taking: Reported on 9/26/2021) 30 Tablet 1    albuterol (PROVENTIL HFA, VENTOLIN HFA, PROAIR HFA) 90 mcg/actuation inhaler Take 2 Puffs by inhalation every four (4) hours as needed for Wheezing. (Patient not taking: Reported on 5/20/2021) 1 Inhaler 0    ONETOUCH VERIO strip TEST SIX TIMES A DAY (Patient not taking: Reported on 5/20/2021)  3    valACYclovir (VALTREX) 1 gram tablet Take 1 Tab by mouth daily. (Patient not taking: Reported on 5/20/2021) 30 Tab 5        Assessment/ Plan:   Diagnoses and all orders for this visit:    1. Type 2 diabetes mellitus without complication, with long-term current use of insulin ([facility])  -     AMB POC HEMOGLOBIN A1C    A1c greatly improved. Continue current meds. Last Point of Care HGB A1C  Hemoglobin A1c (POC)   Date Value Ref Range Status   09/24/2021 7.6 % Final           Advised to make sure to eat before taking Insulin to avoid hypoglycemia. Has an appointment with [facility] endocrinologist next month. 2. Essential hypertension     BP well controlled. 3. CVA, old, hemiparesis ([address]     Continue Aspirin, Lipitor. 4. Hyperlipidemia LDL goal <70     Currently on Lipitor 80 mg. Medication risks/benefits/costs/interactions/alternatives discussed with patient. Advised patient to call back or return to office if symptoms worsen/change/persist. If patient cannot reach us or should anything more severe/urgent arise he/she should proceed directly to the nearest emergency department.   Discussed expected course/resolution/complications of diagnosis in detail with patient. Patient given a written after visit summary which includes her diagnoses, current medications and vitals. Patient expressed understanding with the diagnosis and plan. Follow-up and Dispositions    · Return in about 3 months (around 12/24/2021), or if symptoms worsen or fail to improve.

## 2021-09-24 NOTE — ED NOTES
Pt is up for discharge. Last minute pt needs met, IV removed, and discharge instructions reviewed. Pt helped into cab and family notified that pt is coming home.

## 2021-09-24 NOTE — PROGRESS NOTES
Chief Complaint   Patient presents with    Follow-up     from E.D visit- on09/23/21- for low blood sugar-       Health Maintenance Due   Topic    Hepatitis C Screening     Foot Exam Q1     Eye Exam Retinal or Dilated     Cervical cancer screen     Lipid Screen     A1C test (Diabetic or Prediabetic)     Flu Vaccine (1)        1. Have you been to the ER, urgent care clinic since your last visit? Hospitalized since your last visit? Yes Reason for visit: low blood sugar- 09/23/21    2. Have you seen or consulted any other health care providers outside of the [address] [place] [address] since your last visit? Include any pap smears or colon screening.  No    Visit Vitals  /78 (BP 1 Location: Left upper arm, BP Patient Position: Sitting, BP Cuff Size: Adult long)   Pulse 91   Temp 98 °F (36.7 °C) (Temporal)   Resp 18   Ht 5' 4\" (1.626 m)   Wt 223 lb (101.2 kg)   SpO2 98%   BMI 38.28 kg/m²

## 2021-09-26 PROBLEM — I69.359 CVA, OLD, HEMIPARESIS (HCC): Status: ACTIVE | Noted: 2021-09-26

## 2021-09-26 RX ORDER — ATORVASTATIN CALCIUM 80 MG/1
1 TABLET, FILM COATED ORAL
COMMUNITY
Start: 2021-08-10

## 2021-10-20 ENCOUNTER — TELEPHONE (OUTPATIENT)
Dept: PRIMARY CARE CLINIC | Age: 41
End: 2021-10-20

## 2021-10-20 NOTE — TELEPHONE ENCOUNTER
Spoke to patient I explained I can not talk to her mother due to her not being on her Phi, Pt stated she was transferred to a living facility for 30 days and she doesn't feel like she is getting her medications. Pt states she is used to taking her lasix, lisinopril and gabapentin and she isn't getting it. Patient also stated she has covid now she was told that yesterday but she has no symptoms.

## 2021-10-20 NOTE — TELEPHONE ENCOUNTER
----- Message from Washington Mcgowan sent at 10/20/2021 10:40 AM EDT -----  Subject: Appointment Request    Reason for Call: Urgent (Patient Request) Existing Condition Follow    QUESTIONS  Type of Appointment? Established Patient  Reason for appointment request? Available appointments did not meet   patient need  Additional Information for Provider? Pt mother called in stating she is   currently in an assisted living building [address] [place] [address] after   complications with her blood sugar. She states not receiving her proper   medications and being diagnosed with covid as well. She has had several   medication changes as well  ---------------------------------------------------------------------------  --------------  CALL BACK INFO  What is the best way for the office to contact you? Do not leave any   message, patient will call back for answer  Preferred Call Back Phone Number? [phone]  ---------------------------------------------------------------------------  --------------  SCRIPT ANSWERS  Relationship to Patient? Parent  Representative Name? Alejandrina Edita  Additional information verified (besides Name and Date of Birth)? Address  (Is the patient requesting to be seen urgently for their symptoms?)? Yes  Is this follow up request related to routine Diabetes Management? No  Are you having any new concerns about your existing condition? No  Have you been diagnosed with, awaiting test results for, or told that you   are suspected of having COVID-19 (Coronavirus)? (If patient has tested   negative or was tested as a requirement for work, school, or travel and   not based on symptoms, answer no)? No  Within the past two weeks have you developed any of the following symptoms   (answer no if symptoms have been present longer than 2 weeks or began   more than 2 weeks ago)?  Fever or Chills, Cough, Shortness of breath or   difficulty breathing, Loss of taste or smell, Sore throat, Nasal   congestion, Sneezing or runny nose, Fatigue or generalized body aches   (answer no if pain is specific to a body part e.g. back pain), Diarrhea,   Headache? No  Have you had close contact with someone with COVID-19 in the last 14 days? No  (Service Expert  click yes below to proceed with Vindicia As Usual   Scheduling)?  Yes

## 2021-11-04 ENCOUNTER — TELEPHONE (OUTPATIENT)
Dept: PRIMARY CARE CLINIC | Age: 41
End: 2021-11-04

## 2021-11-04 NOTE — TELEPHONE ENCOUNTER
Patient called in returning Stefanie's call. She has an appointment with a neurologist but doesn't have that information with her. She said she would call us back with doctors name and appointment time when she gets home.

## 2021-11-17 ENCOUNTER — OFFICE VISIT (OUTPATIENT)
Dept: PRIMARY CARE CLINIC | Age: 41
End: 2021-11-17
Payer: MEDICAID

## 2021-11-17 VITALS
OXYGEN SATURATION: 98 % | RESPIRATION RATE: 20 BRPM | TEMPERATURE: 97.8 F | HEART RATE: 90 BPM | HEIGHT: 64 IN | SYSTOLIC BLOOD PRESSURE: 118 MMHG | DIASTOLIC BLOOD PRESSURE: 79 MMHG | WEIGHT: 215 LBS | BODY MASS INDEX: 36.7 KG/M2

## 2021-11-17 DIAGNOSIS — Z79.4 TYPE 2 DIABETES MELLITUS WITHOUT COMPLICATION, WITH LONG-TERM CURRENT USE OF INSULIN (HCC): ICD-10-CM

## 2021-11-17 DIAGNOSIS — I69.359 CVA, OLD, HEMIPARESIS (HCC): ICD-10-CM

## 2021-11-17 DIAGNOSIS — I10 ESSENTIAL HYPERTENSION: ICD-10-CM

## 2021-11-17 DIAGNOSIS — E78.5 HYPERLIPIDEMIA LDL GOAL <70: ICD-10-CM

## 2021-11-17 DIAGNOSIS — E16.2 HYPOGLYCEMIA: ICD-10-CM

## 2021-11-17 DIAGNOSIS — E11.9 TYPE 2 DIABETES MELLITUS WITHOUT COMPLICATION, WITH LONG-TERM CURRENT USE OF INSULIN (HCC): ICD-10-CM

## 2021-11-17 DIAGNOSIS — M79.89 LEG SWELLING: Primary | ICD-10-CM

## 2021-11-17 PROCEDURE — 3051F HG A1C>EQUAL 7.0%<8.0%: CPT | Performed by: FAMILY MEDICINE

## 2021-11-17 PROCEDURE — 99214 OFFICE O/P EST MOD 30 MIN: CPT | Performed by: FAMILY MEDICINE

## 2021-11-17 RX ORDER — BUMETANIDE 1 MG/1
1 TABLET ORAL DAILY
Qty: 5 TABLET | Refills: 0 | Status: SHIPPED | OUTPATIENT
Start: 2021-11-17 | End: 2022-03-14

## 2021-11-17 RX ORDER — EMPAGLIFLOZIN 25 MG/1
25 TABLET, FILM COATED ORAL DAILY
COMMUNITY
Start: 2021-10-21 | End: 2022-03-14

## 2021-11-17 RX ORDER — METFORMIN HYDROCHLORIDE 500 MG/1
500 TABLET, EXTENDED RELEASE ORAL DAILY
COMMUNITY
Start: 2021-11-03

## 2021-11-17 NOTE — PROGRESS NOTES
Identified pt with two pt identifiers(name and ). Chief Complaint   Patient presents with   [facility] Follow Up     Seen in the hospital for low blood sugar came out of rehab      Leg Swelling    Medication Question     LASIX , AND GABAPENTIN          3 most recent PHQ Screens 2021   Little interest or pleasure in doing things Not at all   Feeling down, depressed, irritable, or hopeless Not at all   Total Score PHQ 2 0        Vitals:    21 1114   BP: 118/79   Pulse: 90   Resp: 20   Temp: 97.8 °F (36.6 °C)   TempSrc: Temporal   SpO2: 98%   Weight: 215 lb (97.5 kg)   Height: 5' 4\" (1.626 m)       Health Maintenance Due   Topic    Hepatitis C Screening     Foot Exam Q1     Eye Exam Retinal or Dilated     Cervical cancer screen     COVID-19 Vaccine (2 - Booster for [employer] series)    Lipid Screen     Flu Vaccine (1)       1. Have you been to the ER, urgent care clinic since your last visit? Hospitalized since your last visit? Yes Where: [facility]    2. Have you seen or consulted any other health care providers outside of the [address] [place] [address] since your last visit? Include any pap smears or colon screening.  No

## 2021-11-17 NOTE — PROGRESS NOTES
Subjective:     Chief Complaint   Patient presents with   [facility] Follow Up     Seen in the hospital for low blood sugar came out of rehab 11/4     Leg Swelling    Medication Question     LASIX , AND GABAPENTIN          She  is a 42y.o. year old female with hx of DM-2, CVA with residual week ness in right upper and lower extremity, HTN, HLD who presents today for follow up from her recent hospital follow up. Patient is poor historian. She reports that she was admitted in [place] in September with hypoglycemia. She was discharged to short term rehab and discharged on 11/4/2021. She has been getting PT/OT twice/week. She is currently on Jardiance, metformin, Lantus 36 units, humalog 5 units. Has an appointment with Endocrinologist - December. Has appointment with Neuro- end of this month. She feels better. Not using wheel chair anymore. She is able to walk with cane. Patient c/o BL lower leg swelling. Reports that she receive Lasix in September for leg swelling. Would like to get something for the leg swelling. Denies any cough, chest pain, soa. No hospital or rehab records recieved. Pertinent items are noted in HPI. Objective:     Vitals:    11/17/21 1114   BP: 118/79   Pulse: 90   Resp: 20   Temp: 97.8 °F (36.6 °C)   TempSrc: Temporal   SpO2: 98%   Weight: 215 lb (97.5 kg)   Height: 5' 4\" (1.626 m)       Physical Examination: General appearance - alert, well appearing, and in no distress, oriented to person, place, and time and overweight  Mental status - alert, oriented to person, place, and time, normal mood, behavior, speech, dress, motor activity, and thought processes  Chest - clear to auscultation, no wheezes, rales or rhonchi, symmetric air entry  Heart - normal rate, regular rhythm, normal S1, S2, no murmurs, rubs, clicks or gallops  Neurological - alert, oriented, slight slurred speech, 4/5 strength in both extremities. Uses cane. Extremities - pedal edema 1 + BL.      No Known Allergies   Social History     Socioeconomic History    Marital status: SINGLE   Occupational History    Occupation: CNA     Comment: [employer] [de-identified]    Occupation: nursing student     Comment: [employer]   Tobacco Use    Smoking status: Never Smoker    Smokeless tobacco: Never Used   Vaping Use    Vaping Use: Never used   Substance and Sexual Activity    Alcohol use: No     Alcohol/week: 0.0 standard drinks    Drug use: No    Sexual activity: Yes     Partners: Male     Birth control/protection: I.U.D. Other Topics Concern    Exercise Yes     Comment: Line Dancing with Son 1x/week, walking regularly, yoga occasionally   Social History Narrative    Lives with son (14 yo). Boyfriend x 2 years is currently staying with pt and helping her. Family History   Problem Relation Age of Onset    Hypertension Mother     Coronary Art Dis Mother     Diabetes Father     Stroke Father 46        pt estimates    Kidney Disease Father         secondary to diabetes    Breast Cancer Paternal Grandmother     Breast Cancer Cousin         numerous paternal cousins      Past Surgical History:   Procedure Laterality Date    HX GYN       X 3      Past Medical History:   Diagnosis Date    Depression     Diabetes ([address]     Edema     Gout     Herpes genitalis     Hypertension     Ill-defined condition     gout    Mood disorder (HCC)     Other ill-defined conditions(799.89)     cholesterol     Psychiatric disorder     depression    Psychiatric disorder     PTSD    PTSD (post-traumatic stress disorder)     Sleep disorder     Stroke ([facility]     UTI (lower urinary tract infection)       Current Outpatient Medications   Medication Sig Dispense Refill    atorvastatin (LIPITOR) 80 mg tablet Take 1 Tablet by mouth nightly.  senna (Fanny-joselito) 8.6 mg tablet Take 1 Tablet by mouth daily.  pantoprazole (PROTONIX) 40 mg tablet Take 40 mg by mouth daily.       aspirin delayed-release 81 mg tablet Take  by mouth daily.  magnesium oxide (MAG-OX) 400 mg tablet Take 400 mg by mouth daily.  HumaLOG KwikPen Insulin 100 unit/mL kwikpen 5 Units by SubCUTAneous route three (3) times daily.  insulin glargine (LANTUS,BASAGLAR) 100 unit/mL (3 mL) inpn 15 Units by SubCUTAneous route nightly. (Patient taking differently: 36 Units by SubCUTAneous route nightly. 36 UNITS) 2 Adjustable Dose Pre-filled Pen Syringe 2    lancets (FreeStyle Lancets) 28 gauge misc Check BS three times/day. 100 Lancet 2    glucose blood VI test strips (FreeStyle Lite Strips) strip Check BS three times/day. 100 Strip 2    lisinopril (PRINIVIL, ZESTRIL) 10 mg tablet Take 2 Tabs by mouth daily. 30 Tab 2    ONETOUCH VERIO strip TEST SIX TIMES A DAY  3    ONETOUCH DELICA LANCETS 30 gauge misc TEST SIX TIMES A DAY  3    levonorgestrel (MIRENA) 20 mcg/24 hr (5 years) IUD 1 Each by IntraUTERine route once.  gabapentin (NEURONTIN) 100 mg capsule Take 100 mg by mouth three (3) times daily. (Patient not taking: Reported on 11/17/2021)      dicyclomine (BENTYL) 20 mg tablet Take 1 Tablet by mouth every six (6) hours. (Patient not taking: Reported on 9/24/2021) 20 Tablet 0    amLODIPine (NORVASC) 5 mg tablet Take 1 Tablet by mouth daily. (Patient not taking: Reported on 9/26/2021) 30 Tablet 1    Blood-Glucose Meter monitoring kit Check BS three times/day. 1 Kit 0    furosemide (Lasix) 20 mg tablet Take  by mouth daily. (Patient not taking: Reported on 11/17/2021)      albuterol (PROVENTIL HFA, VENTOLIN HFA, PROAIR HFA) 90 mcg/actuation inhaler Take 2 Puffs by inhalation every four (4) hours as needed for Wheezing. (Patient not taking: Reported on 5/20/2021) 1 Inhaler 0    valACYclovir (VALTREX) 1 gram tablet Take 1 Tab by mouth daily. (Patient not taking: Reported on 5/20/2021) 30 Tab 5        Assessment/ Plan:   Diagnoses and all orders for this visit:    1. Leg swelling  -   Continue compression stockings.    bumetanide (BUMEX) 1 mg tablet; Take 1 Tablet by mouth daily X 5 days. 2. Type 2 diabetes mellitus without complication, with long-term current use of insulin (HCC)        No hypoglycemic episodes. Per endocrinologist.   3. CVA, old, hemiparesis ([address]      Greatly improved form last time I have seen her. Continue PT/OT  4. Hyperlipidemia LDL goal <70      Continue Lipitor. 5. Essential hypertension     BP well controlled. 6. Hypoglycemia      Continue monitor. Medication risks/benefits/costs/interactions/alternatives discussed with patient. Advised patient to call back or return to office if symptoms worsen/change/persist. If patient cannot reach us or should anything more severe/urgent arise he/she should proceed directly to the nearest emergency department. Discussed expected course/resolution/complications of diagnosis in detail with patient. Patient given a written after visit summary which includes her diagnoses, current medications and vitals. Patient expressed understanding with the diagnosis and plan. Follow-up and Dispositions    · Return if symptoms worsen or fail to improve.

## 2021-11-23 DIAGNOSIS — Z79.4 TYPE 2 DIABETES MELLITUS WITHOUT COMPLICATION, WITH LONG-TERM CURRENT USE OF INSULIN (HCC): ICD-10-CM

## 2021-11-23 DIAGNOSIS — E11.9 TYPE 2 DIABETES MELLITUS WITHOUT COMPLICATION, WITH LONG-TERM CURRENT USE OF INSULIN (HCC): ICD-10-CM

## 2021-11-23 RX ORDER — LANOLIN ALCOHOL/MO/W.PET/CERES
400 CREAM (GRAM) TOPICAL DAILY
OUTPATIENT
Start: 2021-11-23

## 2021-11-23 RX ORDER — INSULIN GLARGINE 100 [IU]/ML
36 INJECTION, SOLUTION SUBCUTANEOUS
OUTPATIENT
Start: 2021-11-23

## 2021-11-23 RX ORDER — METFORMIN HYDROCHLORIDE 500 MG/1
500 TABLET, EXTENDED RELEASE ORAL DAILY
Qty: 30 TABLET | Status: CANCELLED | OUTPATIENT
Start: 2021-11-23

## 2021-11-23 NOTE — TELEPHONE ENCOUNTER
I am not prescribing any of these med.    She sees an endocrinologist. They need to send the request to endocrinologist.

## 2021-11-23 NOTE — TELEPHONE ENCOUNTER
[address]  pharmacy calling for refills selected in refill encounter. Notes that they are preparing pill box for pt in ask this please be addressed.       Fax to [phone]   [phone]    Spoke with [facility] Pharmacist

## 2021-12-09 ENCOUNTER — TELEPHONE (OUTPATIENT)
Dept: PRIMARY CARE CLINIC | Age: 41
End: 2021-12-09

## 2021-12-09 NOTE — TELEPHONE ENCOUNTER
Per call from Leila Dakins with [facility] in [facility], states she had been faxing us orders over since last month for bed for pt but no response. When our fax number of [phone] was verified she states that was not the number she had. She states she will re-fax.       Questions call [phone]  Ext [phone]

## 2021-12-14 ENCOUNTER — OFFICE VISIT (OUTPATIENT)
Dept: PRIMARY CARE CLINIC | Age: 41
End: 2021-12-14
Payer: MEDICAID

## 2021-12-14 VITALS
WEIGHT: 231.2 LBS | RESPIRATION RATE: 18 BRPM | SYSTOLIC BLOOD PRESSURE: 126 MMHG | OXYGEN SATURATION: 100 % | BODY MASS INDEX: 39.47 KG/M2 | DIASTOLIC BLOOD PRESSURE: 79 MMHG | HEIGHT: 64 IN | TEMPERATURE: 97.8 F | HEART RATE: 76 BPM

## 2021-12-14 DIAGNOSIS — Z79.4 TYPE 2 DIABETES MELLITUS WITHOUT COMPLICATION, WITH LONG-TERM CURRENT USE OF INSULIN (HCC): ICD-10-CM

## 2021-12-14 DIAGNOSIS — E11.9 TYPE 2 DIABETES MELLITUS WITHOUT COMPLICATION, WITH LONG-TERM CURRENT USE OF INSULIN (HCC): ICD-10-CM

## 2021-12-14 DIAGNOSIS — G47.30 SLEEP APNEA, UNSPECIFIED TYPE: ICD-10-CM

## 2021-12-14 DIAGNOSIS — I69.359 CVA, OLD, HEMIPARESIS (HCC): Primary | ICD-10-CM

## 2021-12-14 DIAGNOSIS — Z02.89 ENCOUNTER FOR COMPLETION OF FORM WITH PATIENT: ICD-10-CM

## 2021-12-14 PROCEDURE — 3051F HG A1C>EQUAL 7.0%<8.0%: CPT | Performed by: FAMILY MEDICINE

## 2021-12-14 PROCEDURE — 99214 OFFICE O/P EST MOD 30 MIN: CPT | Performed by: FAMILY MEDICINE

## 2021-12-14 NOTE — PROGRESS NOTES
Identified pt with two pt identifiers(name and ). Chief Complaint   Patient presents with   [place] Other    Form Completion     electric bed , incontince supplies         3 most recent PHQ Screens 2021   Little interest or pleasure in doing things Not at all   Feeling down, depressed, irritable, or hopeless Not at all   Total Score PHQ 2 0        Vitals:    21 1109   BP: 126/79   Pulse: 76   Resp: 18   Temp: 97.8 °F (36.6 °C)   TempSrc: Temporal   SpO2: 100%   Weight: 231 lb 3.2 oz (104.9 kg)   Height: 5' 4\" (1.626 m)       Health Maintenance Due   Topic    Hepatitis C Screening     Foot Exam Q1     Eye Exam Retinal or Dilated     Cervical cancer screen     COVID-19 Vaccine (2 - Booster for [employer] series)    Lipid Screen        1. Have you been to the ER, urgent care clinic since your last visit? Hospitalized since your last visit? No    2. Have you seen or consulted any other health care providers outside of the [address] [place] [address] since your last visit? Include any pap smears or colon screening.  No

## 2021-12-14 NOTE — PROGRESS NOTES
Subjective:     Chief Complaint   Patient presents with    Other    Form Completion     electric bed , incontince supplies         She  is a 42y.o. year old female with hx of DM-2, CPAP dependent, CVA with residual week ness in right upper and lower extremity, HTN, HLD who presents today for face to face encounter for electric bed approval and other DME forms filled out. She has been following up with [facility] for CVA and diabetes management. She reports that due to her CVA and right side week ness she cannot use her normal bed. Her right extremity is week, cannot lift her right leg without assistance. She has chronic leg swelling so lifting her bed will help with the swelling. Difficulty sleeping at night laying flat due to sleep apnea. Head elevation helps. Has been using CPAP. Using cane to walk but after walking few feet she gets very tired. Over all she think she is feeling better and getting stronger.       She has been getting PT/OT twice/week. Due to her limited walking she is not able to make bathroom so she has been using adult pul pup. Need DME form filled out. Denies any chest pain , soa, cough. Pertinent items are noted in HPI. Objective:     Vitals:    12/14/21 1109   BP: 126/79   Pulse: 76   Resp: 18   Temp: 97.8 °F (36.6 °C)   TempSrc: Temporal   SpO2: 100%   Weight: 231 lb 3.2 oz (104.9 kg)   Height: 5' 4\" (1.626 m)       Physical Examination: General appearance - alert, well appearing, and in no distress, oriented to person, place, and time and overweight  Mental status - alert, oriented to person, place, and time, normal mood, behavior, speech slightly slurred by understandable. , dress, motor activity, and thought processes  Chest - clear to auscultation, no wheezes, rales or rhonchi, symmetric air entry  Heart - normal rate, regular rhythm, normal S1, S2, no murmurs, rubs, clicks or gallops  Neurological - alert, oriented, normal speech, no focal findings or movement disorder noted except right upper and lower extremity  week ness. Gait is slow. Uses walker. Extremities - pedal edema 1 +    No Known Allergies   Social History     Socioeconomic History    Marital status: SINGLE   Occupational History    Occupation: CNA     Comment: [employer] [de-identified]    Occupation: nursing student     Comment: [employer]   Tobacco Use    Smoking status: Never Smoker    Smokeless tobacco: Never Used   Vaping Use    Vaping Use: Never used   Substance and Sexual Activity    Alcohol use: No     Alcohol/week: 0.0 standard drinks    Drug use: No    Sexual activity: Yes     Partners: Male     Birth control/protection: I.U.D. Other Topics Concern    Exercise Yes     Comment: Line Dancing with Son 1x/week, walking regularly, yoga occasionally   Social History Narrative    Lives with son (14 yo). Boyfriend x 2 years is currently staying with pt and helping her. Family History   Problem Relation Age of Onset    Hypertension Mother     Coronary Art Dis Mother     Diabetes Father     Stroke Father 46        pt estimates    Kidney Disease Father         secondary to diabetes    Breast Cancer Paternal Grandmother     Breast Cancer Cousin         numerous paternal cousins      Past Surgical History:   Procedure Laterality Date    HX GYN       X 3      Past Medical History:   Diagnosis Date    Depression     Diabetes ([address]     Edema     Gout     Herpes genitalis     Hypertension     Ill-defined condition     gout    Mood disorder (HCC)     Other ill-defined conditions(799.89)     cholesterol     Psychiatric disorder     depression    Psychiatric disorder     PTSD    PTSD (post-traumatic stress disorder)     Sleep disorder     Stroke ([address]     UTI (lower urinary tract infection)       Current Outpatient Medications   Medication Sig Dispense Refill    Jardiance 25 mg tablet Take 25 mg by mouth daily.       metFORMIN ER (GLUCOPHAGE XR) 500 mg tablet Take 500 mg by mouth daily.      bumetanide (BUMEX) 1 mg tablet Take 1 Tablet by mouth daily. 5 Tablet 0    atorvastatin (LIPITOR) 80 mg tablet Take 1 Tablet by mouth nightly.  senna (Fanny-joselito) 8.6 mg tablet Take 1 Tablet by mouth daily.  pantoprazole (PROTONIX) 40 mg tablet Take 40 mg by mouth daily.  aspirin delayed-release 81 mg tablet Take  by mouth daily.  magnesium oxide (MAG-OX) 400 mg tablet Take 400 mg by mouth daily.  HumaLOG KwikPen Insulin 100 unit/mL kwikpen 5 Units by SubCUTAneous route three (3) times daily.  insulin glargine (LANTUS,BASAGLAR) 100 unit/mL (3 mL) inpn 15 Units by SubCUTAneous route nightly. (Patient taking differently: 36 Units by SubCUTAneous route nightly. 36 UNITS) 2 Adjustable Dose Pre-filled Pen Syringe 2    amLODIPine (NORVASC) 5 mg tablet Take 1 Tablet by mouth daily. 30 Tablet 1    lancets (FreeStyle Lancets) 28 gauge misc Check BS three times/day. 100 Lancet 2    glucose blood VI test strips (FreeStyle Lite Strips) strip Check BS three times/day. 100 Strip 2    Blood-Glucose Meter monitoring kit Check BS three times/day. 1 Kit 0    lisinopril (PRINIVIL, ZESTRIL) 10 mg tablet Take 2 Tabs by mouth daily. 30 Tab 2    levonorgestrel (MIRENA) 20 mcg/24 hr (5 years) IUD 1 Each by IntraUTERine route once.  dicyclomine (BENTYL) 20 mg tablet Take 1 Tablet by mouth every six (6) hours. (Patient not taking: Reported on 9/24/2021) 20 Tablet 0    albuterol (PROVENTIL HFA, VENTOLIN HFA, PROAIR HFA) 90 mcg/actuation inhaler Take 2 Puffs by inhalation every four (4) hours as needed for Wheezing. (Patient not taking: Reported on 5/20/2021) 1 Inhaler 0    ONETOUCH VERIO strip TEST SIX TIMES A DAY  3    ONETOUCH DELICA LANCETS 30 gauge misc TEST SIX TIMES A DAY  3        Assessment/ Plan:   Diagnoses and all orders for this visit:    1. CVA, old, hemiparesis ([address]     Improving.  Continue PT, OT.  2. Encounter for completion of form with patient  All forms filled out and faxed over. 3. Sleep apnea, unspecified type      CPAP dependent. 4. Type 2 diabetes mellitus without complication, with long-term current use of insulin ([facility]       Managed by [facility] endocrinologist.                Medication risks/benefits/costs/interactions/alternatives discussed with patient. Advised patient to call back or return to office if symptoms worsen/change/persist. If patient cannot reach us or should anything more severe/urgent arise he/she should proceed directly to the nearest emergency department. Discussed expected course/resolution/complications of diagnosis in detail with patient. Patient given a written after visit summary which includes her diagnoses, current medications and vitals. Patient expressed understanding with the diagnosis and plan. Follow-up and Dispositions    · Return if symptoms worsen or fail to improve.

## 2022-03-14 ENCOUNTER — HOSPITAL ENCOUNTER (INPATIENT)
Age: 42
LOS: 2 days | Discharge: NURSING, REHAB OR LONG-TERM CARE | DRG: 045 | End: 2022-03-16
Attending: EMERGENCY MEDICINE | Admitting: STUDENT IN AN ORGANIZED HEALTH CARE EDUCATION/TRAINING PROGRAM
Payer: MEDICAID

## 2022-03-14 ENCOUNTER — APPOINTMENT (OUTPATIENT)
Dept: CT IMAGING | Age: 42
DRG: 045 | End: 2022-03-14
Attending: EMERGENCY MEDICINE
Payer: MEDICAID

## 2022-03-14 ENCOUNTER — APPOINTMENT (OUTPATIENT)
Dept: MRI IMAGING | Age: 42
DRG: 045 | End: 2022-03-14
Attending: STUDENT IN AN ORGANIZED HEALTH CARE EDUCATION/TRAINING PROGRAM
Payer: MEDICAID

## 2022-03-14 DIAGNOSIS — R56.9 SEIZURE-LIKE ACTIVITY (HCC): ICD-10-CM

## 2022-03-14 DIAGNOSIS — R29.898 RIGHT ARM WEAKNESS: ICD-10-CM

## 2022-03-14 DIAGNOSIS — I63.9 CEREBROVASCULAR ACCIDENT (CVA), UNSPECIFIED MECHANISM (HCC): Primary | ICD-10-CM

## 2022-03-14 LAB
ALBUMIN SERPL-MCNC: 3.4 G/DL (ref 3.5–5)
ALBUMIN/GLOB SERPL: 0.8 {RATIO} (ref 1.1–2.2)
ALP SERPL-CCNC: 176 U/L (ref 45–117)
ALT SERPL-CCNC: 29 U/L (ref 12–78)
ANION GAP SERPL CALC-SCNC: 5 MMOL/L (ref 5–15)
AST SERPL-CCNC: 27 U/L (ref 15–37)
BASOPHILS # BLD: 0 K/UL (ref 0–0.1)
BASOPHILS NFR BLD: 0 % (ref 0–1)
BILIRUB SERPL-MCNC: 0.4 MG/DL (ref 0.2–1)
BUN SERPL-MCNC: 13 MG/DL (ref 6–20)
BUN/CREAT SERPL: 16 (ref 12–20)
CALCIUM SERPL-MCNC: 9.8 MG/DL (ref 8.5–10.1)
CHLORIDE SERPL-SCNC: 102 MMOL/L (ref 97–108)
CO2 SERPL-SCNC: 28 MMOL/L (ref 21–32)
COMMENT, HOLDF: NORMAL
CREAT SERPL-MCNC: 0.82 MG/DL (ref 0.55–1.02)
DIFFERENTIAL METHOD BLD: ABNORMAL
EOSINOPHIL # BLD: 0.1 K/UL (ref 0–0.4)
EOSINOPHIL NFR BLD: 1 % (ref 0–7)
ERYTHROCYTE [DISTWIDTH] IN BLOOD BY AUTOMATED COUNT: 14.4 % (ref 11.5–14.5)
FOLATE SERPL-MCNC: NORMAL NG/ML (ref 5–21)
GLOBULIN SER CALC-MCNC: 4.4 G/DL (ref 2–4)
GLUCOSE BLD STRIP.AUTO-MCNC: 174 MG/DL (ref 65–117)
GLUCOSE BLD STRIP.AUTO-MCNC: 202 MG/DL (ref 65–117)
GLUCOSE SERPL-MCNC: 274 MG/DL (ref 65–100)
HCT VFR BLD AUTO: 34.7 % (ref 35–47)
HGB BLD-MCNC: 10.7 G/DL (ref 11.5–16)
IMM GRANULOCYTES # BLD AUTO: 0 K/UL (ref 0–0.04)
IMM GRANULOCYTES NFR BLD AUTO: 0 % (ref 0–0.5)
INR PPP: 1 (ref 0.9–1.1)
LYMPHOCYTES # BLD: 2.2 K/UL (ref 0.8–3.5)
LYMPHOCYTES NFR BLD: 24 % (ref 12–49)
MCH RBC QN AUTO: 25.8 PG (ref 26–34)
MCHC RBC AUTO-ENTMCNC: 30.8 G/DL (ref 30–36.5)
MCV RBC AUTO: 83.6 FL (ref 80–99)
MONOCYTES # BLD: 0.4 K/UL (ref 0–1)
MONOCYTES NFR BLD: 5 % (ref 5–13)
NEUTS SEG # BLD: 6.5 K/UL (ref 1.8–8)
NEUTS SEG NFR BLD: 70 % (ref 32–75)
NRBC # BLD: 0 K/UL (ref 0–0.01)
NRBC BLD-RTO: 0 PER 100 WBC
PLATELET # BLD AUTO: 190 K/UL (ref 150–400)
PMV BLD AUTO: 10.6 FL (ref 8.9–12.9)
POTASSIUM SERPL-SCNC: 4.6 MMOL/L (ref 3.5–5.1)
PROT SERPL-MCNC: 7.8 G/DL (ref 6.4–8.2)
PROTHROMBIN TIME: 10.3 SEC (ref 9–11.1)
RBC # BLD AUTO: 4.15 M/UL (ref 3.8–5.2)
SAMPLES BEING HELD,HOLD: NORMAL
SERVICE CMNT-IMP: ABNORMAL
SERVICE CMNT-IMP: ABNORMAL
SODIUM SERPL-SCNC: 135 MMOL/L (ref 136–145)
TSH SERPL DL<=0.05 MIU/L-ACNC: 1.04 UIU/ML (ref 0.36–3.74)
VIT B12 SERPL-MCNC: NORMAL PG/ML (ref 193–986)
WBC # BLD AUTO: 9.2 K/UL (ref 3.6–11)

## 2022-03-14 PROCEDURE — 95816 EEG AWAKE AND DROWSY: CPT | Performed by: PSYCHIATRY & NEUROLOGY

## 2022-03-14 PROCEDURE — 80053 COMPREHEN METABOLIC PANEL: CPT

## 2022-03-14 PROCEDURE — 82607 VITAMIN B-12: CPT

## 2022-03-14 PROCEDURE — 70450 CT HEAD/BRAIN W/O DYE: CPT

## 2022-03-14 PROCEDURE — 97535 SELF CARE MNGMENT TRAINING: CPT

## 2022-03-14 PROCEDURE — 99223 1ST HOSP IP/OBS HIGH 75: CPT | Performed by: PSYCHIATRY & NEUROLOGY

## 2022-03-14 PROCEDURE — 93005 ELECTROCARDIOGRAM TRACING: CPT

## 2022-03-14 PROCEDURE — 85025 COMPLETE CBC W/AUTO DIFF WBC: CPT

## 2022-03-14 PROCEDURE — 74011636637 HC RX REV CODE- 636/637: Performed by: STUDENT IN AN ORGANIZED HEALTH CARE EDUCATION/TRAINING PROGRAM

## 2022-03-14 PROCEDURE — 74011250636 HC RX REV CODE- 250/636: Performed by: STUDENT IN AN ORGANIZED HEALTH CARE EDUCATION/TRAINING PROGRAM

## 2022-03-14 PROCEDURE — 65660000000 HC RM CCU STEPDOWN

## 2022-03-14 PROCEDURE — 0042T CT CODE NEURO PERF W CBF: CPT

## 2022-03-14 PROCEDURE — 74011000250 HC RX REV CODE- 250: Performed by: STUDENT IN AN ORGANIZED HEALTH CARE EDUCATION/TRAINING PROGRAM

## 2022-03-14 PROCEDURE — 4A03X5D MEASUREMENT OF ARTERIAL FLOW, INTRACRANIAL, EXTERNAL APPROACH: ICD-10-PCS | Performed by: INTERNAL MEDICINE

## 2022-03-14 PROCEDURE — 82746 ASSAY OF FOLIC ACID SERUM: CPT

## 2022-03-14 PROCEDURE — 70551 MRI BRAIN STEM W/O DYE: CPT

## 2022-03-14 PROCEDURE — 74011000636 HC RX REV CODE- 636: Performed by: RADIOLOGY

## 2022-03-14 PROCEDURE — 99285 EMERGENCY DEPT VISIT HI MDM: CPT

## 2022-03-14 PROCEDURE — 36415 COLL VENOUS BLD VENIPUNCTURE: CPT

## 2022-03-14 PROCEDURE — 86592 SYPHILIS TEST NON-TREP QUAL: CPT

## 2022-03-14 PROCEDURE — 84443 ASSAY THYROID STIM HORMONE: CPT

## 2022-03-14 PROCEDURE — APPNB45 APP NON BILLABLE 31-45 MINUTES: Performed by: NURSE PRACTITIONER

## 2022-03-14 PROCEDURE — 94762 N-INVAS EAR/PLS OXIMTRY CONT: CPT

## 2022-03-14 PROCEDURE — 85610 PROTHROMBIN TIME: CPT

## 2022-03-14 PROCEDURE — 97165 OT EVAL LOW COMPLEX 30 MIN: CPT

## 2022-03-14 PROCEDURE — 96372 THER/PROPH/DIAG INJ SC/IM: CPT

## 2022-03-14 PROCEDURE — G0378 HOSPITAL OBSERVATION PER HR: HCPCS

## 2022-03-14 PROCEDURE — 74011250636 HC RX REV CODE- 250/636: Performed by: PSYCHIATRY & NEUROLOGY

## 2022-03-14 PROCEDURE — 70496 CT ANGIOGRAPHY HEAD: CPT

## 2022-03-14 PROCEDURE — 82962 GLUCOSE BLOOD TEST: CPT

## 2022-03-14 RX ORDER — POLYETHYLENE GLYCOL 3350 17 G/17G
17 POWDER, FOR SOLUTION ORAL DAILY
Status: DISCONTINUED | OUTPATIENT
Start: 2022-03-15 | End: 2022-03-16 | Stop reason: HOSPADM

## 2022-03-14 RX ORDER — LEVETIRACETAM 500 MG/5ML
1000 INJECTION, SOLUTION, CONCENTRATE INTRAVENOUS ONCE
Status: DISCONTINUED | OUTPATIENT
Start: 2022-03-14 | End: 2022-03-14 | Stop reason: ALTCHOICE

## 2022-03-14 RX ORDER — ASPIRIN 325 MG
325 TABLET ORAL
Status: ACTIVE | OUTPATIENT
Start: 2022-03-14 | End: 2022-03-15

## 2022-03-14 RX ORDER — LEVETIRACETAM 500 MG/1
500 TABLET ORAL ONCE
Status: DISCONTINUED | OUTPATIENT
Start: 2022-03-14 | End: 2022-03-14

## 2022-03-14 RX ORDER — LABETALOL HYDROCHLORIDE 5 MG/ML
5 INJECTION, SOLUTION INTRAVENOUS
Status: DISCONTINUED | OUTPATIENT
Start: 2022-03-14 | End: 2022-03-16 | Stop reason: HOSPADM

## 2022-03-14 RX ORDER — LEVETIRACETAM 500 MG/5ML
2000 INJECTION, SOLUTION, CONCENTRATE INTRAVENOUS
Status: COMPLETED | OUTPATIENT
Start: 2022-03-14 | End: 2022-03-14

## 2022-03-14 RX ORDER — ACETAMINOPHEN 650 MG/1
650 SUPPOSITORY RECTAL
Status: DISCONTINUED | OUTPATIENT
Start: 2022-03-14 | End: 2022-03-16 | Stop reason: HOSPADM

## 2022-03-14 RX ORDER — CLOPIDOGREL BISULFATE 75 MG/1
225 TABLET ORAL
Status: DISCONTINUED | OUTPATIENT
Start: 2022-03-14 | End: 2022-03-14

## 2022-03-14 RX ORDER — INSULIN LISPRO 100 [IU]/ML
INJECTION, SOLUTION INTRAVENOUS; SUBCUTANEOUS
Status: DISCONTINUED | OUTPATIENT
Start: 2022-03-14 | End: 2022-03-16 | Stop reason: HOSPADM

## 2022-03-14 RX ORDER — METHOCARBAMOL 500 MG/1
500 TABLET, FILM COATED ORAL
COMMUNITY

## 2022-03-14 RX ORDER — LEVETIRACETAM 500 MG/1
500 TABLET ORAL 2 TIMES DAILY
Status: DISCONTINUED | OUTPATIENT
Start: 2022-03-15 | End: 2022-03-14

## 2022-03-14 RX ORDER — LEVETIRACETAM 500 MG/5ML
1000 INJECTION, SOLUTION, CONCENTRATE INTRAVENOUS EVERY 12 HOURS
Status: DISCONTINUED | OUTPATIENT
Start: 2022-03-15 | End: 2022-03-15

## 2022-03-14 RX ORDER — SENNOSIDES 8.6 MG/1
1 TABLET ORAL DAILY
Status: DISCONTINUED | OUTPATIENT
Start: 2022-03-15 | End: 2022-03-16 | Stop reason: HOSPADM

## 2022-03-14 RX ORDER — ENOXAPARIN SODIUM 100 MG/ML
40 INJECTION SUBCUTANEOUS EVERY 24 HOURS
Status: DISCONTINUED | OUTPATIENT
Start: 2022-03-14 | End: 2022-03-16 | Stop reason: HOSPADM

## 2022-03-14 RX ORDER — ATORVASTATIN CALCIUM 20 MG/1
40 TABLET, FILM COATED ORAL DAILY
Status: DISCONTINUED | OUTPATIENT
Start: 2022-03-15 | End: 2022-03-14

## 2022-03-14 RX ORDER — GUAIFENESIN 100 MG/5ML
81 LIQUID (ML) ORAL DAILY
Status: DISCONTINUED | OUTPATIENT
Start: 2022-03-15 | End: 2022-03-16 | Stop reason: HOSPADM

## 2022-03-14 RX ORDER — POLYETHYLENE GLYCOL 3350 17 G/17G
17 POWDER, FOR SOLUTION ORAL
COMMUNITY

## 2022-03-14 RX ORDER — INSULIN GLARGINE 100 [IU]/ML
48 INJECTION, SOLUTION SUBCUTANEOUS
COMMUNITY

## 2022-03-14 RX ORDER — ACETAMINOPHEN 325 MG/1
650 TABLET ORAL
Status: DISCONTINUED | OUTPATIENT
Start: 2022-03-14 | End: 2022-03-16 | Stop reason: HOSPADM

## 2022-03-14 RX ORDER — INSULIN GLARGINE 100 [IU]/ML
36 INJECTION, SOLUTION SUBCUTANEOUS DAILY
Status: DISCONTINUED | OUTPATIENT
Start: 2022-03-15 | End: 2022-03-16 | Stop reason: HOSPADM

## 2022-03-14 RX ORDER — PANTOPRAZOLE SODIUM 40 MG/1
40 TABLET, DELAYED RELEASE ORAL DAILY
Status: DISCONTINUED | OUTPATIENT
Start: 2022-03-15 | End: 2022-03-16 | Stop reason: HOSPADM

## 2022-03-14 RX ORDER — GUAIFENESIN 100 MG/5ML
81 LIQUID (ML) ORAL DAILY
COMMUNITY

## 2022-03-14 RX ORDER — LIDOCAINE 4 G/100G
PATCH TOPICAL
COMMUNITY

## 2022-03-14 RX ORDER — POLYVINYL ALCOHOL 14 MG/ML
1 SOLUTION/ DROPS OPHTHALMIC 4 TIMES DAILY
COMMUNITY

## 2022-03-14 RX ORDER — MAGNESIUM SULFATE 100 %
4 CRYSTALS MISCELLANEOUS AS NEEDED
Status: DISCONTINUED | OUTPATIENT
Start: 2022-03-14 | End: 2022-03-16 | Stop reason: HOSPADM

## 2022-03-14 RX ORDER — LEVETIRACETAM 100 MG/ML
1000 SOLUTION ORAL EVERY 12 HOURS
COMMUNITY

## 2022-03-14 RX ORDER — CLOPIDOGREL BISULFATE 75 MG/1
75 TABLET ORAL
Status: DISCONTINUED | OUTPATIENT
Start: 2022-03-14 | End: 2022-03-14

## 2022-03-14 RX ORDER — ATORVASTATIN CALCIUM 40 MG/1
80 TABLET, FILM COATED ORAL
Status: DISCONTINUED | OUTPATIENT
Start: 2022-03-14 | End: 2022-03-16 | Stop reason: HOSPADM

## 2022-03-14 RX ORDER — CLOPIDOGREL BISULFATE 75 MG/1
300 TABLET ORAL
Status: ACTIVE | OUTPATIENT
Start: 2022-03-14 | End: 2022-03-15

## 2022-03-14 RX ORDER — PANTOPRAZOLE SODIUM 20 MG/1
20 TABLET, DELAYED RELEASE ORAL DAILY
COMMUNITY

## 2022-03-14 RX ORDER — LIDOCAINE 4 G/100G
2 PATCH TOPICAL EVERY 24 HOURS
Status: DISCONTINUED | OUTPATIENT
Start: 2022-03-14 | End: 2022-03-16 | Stop reason: HOSPADM

## 2022-03-14 RX ORDER — LANOLIN ALCOHOL/MO/W.PET/CERES
400 CREAM (GRAM) TOPICAL DAILY
Status: DISCONTINUED | OUTPATIENT
Start: 2022-03-15 | End: 2022-03-16 | Stop reason: HOSPADM

## 2022-03-14 RX ADMIN — IOPAMIDOL 40 ML: 755 INJECTION, SOLUTION INTRAVENOUS at 12:31

## 2022-03-14 RX ADMIN — Medication 4 UNITS: at 18:19

## 2022-03-14 RX ADMIN — LEVETIRACETAM 2000 MG: 100 INJECTION INTRAVENOUS at 17:10

## 2022-03-14 RX ADMIN — IOPAMIDOL 80 ML: 755 INJECTION, SOLUTION INTRAVENOUS at 12:31

## 2022-03-14 RX ADMIN — ENOXAPARIN SODIUM 40 MG: 100 INJECTION SUBCUTANEOUS at 15:13

## 2022-03-14 NOTE — PROGRESS NOTES
1719: TRANSFER - IN REPORT:    Verbal report received from Duane Moore RN(name) on [facility]  being received from ED(unit) for routine progression of care      Report consisted of patients Situation, Background, Assessment and   Recommendations(SBAR). Information from the following report(s) SBAR, Kardex, Intake/Output, MAR, Recent Results, Med Rec Status and Dual Neuro Assessment was reviewed with the receiving nurse. Opportunity for questions and clarification was provided. Assessment completed upon patients arrival to unit and care assumed.

## 2022-03-14 NOTE — ED PROVIDER NOTES
30-year-old female history of diabetes, depression, hypertension, high cholesterol, PTSD, stroke presents to the emergency department as a stroke alert. She apparently was at her baseline last night about 2130 and was found this morning have worse right upper extremity weakness compared to prior. EMS reports she had clear speech this morning at approximately 930 (3 hours prior to arrival) which has at some point become worse. Review of her medical record indicates that she typically goes to [facility] with recent notes indicating she has had 4 out of 5 right upper extremity strength. Baseline facial droop of unknown severity. Patient complains to me of right upper and lower extremity pain. The history is provided by the patient, the EMS personnel and medical records. Extremity Weakness   This is a new problem. The current episode started yesterday. The problem occurs constantly. The problem has not changed since onset. Pain location: Right arm and right leg. The pain is severe. Pertinent negatives include no back pain and no neck pain. There has been no history of extremity trauma.         Past Medical History:   Diagnosis Date    Depression     Diabetes ([address]     Edema     Gout     Herpes genitalis     Hypertension     Ill-defined condition     gout    Mood disorder (HCC)     Other ill-defined conditions(799.89)     cholesterol     Psychiatric disorder     depression    Psychiatric disorder     PTSD    PTSD (post-traumatic stress disorder)     Sleep disorder     Stroke ([address]     UTI (lower urinary tract infection)        Past Surgical History:   Procedure Laterality Date    HX GYN       X 3         Family History:   Problem Relation Age of Onset    Hypertension Mother     Coronary Art Dis Mother     Diabetes Father     Stroke Father 46        pt estimates    Kidney Disease Father         secondary to diabetes    Breast Cancer Paternal Grandmother     Breast Cancer Cousin numerous paternal cousins       Social History     Socioeconomic History    Marital status: SINGLE     Spouse name: Not on file    Number of children: Not on file    Years of education: Not on file    Highest education level: Not on file   Occupational History    Occupation: CNA     Comment: Marina Méndez Occupation: nursing student     Comment: [employer]   Tobacco Use    Smoking status: Never Smoker    Smokeless tobacco: Never Used   Vaping Use    Vaping Use: Never used   Substance and Sexual Activity    Alcohol use: No     Alcohol/week: 0.0 standard drinks    Drug use: No    Sexual activity: Yes     Partners: Male     Birth control/protection: I.U.D. Other Topics Concern     Service Not Asked    Blood Transfusions Not Asked    Caffeine Concern Not Asked    Occupational Exposure Not Asked    Hobby Hazards Not Asked    Sleep Concern Not Asked    Stress Concern Not Asked    Weight Concern Not Asked    Special Diet Not Asked    Back Care Not Asked    Exercise Yes     Comment: Line Dancing with Son 1x/week, walking regularly, yoga occasionally    Bike Helmet Not Asked   [address] Not Asked    Self-Exams Not Asked   Social History Narrative    Lives with son (14 yo). Boyfriend x 2 years is currently staying with pt and helping her. Social Determinants of Health     Financial Resource Strain:     Difficulty of Paying Living Expenses: Not on file   Food Insecurity:     Worried About Running Out of Food in the Last Year: Not on file    Andre of Food in the Last Year: Not on file   Transportation Needs:     Lack of Transportation (Medical): Not on file    Lack of Transportation (Non-Medical):  Not on file   Physical Activity:     Days of Exercise per Week: Not on file    Minutes of Exercise per Session: Not on file   Stress:     Feeling of Stress : Not on file   Social Connections:     Frequency of Communication with Friends and Family: Not on file    Frequency of Social Gatherings with Friends and Family: Not on file    Attends Restorationism Services: Not on file    Active Member of Clubs or Organizations: Not on file    Attends Club or Organization Meetings: Not on file    Marital Status: Not on file   Intimate Partner Violence:     Fear of Current or Ex-Partner: Not on file    Emotionally Abused: Not on file    Physically Abused: Not on file    Sexually Abused: Not on file   Housing Stability:     Unable to Pay for Housing in the Last Year: Not on file    Number of [place] in the Last Year: Not on file    Unstable Housing in the Last Year: Not on file         ALLERGIES: Patient has no known allergies. Review of Systems   Constitutional: Negative for fatigue and fever. HENT: Negative for sneezing and sore throat. Respiratory: Negative for cough and shortness of breath. Cardiovascular: Negative for chest pain and leg swelling. Gastrointestinal: Negative for abdominal pain, diarrhea, nausea and vomiting. Genitourinary: Negative for difficulty urinating and dysuria. Musculoskeletal: Negative for arthralgias, back pain, myalgias and neck pain. Skin: Negative for color change and rash. Neurological: Positive for speech difficulty and weakness. Negative for headaches. Psychiatric/Behavioral: Negative for agitation and behavioral problems. There were no vitals filed for this visit. Physical Exam  Vitals and nursing note reviewed. Constitutional:       General: She is not in acute distress. Appearance: Normal appearance. She is well-developed. She is obese. She is ill-appearing. She is not toxic-appearing or diaphoretic. HENT:      Head: Normocephalic and atraumatic. Nose: Nose normal.      Mouth/Throat:      Mouth: Mucous membranes are moist.      Pharynx: Oropharynx is clear. Eyes:      Extraocular Movements: Extraocular movements intact.       Conjunctiva/sclera: Conjunctivae normal.      Pupils: Pupils are equal, round, and reactive to light. Cardiovascular:      Rate and Rhythm: Normal rate and regular rhythm. Pulses: Normal pulses. Heart sounds: Normal heart sounds. Pulmonary:      Effort: Pulmonary effort is normal. No respiratory distress. Breath sounds: Normal breath sounds. No wheezing. Chest:      Chest wall: No tenderness. Abdominal:      General: Abdomen is flat. There is no distension. Palpations: Abdomen is soft. Tenderness: There is no abdominal tenderness. There is no guarding or rebound. Musculoskeletal:         General: No swelling, tenderness, deformity or signs of injury. Normal range of motion. Cervical back: Normal range of motion and neck supple. No rigidity. No muscular tenderness. Right lower leg: No edema. Left lower leg: No edema. Skin:     General: Skin is warm and dry. Capillary Refill: Capillary refill takes less than 2 seconds. Neurological:      Mental Status: She is alert and oriented to person, place, and time. Cranial Nerves: Cranial nerve deficit (Right-sided facial droop) present. Motor: Weakness (Flaccid right upper extremity, able to wiggle her toes bilaterally) present. Psychiatric:         Mood and Affect: Mood normal.         Behavior: Behavior normal.          MDM  Number of Diagnoses or Management Options  Diagnosis management comments: 40-year-old female presents as above with worsening weakness. Per the notes the flaccidity in her right upper extremity is new from last night. She was seen by teleneurology in the emergency department. Not a candidate for TPA given timing of onset. Will admit to the hospital for further management.        Amount and/or Complexity of Data Reviewed  Clinical lab tests: reviewed  Tests in the radiology section of CPT®: reviewed  Tests in the medicine section of CPT®: reviewed      ED Course as of 03/14/22 1325   Mon Mar 14, 2022   1307 Discussed with teleneurology, recommends starting plavix and admit. [JM]   0756 ED EKG interpretation:Rhythm: normal sinus rhythm. Rate (approx.): 69.  Axis: Left. ST segment:  No concerning ST elevations or depressions. This EKG was interpreted by Dario Grant MD,ED Provider. [JM]      ED Course User Index  [JM] Yaw Nguyen MD       Procedures    Perfect Serve Consult for Admission  1:27 PM    ED Room Number: ER24/24  Patient Name and age:  Migdalia Gates 42 y.o.  female  Working Diagnosis:   1. Cerebrovascular accident (CVA), unspecified mechanism ([address]    2. Right arm weakness        COVID-19 Suspicion:  no  Sepsis present:  no  Reassessment needed: N/A  Code Status:  Full Code  Readmission: no  Isolation Requirements:  no  Recommended Level of Care:  step down  Department:[facility] Adult ED - 21   Other: Patient with multiple medical problems, previous CVA. Teleneurology recommends MRI, daily Plavix.

## 2022-03-14 NOTE — ED TRIAGE NOTES
Pt arrives via EMS from [facility] for increased RIGHT sided weakness and slurred speech. Reported last known well at 2130 on 3/13/22. Glucose 378 in route.

## 2022-03-14 NOTE — ROUTINE PROCESS
TRANSFER - OUT REPORT:    Verbal report given to Amy Bain RN(name) on Keri Hanley  being transferred to [facility](unit) for routine progression of care       Report consisted of patients Situation, Background, Assessment and   Recommendations(SBAR). Information from the following report(s) SBAR, ED Summary, Intake/Output, MAR and Recent Results was reviewed with the receiving nurse. Lines:   Peripheral IV 03/14/22 Left Antecubital (Active)   Site Assessment Clean, dry, & intact 03/14/22 1226   Phlebitis Assessment 0 03/14/22 1226   Infiltration Assessment 0 03/14/22 1226   Dressing Status Clean, dry, & intact 03/14/22 1226   Dressing Type Transparent 03/14/22 1226        Opportunity for questions and clarification was provided.       Patient transported with:   [employer]

## 2022-03-14 NOTE — PROGRESS NOTES
Admission Medication Reconciliation:    Information obtained from:  [address] [facility]:  YES    Comments/Recommendations: Updated PTA meds/reviewed patient's allergies. 1) Pt PTA list obtained from NEW/ [facility] records with todays date listed (3/14/22). 2)  Medication changes (since last review): Added  - Levetiracetam 100mg/mL solution (Take 10 mL PO bid for seizure prevention)  - Methocarbamol 500 mg (Take 1 tablet PO q8hr prn for muscle spasms)  - Lidocaine patch 4% (Apply to left side of neck topically once daily for pain management & remove per schedule)  - polyethylene glycol 3350 powder 17gm/packet (Give 17g q24 hours prn for bowel management)  - polyvinyl alcohol 1.4% ophthalmic solution (1 gtt to both eyes qid for s/p eye surgery)    Adjusted  - aspirin DR 81 mg take 1 tablet PO daily --> aspirin 81 mg chewable 1 tab PO daily  - Humalog insulin 100unit/mL (5 units SQ tid)  --> Humalog insulin 100units/mL (8 units SQ tid)  - insulin glargine 100units/mL (15 mL units SQ qhs) --> insuline glargine 100units/mL (48 units SQ qhs)  - pantoprazole 40 mg tab PO daily --> pantoprazole 20 mg PO daily     Removed  -  albuterol 90mcg/actuation inhaler  - amlodipine 5mg tab  - bumetanide 1 mg tab  - dicyclomine 20mg tab  - Jardiance 25mg tab  - levonorgestrel 20mcg/24 hr IUD  - lisinopril 10mg tab  - magnesium oxide 400mg tab     ¹[employer] pharmacy benefit data reflects medications filled and processed through the patient's insurance, however   this data does NOT capture whether the medication was picked up or is currently being taken by the patient. Allergies:  Patient has no known allergies.     Significant PMH/Disease States:   Past Medical History:   Diagnosis Date    Depression     Diabetes ([address]     Edema     Gout     Herpes genitalis     Hypertension     Ill-defined condition     gout    Mood disorder ([address]     Other ill-defined conditions(799.89)     cholesterol Psychiatric disorder     depression    Psychiatric disorder     PTSD    PTSD (post-traumatic stress disorder)     Sleep disorder     Stroke [facility])     UTI (lower urinary tract infection)      Chief Complaint for this Admission:    Chief Complaint   Patient presents with    Dysarthria    Extremity Weakness     Prior to Admission Medications:   Prior to Admission Medications   Prescriptions Last Dose Informant Taking? HumaLOG KwikPen Insulin 100 unit/mL kwikpen 3/14/2022 at Unknown time  Yes   Si Units by SubCUTAneous route three (3) times daily. aspirin 81 mg chewable tablet 3/14/2022 at Unknown time  Yes   Sig: Take 81 mg by mouth daily. atorvastatin (LIPITOR) 80 mg tablet 3/13/2022 at Unknown time  Yes   Sig: Take 1 Tablet by mouth nightly. insulin glargine (LANTUS) 100 unit/mL injection   Yes   Si Units by SubCUTAneous route nightly. levETIRAcetam (KEPPRA) 100 mg/mL solution 3/14/2022 at Unknown time  Yes   Sig: Take 10 mL PO bid for seizure prevention. lidocaine (Lidocaine Pain Relief) 4 % patch   Yes   Sig: Apply 1 patch to left side of neck topically once daily for pain management and remove per schedule. metFORMIN ER (GLUCOPHAGE XR) 500 mg tablet 3/14/2022 at Unknown time  Yes   Sig: Take 500 mg by mouth daily. methocarbamoL (ROBAXIN) 500 mg tablet   Yes   Sig: Take 500 mg by mouth every eight (8) hours as needed for Muscle Spasm(s). pantoprazole (PROTONIX) 20 mg tablet 3/14/2022 at Unknown time  Yes   Sig: Take 20 mg by mouth daily. polyethylene glycol (MIRALAX) 17 gram packet   Yes   Sig: Take 17 g by mouth daily. polyvinyl alcohol (LIQUIFILM TEARS) 1.4 % ophthalmic solution   Yes   Sig: Administer 1 Drop to both eyes four (4) times daily. Indications: for s/p eye surgery   senna (Fanny-joselito) 8.6 mg tablet 3/14/2022 at Unknown time  Yes   Sig: Take 1 Tablet by mouth daily.       Facility-Administered Medications: None     Please contact the main inpatient pharmacy with any questions or concerns at [phone] and we will direct you to the clinical pharmacist covering this patient's care while in-house.    Refugio Churchill

## 2022-03-14 NOTE — PROGRESS NOTES
Problem: Self Care Deficits Care Plan (Adult)  Goal: *Acute Goals and Plan of Care (Insert Text)  Description: FUNCTIONAL STATUS PRIOR TO ADMISSION: patient reports she has been at rehab for therapy for the last few weeks. Patient reports she was able to complete ADLs mostly IND/mod I and was walking with RW for short distances. Patient had a stroke in July 2021. HOME SUPPORT: The patient lived with her sons but did not require assist prior to July 2021, since then she has required more assist and is planning to D/C to her mother's post rehab. Patient also has 2 teenage boys at home (15,16) whom her mother is caring for at this time. Occupational Therapy Goals  Initiated 3/14/2022   1. Patient will perform grooming sitting unsupported with supervision/set-up within 7 day(s). 2.  Patient will perform anterior neck to thigh bathing sitting with minimal assistance/contact guard assist within 7 day(s). 3.  Patient will perform upper body dressing with minimal assistance/contact guard assist within 7 day(s). 4.  Patient will perform toilet transfers to/from [facility] with minimal assistance/contact guard assist within 7 day(s). 5.  Patient will perform all aspects of toileting with moderate assistance within 7 day(s). 6.  Patient will participate in upper extremity therapeutic exercise/activities with supervision/set-up within 7 day(s). 7.  Patient will utilize energy conservation techniques during functional activities with verbal cues within 7 day(s). Outcome: Not Met    OCCUPATIONAL THERAPY EVALUATION  Patient: Adonis Felton (81 y.o. female)  Date: 3/14/2022  Primary Diagnosis: CVA (cerebral vascular accident) [facility]) [I63.9]        Precautions:  Fall    ASSESSMENT  Based on the objective data described below, the patient presents with limited ADL performance s/p admission for CVA. CT negative for acute process, MRI pending at time of evaluation.  Patient ADLs limited by impaired balance, generalized weakness, limited lower body access, R sided hemiparesis at baseline, body habitus and decreased functional activity tolerance. Prior to July 2021, patient was IND but she had a stroke and has been in and out of the hospital/rehab since then. Admitted from SNF but reports plan to D/C to mother's house when se completes rehab program.    Patient cleared to be seen, received sleeping soundly in bed but wakes to verbal stimulus. Patient noted to have slight L lateral lean, needed min A to correct it once HOB placed semi upright. Patient RUE flaccid but with intact sensation. Patient reporting she feels like she is back to her normal self. Of note, patient BP at rest in 170s/100s - did not mobilize patient, however, she did wash her face with setup using LUE for task. Patient left in semi fowlers with call bell in reach, RN aware, all needs met. Patient would benefit from return to SNF for rehab, will continue to determine needs pending progress. Current Level of Function Impacting Discharge (ADLs/self-care): up to max A for ADLs    Functional Outcome Measure: The patient scored Total: 25/100 on the Barthel Index outcome measure which is indicative of 75% impiarment in basic self-care. Other factors to consider for discharge: has 2 teenagers at home     Patient will benefit from skilled therapy intervention to address the above noted impairments. PLAN :  Recommendations and Planned Interventions: self care training, functional mobility training, therapeutic exercise, balance training, therapeutic activities, endurance activities, neuromuscular re-education, patient education, home safety training, and family training/education    Frequency/Duration: Patient will be followed by occupational therapy 5 times a week to address goals.     Recommendation for discharge: (in order for the patient to meet his/her long term goals)  To be determined: SNF vs. IPR pending progress    This discharge recommendation:  Has not yet been discussed the attending provider and/or case management    IF patient discharges home will need the following DME: TBD       SUBJECTIVE:   Patient stated Carolynn Enter you for your help.     OBJECTIVE DATA SUMMARY:   HISTORY:   Past Medical History:   Diagnosis Date    Depression     Diabetes ([facility] [address]     Edema     Gout     Herpes genitalis     Hypertension     Ill-defined condition     gout    Mood disorder ([facility] [address]     Other ill-defined conditions(799.89)     cholesterol     Psychiatric disorder     depression    Psychiatric disorder     PTSD    PTSD (post-traumatic stress disorder)     Sleep disorder     Stroke [facility])     UTI (lower urinary tract infection)      Past Surgical History:   Procedure Laterality Date    HX GYN       X 3       Expanded or extensive additional review of patient history:     Home Situation  Home Environment: Private residence (Mom's home)  # Steps to Enter: 1  One/Two Story Residence: One story  Living Alone: No  Support Systems: Parent(s),Child(irlanda) (mom and 3 sons (25, 12, 13))  Current DME Used/Available at Home: Grab bars,Shower chair  Tub or Shower Type: Shower    Hand dominance: Right    EXAMINATION OF PERFORMANCE DEFICITS:  Cognitive/Behavioral Status:  Neurologic State: Alert  Orientation Level: Oriented to person;Oriented to place; Disoriented to situation;Oriented to time  Cognition: Appropriate for age attention/concentration; Follows commands  Perception: Cues to maintain midline in sitting (L lateral lean)  Perseveration: No perseveration noted  Safety/Judgement: Awareness of environment; Fall prevention    Skin: appears grossly intact    Edema:  none noted in BUEs    Hearing:       Vision/Perceptual:    Tracking: Requires cues, head turns, or add eye shifts to track (L eye dysconjugate )                 Diplopia: No    Acuity: Within Defined Limits    Corrective Lenses: Glasses    Range of Motion:  In BUEs  AROM: Generally decreased, functional (LUE WFL, RUE flaccid)    Strength: In BUEs  Strength: Generally decreased, functional (LUE WFL, RUE flaccid)     Coordination:  Coordination: Generally decreased, functional (LUE WFL, RUE flaccid)  Fine Motor Skills-Upper: Left Intact; Right Impaired    Gross Motor Skills-Upper: Left Intact; Right Impaired    Tone & Sensation:  In BUEs  Tone: Abnormal (RUE flaccid)  Sensation: Intact    Balance:  Sitting: Impaired  Sitting - Static: Fair (occasional); Supported sitting (L lateral lean)    Functional Mobility and Transfers for ADLs:  Bed Mobility:  Supine to Sit: Total assistance (bed mechanics used)    Transfers:  Sit to Stand:  (NT 2/2 hypertension)    ADL Assessment:  Feeding: Minimum assistance    Oral Facial Hygiene/Grooming: Moderate assistance    Bathing: Moderate assistance    Upper Body Dressing: Moderate assistance    Lower Body Dressing: Maximum assistance    Toileting: Maximum assistance\    ADL Intervention and task modifications:       Grooming  Position Performed: Long sitting on bed  Washing Face: Set-up  Cues: Verbal cues provided    Cognitive Retraining  Safety/Judgement: Awareness of environment; Fall prevention    Functional Measure:    Barthel Index:  Bathin  Bladder: 5  Bowels: 10  Groomin  Dressin  Feedin  Mobility: 0  Stairs: 0  Toilet Use: 0  Transfer (Bed to Chair and Back): 0  Total: 25/100     The Barthel ADL Index: Guidelines  1. The index should be used as a record of what a patient does, not as a record of what a patient could do. 2. The main aim is to establish degree of independence from any help, physical or verbal, however minor and for whatever reason. 3. The need for supervision renders the patient not independent. 4. A patient's performance should be established using the best available evidence. Asking the patient, friends/relatives and nurses are the usual sources, but direct observation and common sense are also important.  However direct testing is not needed. 5. Usually the patient's performance over the preceding 24-48 hours is important, but occasionally longer periods will be relevant. 6. Middle categories imply that the patient supplies over 50 per cent of the effort. 7. Use of aids to be independent is allowed. Score Interpretation (from [address])    Independent   60-79 Minimally independent   40-59 Partially dependent   20-39 Very dependent   <20 Totally dependent     -Carmela Drummond., Barthel, DJenifferW. (1965). Functional evaluation: the Barthel Index. [address] ([address] (1997). The Barthel activities of daily living index: self-reporting versus actual performance in the old (> or = 75 years). Journal [address] 45(7), [address] [facility], ALFONZO, Rina Gerardo., [place]. (1999). Measuring the change in disability after inpatient rehabilitation; comparison of the responsiveness of the Barthel Index and Functional [place] Measure. Journal of Neurology, Neurosurgery, and Psychiatry, 66(4), 307-742. Arleen Palomares, [place].DANNY, JOSE Lloyd, & Faye Mendosa, MJAMAR. (2004) Assessment of post-stroke quality of life in cost-effectiveness studies: The usefulness of the Barthel Index and the EuroQoL-5D. Quality of Life Research, 15, 731-13     Occupational Therapy Evaluation Charge Determination   History Examination Decision-Making   LOW Complexity : Brief history review  HIGH Complexity : 5 or more performance deficits relating to physical, cognitive , or psychosocial skils that result in activity limitations and / or participation restrictions HIGH Complexity : Patient presents with comorbidities that affect occupational performance.  Signifigant modification of tasks or assistance (eg, physical or verbal) with assessment (s) is necessary to enable patient to complete evaluation       Based on the above components, the patient evaluation is determined to be of the following complexity level: MEDIUM  Pain Rating:  Reporting no pain    Activity Tolerance:   Fair    After treatment patient left in no apparent distress:    Supine in bed, Call bell within reach, Side rails x 3, and RN aware    COMMUNICATION/EDUCATION:   The patients plan of care was discussed with: Physical therapist, Registered nurse, and Physician. Home safety education was provided and the patient/caregiver indicated understanding. and Patient/family have participated as able in goal setting and plan of care. This patients plan of care is appropriate for delegation to [facility].     Thank you for this referral.  Melina Adame OT  Time Calculation: 25 mins

## 2022-03-14 NOTE — H&P
Ariana Kurtz. [facility] Adult  Hospitalist Group  History and Physical    Primary Care Provider: Luis E Posada MD  Date of Service:  3/14/2022    Chief Complaint: slurred speech worsened R sided weakness     Subjective:     Denisse Cooper is a 42 y.o. female hx of CVA  was in rehab for this, IDDM, hx of seizures, GERD, LISA on CPAP presents from her rehab center after she had worsening right-sided weakness and slurred speech from her baseline. Presented as a stroke alert. Last known well baseline was last night around 0 woke up with symptoms this am. She currently feels at her baseline denies any blurry vision, significant headaches, nausea, vomiting, abdominal pain, significant new onset upper extremity or lower extremity weakness. She denies any significant numbness or tingling in her upper extremities lower extremities. Arrived as Code stroke  NIH 9 not a TPA candidate. .  CTa of the head and neck/ CT perfusion negative. Labs overall unremarkable. Patient given 300 mg Plavix, aspirin. Hospitalist service was consulted for further evaluation. Review of Systems:    A comprehensive review of systems was negative except for that written in the History of Present Illness. Past Medical History:   Diagnosis Date    Depression     Diabetes ([facility] [address]     Edema     Gout     Herpes genitalis     Hypertension     Ill-defined condition     gout    Mood disorder (HCC)     Other ill-defined conditions(799.89)     cholesterol     Psychiatric disorder     depression    Psychiatric disorder     PTSD    PTSD (post-traumatic stress disorder)     Sleep disorder     Stroke ([facility] [address]     UTI (lower urinary tract infection)       Past Surgical History:   Procedure Laterality Date    HX GYN       X 3     Prior to Admission medications    Medication Sig Start Date End Date Taking? Authorizing Provider   aspirin 81 mg chewable tablet Take 81 mg by mouth daily.    Yes Provider, Historical   insulin glargine (LANTUS) 100 unit/mL injection 48 Units by SubCUTAneous route nightly. Yes Provider, Historical   pantoprazole (PROTONIX) 20 mg tablet Take 20 mg by mouth daily. Yes Provider, Historical   methocarbamoL (ROBAXIN) 500 mg tablet Take 500 mg by mouth every eight (8) hours as needed for Muscle Spasm(s). Yes Provider, Historical   lidocaine (Lidocaine Pain Relief) 4 % patch Apply 1 patch to left side of neck topically once daily for pain management and remove per schedule. Yes Provider, Historical   polyethylene glycol (MIRALAX) 17 gram packet Take 17 g by mouth daily. Yes Provider, Historical   polyvinyl alcohol (LIQUIFILM TEARS) 1.4 % ophthalmic solution Administer 1 Drop to both eyes four (4) times daily. Indications: for s/p eye surgery   Yes Provider, Historical   levETIRAcetam (KEPPRA) 100 mg/mL solution Take 10 mL PO bid for seizure prevention. Yes Provider, Historical   metFORMIN ER (GLUCOPHAGE XR) 500 mg tablet Take 500 mg by mouth daily. 11/3/21  Yes Provider, Historical   atorvastatin (LIPITOR) 80 mg tablet Take 1 Tablet by mouth nightly. 8/10/21  Yes Provider, Historical   senna (Fanny-joselito) 8.6 mg tablet Take 1 Tablet by mouth daily. Yes Provider, Historical   HumaLOG KwikPen Insulin 100 unit/mL kwikpen 8 Units by SubCUTAneous route three (3) times daily. 9/20/21  Yes Provider, Historical     No Known Allergies   Family History   Problem Relation Age of Onset    Hypertension Mother     Coronary Art Dis Mother     Diabetes Father     Stroke Father 46        pt estimates    Kidney Disease Father         secondary to diabetes    Breast Cancer Paternal Grandmother     Breast Cancer Cousin         numerous paternal cousins        SOCIAL HISTORY:  Patient resides at rehab  Patient ambulates with assist device.    Smoking history: none  Alcohol history:none        Objective:       Physical Exam:   Visit Vitals  BP (!) 177/99   Pulse 75   Temp 97.8 °F (36.6 °C)   Resp 12   Wt 115.3 kg (254 lb 3.1 oz)   SpO2 97%   BMI 43.63 kg/m²     General appearance: alert, cooperative, no distress, appears stated age, R lip bitten and R side  tongue bitten   Head: Normocephalic, without obvious abnormality, atraumatic  Lungs: clear to auscultation bilaterally  Heart: regular rate and rhythm, S1, S2 normal, no murmur, click, rub or gallop  Abdomen: soft, non-tender. Bowel sounds normal. No masses,  no organomegaly  Extremities: extremities normal, atraumatic, no cyanosis or edema  Pulses: 2+ and symmetric  Skin: Skin color, texture, turgor normal. No rashes or lesions  Neurologic: R sided nasolabial flattening, slight R facial droop, 1/3 RUE/RLE strength, decreased  strength on R, delayed speech, L sided gaze deviation   Cap refill: Brisk, less than 3 seconds  Pulses: 2+, symmetric in all extremities  Skin: Warm, dry, without rashes or lesions    ECG:  NSR     Data Review: All diagnostic labs and studies have been reviewed. Assessment + Plan    Acute on chronic right-sided hemiparesis, new onset dysarthria  Prior hx of CVA  Hx of seizures   -DDx: Unclear etiology certainly suspicious for breakthrough seizure she bit her tongue and lip, unclear how long her symptoms were vs could be a new stroke patient has prior history of CVA vs TIA   -Per collateral from mother patient has had a history of newly diagnosed seizures had followed with Dr. Julien Doctor and [place] and was reportedly on Keppra 500 mg twice daily > pharmacy is recording 1000mg po bid though from facility. ...  -admit to neuro stepdown  -Neurochecks every 2  -keep on tele  -Spoke to neurology this afternoon will give loading dose of Keppra 2 g, 1000 tonight and continue with 1000 twice daily > will confirm with neuro for proper dosing   -routine EEG   - MRI of the brain ordered  -TTE with bubble  -Obtain A1c, lipid panel  -PT OT, speech eval   -Continue aspirin, statin status post plavix and asa given in ED  -Permissive hypertension    Arm pain  -b/l this is chronic for her though, lidocaine patches prn     IDDM -continue Lantus, sliding scale insulin readjust prn   Hypertension- permissive htn, hold home antihypertensives   Hyperlipidemia - cont statin   GERD - cont home ppi  LISA - cpap qhs   Morbid obesity -counseled on diet weight loss    Diet: npo pending speech eval   DVT PPx: Lovenox  Code: Full    Dispo: Back to Rehab when medically stable greater than 48 hours        FUNCTIONAL STATUS PRIOR TO HOSPITALIZATION Ambulatory with Use of Assistive Devices (including history of recent falls)      Signed By: Joanne Kathleen MD     March 14, 2022

## 2022-03-14 NOTE — PROGRESS NOTES
Neurocritical Care Code Stroke Documentation      Symptoms:   worsening right-sided weakness, slurred speech  She is noted to have a disconjugate gaze in right eye at rest.    Last Known Well: 0 pm last night    Medical hx:   Past Medical History:   Diagnosis Date    Depression     [facility] [address]     Edema     Gout     Herpes genitalis     Hypertension     Ill-defined condition     gout    Mood disorder ([facility] [address]     Other ill-defined conditions(799.89)     cholesterol     Psychiatric disorder     depression    Psychiatric disorder     PTSD    PTSD (post-traumatic stress disorder)     Sleep disorder     Stroke ([facility] [address]     UTI (lower urinary tract infection)    remote stroke with residual right-sided deficits    Anticoagulation: Aspirin 81 mg daily listed in PTA meds    VAN:   Negative   NIHSS:   1a-LOC:0    1b-Month/Age:0    1c-Open/Close Hand:0    2-Best Gaze:1 (partial gaze palsy when looking to the right with left eye)    3-Visual Fields:0    4-Facial Palsy:2    5a-Left Arm:0    5b-Right Arm:3    6a-Left Le    6b-Right Le    7-Limb Ataxia:0    8-Sensory:0    9-Best Language:0    10-Dysarthria:1    11-Extinction/Inattention:0  TOTAL SCORE:9   Imaging:   CT: IMPRESSION  Age-indeterminate infarct in the anterolateral right cerebellar hemisphere. Additional chronic appearing infarcts in the posteromedial right cerebellum and  left basal ganglia. Correlate clinically and recommend attention on follow-up    CTA: IMPRESSION  CTA Head:  1. No evidence of flow-limiting stenosis or aneurysm. Scattered atherosclerotic disease including severe stenosis in the left P2 segment, and moderate stenoses in the basilar artery and bilateral carotid siphons. CTA Neck:  1. No evidence of significant stenosis. CTP: No perfusion abnormality    Plan:   TPA Candidate: NO    Mechanical thrombectomy Candidate: NO     Discussed with Dr. Jill Head and RN. Will have NIS review imaging given posterior circulation disease. Admit for further stroke work-up. Arrival time: 1234   Time spent:  35  minutes. Harsh Soni NP  Neurocritical Care Nurse Practitioner    Addendum [phone] [id number]: I spoke with Dr. Winters regarding imaging and no endovascular intervention is indicated. Recommend medical management with DAPT and loading with Aspirin 324 mg and Plavix 300 mg. Discussed with Dr. Elizabeth Calle (ED Physician)  I also discussed this with Dr. Jimmy Gore (Neurologist on call).

## 2022-03-14 NOTE — CONSULTS
INPATIENT NEUROLOGY CONSULTATION  3/14/2022     Consulted by: Tao Flores MD        Patient ID:  Antolin Galindo  [id number]  42 y.o.  1980    Chief Complaint   Patient presents with    Dysarthria    Extremity Weakness       HPI    Ms. Pieter Bartlett is a 16-year-old woman with history of stroke consisting of a right cerebellar infarct managed at [facility] in summer 2021. She has been at rehabilitation. She was transported here from her facility when she had worsening right-sided weakness and worsening speech from baseline. It looks like she is only on aspirin prior to admission. CTA here showing posterior circulation disease. When I speak with her personally she tells me that all of her symptoms are chronic and that she feels her baseline. She denies double vision. She does have poor vision particularly on the left due to diabetic retinopathy. She tells me she has a history of seizures and that she does take seizure medicine but she does not know what the name of the medicine is. She bit her right lip. [facility] notes 2021:  Fermín Heard is a 41 yo RH AAF with a PMH of HTN, HLD, IDDM, TIA, and LISA (on CPAP) who presented to [facility] as a stroke alert for acute onset of left arm weakness, slurred speech, and blurry vision. Her LKW was 1000 on 7/7. Patient stated that she was walking to the bathroom and felt off balance. When symptoms did not improve, EMS was called. Initial BP was 170/101, . NIHSS was a 1 for dysarthria. She was within the window for IV-tPA. NCHCT was negative for hemorrhage. She was consented for TPA and bolus was given at 1352. Infusing started at 1354. CTA/P was negative for LVO or perfusion deficits. She was then admitted to Neurology for further stroke work up. CTA H/N showed no LVO but had moderate stenosis of the L supraclinoid ICA. Further work up revealed a right cerebellar infarct on the right via MRI.  Patients deficits were slurring of speech and ataxia of the right upper extremity. At baseline she has exotropia of the left eye. Her ECHO did not have a PFO or thrombus. She was started on aspirin, 80 of atorvastatin as her LDL was 203, outpatient holter monitor ordered and Doppler of the carotids showed 49 percent stenosis. For BP she was on lisinopril and amlodipine was discontinued. Hypercoag work up to be followed outpatient. BG was optimized via insulin. She was sent to rehab after her hospitalization. 42year old woman here for follow up eval of Vitreous hemorrhage 2/2 Proliferative diabetic retinopathy Left eye. Patient reports left eye vision still blurry, seeing red. no improvement.          Review of Systems   Unable to perform ROS: Medical condition       Past Medical History:   Diagnosis Date    Depression     Diabetes ([facility] [address]     Edema     Gout     Herpes genitalis     Hypertension     Ill-defined condition     gout    Mood disorder (HCC)     Other ill-defined conditions(799.89)     cholesterol     Psychiatric disorder     depression    Psychiatric disorder     PTSD    PTSD (post-traumatic stress disorder)     Sleep disorder     Stroke ([facility] [address]     UTI (lower urinary tract infection)      Family History   Problem Relation Age of Onset    Hypertension Mother     Coronary Art Dis Mother     Diabetes Father     Stroke Father 46        pt estimates    Kidney Disease Father         secondary to diabetes    Breast Cancer Paternal Grandmother     Breast Cancer Cousin         numerous paternal cousins     Social History     Socioeconomic History    Marital status: SINGLE     Spouse name: Not on file    Number of children: Not on file    Years of education: Not on file    Highest education level: Not on file   Occupational History    Occupation: CNA     Comment: Nirmala [de-identified]    Occupation: nursing student     Comment: [employer]   Tobacco Use    Smoking status: Never Smoker    Smokeless tobacco: Never Used   Vaping Use    Vaping Use: Never used   Substance and Sexual Activity    Alcohol use: No     Alcohol/week: 0.0 standard drinks    Drug use: No    Sexual activity: Yes     Partners: Male     Birth control/protection: I.U.D. Other Topics Concern     Service Not Asked    Blood Transfusions Not Asked    Caffeine Concern Not Asked    Occupational Exposure Not Asked    Hobby Hazards Not Asked    Sleep Concern Not Asked    Stress Concern Not Asked    Weight Concern Not Asked    Special Diet Not Asked    Back Care Not Asked    Exercise Yes     Comment: Line Dancing with Son 1x/week, walking regularly, yoga occasionally    Bike Helmet Not Asked   [address] Not Asked    Self-Exams Not Asked   Social History Narrative    Lives with son (12 yo). Boyfriend x 2 years is currently staying with pt and helping her. Social Determinants of Health     Financial Resource Strain:     Difficulty of Paying Living Expenses: Not on file   Food Insecurity:     Worried About Running Out of Food in the Last Year: Not on file    Andre of Food in the Last Year: Not on file   Transportation Needs:     Lack of Transportation (Medical): Not on file    Lack of Transportation (Non-Medical):  Not on file   Physical Activity:     Days of Exercise per Week: Not on file    Minutes of Exercise per Session: Not on file   Stress:     Feeling of Stress : Not on file   Social Connections:     Frequency of Communication with Friends and Family: Not on file    Frequency of Social Gatherings with Friends and Family: Not on file    Attends Bahai Services: Not on file    Active Member of Clubs or Organizations: Not on file    Attends Club or Organization Meetings: Not on file    Marital Status: Not on file   Intimate Partner Violence:     Fear of Current or Ex-Partner: Not on file    Emotionally Abused: Not on file    Physically Abused: Not on file    Sexually Abused: Not on file   Housing Stability:     Unable to Pay for Housing in the Last Year: Not on file    Number of Places Lived in the Last Year: Not on file    Unstable Housing in the Last Year: Not on file     Current Facility-Administered Medications   Medication Dose Route Frequency    acetaminophen (TYLENOL) tablet 650 mg  650 mg Oral Q4H PRN    Or    acetaminophen (TYLENOL) solution 650 mg  650 mg Per NG tube Q4H PRN    Or    acetaminophen (TYLENOL) suppository 650 mg  650 mg Rectal Q4H PRN    [START ON 3/15/2022] aspirin chewable tablet 81 mg  81 mg Oral DAILY    labetaloL (NORMODYNE;TRANDATE) injection 5 mg  5 mg IntraVENous Q10MIN PRN    enoxaparin (LOVENOX) injection 40 mg  40 mg SubCUTAneous Q24H    aspirin tablet 325 mg  325 mg Oral NOW    clopidogreL (PLAVIX) tablet 300 mg  300 mg Oral NOW     Current Outpatient Medications   Medication Sig    Jardiance 25 mg tablet Take 25 mg by mouth daily.  metFORMIN ER (GLUCOPHAGE XR) 500 mg tablet Take 500 mg by mouth daily.  bumetanide (BUMEX) 1 mg tablet Take 1 Tablet by mouth daily.  atorvastatin (LIPITOR) 80 mg tablet Take 1 Tablet by mouth nightly.  senna (Fanny-joselito) 8.6 mg tablet Take 1 Tablet by mouth daily.  pantoprazole (PROTONIX) 40 mg tablet Take 40 mg by mouth daily.  aspirin delayed-release 81 mg tablet Take  by mouth daily.  magnesium oxide (MAG-OX) 400 mg tablet Take 400 mg by mouth daily.  HumaLOG KwikPen Insulin 100 unit/mL kwikpen 5 Units by SubCUTAneous route three (3) times daily.  dicyclomine (BENTYL) 20 mg tablet Take 1 Tablet by mouth every six (6) hours. (Patient not taking: Reported on 9/24/2021)    insulin glargine (LANTUS,BASAGLAR) 100 unit/mL (3 mL) inpn 15 Units by SubCUTAneous route nightly. (Patient taking differently: 36 Units by SubCUTAneous route nightly. 36 UNITS)    amLODIPine (NORVASC) 5 mg tablet Take 1 Tablet by mouth daily.  lancets (FreeStyle Lancets) 28 gauge misc Check BS three times/day.  glucose blood VI test strips (FreeStyle Lite Strips) strip Check BS three times/day.  Blood-Glucose Meter monitoring kit Check BS three times/day.  albuterol (PROVENTIL HFA, VENTOLIN HFA, PROAIR HFA) 90 mcg/actuation inhaler Take 2 Puffs by inhalation every four (4) hours as needed for Wheezing. (Patient not taking: Reported on 5/20/2021)    lisinopril (PRINIVIL, ZESTRIL) 10 mg tablet Take 2 Tabs by mouth daily.  ONETOUCH VERIO strip TEST SIX TIMES A DAY    ONETOUCH DELICA LANCETS 30 gauge misc TEST SIX TIMES A DAY    levonorgestrel (MIRENA) 20 mcg/24 hr (5 years) IUD 1 Each by IntraUTERine route once. No Known Allergies    Visit Vitals  BP (!) 171/99   Pulse 84   Resp 16   Wt 254 lb 3.1 oz (115.3 kg)   SpO2 94%   BMI 43.63 kg/m²     Physical Exam  Vitals and nursing note reviewed. Constitutional:       Appearance: Normal appearance. She is obese. HENT:      Head:      Comments: Right lower lip lac from biting  Cardiovascular:      Rate and Rhythm: Normal rate. Pulmonary:      Effort: Pulmonary effort is normal.   Skin:     General: Skin is warm and dry. Neurological:      Mental Status: She is alert. Neurologic Exam     Mental Status   Age-appropriate woman awake and alert. Very slow to respond to questions. Speech is slow but clear, naming and repetition intact. Her pupils are equal reactive but she has a very disconjugate gaze left eye appears exotropic. Ductions intact. Face is asymmetric to the right. Right upper extremity plegic, right leg 2/5 left side good brisk movement. No abnormal movements. Sensation grossly intact.   Gait deferred due to condition            Lab Results   Component Value Date/Time    WBC 9.2 03/14/2022 12:37 PM    HGB 10.7 (L) 03/14/2022 12:37 PM    Hemoglobin (POC) 13.6 02/02/2018 09:55 AM    HCT 34.7 (L) 03/14/2022 12:37 PM    Hematocrit (POC) 40 02/02/2018 09:55 AM    PLATELET 552 02/23/6988 12:37 PM    MCV 83.6 03/14/2022 12:37 PM     Lab Results   Component Value Date/Time    Hemoglobin A1c 12.3 (H) 11/30/2015 11:01 PM Hemoglobin A1c 9.8 (H) 12/03/2014 09:44 AM    Hemoglobin A1c 8.8 (H) 08/09/2014 09:12 PM    Glucose 274 (H) 03/14/2022 12:37 PM    Glucose 301 (H) 08/08/2014 06:45 AM    Glucose (POC) 194 (H) 09/23/2021 08:23 PM    Microalb/Creat ratio (ug/mg creat.) 70 (H) 05/20/2021 12:00 AM    LDL, calculated 199 (H) 11/25/2015 03:21 PM    Creatinine (POC) 0.6 02/02/2018 09:55 AM    Creatinine 0.82 03/14/2022 12:37 PM      Lab Results   Component Value Date/Time    Cholesterol, total 314 (H) 11/25/2015 03:21 PM    HDL Cholesterol 48 11/25/2015 03:21 PM    LDL, calculated 199 (H) 11/25/2015 03:21 PM    Triglyceride 333 (H) 11/25/2015 03:21 PM    CHOL/HDL Ratio 5.1 (H) 08/08/2014 06:45 AM     Lab Results   Component Value Date/Time    ALT (SGPT) 29 03/14/2022 12:37 PM    Alk. phosphatase 176 (H) 03/14/2022 12:37 PM    Bilirubin, direct <0.1 08/30/2014 04:21 PM    Bilirubin, total 0.4 03/14/2022 12:37 PM    Albumin 3.4 (L) 03/14/2022 12:37 PM    Protein, total 7.8 03/14/2022 12:37 PM    INR 1.0 03/14/2022 12:37 PM    Prothrombin time 10.3 03/14/2022 12:37 PM    PLATELET 032 12/82/2067 12:37 PM        CT Results (maximum last 3): Results from Hospital Encounter encounter on 03/14/22    CT CODE NEURO PERF W CBF    Narrative  EXAM:  CTA CODE NEURO HEAD AND NECK W CONT, CT CODE NEURO PERF W CBF    INDICATION:   Code Stroke    COMPARISON:  CT head 3/14/2022. CONTRAST:  120 mL of Isovue-370. TECHNIQUE:  Unenhanced  images were obtained to localize the volume for  acquisition. Multislice helical axial CT angiography was performed from the  aortic arch to the top of the head during uneventful rapid bolus intravenous  contrast administration. Coronal and sagittal reformations and 3D post  processing was performed. CT dose reduction was achieved through use of a  standardized protocol tailored for this examination and automatic exposure  control for dose modulation.     CT brain perfusion was performed with generation of hemodynamic maps of multiple  parameters, including cerebral blood flow, cerebral blood volume, and MTT (mean  transit time). CT dose reduction was achieved through use of a standardized  protocol tailored for this examination and automatic exposure control for dose  modulation. This study was analyzed by the [address]  algorithm. FINDINGS:    CTA Head:  There is no evidence of large vessel occlusion or flow-limiting stenosis of the  intracranial internal carotid, anterior cerebral, and middle cerebral arteries. Calcific atherosclerosis of the bilateral carotid siphons with multifocal  moderate stenoses. The anterior communicating artery is patent. There is no evidence of large vessel occlusion or flow-limiting stenosis of the  intracranial vertebral arteries, basilar artery, or posterior cerebral arteries. Severe focal stenosis in the left P2 segment, and additional multifocal mild to  moderate stenoses in the bilateral posterior cerebral arteries. Moderate focal  stenosis in the proximal basilar artery. Fetal origin of the bilateral posterior  cerebral arteries. There is no evidence of aneurysm or vascular malformation. The dural venous  sinuses and deep cerebral venous system are patent. No evidence of abnormal  enhancement on delayed phase images. CTA NECK:  NASCET method was utilized for calculating stenosis. The aortic arch is unremarkable. The common carotid arteries demonstrate no  significant stenosis. Calcific atherosclerosis of the right carotid bifurcation  without significant stenosis. There is no evidence of significant stenosis in  the cervical left internal carotid artery. There is a right dominant vertebrobasilar arterial system. The left vertebral  artery arises directly from the aortic arch. The cervical vertebral arteries are  normal in course, size and contour without significant stenosis. Visualized soft tissues of the neck are unremarkable.  Visualized lung apices are  clear. No acute fracture or aggressive osseous lesion. Multilevel degenerative  disc disease most advanced at C5-C6 and C6-C7. CT Brain Perfusion:  There are no regional areas of elevated Tmax, decreased cerebral blood flow or  blood volume. rCBF < 30% = 0 cc. Tmax > 6 seconds = 0 cc. Impression  CTA Head:  1. No evidence of flow-limiting stenosis or aneurysm. Scattered atherosclerotic  disease as above, including severe stenosis in the left P2 segment, and moderate  stenoses in the basilar artery and bilateral carotid siphons. CTA Neck:  1. No evidence of significant stenosis. CT Brain Perfusion:  1. No acute abnormality. CTA CODE NEURO HEAD AND NECK W CONT    Narrative  EXAM:  CTA CODE NEURO HEAD AND NECK W CONT, CT CODE NEURO PERF W CBF    INDICATION:   Code Stroke    COMPARISON:  CT head 3/14/2022. CONTRAST:  120 mL of Isovue-370. TECHNIQUE:  Unenhanced  images were obtained to localize the volume for  acquisition. Multislice helical axial CT angiography was performed from the  aortic arch to the top of the head during uneventful rapid bolus intravenous  contrast administration. Coronal and sagittal reformations and 3D post  processing was performed. CT dose reduction was achieved through use of a  standardized protocol tailored for this examination and automatic exposure  control for dose modulation. CT brain perfusion was performed with generation of hemodynamic maps of multiple  parameters, including cerebral blood flow, cerebral blood volume, and MTT (mean  transit time). CT dose reduction was achieved through use of a standardized  protocol tailored for this examination and automatic exposure control for dose  modulation. This study was analyzed by the [address]  algorithm.     FINDINGS:    CTA Head:  There is no evidence of large vessel occlusion or flow-limiting stenosis of the  intracranial internal carotid, anterior cerebral, and middle cerebral arteries. Calcific atherosclerosis of the bilateral carotid siphons with multifocal  moderate stenoses. The anterior communicating artery is patent. There is no evidence of large vessel occlusion or flow-limiting stenosis of the  intracranial vertebral arteries, basilar artery, or posterior cerebral arteries. Severe focal stenosis in the left P2 segment, and additional multifocal mild to  moderate stenoses in the bilateral posterior cerebral arteries. Moderate focal  stenosis in the proximal basilar artery. Fetal origin of the bilateral posterior  cerebral arteries. There is no evidence of aneurysm or vascular malformation. The dural venous  sinuses and deep cerebral venous system are patent. No evidence of abnormal  enhancement on delayed phase images. CTA NECK:  NASCET method was utilized for calculating stenosis. The aortic arch is unremarkable. The common carotid arteries demonstrate no  significant stenosis. Calcific atherosclerosis of the right carotid bifurcation  without significant stenosis. There is no evidence of significant stenosis in  the cervical left internal carotid artery. There is a right dominant vertebrobasilar arterial system. The left vertebral  artery arises directly from the aortic arch. The cervical vertebral arteries are  normal in course, size and contour without significant stenosis. Visualized soft tissues of the neck are unremarkable. Visualized lung apices are  clear. No acute fracture or aggressive osseous lesion. Multilevel degenerative  disc disease most advanced at C5-C6 and C6-C7. CT Brain Perfusion:  There are no regional areas of elevated Tmax, decreased cerebral blood flow or  blood volume. rCBF < 30% = 0 cc. Tmax > 6 seconds = 0 cc. Impression  CTA Head:  1. No evidence of flow-limiting stenosis or aneurysm.  Scattered atherosclerotic  disease as above, including severe stenosis in the left P2 segment, and moderate  stenoses in the basilar artery and bilateral carotid siphons. CTA Neck:  1. No evidence of significant stenosis. CT Brain Perfusion:  1. No acute abnormality. MRI Results (maximum last 3): Results from [place] encounter on 01/19/15    MRI KNEE RT WO CONT    Narrative  **Final Report**      ICD Codes / Adm. Diagnosis: 719.46  790.29 / Pain in joint, lower leg  Examination:  MR KNEE WO CON RT  - [id number] - Jan 19 2015  6:43AM  Accession No:  [id number]  Reason:  right knee pain      REPORT:  EXAM:  MR KNEE WO CON RT    INDICATION: Right knee pain for 3 months without trauma    COMPARISON: None    TECHNIQUE: Axial T2 fat-saturated and proton density fat-saturated; coronal  T1 and proton density fat-saturated; and sagittal T2 fat-saturated, proton  density fat-saturated, and gradient echo MRI of the right knee  performed on  the 3 Karin magnet. CONTRAST:  None. FINDINGS: Bone marrow: Within normal limits. No acute fracture, dislocation,  or marrow replacing process. Joint fluid: Small joint effusion. No loose body. Collateral ligaments and posterior, lateral corner: Intact. Medial meniscus: Intact. Lateral meniscus: Intact. ACL and PCL: Intact. Tendons: Mild proximal patellar tendinosis. Quadriceps tendon is intact. Muscles: Within normal limits. Patellofemoral alignment: No patellar subluxation/tilt. Trochlear groove is  not hypoplastic. TT-TG distance: 9 mm. Articular cartilage: Subtle minimal loss of articular cartilage in the  lateral aspect of the medial femoral condyle. Remaining articular cartilage  is intact. Soft tissue mass: None. Impression  :    1. Minimal chondromalacia of the medial femoral condyle. 2. Small joint effusion. 3. Mild proximal patellar tendinosis. 4. Intact cruciates, collaterals, and menisci. Signing/Reading Doctor: Katt Moses ([id number])  Approved: Katt Moses ([id number])  Jan 19 2015 10:32AM      VAS/US/Carotid Doppler Results (maximum last 3):   Results from Hospital Encounter encounter on 01/30/15    DUPLEX LOWER EXT VENOUS BILAT      PET Results (maximum last 3): No results found for this or any previous visit. Assessment and Plan      48-year-old woman with history of stroke and multiple risk factors for stroke who has Right-sided hemiparesis due to previous stroke presenting with worsening baseline symptoms. I am also concerned about possible seizure. She is awake and alert now so I do not think she is clinically seizing. We will check routine EEG. Check MRI for possible new stroke. Consider dual antiplatelets for posterior circulation disease. Every 2 neurochecks  And in the stepdown unit at the minimum. PT OT and speech. Recommend inpt status. Following      This clinical note was dictated with an electronic dictation software that can make unintentional errors. If there are any questions, please contact me directly for clarification.       2 [facility], [address] [place] [address]  3/14/2022

## 2022-03-15 ENCOUNTER — APPOINTMENT (OUTPATIENT)
Dept: NON INVASIVE DIAGNOSTICS | Age: 42
DRG: 045 | End: 2022-03-15
Attending: STUDENT IN AN ORGANIZED HEALTH CARE EDUCATION/TRAINING PROGRAM
Payer: MEDICAID

## 2022-03-15 LAB
ANION GAP SERPL CALC-SCNC: 2 MMOL/L (ref 5–15)
ATRIAL RATE: 69 BPM
BASOPHILS # BLD: 0 K/UL (ref 0–0.1)
BASOPHILS NFR BLD: 0 % (ref 0–1)
BUN SERPL-MCNC: 14 MG/DL (ref 6–20)
BUN/CREAT SERPL: 18 (ref 12–20)
CALCIUM SERPL-MCNC: 9 MG/DL (ref 8.5–10.1)
CALCULATED P AXIS, ECG09: 49 DEGREES
CALCULATED R AXIS, ECG10: -50 DEGREES
CALCULATED T AXIS, ECG11: 44 DEGREES
CHLORIDE SERPL-SCNC: 104 MMOL/L (ref 97–108)
CHOLEST SERPL-MCNC: 148 MG/DL
CO2 SERPL-SCNC: 26 MMOL/L (ref 21–32)
CREAT SERPL-MCNC: 0.77 MG/DL (ref 0.55–1.02)
DIAGNOSIS, 93000: NORMAL
DIFFERENTIAL METHOD BLD: ABNORMAL
ECHO AV AREA PEAK VELOCITY: 3.2 CM2
ECHO AV AREA/BSA PEAK VELOCITY: 1.5 CM2/M2
ECHO AV PEAK GRADIENT: 7 MMHG
ECHO AV PEAK VELOCITY: 1.3 M/S
ECHO AV VELOCITY RATIO: 0.92
ECHO LA DIAMETER INDEX: 1.42 CM/M2
ECHO LA DIAMETER: 3 CM
ECHO LV E' LATERAL VELOCITY: 8 CM/S
ECHO LV E' SEPTAL VELOCITY: 7 CM/S
ECHO LV FRACTIONAL SHORTENING: 37 % (ref 28–44)
ECHO LV INTERNAL DIMENSION DIASTOLE INDEX: 2.03 CM/M2
ECHO LV INTERNAL DIMENSION DIASTOLIC: 4.3 CM (ref 3.9–5.3)
ECHO LV INTERNAL DIMENSION SYSTOLIC INDEX: 1.27 CM/M2
ECHO LV INTERNAL DIMENSION SYSTOLIC: 2.7 CM
ECHO LV IVSD: 1.3 CM (ref 0.6–0.9)
ECHO LV MASS 2D: 196.1 G (ref 67–162)
ECHO LV MASS INDEX 2D: 92.5 G/M2 (ref 43–95)
ECHO LV POSTERIOR WALL DIASTOLIC: 1.2 CM (ref 0.6–0.9)
ECHO LV RELATIVE WALL THICKNESS RATIO: 0.56
ECHO LVOT AREA: 3.5 CM2
ECHO LVOT DIAM: 2.1 CM
ECHO LVOT PEAK GRADIENT: 6 MMHG
ECHO LVOT PEAK VELOCITY: 1.2 M/S
ECHO MV A VELOCITY: 0.89 M/S
ECHO MV E VELOCITY: 0.86 M/S
ECHO MV E/A RATIO: 0.97
ECHO MV E/E' LATERAL: 10.75
ECHO MV E/E' RATIO (AVERAGED): 11.52
ECHO MV E/E' SEPTAL: 12.29
EOSINOPHIL # BLD: 0.1 K/UL (ref 0–0.4)
EOSINOPHIL NFR BLD: 1 % (ref 0–7)
ERYTHROCYTE [DISTWIDTH] IN BLOOD BY AUTOMATED COUNT: 14.3 % (ref 11.5–14.5)
EST. AVERAGE GLUCOSE BLD GHB EST-MCNC: 189 MG/DL
GLUCOSE BLD STRIP.AUTO-MCNC: 209 MG/DL (ref 65–117)
GLUCOSE BLD STRIP.AUTO-MCNC: 246 MG/DL (ref 65–117)
GLUCOSE BLD STRIP.AUTO-MCNC: 252 MG/DL (ref 65–117)
GLUCOSE BLD STRIP.AUTO-MCNC: 279 MG/DL (ref 65–117)
GLUCOSE SERPL-MCNC: 222 MG/DL (ref 65–100)
HBA1C MFR BLD: 8.2 % (ref 4–5.6)
HCT VFR BLD AUTO: 31 % (ref 35–47)
HDLC SERPL-MCNC: 46 MG/DL
HDLC SERPL: 3.2 {RATIO} (ref 0–5)
HGB BLD-MCNC: 9.6 G/DL (ref 11.5–16)
IMM GRANULOCYTES # BLD AUTO: 0 K/UL (ref 0–0.04)
IMM GRANULOCYTES NFR BLD AUTO: 0 % (ref 0–0.5)
LDLC SERPL CALC-MCNC: 68.6 MG/DL (ref 0–100)
LYMPHOCYTES # BLD: 1.7 K/UL (ref 0.8–3.5)
LYMPHOCYTES NFR BLD: 18 % (ref 12–49)
MCH RBC QN AUTO: 25.9 PG (ref 26–34)
MCHC RBC AUTO-ENTMCNC: 31 G/DL (ref 30–36.5)
MCV RBC AUTO: 83.8 FL (ref 80–99)
MONOCYTES # BLD: 0.4 K/UL (ref 0–1)
MONOCYTES NFR BLD: 5 % (ref 5–13)
NEUTS SEG # BLD: 6.9 K/UL (ref 1.8–8)
NEUTS SEG NFR BLD: 76 % (ref 32–75)
NRBC # BLD: 0 K/UL (ref 0–0.01)
NRBC BLD-RTO: 0 PER 100 WBC
P-R INTERVAL, ECG05: 134 MS
PLATELET # BLD AUTO: 191 K/UL (ref 150–400)
PMV BLD AUTO: 11.2 FL (ref 8.9–12.9)
POTASSIUM SERPL-SCNC: ABNORMAL MMOL/L (ref 3.5–5.1)
Q-T INTERVAL, ECG07: 412 MS
QRS DURATION, ECG06: 84 MS
QTC CALCULATION (BEZET), ECG08: 441 MS
RBC # BLD AUTO: 3.7 M/UL (ref 3.8–5.2)
RPR SER QL: NONREACTIVE
SERVICE CMNT-IMP: ABNORMAL
SODIUM SERPL-SCNC: 132 MMOL/L (ref 136–145)
TRIGL SERPL-MCNC: 167 MG/DL (ref ?–150)
VENTRICULAR RATE, ECG03: 69 BPM
VLDLC SERPL CALC-MCNC: 33.4 MG/DL
WBC # BLD AUTO: 9.2 K/UL (ref 3.6–11)

## 2022-03-15 PROCEDURE — 80048 BASIC METABOLIC PNL TOTAL CA: CPT

## 2022-03-15 PROCEDURE — 74011250636 HC RX REV CODE- 250/636: Performed by: PSYCHIATRY & NEUROLOGY

## 2022-03-15 PROCEDURE — 92522 EVALUATE SPEECH PRODUCTION: CPT | Performed by: SPEECH-LANGUAGE PATHOLOGIST

## 2022-03-15 PROCEDURE — 74011636637 HC RX REV CODE- 636/637: Performed by: STUDENT IN AN ORGANIZED HEALTH CARE EDUCATION/TRAINING PROGRAM

## 2022-03-15 PROCEDURE — 92610 EVALUATE SWALLOWING FUNCTION: CPT | Performed by: SPEECH-LANGUAGE PATHOLOGIST

## 2022-03-15 PROCEDURE — 85025 COMPLETE CBC W/AUTO DIFF WBC: CPT

## 2022-03-15 PROCEDURE — 80061 LIPID PANEL: CPT

## 2022-03-15 PROCEDURE — 99233 SBSQ HOSP IP/OBS HIGH 50: CPT | Performed by: PSYCHIATRY & NEUROLOGY

## 2022-03-15 PROCEDURE — 74011250636 HC RX REV CODE- 250/636: Performed by: STUDENT IN AN ORGANIZED HEALTH CARE EDUCATION/TRAINING PROGRAM

## 2022-03-15 PROCEDURE — 65660000000 HC RM CCU STEPDOWN

## 2022-03-15 PROCEDURE — 74011250637 HC RX REV CODE- 250/637: Performed by: STUDENT IN AN ORGANIZED HEALTH CARE EDUCATION/TRAINING PROGRAM

## 2022-03-15 PROCEDURE — 77030038269 HC DRN EXT URIN PURWCK BARD -A

## 2022-03-15 PROCEDURE — 74011250637 HC RX REV CODE- 250/637: Performed by: PSYCHIATRY & NEUROLOGY

## 2022-03-15 PROCEDURE — 36415 COLL VENOUS BLD VENIPUNCTURE: CPT

## 2022-03-15 PROCEDURE — 74011000250 HC RX REV CODE- 250: Performed by: STUDENT IN AN ORGANIZED HEALTH CARE EDUCATION/TRAINING PROGRAM

## 2022-03-15 PROCEDURE — 82962 GLUCOSE BLOOD TEST: CPT

## 2022-03-15 PROCEDURE — 83036 HEMOGLOBIN GLYCOSYLATED A1C: CPT

## 2022-03-15 PROCEDURE — 93306 TTE W/DOPPLER COMPLETE: CPT

## 2022-03-15 PROCEDURE — 95816 EEG AWAKE AND DROWSY: CPT | Performed by: PSYCHIATRY & NEUROLOGY

## 2022-03-15 RX ORDER — LEVETIRACETAM 500 MG/5ML
750 INJECTION, SOLUTION, CONCENTRATE INTRAVENOUS EVERY 12 HOURS
Status: DISCONTINUED | OUTPATIENT
Start: 2022-03-15 | End: 2022-03-16 | Stop reason: SDUPTHER

## 2022-03-15 RX ORDER — CLOPIDOGREL BISULFATE 75 MG/1
75 TABLET ORAL DAILY
Status: DISCONTINUED | OUTPATIENT
Start: 2022-03-15 | End: 2022-03-16 | Stop reason: HOSPADM

## 2022-03-15 RX ADMIN — ENOXAPARIN SODIUM 40 MG: 100 INJECTION SUBCUTANEOUS at 14:47

## 2022-03-15 RX ADMIN — ACETAMINOPHEN 650 MG: 325 TABLET ORAL at 16:52

## 2022-03-15 RX ADMIN — SENNOSIDES 8.6 MG: 8.6 TABLET, FILM COATED ORAL at 09:06

## 2022-03-15 RX ADMIN — Medication 7 UNITS: at 12:36

## 2022-03-15 RX ADMIN — PANTOPRAZOLE SODIUM 40 MG: 40 TABLET, DELAYED RELEASE ORAL at 09:05

## 2022-03-15 RX ADMIN — Medication 4 UNITS: at 21:51

## 2022-03-15 RX ADMIN — Medication 4 UNITS: at 09:06

## 2022-03-15 RX ADMIN — Medication 400 MG: at 09:05

## 2022-03-15 RX ADMIN — ASPIRIN 81 MG CHEWABLE TABLET 81 MG: 81 TABLET CHEWABLE at 09:05

## 2022-03-15 RX ADMIN — ATORVASTATIN CALCIUM 80 MG: 40 TABLET, FILM COATED ORAL at 21:51

## 2022-03-15 RX ADMIN — POLYETHYLENE GLYCOL 3350 17 G: 17 POWDER, FOR SOLUTION ORAL at 09:07

## 2022-03-15 RX ADMIN — LEVETIRACETAM 750 MG: 100 INJECTION INTRAVENOUS at 19:06

## 2022-03-15 RX ADMIN — CLOPIDOGREL BISULFATE 75 MG: 75 TABLET ORAL at 09:14

## 2022-03-15 RX ADMIN — INSULIN GLARGINE 36 UNITS: 100 INJECTION, SOLUTION SUBCUTANEOUS at 09:14

## 2022-03-15 RX ADMIN — LEVETIRACETAM 1000 MG: 100 INJECTION, SOLUTION INTRAVENOUS at 06:53

## 2022-03-15 RX ADMIN — Medication 4 UNITS: at 16:51

## 2022-03-15 NOTE — PROGRESS NOTES
Bedside and Verbal shift change report given to Aletha Chao (oncoming nurse) by Yanira Vázquez RN (offgoing nurse). Report included the following information SBAR, Kardex and Intake/Output.

## 2022-03-15 NOTE — PROGRESS NOTES
Problem: Motor Speech Impaired (Adult)  Goal: *Acute Goals and Plan of Care (Insert Text)  Description: Initiated 3/15/2022  1. Patient will communicate medical, social, and safety wants and needs in a dynamic environment with 80% intelligibility. Outcome: Not Met   SPEECH LANGUAGE PATHOLOGY BEDSIDE SWALLOW AND SPEECH EVALUATIONS  Patient: Mare Shipman (57 y.o. female)  Date: 3/15/2022  Primary Diagnosis: CVA (cerebral vascular accident) [facility]) [I63.9]        Precautions:    Fall    ASSESSMENT :  Based on the objective data described below, the patient presents with mild oral dysphagia, which she compensates for well. She experience anterior loss (reported) from the R side of her mouth, but she is aware of this and uses a straw placed on the L to compensate. She is able to chew on the L and avoid pocketing. There are no s/s of aspiration, even with successive large gulps via straw. No need for SLP to follow for swallowing. Patient presents with mild dysarthria. She reports this began at the time of her CVA in July 2021. She received SLP services, though she reports she was told she did not need speech therapy. She is concerned that her family cannot understand her, and describes a family with 3 boys whom she enjoys spending time with. At this juncture, her mild dysarthria appears to be impacting her quality of life. Noted moderate distortion with complex articulation tasks. Patient compensates well in conversation with slowed rate and over-articulation, though suspect in a home environment this is more challenging. Discussed with patient that overall prognosis for improvement in motor speech skills will be slow, with very small changes over time. She verbalized understanding. Patient will benefit from skilled intervention to address the above impairments. Patients rehabilitation potential is considered to be limited. Patient understands this.       PLAN :  Recommendations and Planned Interventions:  No SLP for swallowing, regular diet/thin liquids  She will benefit from follow up for dysarthria, though improvement will be best with consistent therapy after discharge from this setting. Frequency/Duration: Patient will be followed by speech-language pathology 1 time a week to address goals. Discharge Recommendations: To Be Determined     SUBJECTIVE:   Patient stated My family cannot understand me. OBJECTIVE:     Past Medical History:   Diagnosis Date    Depression     Diabetes ([facility] [address]     Edema     Gout     Herpes genitalis     Hypertension     Ill-defined condition     gout    Mood disorder ([facility] [address]     Other ill-defined conditions(799.89)     cholesterol     Psychiatric disorder     depression    Psychiatric disorder     PTSD    PTSD (post-traumatic stress disorder)     Sleep disorder     Stroke [facility])     UTI (lower urinary tract infection)      Past Surgical History:   Procedure Laterality Date    HX GYN       X 3     Prior Level of Function/Home Situation:    Home Situation  Home Environment: Private residence (Mom's home)  # Steps to Enter: 1  One/Two Story Residence: One story  Living Alone: No  Support Systems: Parent(s),Child(irlanda) (mom and 3 sons (25, 12, 13))  Patient Expects to be Discharged to[de-identified] Home  Current DME Used/Available at Home: Grab bars,Shower chair  Tub or Shower Type: Shower  Diet prior to admission: regular with thins  Current Diet:  NPO   Cognitive and Communication Status:  Neurologic State: Alert  Orientation Level: Oriented to situation,Oriented to person  Cognition: Follows commands  Perception: Cues to maintain midline in sitting (L lateral lean)  Perseveration: No perseveration noted  Safety/Judgement: Awareness of environment,Fall prevention  Swallowing Evaluation:   Oral Assessment:  Oral Assessment  Labial: Right droop  Dentition: Natural  Oral Hygiene: moist, clean  Lingual: Right deviation  Mandible: No impairment  P.O.  Trials:  Patient Position: up in bed  Vocal quality prior to P.O.: No impairment  Consistency Presented: Thin liquid; Solid;Puree  How Presented: Self-fed/presented;Spoon;Straw;Successive swallows     Bolus Acceptance: No impairment (patient independently places straw on L)  Bolus Formation/Control: No impairment     Propulsion: No impairment  Oral Residue: None        Aspiration Signs/Symptoms: None                Oral Phase Severity: Mild  Pharyngeal Phase Severity : No impairment      Speech/Language Evaluation  Motor Speech:  Oral-Motor Structure/Motor Speech  Labial: Right droop  Dentition: Natural  Oral Hygiene: moist, clean  Lingual: Right deviation  Mandible: No impairment  Apraxic Characteristics: None  Dysarthric Characteristics: Decreased rate; Imprecise  Intelligibility: No impairment  Overall Impairment Severity: Mild  Language Comprehension and Expression:      No aphasia noted                                                            Voice:                 Vocal Quality: No impairment                                   Pain:  Pain Scale 1: Numeric (0 - 10)  Pain Intensity 1: 0       After treatment:   Patient left in no apparent distress in bed, Call bell within reach, Nursing notified, and Caregiver / family present    COMMUNICATION/EDUCATION:   Patient was educated as noted above. She verbalized understanding. The patient's plan of care including recommendations, planned interventions, and recommended diet changes were discussed with: Registered nurse. Patient/family agree to work toward stated goals and plan of care.     Thank you for this referral.  Sawyer Richmodn, SLP  Time Calculation: 30 mins

## 2022-03-15 NOTE — PROCEDURES
[facility]  EEG    Name:  Hernan Akers  MR#:  [id number]  :  1980  ACCOUNT #:  [de-identified]  DATE OF SERVICE:  03/15/2022      REQUESTING PHYSICIAN:  Luz Campos DO    HISTORY:  The patient is a 19-year-old female who is being evaluated after a seizure-like activity. DESCRIPTION:  This is an 18-channel EEG performed on an awake patient. The dominant posterior background rhythm consists of symmetric well-modulated medium voltage rhythms in the 9 Hz frequency range out of the posterior head region. The alpha rhythm in the right posterior head region was sharply contoured, normal variant. Drowsiness and sleep states were not recorded. Photic stimulation elicits a symmetric driving response. Hyperventilation was not performed. EEG SUMMARY:  Normal study. CLINICAL INTERPRETATION:  This was a normal EEG during wakeful state of the patient. No lateralizing or epileptiform features were noted.       Venice Vega MD      [id number]  D:  03/15/2022 15:16  T:  03/15/2022 15:59  JOB #:  [id number]

## 2022-03-15 NOTE — PROGRESS NOTES
Problem: Patient Education: Go to Patient Education Activity  Goal: Patient/Family Education  Outcome: Progressing Towards Goal     Problem: Pressure Injury - Risk of  Goal: *Prevention of pressure injury  Description: Document Mahendra Scale and appropriate interventions in the flowsheet. Outcome: Progressing Towards Goal  Note: Pressure Injury Interventions: Activity Interventions: PT/OT evaluation    Mobility Interventions: HOB 30 degrees or less,PT/OT evaluation,Pressure redistribution bed/mattress (bed type)    Nutrition Interventions: Document food/fluid/supplement intake,Offer support with meals,snacks and hydration    Friction and Shear Interventions: HOB 30 degrees or less,Minimize layers                Problem: Patient Education: Go to Patient Education Activity  Goal: Patient/Family Education  Outcome: Progressing Towards Goal     Problem: Falls - Risk of  Goal: *Absence of Falls  Description: Document Felicita Fall Risk and appropriate interventions in the flowsheet.   Outcome: Progressing Towards Goal  Note: Fall Risk Interventions:  Mobility Interventions: Communicate number of staff needed for ambulation/transfer,Bed/chair exit alarm,Patient to call before getting OOB,PT Consult for mobility concerns         Medication Interventions: Patient to call before getting OOB,Bed/chair exit alarm,Teach patient to arise slowly    Elimination Interventions: Call light in reach,Patient to call for help with toileting needs              Problem: Patient Education: Go to Patient Education Activity  Goal: Patient/Family Education  Outcome: Progressing Towards Goal     Problem: Patient Education: Go to Patient Education Activity  Goal: Patient/Family Education  Outcome: Progressing Towards Goal     Problem: Patient Education: Go to Patient Education Activity  Goal: Patient/Family Education  Outcome: Progressing Towards Goal

## 2022-03-15 NOTE — PROGRESS NOTES
Occupational Therapy: defer    Attempted OT treatment session. Patient is undergoing EEG at bedside. Will defer OT at this time and will f/u later as able and appropriate.     Carin Bennett, OTR/L

## 2022-03-15 NOTE — PROGRESS NOTES
[facility]  Hospitalist Group                                                                                          Hospitalist Progress Note  [facility], DO  Answering service: [phone] OR [phone] from in house phone        Date of Service:  3/15/2022  NAME:  Kenney Ribera  :  1980  MRN:  [id number]      Admission Summary:   Kenney Ribera is a 42 y.o. female hx of CVA  was in rehab for this, IDDM, hx of seizures, GERD, LISA on CPAP presents from her rehab center after she had worsening right-sided weakness and slurred speech from her baseline. Presented as a stroke alert. Last known well baseline was last night around 2130 woke up with symptoms this am. She currently feels at her baseline denies any blurry vision, significant headaches, nausea, vomiting, abdominal pain, significant new onset upper extremity or lower extremity weakness. She denies any significant numbness or tingling in her upper extremities lower extremities. Arrived as Code stroke  NIH 9 not a TPA candidate. .  CTa of the head and neck/ CT perfusion negative. Labs overall unremarkable. Patient given 300 mg Plavix, aspirin. Hospitalist service was consulted for further evaluation.      Interval history / Subjective: Follow up stroke. Patient seen and examined earlier today. Has persistent right-sided weakness. MRI with small acute infarct in the inferior left frontal lobe, extending into the superior left basal ganglia.      Assessment & Plan:        Acute CVA  Seizure  -MRI with small acute infarct left inferior frontal lobe  -Appreciate neurology  -Continue aspirin, Plavix  -EEG normal  -TTE with bubble completed, pending read  -HA1C 8.2, LDL 68.6  -PT OT, speech eval   -Permissive hypertension     Arm pain  -b/l this is chronic for her though, lidocaine patches prn      IDDM -continue Lantus, sliding scale insulin readjust prn   Hypertension- permissive htn, hold home antihypertensives Hyperlipidemia - cont statin   GERD - cont home ppi  LISA - cpap qhs   Morbid obesity -counseled on diet weight loss          Code status:  Full  Prophylaxis: Lovenox  Care Plan discussed with: Patient  Anticipated Disposition: 1 to 2 days     Hospital Problems  Date Reviewed: 12/14/2021          Codes Class Noted POA    CVA (cerebral vascular accident) [facility]) ICD-10-CM: I63.9  ICD-9-CM: 434.91  3/14/2022 Unknown                Review of Systems:   Negative unless stated above      Vital Signs:    Last 24hrs VS reviewed since prior progress note. Most recent are:  Visit Vitals  BP (!) 148/91 (BP 1 Location: Left upper arm, BP Patient Position: At rest;Supine)   Pulse 79   Temp 98.1 °F (36.7 °C)   Resp 16   Ht 5' 4\" (1.626 m)   Wt 110.2 kg (242 lb 15.2 oz)   SpO2 98%   BMI 41.70 kg/m²         Intake/Output Summary (Last 24 hours) at 3/15/2022 1840  Last data filed at 3/15/2022 0800  Gross per 24 hour   Intake 480 ml   Output 750 ml   Net -270 ml        Physical Examination:     I had a face to face encounter with this patient and independently examined them on 3/15/2022 as outlined below:          Constitutional:  No acute distress, cooperative, pleasant    ENT:  Oral mucosa moist, oropharynx benign. Resp:  CTA bilaterally. No wheezing/rhonchi/rales. No accessory muscle use. CV:  Regular rhythm, normal rate, no murmurs, gallops, rubs    GI:  Soft, non distended, non tender.  normoactive bowel sounds, no hepatosplenomegaly     Musculoskeletal:  No edema, warm, 2+ pulses throughout    Neurologic:  Right-sided facial droop, right upper and lower extremity weakness            Data Review:    Review and/or order of clinical lab test  Review and/or order of tests in the radiology section of CPT  Review and/or order of tests in the medicine section of CPT      Labs:     Recent Labs     03/15/22  0516 03/14/22  1237   WBC 9.2 9.2   HGB 9.6* 10.7*   HCT 31.0* 34.7*    190     Recent Labs     03/15/22  0516 03/14/22  1237   * 135*   K Sample is hemolyzed (hemoglobin is 4.6    102   CO2 26 28   BUN 14 13   CREA 0.77 0.82   * 274*   CA 9.0 9.8     Recent Labs     03/14/22  1237   ALT 29   *   TBILI 0.4   TP 7.8   ALB 3.4*   GLOB 4.4*     Recent Labs     03/14/22  1237   INR 1.0   PTP 10.3      No results for input(s): FE, TIBC, PSAT, FERR in the last 72 hours. Lab Results   Component Value Date/Time    Folate SPECIMEN HEMOLYZED, RESULTS MAY BE AFFECTED 03/14/2022 04:33 PM      No results for input(s): PH, PCO2, PO2 in the last 72 hours. No results for input(s): CPK, CKNDX, TROIQ in the last 72 hours.     No lab exists for component: CPKMB  Lab Results   Component Value Date/Time    Cholesterol, total 148 03/15/2022 05:16 AM    HDL Cholesterol 46 03/15/2022 05:16 AM    LDL, calculated 68.6 03/15/2022 05:16 AM    Triglyceride 167 (H) 03/15/2022 05:16 AM    CHOL/HDL Ratio 3.2 03/15/2022 05:16 AM     Lab Results   Component Value Date/Time    Glucose (POC) 246 (H) 03/15/2022 04:33 PM    Glucose (POC) 279 (H) 03/15/2022 11:42 AM    Glucose (POC) 209 (H) 03/15/2022 06:57 AM    Glucose (POC) 174 (H) 03/14/2022 09:30 PM    Glucose (POC) 202 (H) 03/14/2022 04:31 PM     Lab Results   Component Value Date/Time    Color YELLOW/STRAW 09/23/2021 06:56 PM    Appearance CLEAR 09/23/2021 06:56 PM    Specific gravity 1.018 09/23/2021 06:56 PM    Specific gravity >1.030 (H) 05/22/2021 04:09 AM    pH (UA) 5.0 09/23/2021 06:56 PM    Protein Negative 09/23/2021 06:56 PM    Glucose Negative 09/23/2021 06:56 PM    Ketone Negative 09/23/2021 06:56 PM    Bilirubin Negative 09/23/2021 06:56 PM    Urobilinogen 0.2 09/23/2021 06:56 PM    Nitrites Negative 09/23/2021 06:56 PM    Leukocyte Esterase Negative 09/23/2021 06:56 PM    Epithelial cells FEW 09/23/2021 06:56 PM    Bacteria Negative 09/23/2021 06:56 PM    WBC 0-4 09/23/2021 06:56 PM    RBC 0-5 09/23/2021 06:56 PM         Medications Reviewed:     Current Facility-Administered Medications   Medication Dose Route Frequency    clopidogreL (PLAVIX) tablet 75 mg  75 mg Oral DAILY    levETIRAcetam (KEPPRA) injection 750 mg  750 mg IntraVENous Q12H    acetaminophen (TYLENOL) tablet 650 mg  650 mg Oral Q4H PRN    Or    acetaminophen (TYLENOL) solution 650 mg  650 mg Per NG tube Q4H PRN    Or    acetaminophen (TYLENOL) suppository 650 mg  650 mg Rectal Q4H PRN    aspirin chewable tablet 81 mg  81 mg Oral DAILY    labetaloL (NORMODYNE;TRANDATE) injection 5 mg  5 mg IntraVENous Q10MIN PRN    enoxaparin (LOVENOX) injection 40 mg  40 mg SubCUTAneous Q24H    atorvastatin (LIPITOR) tablet 80 mg  80 mg Oral QHS    magnesium oxide (MAG-OX) tablet 400 mg  400 mg Oral DAILY    pantoprazole (PROTONIX) tablet 40 mg  40 mg Oral DAILY    senna (SENOKOT) tablet 8.6 mg  1 Tablet Oral DAILY    insulin lispro (HUMALOG) injection   SubCUTAneous AC&HS    glucose chewable tablet 16 g  4 Tablet Oral PRN    glucagon (GLUCAGEN) injection 1 mg  1 mg IntraMUSCular PRN    insulin glargine (LANTUS) injection 36 Units  36 Units SubCUTAneous DAILY    lidocaine 4 % patch 2 Patch  2 Patch TransDERmal Q24H    polyethylene glycol (MIRALAX) packet 17 g  17 g Oral DAILY     ______________________________________________________________________  EXPECTED LENGTH OF STAY: - - -  ACTUAL LENGTH OF STAY:          [facility],

## 2022-03-15 NOTE — CONSULTS
Neurology Progress Note    Patient ID:  Lex Deal  [id number]  75 y.o.  1980    Chief Complaint: Worsening weakness    Subjective:     76-year-old woman with history of stroke with right-sided weakness who presents with worsening weakness. MRI showing a new left MCA infarct. She has posterior circulation disease as well. History of seizures and tongue biting on presentation. Reportedly only taking aspirin at rehab. She still feels the same today. Objective:       ROS:  Per HPI  All other 12 pt ROS negative    Meds:  Current Facility-Administered Medications   Medication Dose Route Frequency    clopidogreL (PLAVIX) tablet 75 mg  75 mg Oral DAILY    levETIRAcetam (KEPPRA) injection 750 mg  750 mg IntraVENous Q12H    acetaminophen (TYLENOL) tablet 650 mg  650 mg Oral Q4H PRN    Or    acetaminophen (TYLENOL) solution 650 mg  650 mg Per NG tube Q4H PRN    Or    acetaminophen (TYLENOL) suppository 650 mg  650 mg Rectal Q4H PRN    aspirin chewable tablet 81 mg  81 mg Oral DAILY    labetaloL (NORMODYNE;TRANDATE) injection 5 mg  5 mg IntraVENous Q10MIN PRN    enoxaparin (LOVENOX) injection 40 mg  40 mg SubCUTAneous Q24H    atorvastatin (LIPITOR) tablet 80 mg  80 mg Oral QHS    magnesium oxide (MAG-OX) tablet 400 mg  400 mg Oral DAILY    pantoprazole (PROTONIX) tablet 40 mg  40 mg Oral DAILY    senna (SENOKOT) tablet 8.6 mg  1 Tablet Oral DAILY    insulin lispro (HUMALOG) injection   SubCUTAneous AC&HS    glucose chewable tablet 16 g  4 Tablet Oral PRN    glucagon (GLUCAGEN) injection 1 mg  1 mg IntraMUSCular PRN    insulin glargine (LANTUS) injection 36 Units  36 Units SubCUTAneous DAILY    lidocaine 4 % patch 2 Patch  2 Patch TransDERmal Q24H    polyethylene glycol (MIRALAX) packet 17 g  17 g Oral DAILY       MRI Results (maximum last 3): Results from [place] encounter on 03/14/22    MRI BRAIN WO CONT    Narrative  *PRELIMINARY REPORT*    1.   Left basal ganglia acute-subacute infarct  2. Chronic right cerebellar infarcts. Preliminary report was provided by Dr. Rudy Aschoff, the on-call radiologist, at 1625  hours 3/14/2022    Final report to follow. *END PRELIMINARY REPORT*    CLINICAL HISTORY: cva r/o. INDICATION: cva r/o. COMPARISON: CTA 3/14/2022    TECHNIQUE: MR examination of the brain includes axial and sagittal T1, coronal  T2, axial T2, axial FLAIR, axial gradient echo, axial DWI. CONTRAST: None    FINDINGS:    Small acute infarction in the periventricular white matter of the left frontal  lobe extending into the superior aspect of the left basal ganglia. No  hemorrhage, midline shift or mass effect is associated. IACs are symmetric in size. There is a small remote infarction of the right  cerebellum. Moderate size remote infarction in the lateral right cerebellum/brachium pontis. Sulcal and ventricular prominence for patient age. Left maxillary sinus  opacification. Mild to moderate right maxillary sinus disease. There is no  evidence of midline shift or mass-effect. There are no extra-axial fluid  collections. There is no Chiari or syrinx. The pituitary and infundibulum are  grossly unremarkable. There is no skull base mass. Cerebellopontine angles are  grossly unremarkable. The major intracranial vascular flow-voids are  unremarkable. The cavernous sinuses are symmetric. Optic chiasm and infundibulum  grossly unremarkable. Orbits are grossly symmetric. Dural venous sinuses are grossly patent. The mastoid air cells are well pneumatized and clear. Impression  Small acute infarction in the inferior left frontal lobe extending into the  superior left basal ganglia. Moderate size remote infarction in the lateral right cerebellum. Small remote infarction in the posterior right cerebellum. Mild to moderate cerebral atrophy for patient age. No associated hemorrhage, midline shift or mass effect.       Results from [place] encounter on 01/19/15    MRI KNEE RT WO CONT    Narrative  **Final Report**      ICD Codes / Adm. Diagnosis: 719.46  790.29 / Pain in joint, lower leg  Examination:   KNEE WO CON RT  - [id number] - Jan 19 2015  6:43AM  Accession No:  [id number]  Reason:  right knee pain      REPORT:  EXAM:   KNEE WO CON RT    INDICATION: Right knee pain for 3 months without trauma    COMPARISON: None    TECHNIQUE: Axial T2 fat-saturated and proton density fat-saturated; coronal  T1 and proton density fat-saturated; and sagittal T2 fat-saturated, proton  density fat-saturated, and gradient echo MRI of the right knee  performed on  the 3 Karin magnet. CONTRAST:  None. FINDINGS: Bone marrow: Within normal limits. No acute fracture, dislocation,  or marrow replacing process. Joint fluid: Small joint effusion. No loose body. Collateral ligaments and posterior, lateral corner: Intact. Medial meniscus: Intact. Lateral meniscus: Intact. ACL and PCL: Intact. Tendons: Mild proximal patellar tendinosis. Quadriceps tendon is intact. Muscles: Within normal limits. Patellofemoral alignment: No patellar subluxation/tilt. Trochlear groove is  not hypoplastic. TT-TG distance: 9 mm. Articular cartilage: Subtle minimal loss of articular cartilage in the  lateral aspect of the medial femoral condyle. Remaining articular cartilage  is intact. Soft tissue mass: None. Impression  :    1. Minimal chondromalacia of the medial femoral condyle. 2. Small joint effusion. 3. Mild proximal patellar tendinosis. 4. Intact cruciates, collaterals, and menisci.           Signing/Reading Doctor: Margot Martin ([id number])  Approved: Margot Goodness ([id number])  Jan 19 2015 10:32AM      Lab Review   Recent Results (from the past 24 hour(s))   CBC WITH AUTOMATED DIFF    Collection Time: 03/14/22 12:37 PM   Result Value Ref Range    WBC 9.2 3.6 - 11.0 K/uL    RBC 4.15 3.80 - 5.20 M/uL    HGB 10.7 (L) 11.5 - 16.0 g/dL    HCT 34.7 (L) 35.0 - 47.0 %    MCV 83.6 80.0 - 99.0 FL MCH 25.8 (L) 26.0 - 34.0 PG    MCHC 30.8 30.0 - 36.5 g/dL    RDW 14.4 11.5 - 14.5 %    PLATELET 859 818 - 864 K/uL    MPV 10.6 8.9 - 12.9 FL    NRBC 0.0 0  WBC    ABSOLUTE NRBC 0.00 0.00 - 0.01 K/uL    NEUTROPHILS 70 32 - 75 %    LYMPHOCYTES 24 12 - 49 %    MONOCYTES 5 5 - 13 %    EOSINOPHILS 1 0 - 7 %    BASOPHILS 0 0 - 1 %    IMMATURE GRANULOCYTES 0 0.0 - 0.5 %    ABS. NEUTROPHILS 6.5 1.8 - 8.0 K/UL    ABS. LYMPHOCYTES 2.2 0.8 - 3.5 K/UL    ABS. MONOCYTES 0.4 0.0 - 1.0 K/UL    ABS. EOSINOPHILS 0.1 0.0 - 0.4 K/UL    ABS. BASOPHILS 0.0 0.0 - 0.1 K/UL    ABS. IMM. GRANS. 0.0 0.00 - 0.04 K/UL    DF AUTOMATED     METABOLIC PANEL, COMPREHENSIVE    Collection Time: 03/14/22 12:37 PM   Result Value Ref Range    Sodium 135 (L) 136 - 145 mmol/L    Potassium 4.6 3.5 - 5.1 mmol/L    Chloride 102 97 - 108 mmol/L    CO2 28 21 - 32 mmol/L    Anion gap 5 5 - 15 mmol/L    Glucose 274 (H) 65 - 100 mg/dL    BUN 13 6 - 20 MG/DL    Creatinine 0.82 0.55 - 1.02 MG/DL    BUN/Creatinine ratio 16 12 - 20      GFR est AA >60 >60 ml/min/1.73m2    GFR est non-AA >60 >60 ml/min/1.73m2    Calcium 9.8 8.5 - 10.1 MG/DL    Bilirubin, total 0.4 0.2 - 1.0 MG/DL    ALT (SGPT) 29 12 - 78 U/L    AST (SGOT) 27 15 - 37 U/L    Alk.  phosphatase 176 (H) 45 - 117 U/L    Protein, total 7.8 6.4 - 8.2 g/dL    Albumin 3.4 (L) 3.5 - 5.0 g/dL    Globulin 4.4 (H) 2.0 - 4.0 g/dL    A-G Ratio 0.8 (L) 1.1 - 2.2     PROTHROMBIN TIME + INR    Collection Time: 03/14/22 12:37 PM   Result Value Ref Range    INR 1.0 0.9 - 1.1      Prothrombin time 10.3 9.0 - 11.1 sec   EKG, 12 LEAD, INITIAL    Collection Time: 03/14/22  1:18 PM   Result Value Ref Range    Ventricular Rate 69 BPM    Atrial Rate 69 BPM    P-R Interval 134 ms    QRS Duration 84 ms    Q-T Interval 412 ms    QTC Calculation (Bezet) 441 ms    Calculated P Axis 49 degrees    Calculated R Axis -50 degrees    Calculated T Axis 44 degrees    Diagnosis       Normal sinus rhythm  Left axis deviation  Abnormal ECG  When compared with ECG of 23-SEP-2021 18:41,  QRS axis shifted left     GLUCOSE, POC    Collection Time: 03/14/22  4:31 PM   Result Value Ref Range    Glucose (POC) 202 (H) 65 - 117 mg/dL    Performed by Harika Salazar    Collection Time: 03/14/22  4:33 PM   Result Value Ref Range    SAMPLES BEING HELD 1RED,1PST,1LAV     COMMENT        Add-on orders for these samples will be processed based on acceptable specimen integrity and analyte stability, which may vary by analyte.    FOLATE    Collection Time: 03/14/22  4:33 PM   Result Value Ref Range    Folate SPECIMEN HEMOLYZED, RESULTS MAY BE AFFECTED 5.0 - 21.0 ng/mL   TSH 3RD GENERATION    Collection Time: 03/14/22  4:33 PM   Result Value Ref Range    TSH 1.04 0.36 - 3.74 uIU/mL   VITAMIN B12    Collection Time: 03/14/22  4:33 PM   Result Value Ref Range    Vitamin B12 SPECIMEN HEMOLYZED, RESULTS MAY BE AFFECTED 193 - 986 pg/mL   GLUCOSE, POC    Collection Time: 03/14/22  9:30 PM   Result Value Ref Range    Glucose (POC) 174 (H) 65 - 117 mg/dL    Performed by Estefani Carlisle    LIPID PANEL    Collection Time: 03/15/22  5:16 AM   Result Value Ref Range    Cholesterol, total 148 <200 MG/DL    Triglyceride 167 (H) <150 MG/DL    HDL Cholesterol 46 MG/DL    LDL, calculated 68.6 0 - 100 MG/DL    VLDL, calculated 33.4 MG/DL    CHOL/HDL Ratio 3.2 0.0 - 5.0     HEMOGLOBIN A1C WITH EAG    Collection Time: 03/15/22  5:16 AM   Result Value Ref Range    Hemoglobin A1c 8.2 (H) 4.0 - 5.6 %    Est. average glucose 189 mg/dL   CBC WITH AUTOMATED DIFF    Collection Time: 03/15/22  5:16 AM   Result Value Ref Range    WBC 9.2 3.6 - 11.0 K/uL    RBC 3.70 (L) 3.80 - 5.20 M/uL    HGB 9.6 (L) 11.5 - 16.0 g/dL    HCT 31.0 (L) 35.0 - 47.0 %    MCV 83.8 80.0 - 99.0 FL    MCH 25.9 (L) 26.0 - 34.0 PG    MCHC 31.0 30.0 - 36.5 g/dL    RDW 14.3 11.5 - 14.5 %    PLATELET 594 625 - 660 K/uL    MPV 11.2 8.9 - 12.9 FL    NRBC 0.0 0  WBC    ABSOLUTE NRBC 0.00 0.00 - 0.01 K/uL NEUTROPHILS 76 (H) 32 - 75 %    LYMPHOCYTES 18 12 - 49 %    MONOCYTES 5 5 - 13 %    EOSINOPHILS 1 0 - 7 %    BASOPHILS 0 0 - 1 %    IMMATURE GRANULOCYTES 0 0.0 - 0.5 %    ABS. NEUTROPHILS 6.9 1.8 - 8.0 K/UL    ABS. LYMPHOCYTES 1.7 0.8 - 3.5 K/UL    ABS. MONOCYTES 0.4 0.0 - 1.0 K/UL    ABS. EOSINOPHILS 0.1 0.0 - 0.4 K/UL    ABS. BASOPHILS 0.0 0.0 - 0.1 K/UL    ABS. IMM. GRANS. 0.0 0.00 - 0.04 K/UL    DF AUTOMATED     METABOLIC PANEL, BASIC    Collection Time: 03/15/22  5:16 AM   Result Value Ref Range    Sodium 132 (L) 136 - 145 mmol/L    Potassium Sample is hemolyzed (hemoglobin is 3.5 - 5.1 mmol/L    Chloride 104 97 - 108 mmol/L    CO2 26 21 - 32 mmol/L    Anion gap 2 (L) 5 - 15 mmol/L    Glucose 222 (H) 65 - 100 mg/dL    BUN 14 6 - 20 MG/DL    Creatinine 0.77 0.55 - 1.02 MG/DL    BUN/Creatinine ratio 18 12 - 20      GFR est AA >60 >60 ml/min/1.73m2    GFR est non-AA >60 >60 ml/min/1.73m2    Calcium 9.0 8.5 - 10.1 MG/DL   GLUCOSE, POC    Collection Time: 03/15/22  6:57 AM   Result Value Ref Range    Glucose (POC) 209 (H) 65 - 117 mg/dL    Performed by Jason Wick        Additional comments:I reviewed the patients new imaging test results. Patient Vitals for the past 8 hrs:   BP Temp Pulse Resp SpO2 Weight   03/15/22 0609 122/88 99.1 °F (37.3 °C) 80 16 99 % --   03/15/22 0600 -- -- 78 -- -- --   03/15/22 0400 -- -- 78 -- -- --   03/15/22 0221 (!) 148/91 98.1 °F (36.7 °C) 78 18 98 % 242 lb 14.4 oz (110.2 kg)   03/15/22 0200 -- -- 76 -- -- --       03/15 0701 - 03/15 1900  In: -   Out: 600 [Urine:600]  No intake/output data recorded.     Exam:  Visit Vitals  /88   Pulse 80   Temp 99.1 °F (37.3 °C)   Resp 16   Wt 242 lb 14.4 oz (110.2 kg)   SpO2 99%   BMI 41.69 kg/m²     Gen: Well developed  CV: RRR  Lungs: non labored breathing  Abd: soft, non distended  Neuro: A&O x 3, dysarthric  CN II-XII: PERRL with disconjugate gaze-baseline,  face right asymmetry, tongue/palate midline  Motor: Right-sided hemiplegia arm more than leg  Sensory: Not tested  DTRs: symmetric  COOR: no limb/truncal ataxia  Gait: Deferred    PROBLEM LIST:     Patient Active Problem List   Diagnosis Code    Personality disorder ([facility] F60.9    Obesity E66.9    Depression F32. A    Type 2 diabetes mellitus with hypoglycemia without coma ([facility]) E11.649    Diabetes mellitus type 2 with neurological manifestations ([facility] E11.49    Genital herpes A60.00    Benign heart murmur R01.0    Essential hypertension with goal blood pressure less than 130/85 I10    PTSD (post-traumatic stress disorder) F43.10    Uncontrolled type 2 diabetes mellitus with complication, with long-term current use of insulin ([facility]) E11.8, E11.65, Z79.4    Hyperlipidemia LDL goal <70 E78.5    Sleep apnea G47.30    Transient ischemic attack (TIA) G45.9    CVA, old, hemiparesis ([facility] I69.359    CVA (cerebral vascular accident) ([facility] I63.9       Assessment/Plan:      76-year-old woman with history of stroke who has new superimposed stroke in the left MCA territory causing worsening right-sided weakness. I am going to add Plavix for dual antiplatelet coverage given the posterior circulation disease and intracranial atherosclerosis. Echo needs to be done. EEG to be done today. Stay on [address]. I suspect she had a provoked seizure given the new stroke and known seizure disorder at baseline. Anticipate she will go back to rehabilitation. I spoke with her about the results and plans. This clinical note was dictated with an electronic dictation software that can make unintentional errors. If there are any questions, please contact me directly for clarification.       Signed:  Angelina Tanner DO  3/15/2022  9:37 AM

## 2022-03-15 NOTE — PROGRESS NOTES
Transition of Care Plan: SNF-Likely back to [facility] (accepted back)-[place], TBD-medical/therapy recs     RUR: 10%    PCP F/U:     Disposition: SNF-Likely back to [facility], TBD-medical/therapy recs    Transportation: likely BLS    Main Contact: Mother-Lisa HARTMANN-[phone]    1045: Met with patient at bedside. Introduced self and role of CM. Patient has deficits related to stroke, predominately speech, but it A&O x 3. States she has been staying at [facility] for 3 weeks. Prior to that she was living alone. States long term goal is to live with mother. States mother is healthcare decision maker. Will reach out to mother to obtain more information. Will continue to follow for medical/therapy recommendations. 1059: Spoke with patient's mother via telephone call. Confirmed that patient will need to return to [facility]. States that she is planning on taking patient back home at some point, but not at this time. Referral sent to [facility] to see if they can accept back. Domingo Noel RN, CRM    Residency: [facility] for 3 weeks, lived alone prior   Lives With: n/a  Prior functioning: states independent at baseline   Prior DME used: wheelchair, walker, cane, rollator  Rehab history: Home Health: [facility], SNF: [facility], IPR: [address] [facility] [address]:  [address] [place] [address] Road?     Reason for Admission:  CVA                   RUR Score:  10% low                   Plan for utilizing home health: none indicated          PCP: First and Last name:  Delaney Martinez MD     Name of Practice:    Are you a current patient: Yes/No: yes   Approximate date of last visit: a couple of months ago   Can you participate in a virtual visit with your PCP:                     Current Advanced Directive/Advance Care Plan: Full Code    Healthcare Decision Maker:   Click here to complete [address] including selection of the Healthcare Decision Maker Relationship (ie \"Primary\")  Obed [id number]                 Transition of Care Plan: likely back to [facility] pending medical/therapy recommendations                      Care Management Interventions  PCP Verified by CM: Yes (Dr. Bimal Vásquez)  Mode of Transport at Discharge:  Other (see comment) (TBD-BLS)  Transition of Care Consult (CM Consult): SNF (TBD)  Physical Therapy Consult: Yes  Occupational Therapy Consult: Yes  Speech Therapy Consult: Yes  Support Systems: Parent(s),Child(irlanda)  Confirm Follow Up Transport: Other (see comment) (TBD-BLS)  Discharge Location  Patient Expects to be Discharged to[de-identified] Skilled nursing facility (TBD)

## 2022-03-15 NOTE — PROGRESS NOTES
Physical Therapy: Defer     Pt chart reviewed and attempted to see for PT evaluation. Pt currently having EEG and is unable to participate in therapy at this time. Will follow up as able.      Cheryle Constable, SPT

## 2022-03-16 VITALS
HEART RATE: 84 BPM | SYSTOLIC BLOOD PRESSURE: 138 MMHG | BODY MASS INDEX: 41.48 KG/M2 | DIASTOLIC BLOOD PRESSURE: 81 MMHG | HEIGHT: 64 IN | OXYGEN SATURATION: 97 % | WEIGHT: 242.95 LBS | RESPIRATION RATE: 13 BRPM | TEMPERATURE: 98.5 F

## 2022-03-16 LAB
ANION GAP SERPL CALC-SCNC: 4 MMOL/L (ref 5–15)
BASOPHILS # BLD: 0 K/UL (ref 0–0.1)
BASOPHILS NFR BLD: 0 % (ref 0–1)
BUN SERPL-MCNC: 14 MG/DL (ref 6–20)
BUN/CREAT SERPL: 18 (ref 12–20)
CALCIUM SERPL-MCNC: 9.4 MG/DL (ref 8.5–10.1)
CHLORIDE SERPL-SCNC: 105 MMOL/L (ref 97–108)
CO2 SERPL-SCNC: 27 MMOL/L (ref 21–32)
CREAT SERPL-MCNC: 0.8 MG/DL (ref 0.55–1.02)
DIFFERENTIAL METHOD BLD: ABNORMAL
EOSINOPHIL # BLD: 0.2 K/UL (ref 0–0.4)
EOSINOPHIL NFR BLD: 2 % (ref 0–7)
ERYTHROCYTE [DISTWIDTH] IN BLOOD BY AUTOMATED COUNT: 14.3 % (ref 11.5–14.5)
GLUCOSE BLD STRIP.AUTO-MCNC: 207 MG/DL (ref 65–117)
GLUCOSE BLD STRIP.AUTO-MCNC: 249 MG/DL (ref 65–117)
GLUCOSE BLD STRIP.AUTO-MCNC: 267 MG/DL (ref 65–117)
GLUCOSE SERPL-MCNC: 204 MG/DL (ref 65–100)
HCT VFR BLD AUTO: 31.6 % (ref 35–47)
HGB BLD-MCNC: 10.1 G/DL (ref 11.5–16)
IMM GRANULOCYTES # BLD AUTO: 0 K/UL (ref 0–0.04)
IMM GRANULOCYTES NFR BLD AUTO: 0 % (ref 0–0.5)
LYMPHOCYTES # BLD: 2.1 K/UL (ref 0.8–3.5)
LYMPHOCYTES NFR BLD: 27 % (ref 12–49)
MAGNESIUM SERPL-MCNC: 1.7 MG/DL (ref 1.6–2.4)
MCH RBC QN AUTO: 26.6 PG (ref 26–34)
MCHC RBC AUTO-ENTMCNC: 32 G/DL (ref 30–36.5)
MCV RBC AUTO: 83.4 FL (ref 80–99)
MONOCYTES # BLD: 0.5 K/UL (ref 0–1)
MONOCYTES NFR BLD: 6 % (ref 5–13)
NEUTS SEG # BLD: 5 K/UL (ref 1.8–8)
NEUTS SEG NFR BLD: 65 % (ref 32–75)
NRBC # BLD: 0 K/UL (ref 0–0.01)
NRBC BLD-RTO: 0 PER 100 WBC
PHOSPHATE SERPL-MCNC: 4.6 MG/DL (ref 2.6–4.7)
PLATELET # BLD AUTO: 215 K/UL (ref 150–400)
PMV BLD AUTO: 10.8 FL (ref 8.9–12.9)
POTASSIUM SERPL-SCNC: 4.4 MMOL/L (ref 3.5–5.1)
RBC # BLD AUTO: 3.79 M/UL (ref 3.8–5.2)
SERVICE CMNT-IMP: ABNORMAL
SODIUM SERPL-SCNC: 136 MMOL/L (ref 136–145)
WBC # BLD AUTO: 7.7 K/UL (ref 3.6–11)

## 2022-03-16 PROCEDURE — 74011250636 HC RX REV CODE- 250/636: Performed by: PSYCHIATRY & NEUROLOGY

## 2022-03-16 PROCEDURE — 85025 COMPLETE CBC W/AUTO DIFF WBC: CPT

## 2022-03-16 PROCEDURE — 36415 COLL VENOUS BLD VENIPUNCTURE: CPT

## 2022-03-16 PROCEDURE — 97112 NEUROMUSCULAR REEDUCATION: CPT

## 2022-03-16 PROCEDURE — 80048 BASIC METABOLIC PNL TOTAL CA: CPT

## 2022-03-16 PROCEDURE — 97161 PT EVAL LOW COMPLEX 20 MIN: CPT

## 2022-03-16 PROCEDURE — 74011250636 HC RX REV CODE- 250/636: Performed by: STUDENT IN AN ORGANIZED HEALTH CARE EDUCATION/TRAINING PROGRAM

## 2022-03-16 PROCEDURE — 83735 ASSAY OF MAGNESIUM: CPT

## 2022-03-16 PROCEDURE — 74011636637 HC RX REV CODE- 636/637: Performed by: STUDENT IN AN ORGANIZED HEALTH CARE EDUCATION/TRAINING PROGRAM

## 2022-03-16 PROCEDURE — 74011250637 HC RX REV CODE- 250/637: Performed by: PSYCHIATRY & NEUROLOGY

## 2022-03-16 PROCEDURE — 82962 GLUCOSE BLOOD TEST: CPT

## 2022-03-16 PROCEDURE — 84100 ASSAY OF PHOSPHORUS: CPT

## 2022-03-16 PROCEDURE — 74011000250 HC RX REV CODE- 250: Performed by: STUDENT IN AN ORGANIZED HEALTH CARE EDUCATION/TRAINING PROGRAM

## 2022-03-16 PROCEDURE — 74011250637 HC RX REV CODE- 250/637: Performed by: STUDENT IN AN ORGANIZED HEALTH CARE EDUCATION/TRAINING PROGRAM

## 2022-03-16 PROCEDURE — 97530 THERAPEUTIC ACTIVITIES: CPT

## 2022-03-16 RX ORDER — LEVETIRACETAM 500 MG/5ML
750 INJECTION, SOLUTION, CONCENTRATE INTRAVENOUS EVERY 12 HOURS
Status: DISCONTINUED | OUTPATIENT
Start: 2022-03-16 | End: 2022-03-16 | Stop reason: HOSPADM

## 2022-03-16 RX ORDER — CLOPIDOGREL BISULFATE 75 MG/1
75 TABLET ORAL DAILY
Qty: 30 TABLET | Refills: 0 | Status: SHIPPED
Start: 2022-03-17

## 2022-03-16 RX ADMIN — Medication 4 UNITS: at 17:09

## 2022-03-16 RX ADMIN — CLOPIDOGREL BISULFATE 75 MG: 75 TABLET ORAL at 08:43

## 2022-03-16 RX ADMIN — Medication 7 UNITS: at 12:37

## 2022-03-16 RX ADMIN — ENOXAPARIN SODIUM 40 MG: 100 INJECTION SUBCUTANEOUS at 13:59

## 2022-03-16 RX ADMIN — INSULIN GLARGINE 36 UNITS: 100 INJECTION, SOLUTION SUBCUTANEOUS at 08:43

## 2022-03-16 RX ADMIN — Medication 4 UNITS: at 08:42

## 2022-03-16 RX ADMIN — ASPIRIN 81 MG CHEWABLE TABLET 81 MG: 81 TABLET CHEWABLE at 08:43

## 2022-03-16 RX ADMIN — PANTOPRAZOLE SODIUM 40 MG: 40 TABLET, DELAYED RELEASE ORAL at 08:43

## 2022-03-16 RX ADMIN — ACETAMINOPHEN 650 MG: 325 TABLET ORAL at 03:16

## 2022-03-16 RX ADMIN — LEVETIRACETAM 750 MG: 100 INJECTION INTRAVENOUS at 07:08

## 2022-03-16 RX ADMIN — SENNOSIDES 8.6 MG: 8.6 TABLET, FILM COATED ORAL at 08:42

## 2022-03-16 RX ADMIN — Medication 400 MG: at 08:43

## 2022-03-16 NOTE — PROGRESS NOTES
TRANSFER - OUT REPORT:    Verbal report given to Ayleen (name) on Rhoda Nelson  being transferred to [facility] (unit) for routine progression of care       Report consisted of patients Situation, Background, Assessment and   Recommendations(SBAR). Information from the following report(s) SBAR, Kardex, MAR, Cardiac Rhythm NSR and Dual Neuro Assessment was reviewed with the receiving nurse. Lines:   Peripheral IV 03/14/22 Left Antecubital (Active)   Site Assessment Clean, dry, & intact 03/16/22 1200   Phlebitis Assessment 0 03/16/22 1200   Infiltration Assessment 0 03/16/22 1200   Dressing Status Clean, dry, & intact 03/16/22 1200   Dressing Type Transparent;Tape 03/16/22 1200   Hub Color/Line Status Pink;Capped 03/16/22 1200   Action Taken Open ports on tubing capped 03/16/22 1200   Alcohol Cap Used Yes 03/16/22 1200        Opportunity for questions and clarification was provided.       Patient transported with:   Patient-specific medications from Pharmacy

## 2022-03-16 NOTE — PROGRESS NOTES
Problem: Patient Education: Go to Patient Education Activity  Goal: Patient/Family Education  Outcome: Progressing Towards Goal     Problem: Pressure Injury - Risk of  Goal: *Prevention of pressure injury  Description: Document Mahendra Scale and appropriate interventions in the flowsheet. Outcome: Progressing Towards Goal  Note: Pressure Injury Interventions:  Sensory Interventions: Assess changes in LOC,Check visual cues for pain,Float heels,Keep linens dry and wrinkle-free,Minimize linen layers,Monitor skin under medical devices,Pad between skin to skin,Pressure redistribution bed/mattress (bed type)    Moisture Interventions: Check for incontinence Q2 hours and as needed,Internal/External urinary devices,Maintain skin hydration (lotion/cream),Moisture barrier,Minimize layers,Absorbent underpads,Apply protective barrier, creams and emollients    Activity Interventions: PT/OT evaluation,Pressure redistribution bed/mattress(bed type)    Mobility Interventions: PT/OT evaluation,Turn and reposition approx. every two hours(pillow and wedges),Float heels    Nutrition Interventions: Document food/fluid/supplement intake,Offer support with meals,snacks and hydration    Friction and Shear Interventions: Apply protective barrier, creams and emollients,HOB 30 degrees or less,Lift sheet,Lift team/patient mobility team,Transfer aides:transfer board/Tomasz lift/ceiling lift,Transferring/repositioning devices       Problem: Patient Education: Go to Patient Education Activity  Goal: Patient/Family Education  Outcome: Progressing Towards Goal    Problem: Diabetes Self-Management  Goal: *Disease process and treatment process  Description: Define diabetes and identify own type of diabetes; list 3 options for treating diabetes.   3/16/2022 0411 by Immanuel San RN  Outcome: Progressing Towards Goal  3/16/2022 0409 by Immanuel San RN  Outcome: Progressing Towards Goal      Problem: Falls - Risk of  Goal: *Absence of Falls  Description: Document Rayo Zepeda Fall Risk and appropriate interventions in the flowsheet.   Outcome: Progressing Towards Goal  Note: Fall Risk Interventions:  Mobility Interventions: Bed/chair exit alarm,OT consult for ADLs,Patient to call before getting OOB,Utilize walker, cane, or other assistive device,Utilize gait belt for transfers/ambulation     Medication Interventions: Teach patient to arise slowly,Patient to call before getting OOB,Utilize gait belt for transfers/ambulation    Elimination Interventions: Call light in reach,Bed/chair exit alarm,Toileting schedule/hourly rounds

## 2022-03-16 NOTE — PROGRESS NOTES
Hospital follow-up PCP transitional care appointment has been scheduled with Dieudonne Ribera NP for Tuesday, 3/22/22 at 3:00 p.m. Pending patient discharge. Logan Roldan, Care Management Assistant.

## 2022-03-16 NOTE — PROGRESS NOTES
T.O.C.    RUR: 12% Low  Disposition: Return to [facility] pending bed availability  Follow up with PCP  Transport: [facility]-- 5pm    CM called Diana Shravan at [facility], [phone], to ask about bed availability and if the patient would be able to return today. Voicemail left and [facility] requested for 5pm today. CM received a call back from Diana Cheney, they do have a bed available today for the patient. Call report number: [phone], room number pending. Transition of Care Plan to SNF/Rehab    SNF/Rehab Transition:  Patient has been accepted to [facility] and meets criteria for admission. Patient will transported by [facility] and expected to [facility].    Communication to Patient/Family:  Met with patient and called her mother, Ryder Rosen, [phone] and they are agreeable to the transition plan. Communication to SNF/Rehab:  Bedside RN, Brian Gaspar, has been notified to update the transition plan to the facility and call report (phone number [phone]). Discharge information has been updated on the AVS.     Discharge instructions sent through [facility]. Nursing Please include all hard scripts for controlled substances, med rec and dc summary, and AVS in packet. Reviewed and confirmed with facility, [facility], can manage the patient care needs for the following:     SNF/Rehab Transition:  Patient to follow-up with Home Health:  [facility], other ,none)  PCP/Specialist: Dr. Loni Salazar and confirmed with facility, [facility], they can manage the patient care needs for the following:     Awais Guillaume with (X) only those applicable:    Medication:  [x]  Medications will be available at the facility  []  IV Antibiotics  []  Controlled Substance - hard copy to be sent with patient   [x]  Weekly Labs   Documents:  [] Hard RX  [] MAR  [x] Kardex  [] AVS  [x]Transfer Summary  [x]Discharge   Equipment:  []  CPAP/BiPAP  []  Wound Vacuum  []  Sanderson or Urinary Device  []  PICC/Central Line  []  Nebulizer  [] Ventilator   Treatment:  []Isolation (for MRSA, VRE, etc.)  []Surgical Drain Management  []Tracheostomy Care  []Dressing Changes  []Dialysis with transportation and chair time  []PEG Care  []Oxygen  []Daily Weights for Heart Failure   Dietary:  []Any diet limitations  []Tube Feedings   []Total Parenteral Management (TPN)   Eligible for Medicaid Long Term Services and Supports  Yes:  [x] Eligible for medical assistance or will become eligible within 180 days and UAI completed. [] Provider/Patient and/or support system has requested screening. [] UAI copy provided to patient or responsible party,   [] UAI unavailable at discharge will send once processed to SNF provider. [] UAI unavailable at discharged mailed to patient  No:   [] Private pay and is not financially eligible for Medicaid within the next 180 days. [] Reside out-of-state.   [] A residents of a state owned/operated facility that is licensed  by [address] and [employer] or [facility]  [] Enrollment in [facility] hospice services  [] [address] [place] [address]  [] Patient /Family declines to have screening completed or provide financial information for screening     Financial Resources:  Medicaid    [] Initiated and application pending   [x] Full coverage     Advanced Care Plan:  []Surrogate Decision Maker of Care  []POA  []Communicated Code Status    Other     Gia Stratton, Master's of Social Work Supervisee

## 2022-03-16 NOTE — PROGRESS NOTES
BSSR completed. Report given to Nohemi Suero and Carlota Massey, -on coming RNs.  Report includes but not limited to SBAR, Kardex, procedures, intake/output, and MAR    .      - Patient rests comfortably in bed  - Patient's bed is locked and in lowest position  - No complaints at this time, all needs and requests addressed  - Call bell with in reach  Rounds with intent completed throughout shift as hourly, scheduled and PRN nursing care

## 2022-03-16 NOTE — PROGRESS NOTES
Problem: Patient Education: Go to Patient Education Activity  Goal: Patient/Family Education  Outcome: Progressing Towards Goal     Problem: Pressure Injury - Risk of  Goal: *Prevention of pressure injury  Description: Document Mahendra Scale and appropriate interventions in the flowsheet. Outcome: Progressing Towards Goal  Note: Pressure Injury Interventions:  Sensory Interventions: Assess changes in LOC,Check visual cues for pain,Float heels,Keep linens dry and wrinkle-free,Maintain/enhance activity level,Minimize linen layers,Monitor skin under medical devices,Pad between skin to skin,Pressure redistribution bed/mattress (bed type)    Moisture Interventions: Absorbent underpads,Apply protective barrier, creams and emollients,Assess need for specialty bed,Check for incontinence Q2 hours and as needed,Internal/External urinary devices,Limit adult briefs,Maintain skin hydration (lotion/cream),Minimize layers    Activity Interventions: PT/OT evaluation,Pressure redistribution bed/mattress(bed type)    Mobility Interventions: PT/OT evaluation,Turn and reposition approx. every two hours(pillow and wedges),Float heels    Nutrition Interventions: Document food/fluid/supplement intake,Offer support with meals,snacks and hydration    Friction and Shear Interventions: Feet elevated on foot rest,HOB 30 degrees or less,Minimize layers                Problem: Patient Education: Go to Patient Education Activity  Goal: Patient/Family Education  Outcome: Progressing Towards Goal     Problem: Falls - Risk of  Goal: *Absence of Falls  Description: Document Felicita Fall Risk and appropriate interventions in the flowsheet.   Outcome: Progressing Towards Goal  Note: Fall Risk Interventions:  Mobility Interventions: Bed/chair exit alarm,OT consult for ADLs,Communicate number of staff needed for ambulation/transfer,PT Consult for mobility concerns         Medication Interventions: Bed/chair exit alarm,Teach patient to arise slowly    Elimination Interventions: Call light in reach,Patient to call for help with toileting needs,Toilet paper/wipes in reach,Toileting schedule/hourly rounds              Problem: Patient Education: Go to Patient Education Activity  Goal: Patient/Family Education  Outcome: Progressing Towards Goal     Problem: Diabetes Self-Management  Goal: *Disease process and treatment process  Description: Define diabetes and identify own type of diabetes; list 3 options for treating diabetes. Outcome: Progressing Towards Goal  Goal: *Incorporating nutritional management into lifestyle  Description: Describe effect of type, amount and timing of food on blood glucose; list 3 methods for planning meals. Outcome: Progressing Towards Goal  Goal: *Incorporating physical activity into lifestyle  Description: State effect of exercise on blood glucose levels. Outcome: Progressing Towards Goal  Goal: *Developing strategies to promote health/change behavior  Description: Define the ABC's of diabetes; identify appropriate screenings, schedule and personal plan for screenings. Outcome: Progressing Towards Goal  Goal: *Using medications safely  Description: State effect of diabetes medications on diabetes; name diabetes medication taking, action and side effects. Outcome: Progressing Towards Goal  Goal: *Monitoring blood glucose, interpreting and using results  Description: Identify recommended blood glucose targets  and personal targets. Outcome: Progressing Towards Goal  Goal: *Prevention, detection, treatment of acute complications  Description: List symptoms of hyper- and hypoglycemia; describe how to treat low blood sugar and actions for lowering  high blood glucose level.   Outcome: Progressing Towards Goal  Goal: *Prevention, detection and treatment of chronic complications  Description: Define the natural course of diabetes and describe the relationship of blood glucose levels to long term complications of diabetes.   Outcome: Progressing Towards Goal  Goal: *Developing strategies to address psychosocial issues  Description: Describe feelings about living with diabetes; identify support needed and support network  Outcome: Progressing Towards Goal  Goal: *Insulin pump training  Outcome: Progressing Towards Goal  Goal: *Sick day guidelines  Outcome: Progressing Towards Goal  Goal: *Patient Specific Goal (EDIT GOAL, INSERT TEXT)  Outcome: Progressing Towards Goal     Problem: Patient Education: Go to Patient Education Activity  Goal: Patient/Family Education  Outcome: Progressing Towards Goal     Problem: Patient Education: Go to Patient Education Activity  Goal: Patient/Family Education  Outcome: Progressing Towards Goal     Problem: Patient Education: Go to Patient Education Activity  Goal: Patient/Family Education  Outcome: Progressing Towards Goal

## 2022-03-16 NOTE — PROGRESS NOTES
Problem: Mobility Impaired (Adult and Pediatric)  Goal: *Acute Goals and Plan of Care (Insert Text)  Description: FUNCTIONAL STATUS PRIOR TO ADMISSION: Pt was admitted from SNF. Pt reports she was walking [place] with RW at SNF with therapy. Has a hx of stroke in 2021 with residual R sided hemiparesis. HOME SUPPORT PRIOR TO ADMISSION: Admitted from SNF and required assistance for mobility and ADLs. Prior to that, pt was living with her mom. Physical Therapy Goals  Initiated 3/16/2022  1. Patient will move from supine to sit and sit to supine , scoot up and down, and roll side to side in bed with minimal assistance/contact guard assist x2 within 7 day(s). 2.  Patient will transfer from bed to chair and chair to bed with moderate assistance x2  using the least restrictive device within 7 day(s). 3.  Patient will perform sit to stand with moderate assistance x2  within 7 day(s). 4.  Patient will ambulate with moderate assistance x2  for 20 feet with the least restrictive device within 7 day(s). Outcome: Not Met   PHYSICAL THERAPY EVALUATION  Patient: Lilia Gutierrez (47 y.o. female)  Date: 3/16/2022  Primary Diagnosis: CVA (cerebral vascular accident) [facility]) [I63.9]        Precautions:  Fall    ASSESSMENT  Based on the objective data described below, the patient presents with impaired balance, increased R sided weakness from baseline (baseline R hemiparesis from prior CVA), R inattention, impaired vision (L eye), decreased activity tolerance, and confusion s/p admission for CVA work up. MRI was positive for L frontal lobe infarct extending to BG. Pt has a hx of CVA in 2021 with residual R sided deficits. Pt was admitted from SNF and required assistance for ADLs and mobility. Prior to that, pt was living at home with her mom. This date, pt reported her R side feels weaker compared to her baseline R hemiparesis prior to her admission.  R LE MMT 1/5 through out, demonstrated some trace movement/ muscle contraction. No active movement appreciated in R UE. Pt required modA x2 for supine to sit transfer. Able to maintain seated balance, with occasional Brigette. Demonstrated posterior and L sided lean and was able to initiate self correction when given VC. Required maxA x2 sit <>stand and demonstrated strong posterior lean Provided with VC and manual facilitation for hip extension. Recommending return to SNF upon discharge. Current Level of Function Impacting Discharge (mobility/balance): maxA x2 sit <>stand     Functional Outcome Measure: The patient scored 1/56 on the Barthel outcome measure which is indicative of high fall risk. Other factors to consider for discharge: hx of CVA, fall risk, R inattention, from SNF     Patient will benefit from skilled therapy intervention to address the above noted impairments. PLAN :  Recommendations and Planned Interventions: bed mobility training, transfer training, gait training, therapeutic exercises, neuromuscular re-education, patient and family training/education, and therapeutic activities      Frequency/Duration: Patient will be followed by physical therapy:  5 times a week to address goals. Recommendation for discharge: (in order for the patient to meet his/her long term goals)  Therapy up to 5 days/week in SNF setting    This discharge recommendation:  Has not yet been discussed the attending provider and/or case management    IF patient discharges home will need the following DME: to be determined (TBD)         SUBJECTIVE:   Patient stated I was learning how to walk straighter.     OBJECTIVE DATA SUMMARY:   HISTORY:    Past Medical History:   Diagnosis Date    Depression     Diabetes ([facility] [address]     Edema     Gout     Herpes genitalis     Hypertension     Ill-defined condition     gout    Mood disorder (HCC)     Other ill-defined conditions(799.89)     cholesterol     Psychiatric disorder     depression    Psychiatric disorder     PTSD    PTSD (post-traumatic stress disorder)     Sleep disorder     Stroke ([facility] [address]     UTI (lower urinary tract infection)      Past Surgical History:   Procedure Laterality Date    HX GYN       X 3       Home Situation  Home Environment: Private residence (Mom's home)  # Steps to Enter: 1  One/Two Story Residence: One story  Living Alone: No  Support Systems: Parent(s),Child(irlanda)  Patient Expects to be Discharged to[de-identified] Skilled nursing facility (TBD)  Current DME Used/Available at Home: Grab bars,Shower chair  Tub or Shower Type: Shower    EXAMINATION/PRESENTATION/DECISION MAKING:   Critical Behavior:  Neurologic State: Alert  Orientation Level: Appropriate for age,Oriented X4  Cognition: Follows commands,Appropriate for age attention/concentration,Recognition of people/places  Safety/Judgement: Awareness of environment,Fall prevention    Range Of Motion:  AROM: Generally decreased, functional (inc R hemiparesis from baseline )     Strength:    Strength: Generally decreased, functional (L LE WFL, R LE MMT 1/5)     Tone & Sensation:   Tone: Abnormal     Sensation: Intact     Coordination:  Coordination: Generally decreased, functional  Vision:      Functional Mobility:  Bed Mobility:     Supine to Sit: Moderate assistance;Assist x2  Sit to Supine: Maximum assistance;Assist x2     Transfers:  Sit to Stand: Maximum assistance;Assist x2  Stand to Sit: Maximum assistance;Assist x2             Balance:   Sitting: Impaired  Sitting - Static: Fair (occasional)  Sitting - Dynamic: Poor (constant support)  Standing: Impaired; With support  Standing - Static: Poor;Constant support      Functional Measure:  Lezama Balance Test:    Sitting to Standin  Standing Unsupported: 0  Sitting with Back Unsupported: 1  Standing to Sittin  Transfers: 0  Standing Unsupported with Eyes Closed: 0  Standing Unsupported with Feet Together: 0  Reach Forward with Outstretched Arm: 0   Object: 0  Turn to Look Over Shoulders: 0  Turn 360 Degrees: 0  Alternate Foot on Step/Stool: 0  Standing Unsupported One Foot in Front: 0  Stand on One Le  Total:          56=Maximum possible score;   0-20=High fall risk  21-40=Moderate fall risk   41-56=Low fall risk         Physical Therapy Evaluation Charge Determination   History Examination Presentation Decision-Making   HIGH Complexity :3+ comorbidities / personal factors will impact the outcome/ POC  HIGH Complexity : 4+ Standardized tests and measures addressing body structure, function, activity limitation and / or participation in recreation  LOW Complexity : Stable, uncomplicated  Other outcome measures Lezama  HIGH       Based on the above components, the patient evaluation is determined to be of the following complexity level: LOW       Activity Tolerance:   Fair    After treatment patient left in no apparent distress:   Supine in bed, Call bell within reach, Bed / chair alarm activated, and Side rails x 3    COMMUNICATION/EDUCATION:   The patients plan of care was discussed with: Registered nurse. Fall prevention education was provided and the patient/caregiver indicated understanding., Patient/family have participated as able in goal setting and plan of care. , and Patient/family agree to work toward stated goals and plan of care. Thank you for this referral.  Davion Kimbrough, SPT   Time Calculation: 28 mins      Regarding student involvement in patient care:  A student participated in this treatment session. Per CMS Medicare statements and APTA guidelines I certify that the following was true:  1. I was present and directly observed the entire session. 2. I made all skilled judgments and clinical decisions regarding care. 3. I am the practitioner responsible for assessment, treatment, and documentation.

## 2022-03-16 NOTE — PROGRESS NOTES
Bedside RN performed patient education and medication education. Discharge concerns initiated and discussed with patient, including clarification on \"who\" assists the patient at their home and instructions for when the home going patient should call their provider after discharge. Opportunity for questions and clarification was provided. Patient receptive to education: YES  Patient stated: Acceptance  Barriers to Education: None  Diagnosis Education given:  YES    Length of stay: 2  Expected Day of Discharge: 3/16/22  Ask if they have \"Help at Home\" & add to white board? YES    Education Day #: 2    Medication Education Given:  YES  M in the box Medication name: Ahsan Bachelor    Pt aware of HCAHPS survey: YES          Stroke Education documented in Patient Education: YES  Core Measures Documented in Connect Care:  Risk Factors: YES  Warning signs of stroke: YES  When to Activate 911: YES  Medication Education for Risk Factors: YES  Smoking cessation if applicable: YES  Written Education Given:  YES    Discharge NIH Completed: YES  Score: 10    BRAINS: YES    Follow Up Appointment Made: YES  Date/Time if applicable: March 05XK.

## 2022-03-16 NOTE — DISCHARGE SUMMARY
Discharge Summary       PATIENT ID: Rosa Biggs  MRN: [id number]   YOB: 1980    DATE OF ADMISSION: 3/14/2022 12:25 PM    DATE OF DISCHARGE: 3/16/2022  PRIMARY CARE PROVIDER: Carole Moore MD     ATTENDING PHYSICIAN: Hannah Soto DO  DISCHARGING PROVIDER: Hannah Soto DO    To contact this individual call [phone] and ask the  to page. If unavailable ask to be transferred the Adult Hospitalist Department. CONSULTATIONS: IP CONSULT TO NEUROLOGY    PROCEDURES/SURGERIES: * No surgery found *      ADMISSION SUMMARY AND HOSPITAL COURSE:   Admission history:  \"Justyna Biswas is a 42 y. o. female hx of CVA 2021 was in rehab for this, IDDM, hx of seizures, GERD, LISA on CPAP presents from her rehab center after she had worsening right-sided weakness and slurred speech from her baseline. Presented as a stroke alert. Last known well baseline was last night around 2130 woke up with symptoms this am. She currently feels at her baseline denies any blurry vision, significant headaches, nausea, vomiting, abdominal pain, significant new onset upper extremity or lower extremity weakness.  She denies any significant numbness or tingling in her upper extremities lower extremities. Arrived as Code stroke  NIH 9 not a TPA candidate. [place] Press[place] of the head and neck/ CT perfusion negative.  Labs overall unremarkable.  Patient given 300 mg Plavix, aspirin. Hospitalist service was consulted for further evaluation. \"      MRI brain without contrast:  IMPRESSION  Small acute infarction in the inferior left frontal lobe extending into the  superior left basal ganglia. Moderate size remote infarction in the lateral right cerebellum. Small remote infarction in the posterior right cerebellum. Mild to moderate cerebral atrophy for patient age. No associated hemorrhage, midline shift or mass effect. EEG:  CLINICAL INTERPRETATION:  This was a normal EEG during wakeful state of the patient.   No lateralizing or epileptiform features were noted.     CTA head, CTA perfusion:  IMPRESSION  CTA Head:  1. No evidence of flow-limiting stenosis or aneurysm. Scattered atherosclerotic  disease as above, including severe stenosis in the left P2 segment, and moderate  stenoses in the basilar artery and bilateral carotid siphons. CTA Neck:  1. No evidence of significant stenosis. CT Brain Perfusion:  1. No acute abnormality. Echo:  Left Ventricle: Left ventricle size is normal. Mildly increased wall thickness. Normal wall motion. Normal left ventricular systolic function with a visually estimated EF of 60 - 65%. Diastolic dysfunction present with normal LV EF.      DISCHARGE DIAGNOSES / PLAN:      Acute CVA  Seizure  -MRI with small acute infarct left inferior frontal lobe  -Appreciate neurology  -Continue aspirin, Plavix  -EEG normal  -TTE with bubble completed, pending read  -HA1C 8.2, LDL 68.6  -PT OT, speech eval   -Permissive hypertension  -Continue home keppra BID     IDDM - resume home regimen, HA1C 8.2  Elevated blood pressure- improved, not requiring antihypertensives, monitor as an outpatient  Hyperlipidemia - cont statin   GERD - cont home ppi  LISA - cpap qhs   Morbid obesity -counseled on diet weight loss  Right Arm pain, chronic       BMI: Body mass index is 41.7 kg/m². . This patient: Meets criteria for severe obesity given BMI >/= to 40 due to excess calories/nutritional. Weight loss and lifestyle modifications should be encouraged as an outpatient. PENDING TEST RESULTS:   At the time of discharge the following test results are still pending: none     ADDITIONAL CARE RECOMMENDATIONS:   New medication- plavix     NOTIFY YOUR PHYSICIAN FOR ANY OF THE FOLLOWING:   Fever over 101 degrees for 24 hours. Chest pain, shortness of breath, fever, chills, nausea, vomiting, diarrhea, change in mentation, falling, weakness, bleeding.  Severe pain or pain not relieved by medications, as well as any other signs or symptoms that you may have questions about. FOLLOW UP APPOINTMENTS:    Follow-up Information     Follow up With Specialties Details Why Contact Info    Lisy Chairez MD Family Medicine   [address] [place] [id number] [phone]               DIET: Diet Regular 3 carb choices (45 gm/meal)    ACTIVITY: as tolerated    DISCHARGE MEDICATIONS:  Current Discharge Medication List      START taking these medications    Details   clopidogreL (PLAVIX) 75 mg tab Take 1 Tablet by mouth daily. Qty: 30 Tablet, Refills: 0  Start date: 3/17/2022         CONTINUE these medications which have NOT CHANGED    Details   aspirin 81 mg chewable tablet Take 81 mg by mouth daily. insulin glargine (LANTUS) 100 unit/mL injection 48 Units by SubCUTAneous route nightly. pantoprazole (PROTONIX) 20 mg tablet Take 20 mg by mouth daily. methocarbamoL (ROBAXIN) 500 mg tablet Take 500 mg by mouth every eight (8) hours as needed for Muscle Spasm(s). lidocaine (Lidocaine Pain Relief) 4 % patch Apply 1 patch to left side of neck topically once daily for pain management and remove per schedule. polyethylene glycol (MIRALAX) 17 gram packet Take 17 g by mouth daily as needed. polyvinyl alcohol (LIQUIFILM TEARS) 1.4 % ophthalmic solution Administer 1 Drop to both eyes four (4) times daily. Indications: for s/p eye surgery      levETIRAcetam (KEPPRA) 100 mg/mL solution Take 1,000 mg by mouth every twelve (12) hours. metFORMIN ER (GLUCOPHAGE XR) 500 mg tablet Take 500 mg by mouth daily. atorvastatin (LIPITOR) 80 mg tablet Take 1 Tablet by mouth nightly. senna (Fanny-joselito) 8.6 mg tablet Take 1 Tablet by mouth daily. HumaLOG KwikPen Insulin 100 unit/mL kwikpen 8 Units by SubCUTAneous route Before breakfast, lunch, and dinner.          STOP taking these medications       magnesium oxide (MAG-OX) 400 mg tablet Comments:   Reason for Stopping:               DISPOSITION:    Home With:   OT  PT  HH  RN       Long term SNF/Inpatient Rehab    Independent/assisted living    Hospice    Other:       PATIENT CONDITION AT DISCHARGE:     Functional status    Poor     Deconditioned     Independent      Cognition     Lucid     Forgetful     Dementia      Catheters/lines (plus indication)    Sanderson     PICC     PEG     None      Code status     Full code     DNR      PHYSICAL EXAMINATION AT DISCHARGE:  General:          Alert, cooperative, no distress, appears stated age. HEENT:           Atraumatic, anicteric sclerae, pink conjunctivae                          No oral ulcers, mucosa moist, throat clear, dentition fair  Neck:               Supple, symmetrical  Lungs:             Clear to auscultation bilaterally. No Wheezing or Rhonchi. No rales. Chest wall:      No tenderness  No Accessory muscle use. Heart:              Regular  rhythm,  No  murmur   No edema  Abdomen:        Soft, non-tender. Not distended. Bowel sounds normal  Extremities:     No cyanosis. No clubbing,                            Skin turgor normal, Capillary refill normal  Skin:                Not pale. Not Jaundiced  No rashes   Psych:             Not anxious or agitated.   Neurologic:      Alert, moves all extremities, answers questions appropriately and responds to commands       CHRONIC MEDICAL DIAGNOSES:  Problem List as of 3/16/2022 Date Reviewed: 12/14/2021          Codes Class Noted - Resolved    CVA (cerebral vascular accident) [facility]) ICD-10-CM: I63.9  ICD-9-CM: 434.91  3/14/2022 - Present        CVA, old, hemiparesis ([facility] ICD-10-CM: T05.629  ICD-9-CM: 438.20  9/26/2021 - Present        Hyperlipidemia LDL goal <70 ICD-10-CM: E78.5  ICD-9-CM: 272.4  5/22/2021 - Present        Sleep apnea ICD-10-CM: G47.30  ICD-9-CM: 780.57  5/22/2021 - Present        Transient ischemic attack (TIA) ICD-10-CM: G45.9  ICD-9-CM: 435.9  5/22/2021 - Present        Uncontrolled type 2 diabetes mellitus with complication, with long-term current use of insulin ([facility] ICD-10-CM: E11.8, E11.65, Z79.4  ICD-9-CM: 250.82, V58.67  11/6/2017 - Present        PTSD (post-traumatic stress disorder) ICD-10-CM: F43.10  ICD-9-CM: 309.81  4/26/2016 - Present        Essential hypertension with goal blood pressure less than 130/85 ICD-10-CM: I10  ICD-9-CM: 401.9  4/25/2016 - Present        Benign heart murmur ICD-10-CM: R01.0  ICD-9-CM: Nnamdi Andres  2/12/2016 - Present    Overview Signed 2/12/2016 10:48 AM by Meenakshi Brewster PA-C     Evaluated with Echo by Dr. Houston Campbell 2/2016. Genital herpes ICD-10-CM: A60.00  ICD-9-CM: 054.10  1/22/2016 - Present        Type 2 diabetes mellitus with hypoglycemia without coma ([facility] ICD-10-CM: E11.649  ICD-9-CM: 250.80  1/5/2016 - Present        Diabetes mellitus type 2 with neurological manifestations ([facility] ICD-10-CM: E11.49  ICD-9-CM: 250.60  1/5/2016 - Present        Depression ICD-10-CM: F32. A  ICD-9-CM: 671  8/7/2014 - Present        Personality disorder ([facility] ICD-10-CM: F60.9  ICD-9-CM: 301.9  6/30/2014 - Present        Obesity ICD-10-CM: E66.9  ICD-9-CM: 278.00  6/30/2014 - Present        RESOLVED: Essential hypertension ICD-10-CM: I10  ICD-9-CM: 401.9  11/6/2017 - 8/3/2021        RESOLVED: Type 2 diabetes mellitus with complication ([facility] NCU-33-AC: E11.8  ICD-9-CM: 250.90  1/5/2016 - 1/5/2016        RESOLVED: Type II diabetes mellitus with complication, uncontrolled ([facility] ICD-10-CM: E11.8, E11.65  ICD-9-CM: 250.92  1/5/2016 - 11/6/2017        RESOLVED: Diabetes mellitus ([facility] ICD-10-CM: E11.9  ICD-9-CM: 250.00  8/8/2014 - 1/5/2016        RESOLVED: Hypertension ICD-10-CM: I10  ICD-9-CM: 401.9  8/8/2014 - 4/25/2016        RESOLVED: Major depression ICD-10-CM: F32.9  ICD-9-CM: 296.20  8/7/2014 - 8/8/2014        RESOLVED: Adjustment disorder with depressed mood ICD-10-CM: F43.21  ICD-9-CM: 309.0  6/30/2014 - 8/8/2014        RESOLVED: UTI (lower urinary tract infection) ICD-10-CM: N39.0  ICD-9-CM: 599.0  6/30/2014 - 8/8/2014 Greater than 30 minutes were spent with the patient on counseling and coordination of care    Signed:   Remy Contreras DO  3/16/2022  11:42 AM

## 2022-03-18 PROBLEM — G45.9 TRANSIENT ISCHEMIC ATTACK (TIA): Status: ACTIVE | Noted: 2021-05-22

## 2022-03-18 PROBLEM — I69.359 CVA, OLD, HEMIPARESIS (HCC): Status: ACTIVE | Noted: 2021-09-26

## 2022-03-18 PROBLEM — I63.9 CVA (CEREBRAL VASCULAR ACCIDENT) (HCC): Status: ACTIVE | Noted: 2022-03-14

## 2022-03-19 PROBLEM — E78.5 HYPERLIPIDEMIA LDL GOAL <70: Status: ACTIVE | Noted: 2021-05-22

## 2022-03-19 PROBLEM — G47.30 SLEEP APNEA: Status: ACTIVE | Noted: 2021-05-22

## 2022-05-25 ENCOUNTER — TELEPHONE (OUTPATIENT)
Dept: PRIMARY CARE CLINIC | Age: 42
End: 2022-05-25

## 2022-05-25 NOTE — TELEPHONE ENCOUNTER
Spoke with patients POA, patient is still in a nursing home- Dr Susan Saenz see her while she is still in the nursing home

## 2022-05-25 NOTE — TELEPHONE ENCOUNTER
----- Message from David Johnson sent at 5/25/2022 12:02 PM EDT -----  Subject: Message to Provider    QUESTIONS  Information for Provider? Pt mother/Lisa Crews/SUKI (pt of Dr Sushma Vazquez) phnd   - states pt is in a nursing home for 3 strokes - would like Dr Sushma Vazquez to   become her pcp; please call Judith Bloom back to advise  ---------------------------------------------------------------------------  --------------  CALL BACK INFO  What is the best way for the office to contact you? OK to leave message on   voicemail  Preferred Call Back Phone Number? [phone]  ---------------------------------------------------------------------------  --------------  SCRIPT ANSWERS  Relationship to Patient? Parent  Representative Name? Lisa   Patient is under 25 and the Parent has custody? No  Is the Representative on the appropriate HIPAA document in Epic?  Yes

## 2022-11-05 ENCOUNTER — APPOINTMENT (OUTPATIENT)
Dept: CT IMAGING | Age: 42
DRG: 058 | End: 2022-11-05
Attending: EMERGENCY MEDICINE
Payer: MEDICAID

## 2022-11-05 ENCOUNTER — HOSPITAL ENCOUNTER (INPATIENT)
Age: 42
LOS: 2 days | Discharge: NURSING, REHAB OR LONG-TERM CARE | DRG: 058 | End: 2022-11-07
Attending: EMERGENCY MEDICINE | Admitting: FAMILY MEDICINE
Payer: MEDICAID

## 2022-11-05 ENCOUNTER — APPOINTMENT (OUTPATIENT)
Dept: GENERAL RADIOLOGY | Age: 42
DRG: 058 | End: 2022-11-05
Attending: INTERNAL MEDICINE
Payer: MEDICAID

## 2022-11-05 DIAGNOSIS — R53.1 RIGHT SIDED WEAKNESS: Primary | ICD-10-CM

## 2022-11-05 PROBLEM — G81.90 HEMIPARESIS (HCC): Status: ACTIVE | Noted: 2022-11-05

## 2022-11-05 PROBLEM — I63.9 ACUTE CVA (CEREBROVASCULAR ACCIDENT) (HCC): Status: ACTIVE | Noted: 2022-03-14

## 2022-11-05 LAB
ALBUMIN SERPL-MCNC: 3.1 G/DL (ref 3.5–5)
ALBUMIN/GLOB SERPL: 0.6 {RATIO} (ref 1.1–2.2)
ALP SERPL-CCNC: 198 U/L (ref 45–117)
ALT SERPL-CCNC: 28 U/L (ref 12–78)
ANION GAP SERPL CALC-SCNC: 6 MMOL/L (ref 5–15)
APPEARANCE UR: CLEAR
AST SERPL-CCNC: 72 U/L (ref 15–37)
BACTERIA URNS QL MICRO: ABNORMAL /HPF
BASOPHILS # BLD: 0.1 K/UL (ref 0–0.1)
BASOPHILS NFR BLD: 0 % (ref 0–1)
BILIRUB SERPL-MCNC: 1 MG/DL (ref 0.2–1)
BILIRUB UR QL: NEGATIVE
BUN SERPL-MCNC: 29 MG/DL (ref 6–20)
BUN/CREAT SERPL: 26 (ref 12–20)
CALCIUM SERPL-MCNC: 9.6 MG/DL (ref 8.5–10.1)
CHLORIDE SERPL-SCNC: 100 MMOL/L (ref 97–108)
CO2 SERPL-SCNC: 30 MMOL/L (ref 21–32)
COLOR UR: ABNORMAL
COMMENT, HOLDF: NORMAL
CREAT SERPL-MCNC: 1.12 MG/DL (ref 0.55–1.02)
DIFFERENTIAL METHOD BLD: ABNORMAL
EOSINOPHIL # BLD: 0.1 K/UL (ref 0–0.4)
EOSINOPHIL NFR BLD: 1 % (ref 0–7)
EPITH CASTS URNS QL MICRO: ABNORMAL /LPF
ERYTHROCYTE [DISTWIDTH] IN BLOOD BY AUTOMATED COUNT: 14.3 % (ref 11.5–14.5)
GLOBULIN SER CALC-MCNC: 5.2 G/DL (ref 2–4)
GLUCOSE SERPL-MCNC: 156 MG/DL (ref 65–100)
GLUCOSE UR STRIP.AUTO-MCNC: NEGATIVE MG/DL
HCT VFR BLD AUTO: 35.3 % (ref 35–47)
HGB BLD-MCNC: 10.8 G/DL (ref 11.5–16)
HGB UR QL STRIP: ABNORMAL
HYALINE CASTS URNS QL MICRO: ABNORMAL /LPF (ref 0–5)
IMM GRANULOCYTES # BLD AUTO: 0.1 K/UL (ref 0–0.04)
IMM GRANULOCYTES NFR BLD AUTO: 1 % (ref 0–0.5)
INR PPP: 1.1 (ref 0.9–1.1)
KETONES UR QL STRIP.AUTO: NEGATIVE MG/DL
LEUKOCYTE ESTERASE UR QL STRIP.AUTO: NEGATIVE
LYMPHOCYTES # BLD: 2.4 K/UL (ref 0.8–3.5)
LYMPHOCYTES NFR BLD: 17 % (ref 12–49)
MCH RBC QN AUTO: 25.1 PG (ref 26–34)
MCHC RBC AUTO-ENTMCNC: 30.6 G/DL (ref 30–36.5)
MCV RBC AUTO: 81.9 FL (ref 80–99)
MONOCYTES # BLD: 0.8 K/UL (ref 0–1)
MONOCYTES NFR BLD: 6 % (ref 5–13)
NEUTS SEG # BLD: 10.4 K/UL (ref 1.8–8)
NEUTS SEG NFR BLD: 75 % (ref 32–75)
NITRITE UR QL STRIP.AUTO: NEGATIVE
NRBC # BLD: 0.02 K/UL (ref 0–0.01)
NRBC BLD-RTO: 0.1 PER 100 WBC
PH UR STRIP: 6 [PH] (ref 5–8)
PLATELET # BLD AUTO: 281 K/UL (ref 150–400)
PMV BLD AUTO: 10.8 FL (ref 8.9–12.9)
POTASSIUM SERPL-SCNC: 5.9 MMOL/L (ref 3.5–5.1)
PROT SERPL-MCNC: 8.3 G/DL (ref 6.4–8.2)
PROT UR STRIP-MCNC: NEGATIVE MG/DL
PROTHROMBIN TIME: 11.4 SEC (ref 9–11.1)
RBC # BLD AUTO: 4.31 M/UL (ref 3.8–5.2)
RBC #/AREA URNS HPF: ABNORMAL /HPF (ref 0–5)
SAMPLES BEING HELD,HOLD: NORMAL
SODIUM SERPL-SCNC: 136 MMOL/L (ref 136–145)
TROPONIN-HIGH SENSITIVITY: 7 NG/L (ref 0–51)
UR CULT HOLD, URHOLD: NORMAL
UROBILINOGEN UR QL STRIP.AUTO: 1 EU/DL (ref 0.2–1)
WBC # BLD AUTO: 13.8 K/UL (ref 3.6–11)
WBC URNS QL MICRO: ABNORMAL /HPF (ref 0–4)

## 2022-11-05 PROCEDURE — 96374 THER/PROPH/DIAG INJ IV PUSH: CPT

## 2022-11-05 PROCEDURE — 74176 CT ABD & PELVIS W/O CONTRAST: CPT

## 2022-11-05 PROCEDURE — 36415 COLL VENOUS BLD VENIPUNCTURE: CPT

## 2022-11-05 PROCEDURE — 84484 ASSAY OF TROPONIN QUANT: CPT

## 2022-11-05 PROCEDURE — 70450 CT HEAD/BRAIN W/O DYE: CPT

## 2022-11-05 PROCEDURE — 70496 CT ANGIOGRAPHY HEAD: CPT

## 2022-11-05 PROCEDURE — 80053 COMPREHEN METABOLIC PANEL: CPT

## 2022-11-05 PROCEDURE — 99285 EMERGENCY DEPT VISIT HI MDM: CPT

## 2022-11-05 PROCEDURE — 0042T CT CODE NEURO PERF W CBF: CPT

## 2022-11-05 PROCEDURE — 85610 PROTHROMBIN TIME: CPT

## 2022-11-05 PROCEDURE — 96361 HYDRATE IV INFUSION ADD-ON: CPT

## 2022-11-05 PROCEDURE — 74011250636 HC RX REV CODE- 250/636: Performed by: EMERGENCY MEDICINE

## 2022-11-05 PROCEDURE — 74011250637 HC RX REV CODE- 250/637: Performed by: EMERGENCY MEDICINE

## 2022-11-05 PROCEDURE — 81001 URINALYSIS AUTO W/SCOPE: CPT

## 2022-11-05 PROCEDURE — 85025 COMPLETE CBC W/AUTO DIFF WBC: CPT

## 2022-11-05 PROCEDURE — 74011000636 HC RX REV CODE- 636: Performed by: STUDENT IN AN ORGANIZED HEALTH CARE EDUCATION/TRAINING PROGRAM

## 2022-11-05 PROCEDURE — 93005 ELECTROCARDIOGRAM TRACING: CPT

## 2022-11-05 PROCEDURE — 65270000046 HC RM TELEMETRY

## 2022-11-05 PROCEDURE — 71045 X-RAY EXAM CHEST 1 VIEW: CPT

## 2022-11-05 RX ORDER — INSULIN LISPRO 100 [IU]/ML
INJECTION, SOLUTION INTRAVENOUS; SUBCUTANEOUS
Status: DISCONTINUED | OUTPATIENT
Start: 2022-11-06 | End: 2022-11-07 | Stop reason: HOSPADM

## 2022-11-05 RX ORDER — SODIUM CHLORIDE 9 MG/ML
75 INJECTION, SOLUTION INTRAVENOUS CONTINUOUS
Status: DISCONTINUED | OUTPATIENT
Start: 2022-11-05 | End: 2022-11-07 | Stop reason: HOSPADM

## 2022-11-05 RX ORDER — ONDANSETRON 2 MG/ML
4 INJECTION INTRAMUSCULAR; INTRAVENOUS
Status: DISCONTINUED | OUTPATIENT
Start: 2022-11-05 | End: 2022-11-07 | Stop reason: HOSPADM

## 2022-11-05 RX ORDER — GUAIFENESIN 100 MG/5ML
324 LIQUID (ML) ORAL
Status: COMPLETED | OUTPATIENT
Start: 2022-11-05 | End: 2022-11-05

## 2022-11-05 RX ORDER — INSULIN GLARGINE 100 [IU]/ML
48 INJECTION, SOLUTION SUBCUTANEOUS
Status: DISCONTINUED | OUTPATIENT
Start: 2022-11-05 | End: 2022-11-07 | Stop reason: HOSPADM

## 2022-11-05 RX ORDER — LEVETIRACETAM 100 MG/ML
1000 SOLUTION ORAL EVERY 12 HOURS
Status: DISCONTINUED | OUTPATIENT
Start: 2022-11-05 | End: 2022-11-07 | Stop reason: HOSPADM

## 2022-11-05 RX ORDER — METHOCARBAMOL 500 MG/1
500 TABLET, FILM COATED ORAL
Status: DISCONTINUED | OUTPATIENT
Start: 2022-11-05 | End: 2022-11-07 | Stop reason: HOSPADM

## 2022-11-05 RX ORDER — CLOPIDOGREL BISULFATE 75 MG/1
75 TABLET ORAL DAILY
Status: DISCONTINUED | OUTPATIENT
Start: 2022-11-06 | End: 2022-11-07

## 2022-11-05 RX ORDER — ENOXAPARIN SODIUM 100 MG/ML
30 INJECTION SUBCUTANEOUS EVERY 12 HOURS
Status: DISCONTINUED | OUTPATIENT
Start: 2022-11-06 | End: 2022-11-05

## 2022-11-05 RX ORDER — LIDOCAINE 4 G/100G
1 PATCH TOPICAL EVERY 24 HOURS
Status: DISCONTINUED | OUTPATIENT
Start: 2022-11-06 | End: 2022-11-07 | Stop reason: HOSPADM

## 2022-11-05 RX ORDER — BISACODYL 5 MG
5 TABLET, DELAYED RELEASE (ENTERIC COATED) ORAL DAILY PRN
Status: DISCONTINUED | OUTPATIENT
Start: 2022-11-05 | End: 2022-11-07 | Stop reason: HOSPADM

## 2022-11-05 RX ORDER — ENOXAPARIN SODIUM 100 MG/ML
40 INJECTION SUBCUTANEOUS EVERY 24 HOURS
Status: DISCONTINUED | OUTPATIENT
Start: 2022-11-05 | End: 2022-11-05

## 2022-11-05 RX ORDER — MAGNESIUM SULFATE 100 %
4 CRYSTALS MISCELLANEOUS AS NEEDED
Status: DISCONTINUED | OUTPATIENT
Start: 2022-11-05 | End: 2022-11-07 | Stop reason: HOSPADM

## 2022-11-05 RX ORDER — ONDANSETRON 2 MG/ML
4 INJECTION INTRAMUSCULAR; INTRAVENOUS
Status: COMPLETED | OUTPATIENT
Start: 2022-11-05 | End: 2022-11-05

## 2022-11-05 RX ORDER — ACETAMINOPHEN 325 MG/1
650 TABLET ORAL
Status: DISCONTINUED | OUTPATIENT
Start: 2022-11-05 | End: 2022-11-07 | Stop reason: HOSPADM

## 2022-11-05 RX ORDER — PANTOPRAZOLE SODIUM 20 MG/1
20 TABLET, DELAYED RELEASE ORAL DAILY
Status: DISCONTINUED | OUTPATIENT
Start: 2022-11-06 | End: 2022-11-07 | Stop reason: HOSPADM

## 2022-11-05 RX ORDER — ENOXAPARIN SODIUM 100 MG/ML
30 INJECTION SUBCUTANEOUS EVERY 12 HOURS
Status: DISCONTINUED | OUTPATIENT
Start: 2022-11-06 | End: 2022-11-07 | Stop reason: HOSPADM

## 2022-11-05 RX ORDER — ATORVASTATIN CALCIUM 40 MG/1
80 TABLET, FILM COATED ORAL
Status: DISCONTINUED | OUTPATIENT
Start: 2022-11-05 | End: 2022-11-07 | Stop reason: HOSPADM

## 2022-11-05 RX ORDER — GUAIFENESIN 100 MG/5ML
81 LIQUID (ML) ORAL DAILY
Status: DISCONTINUED | OUTPATIENT
Start: 2022-11-06 | End: 2022-11-07 | Stop reason: HOSPADM

## 2022-11-05 RX ORDER — ACETAMINOPHEN 650 MG/1
650 SUPPOSITORY RECTAL
Status: DISCONTINUED | OUTPATIENT
Start: 2022-11-05 | End: 2022-11-07 | Stop reason: HOSPADM

## 2022-11-05 RX ADMIN — SODIUM CHLORIDE 1000 ML: 9 INJECTION, SOLUTION INTRAVENOUS at 15:54

## 2022-11-05 RX ADMIN — ASPIRIN 81 MG 324 MG: 81 TABLET ORAL at 19:21

## 2022-11-05 RX ADMIN — ONDANSETRON 4 MG: 2 INJECTION INTRAMUSCULAR; INTRAVENOUS at 15:54

## 2022-11-05 RX ADMIN — IOPAMIDOL 120 ML: 755 INJECTION, SOLUTION INTRAVENOUS at 14:27

## 2022-11-05 NOTE — ED PROVIDER NOTES
HPI   74-year-old female with a past medical history of stroke resulting in right-sided weakness as well as right facial droop who presents to the emergency department as a level 2 code stroke. Patient remains reported to EMS that she had not new onset right-sided weakness with her last known normal being at 11 PM last night. She had a normal blood sugar normal vital signs and was transported here. However patient reports to us that these current deficits are at baseline since Hallow. She says that Halloween time she was walking around but can no longer do so and she feels more weak, especially in her right lower extremity. She is also been having left-sided flank pain and nausea and vomiting since Halloween. She denies headache. No visual changes. She does not believe she had a fever. No diarrhea. No chest pain or shortness of breath. No abdominal pain.   Past Medical History:   Diagnosis Date    Depression     Diabetes ([facility]     Edema     Gout     Herpes genitalis     Hypertension     Ill-defined condition     gout    Mood disorder ([facility]     Other ill-defined conditions(799.89)     cholesterol     Psychiatric disorder     depression    Psychiatric disorder     PTSD    PTSD (post-traumatic stress disorder)     Sleep disorder     Stroke [facility])     UTI (lower urinary tract infection)        Past Surgical History:   Procedure Laterality Date    HX GYN       X 3         Family History:   Problem Relation Age of Onset    Hypertension Mother     Coronary Art Dis Mother     Diabetes Father     Stroke Father 46        pt estimates    Kidney Disease Father         secondary to diabetes    Breast Cancer Paternal Grandmother     Breast Cancer Cousin         numerous paternal cousins       Social History     Socioeconomic History    Marital status: SINGLE     Spouse name: Not on file    Number of children: Not on file    Years of education: Not on file    Highest education level: Not on file   Occupational History    Occupation: CNA     Comment: [employer]    Occupation: nursing student     Comment: [employer]   Tobacco Use    Smoking status: Never    Smokeless tobacco: Never   Vaping Use    Vaping Use: Never used   Substance and Sexual Activity    Alcohol use: No     Alcohol/week: 0.0 standard drinks    Drug use: No    Sexual activity: Yes     Partners: Male     Birth control/protection: I.U.D. Other Topics Concern     Service Not Asked    Blood Transfusions Not Asked    Caffeine Concern Not Asked    Occupational Exposure Not Asked    Hobby Hazards Not Asked    Sleep Concern Not Asked    Stress Concern Not Asked    Weight Concern Not Asked    Special Diet Not Asked    Back Care Not Asked    Exercise Yes     Comment: Line Dancing with Son 1x/week, walking regularly, yoga occasionally    Bike Helmet Not Asked    Seat Belt Not Asked    Self-Exams Not Asked   Social History Narrative    Lives with son (14 yo). Boyfriend x 2 years is currently staying with pt and helping her. Social Determinants of Health     Financial Resource Strain: Not on file   Food Insecurity: Not on file   Transportation Needs: Not on file   Physical Activity: Not on file   Stress: Not on file   Social Connections: Not on file   Intimate Partner Violence: Not on file   Housing Stability: Not on file         ALLERGIES: Patient has no known allergies. Review of Systems  A complete review of systems was performed and all systems reviewed are negative unless otherwise documented in the HPI    Vitals:    11/05/22 1500 11/05/22 1600 11/05/22 1700 11/05/22 1800   BP: 130/88 111/86 124/84 125/80   Pulse: 91 79 77 84   Resp: 16 14 14 17   Temp:       SpO2: 90% 94% 95% 93%   Weight:       Height:                Physical Exam  Constitutional:       Comments: Chronically ill-appearing but nontoxic   HENT:      Head:      Comments: No appreciable signs of head trauma  Eyes:      General: No scleral icterus.      Extraocular Movements: Extraocular movements intact. Pupils: Pupils are equal, round, and reactive to light. Neck:      Comments: Trachea midline  Cardiovascular:      Rate and Rhythm: Normal rate and regular rhythm. Heart sounds: No murmur heard. Pulmonary:      Effort: No respiratory distress. Breath sounds: Normal breath sounds. No wheezing or rales. Abdominal:      General: There is no distension. Palpations: Abdomen is soft. Tenderness: There is no abdominal tenderness. Musculoskeletal:         General: No deformity. Normal range of motion. Cervical back: Normal range of motion. Right lower leg: No edema. Left lower leg: No edema. Skin:     General: Skin is warm and dry. Neurological:      Comments: Right lower facial droop. Flaccid paralysis in right upper and lower extremity. No sensory deficits. Normal strength and movement on the left. Mild dysarthria. NIH stroke scale of 11. [facility]    ED Course as of 11/05/22 1906   Sat Nov 05, 2022   1446 EKG shows normal sinus rhythm. Rate is 91. Left axis deviation. No concerning ST elevations or depressions. No arrhythmias noted. QTc is 482. [MM]      ED Course User Index  [MM] Sergio David MD   58-year-old female presents with the above chief complaint. She has stable vital signs. She does have a right lower facial droop as well as hemiplegia on the right upper and lower extremity with no sensory deficits. He has a history of patient having right-sided deficits after she was taken to CT. Initial imaging shows no evidence of any new processes. She is not a tPA candidate given the onset of the reported symptoms. Teleneurology did evaluate the patient and recommended admission for MRI given the unclear history. She was given some aspirin. I also get a CT given her left flank pain and vomiting and this was negative for any acute abnormalities.     Procedures    Perfect Serve Consult for Admission  7:06 PM    ED Room Number: ER25/25  Patient Name and age:  Clay Petty 36 y.o.  female  Working Diagnosis:   1. Right sided weakness        COVID-19 Suspicion:  no  Sepsis present:  no  Reassessment needed: yes  Code Status:  Full Code  Readmission: no  Isolation Requirements:  no  Recommended Level of Care:  telemetry  Department: [facility] Adult ED - 21       Other: Initial report was that the patient presented with right-sided facial droop and right-sided weakness with a last known normal being sometime last night. Her previous admissions shows she has had strokes with some residual right-sided weakness and a facial droop, but it is unclear whether is to this extent. The patient says that at [address] she was actually able to ambulate but since that time she has been unable to do so because of worsening weakness. She also says she has been vomiting since that time has been having back pain. She has flaccid paralysis on the right. She is not a tPA candidate. Imaging is unremarkable. The rest of her work-up is reassuring. Teleneurology recommending admission for MRI.

## 2022-11-05 NOTE — ED NOTES
Pt placed on [employer]    1915: pt takes pills crushed into pudding or applesauce at home. Pt tolerated eating applesauce at thsi time.  Pt coughed when she drank some water

## 2022-11-05 NOTE — ED TRIAGE NOTES
Pt arrives to ED via [facility] with c/o facial droop and weakness R extremities onset 2300 yesterday. Glucose 150 mg/dl.

## 2022-11-05 NOTE — ED TRIAGE NOTES
Patient arrives to ED via EMS for right sided facial droop and right weakness, per EMS LKW 11pm.  Patient reports right deficits were \"from the last time I was here\".  EMS , /74    Patient reports her facial droop got worse last night, also reports back pain and nausea/vomiting

## 2022-11-06 ENCOUNTER — APPOINTMENT (OUTPATIENT)
Dept: MRI IMAGING | Age: 42
DRG: 058 | End: 2022-11-06
Attending: INTERNAL MEDICINE
Payer: MEDICAID

## 2022-11-06 LAB
ANION GAP SERPL CALC-SCNC: 5 MMOL/L (ref 5–15)
ATRIAL RATE: 91 BPM
BASOPHILS # BLD: 0.1 K/UL (ref 0–0.1)
BASOPHILS NFR BLD: 1 % (ref 0–1)
BUN SERPL-MCNC: 21 MG/DL (ref 6–20)
BUN/CREAT SERPL: 26 (ref 12–20)
CALCIUM SERPL-MCNC: 8.5 MG/DL (ref 8.5–10.1)
CALCULATED P AXIS, ECG09: 60 DEGREES
CALCULATED R AXIS, ECG10: -61 DEGREES
CALCULATED T AXIS, ECG11: 70 DEGREES
CHLORIDE SERPL-SCNC: 106 MMOL/L (ref 97–108)
CHOLEST SERPL-MCNC: 127 MG/DL
CO2 SERPL-SCNC: 28 MMOL/L (ref 21–32)
CREAT SERPL-MCNC: 0.8 MG/DL (ref 0.55–1.02)
CRP SERPL-MCNC: 5.25 MG/DL (ref 0–0.6)
DIAGNOSIS, 93000: NORMAL
DIFFERENTIAL METHOD BLD: ABNORMAL
EOSINOPHIL # BLD: 0.1 K/UL (ref 0–0.4)
EOSINOPHIL NFR BLD: 1 % (ref 0–7)
ERYTHROCYTE [DISTWIDTH] IN BLOOD BY AUTOMATED COUNT: 14.1 % (ref 11.5–14.5)
EST. AVERAGE GLUCOSE BLD GHB EST-MCNC: 217 MG/DL
GLUCOSE BLD STRIP.AUTO-MCNC: 127 MG/DL (ref 65–117)
GLUCOSE BLD STRIP.AUTO-MCNC: 137 MG/DL (ref 65–117)
GLUCOSE BLD STRIP.AUTO-MCNC: 143 MG/DL (ref 65–117)
GLUCOSE BLD STRIP.AUTO-MCNC: 163 MG/DL (ref 65–117)
GLUCOSE BLD STRIP.AUTO-MCNC: 236 MG/DL (ref 65–117)
GLUCOSE SERPL-MCNC: 216 MG/DL (ref 65–100)
HBA1C MFR BLD: 9.2 % (ref 4–5.6)
HCT VFR BLD AUTO: 30.4 % (ref 35–47)
HDLC SERPL-MCNC: 43 MG/DL
HDLC SERPL: 3 {RATIO} (ref 0–5)
HGB BLD-MCNC: 9.2 G/DL (ref 11.5–16)
IMM GRANULOCYTES # BLD AUTO: 0.1 K/UL (ref 0–0.04)
IMM GRANULOCYTES NFR BLD AUTO: 1 % (ref 0–0.5)
LDLC SERPL CALC-MCNC: 56.4 MG/DL (ref 0–100)
LIPASE SERPL-CCNC: 64 U/L (ref 73–393)
LYMPHOCYTES # BLD: 2 K/UL (ref 0.8–3.5)
LYMPHOCYTES NFR BLD: 16 % (ref 12–49)
MCH RBC QN AUTO: 24.7 PG (ref 26–34)
MCHC RBC AUTO-ENTMCNC: 30.3 G/DL (ref 30–36.5)
MCV RBC AUTO: 81.7 FL (ref 80–99)
MONOCYTES # BLD: 0.8 K/UL (ref 0–1)
MONOCYTES NFR BLD: 6 % (ref 5–13)
NEUTS SEG # BLD: 9.5 K/UL (ref 1.8–8)
NEUTS SEG NFR BLD: 75 % (ref 32–75)
NRBC # BLD: 0 K/UL (ref 0–0.01)
NRBC BLD-RTO: 0 PER 100 WBC
P-R INTERVAL, ECG05: 144 MS
PLATELET # BLD AUTO: 219 K/UL (ref 150–400)
PMV BLD AUTO: 10.8 FL (ref 8.9–12.9)
POTASSIUM SERPL-SCNC: 3.8 MMOL/L (ref 3.5–5.1)
Q-T INTERVAL, ECG07: 392 MS
QRS DURATION, ECG06: 88 MS
QTC CALCULATION (BEZET), ECG08: 482 MS
RBC # BLD AUTO: 3.72 M/UL (ref 3.8–5.2)
SERVICE CMNT-IMP: ABNORMAL
SODIUM SERPL-SCNC: 139 MMOL/L (ref 136–145)
TRIGL SERPL-MCNC: 138 MG/DL (ref ?–150)
TROPONIN-HIGH SENSITIVITY: 7 NG/L (ref 0–51)
VENTRICULAR RATE, ECG03: 91 BPM
VIT B12 SERPL-MCNC: 676 PG/ML (ref 193–986)
VLDLC SERPL CALC-MCNC: 27.6 MG/DL
WBC # BLD AUTO: 12.5 K/UL (ref 3.6–11)

## 2022-11-06 PROCEDURE — 92610 EVALUATE SWALLOWING FUNCTION: CPT | Performed by: SPEECH-LANGUAGE PATHOLOGIST

## 2022-11-06 PROCEDURE — 84484 ASSAY OF TROPONIN QUANT: CPT

## 2022-11-06 PROCEDURE — 97161 PT EVAL LOW COMPLEX 20 MIN: CPT

## 2022-11-06 PROCEDURE — 99223 1ST HOSP IP/OBS HIGH 75: CPT | Performed by: PSYCHIATRY & NEUROLOGY

## 2022-11-06 PROCEDURE — 83036 HEMOGLOBIN GLYCOSYLATED A1C: CPT

## 2022-11-06 PROCEDURE — 97535 SELF CARE MNGMENT TRAINING: CPT

## 2022-11-06 PROCEDURE — 74011250637 HC RX REV CODE- 250/637: Performed by: INTERNAL MEDICINE

## 2022-11-06 PROCEDURE — 83690 ASSAY OF LIPASE: CPT

## 2022-11-06 PROCEDURE — 80048 BASIC METABOLIC PNL TOTAL CA: CPT

## 2022-11-06 PROCEDURE — 82607 VITAMIN B-12: CPT

## 2022-11-06 PROCEDURE — 36415 COLL VENOUS BLD VENIPUNCTURE: CPT

## 2022-11-06 PROCEDURE — 74011250636 HC RX REV CODE- 250/636: Performed by: INTERNAL MEDICINE

## 2022-11-06 PROCEDURE — 97165 OT EVAL LOW COMPLEX 30 MIN: CPT

## 2022-11-06 PROCEDURE — 80061 LIPID PANEL: CPT

## 2022-11-06 PROCEDURE — 86140 C-REACTIVE PROTEIN: CPT

## 2022-11-06 PROCEDURE — 85025 COMPLETE CBC W/AUTO DIFF WBC: CPT

## 2022-11-06 PROCEDURE — 82962 GLUCOSE BLOOD TEST: CPT

## 2022-11-06 PROCEDURE — 97530 THERAPEUTIC ACTIVITIES: CPT

## 2022-11-06 PROCEDURE — 70551 MRI BRAIN STEM W/O DYE: CPT

## 2022-11-06 PROCEDURE — 74011000250 HC RX REV CODE- 250: Performed by: INTERNAL MEDICINE

## 2022-11-06 PROCEDURE — 65270000046 HC RM TELEMETRY

## 2022-11-06 PROCEDURE — 74011636637 HC RX REV CODE- 636/637: Performed by: INTERNAL MEDICINE

## 2022-11-06 RX ORDER — ASPIRIN 300 MG/1
300 SUPPOSITORY RECTAL DAILY
Status: DISCONTINUED | OUTPATIENT
Start: 2022-11-06 | End: 2022-11-06

## 2022-11-06 RX ORDER — LEVETIRACETAM 500 MG/5ML
1000 INJECTION, SOLUTION, CONCENTRATE INTRAVENOUS EVERY 12 HOURS
Status: DISCONTINUED | OUTPATIENT
Start: 2022-11-06 | End: 2022-11-07 | Stop reason: HOSPADM

## 2022-11-06 RX ADMIN — Medication 3 UNITS: at 18:09

## 2022-11-06 RX ADMIN — SODIUM CHLORIDE 75 ML/HR: 9 INJECTION, SOLUTION INTRAVENOUS at 20:50

## 2022-11-06 RX ADMIN — LEVETIRACETAM 1000 MG: 100 INJECTION INTRAVENOUS at 23:33

## 2022-11-06 RX ADMIN — Medication 2 UNITS: at 23:29

## 2022-11-06 RX ADMIN — INSULIN GLARGINE 48 UNITS: 100 INJECTION, SOLUTION SUBCUTANEOUS at 23:29

## 2022-11-06 RX ADMIN — SODIUM ZIRCONIUM CYCLOSILICATE 10 G: 10 POWDER, FOR SUSPENSION ORAL at 01:53

## 2022-11-06 RX ADMIN — ATORVASTATIN CALCIUM 80 MG: 40 TABLET, FILM COATED ORAL at 23:28

## 2022-11-06 RX ADMIN — ENOXAPARIN SODIUM 30 MG: 100 INJECTION SUBCUTANEOUS at 23:30

## 2022-11-06 RX ADMIN — METHOCARBAMOL 500 MG: 500 TABLET ORAL at 01:42

## 2022-11-06 RX ADMIN — ENOXAPARIN SODIUM 30 MG: 100 INJECTION SUBCUTANEOUS at 10:51

## 2022-11-06 RX ADMIN — ASPIRIN 81 MG 81 MG: 81 TABLET ORAL at 10:51

## 2022-11-06 RX ADMIN — SODIUM CHLORIDE 75 ML/HR: 9 INJECTION, SOLUTION INTRAVENOUS at 01:22

## 2022-11-06 RX ADMIN — Medication 2 UNITS: at 09:09

## 2022-11-06 RX ADMIN — PANTOPRAZOLE SODIUM 20 MG: 20 TABLET, DELAYED RELEASE ORAL at 10:51

## 2022-11-06 RX ADMIN — SODIUM CHLORIDE 75 ML/HR: 9 INJECTION, SOLUTION INTRAVENOUS at 09:21

## 2022-11-06 RX ADMIN — LEVETIRACETAM 1000 MG: 100 INJECTION INTRAVENOUS at 10:54

## 2022-11-06 RX ADMIN — ATORVASTATIN CALCIUM 80 MG: 40 TABLET, FILM COATED ORAL at 01:42

## 2022-11-06 RX ADMIN — CLOPIDOGREL BISULFATE 75 MG: 75 TABLET ORAL at 10:51

## 2022-11-06 RX ADMIN — LEVETIRACETAM 1000 MG: 500 SOLUTION ORAL at 01:22

## 2022-11-06 NOTE — PROGRESS NOTES
GIGI: anticipate return to [address] [place] [address], pt resided there prior to admission; Referral pending in careport to [address]    May need a Rapid covid test to return to LTC    [facility] Transport    Primary family contact: Bob Murray, [phone]    RUR: 13%  PT/OT recs. SNF vs IPR  Reason for Admission:    Suspected Acute recurrent CVA  History of CVA with right hemiparesis, LISA,                    RUR Score:                     Plan for utilizing home health:      plan for return to LTC    PCP: First and Last name:  Myron Pathak MD     Name of Practice:    Are you a current patient: Yes/No:    Approximate date of last visit:    Can you participate in a virtual visit with your PCP:                     Current Advanced Directive/Advance Care Plan: Full Code      Healthcare Decision Maker:   Click here to complete [address] including selection of the [address] Relationship (ie \"Primary\")           MotherBob, [phone]                  Transition of Care Plan:                    1500-CM reviewed pt chart & met with pt at bedside and spoke with mother, Ashkan Longoria on phone. Per mother's report, pt was residing at [facility] in [address] [place] [address] for approximately the last nine months. Pt receives total care there. CM confirmed pcp, demographics and insurance. Mother agreeable for pt to return to LTC at [facility] at d/c. CM sent perfect serve to Attending to inquire about IPR, Attending would like pt to return to LTC. Referral sent in careport as noted above. CM to follow. Kirsten Brandon RN BSN CCM  Transition of Care Plan:     The Plan for Transition of Care is related to the following treatment goals:LTC return, [facility]    The Patient and/or patient representative was provided with a choice of provider and agrees  with the discharge plan.       Yes [x] No []    A Freedom of choice list was provided with basic dialogue that supports the patient's individualized plan of care/goals and shares the quality data associated with the providers. Yes [x] No []   Care Management Interventions  PCP Verified by CM:  Yes  Palliative Care Criteria Met (RRAT>21 & CHF Dx)?: No  Mode of Transport at Discharge: BLS  Transition of Care Consult (CM Consult): Discharge Planning, [facility], SNF  Partner SNF: No  MyChart Signup: Yes  Discharge Durable Medical Equipment: No  Health Maintenance Reviewed: Yes  Physical Therapy Consult: Yes  Occupational Therapy Consult: Yes  Support Systems: Parent(s)  Confirm Follow Up Transport:  (s)  The Patient and/or Patient Representative was Provided with a Choice of Provider and Agrees with the Discharge Plan?: Yes  Name of the Patient Representative Who was Provided with a Choice of Provider and Agrees with the Discharge Plan: MOTHER, [address], [address] of Choice List was Provided with Basic Dialogue that Supports the Patient's Individualized Plan of Care/Goals, Treatment Preferences and Shares the Quality Data Associated with the Providers?: Yes  Discharge Location  Patient Expects to be Discharged to[de-identified] [facility]

## 2022-11-06 NOTE — PROGRESS NOTES
Hospitalist Progress Note      Hospital summary: This is a 76-year-old woman with past medical history significant for dyslipidemia; type 2 diabetes; obstructive sleep apnea, suspected seizure disorder, on Keppra; and status post CVA with right-sided weakness; presented at the emergency room with right facial droop and right upper and lower extremity weakness. The patient's symptoms started at about 2300 yesterday. The patient was brought to the emergency room as Code Stroke. When the patient arrived at the emergency room, CT scan of the head was obtained. This shows chronic right cerebellar infarct and chronic left basilar ganglia infarct. No evidence of acute hemorrhage. This was followed with CTA of the head and neck which did not show any evidence of acute large vessel arterial occlusion. The patient was seen by teleneurologist at the request of the emergency room physician and the patient was subsequently referred to the hospitalist service for admission. The patient was last admitted to the hospital from 03/14/2022 to 03/16/2022. The patient presented with right-sided weakness at that time as well. The patient was admitted and MRI of the brain confirmed acute stroke, EEG was negative, but the patient was still discharged to home on Keppra. The patient denies fever, rigors, or chills 11/5/2022      Assessment/Plan:  Suspect acute recurrent CVA. - The patient had a similar presentation in 3/22  - CT head: Chronic right cerebellar infarct. Chronic left basal ganglia infarct. No evidence of acute hemorrhage or infarct.  - CTA head: No evidence for acute large vessel arterial occlusion. No significant cerebral perfusion abnormality  - MRI of the brain pending   - echocardiogram ordered     -  lipid panel ordered.  A1c is 9.2  -  Neurology consult   - SLP, PT/OT   - c/w aspirin, plavix, statin   - aspirin and p2y12 tests ordered     Dyslipidemia: statin   Type 2 diabetes with hyperglycemia:  c/w ISS, lantus 48U  Morbid obesity: BMI of 44.43. Diet and exercise advised. Obstructive sleep apnea:  CPAP at bedtime. Seizure disorder:  c/w Keppra. seizure precaution. Leukocytosis:  likely reactive   - afebrile, and not toxic.    - CT abd - no source of infection. Chest x-ray is also negative. Urinalysis clean. - will monitor off abx     Code status: Full  DVT prophylaxis: Lovenox   Disposition: TBD. Need SNF  ----------------------------------------------    CC: Weakness     S: Patient is seen and examined at bedside this AM. She is sleepy, did not interact much. Discussed with nursing - swallow issues - speech therapy     Review of Systems:  Review of systems not obtained due to patient factors. O:  Visit Vitals  /65   Pulse 87   Temp 97.5 °F (36.4 °C)   Resp 20   Ht 5' 4\" (1.626 m)   Wt 117.4 kg (258 lb 13.1 oz)   SpO2 94%   BMI 44.43 kg/m²       PHYSICAL EXAM:  Gen: NAD, chronically ill   HEENT: anicteric sclerae, normal conjunctiva, oropharynx clear, MM moist  Neck: supple, trachea midline, no adenopathy  Heart: RRR, no MRG, no JVD, no peripheral edema  Lungs: CTA b/l, non-labored respirations  Abd: soft, NT, ND, BS+, no organomegaly  Extr: warm  Skin: dry, no rash  Neuro: sleepy       Intake/Output Summary (Last 24 hours) at 11/6/2022 1226  Last data filed at 11/6/2022 0202  Gross per 24 hour   Intake 1000 ml   Output 750 ml   Net 250 ml        Recent labs & imaging reviewed:  Recent Results (from the past 24 hour(s))   SAMPLES BEING HELD    Collection Time: 11/05/22  2:11 PM   Result Value Ref Range    SAMPLES BEING HELD 1RED     COMMENT        Add-on orders for these samples will be processed based on acceptable specimen integrity and analyte stability, which may vary by analyte.    CBC WITH AUTOMATED DIFF    Collection Time: 11/05/22  2:11 PM   Result Value Ref Range    WBC 13.8 (H) 3.6 - 11.0 K/uL    RBC 4.31 3.80 - 5.20 M/uL    HGB 10.8 (L) 11.5 - 16.0 g/dL HCT 35.3 35.0 - 47.0 %    MCV 81.9 80.0 - 99.0 FL    MCH 25.1 (L) 26.0 - 34.0 PG    MCHC 30.6 30.0 - 36.5 g/dL    RDW 14.3 11.5 - 14.5 %    PLATELET 119 382 - 940 K/uL    MPV 10.8 8.9 - 12.9 FL    NRBC 0.1 (H) 0  WBC    ABSOLUTE NRBC 0.02 (H) 0.00 - 0.01 K/uL    NEUTROPHILS 75 32 - 75 %    LYMPHOCYTES 17 12 - 49 %    MONOCYTES 6 5 - 13 %    EOSINOPHILS 1 0 - 7 %    BASOPHILS 0 0 - 1 %    IMMATURE GRANULOCYTES 1 (H) 0.0 - 0.5 %    ABS. NEUTROPHILS 10.4 (H) 1.8 - 8.0 K/UL    ABS. LYMPHOCYTES 2.4 0.8 - 3.5 K/UL    ABS. MONOCYTES 0.8 0.0 - 1.0 K/UL    ABS. EOSINOPHILS 0.1 0.0 - 0.4 K/UL    ABS. BASOPHILS 0.1 0.0 - 0.1 K/UL    ABS. IMM. GRANS. 0.1 (H) 0.00 - 0.04 K/UL    DF AUTOMATED     METABOLIC PANEL, COMPREHENSIVE    Collection Time: 11/05/22  2:11 PM   Result Value Ref Range    Sodium 136 136 - 145 mmol/L    Potassium 5.9 (H) 3.5 - 5.1 mmol/L    Chloride 100 97 - 108 mmol/L    CO2 30 21 - 32 mmol/L    Anion gap 6 5 - 15 mmol/L    Glucose 156 (H) 65 - 100 mg/dL    BUN 29 (H) 6 - 20 MG/DL    Creatinine 1.12 (H) 0.55 - 1.02 MG/DL    BUN/Creatinine ratio 26 (H) 12 - 20      eGFR >60 >60 ml/min/1.73m2    Calcium 9.6 8.5 - 10.1 MG/DL    Bilirubin, total 1.0 0.2 - 1.0 MG/DL    ALT (SGPT) 28 12 - 78 U/L    AST (SGOT) 72 (H) 15 - 37 U/L    Alk.  phosphatase 198 (H) 45 - 117 U/L    Protein, total 8.3 (H) 6.4 - 8.2 g/dL    Albumin 3.1 (L) 3.5 - 5.0 g/dL    Globulin 5.2 (H) 2.0 - 4.0 g/dL    A-G Ratio 0.6 (L) 1.1 - 2.2     TROPONIN-HIGH SENSITIVITY    Collection Time: 11/05/22  2:11 PM   Result Value Ref Range    Troponin-High Sensitivity 7 0 - 51 ng/L   PROTHROMBIN TIME + INR    Collection Time: 11/05/22  2:11 PM   Result Value Ref Range    INR 1.1 0.9 - 1.1      Prothrombin time 11.4 (H) 9.0 - 11.1 sec   EKG, 12 LEAD, INITIAL    Collection Time: 11/05/22  2:26 PM   Result Value Ref Range    Ventricular Rate 91 BPM    Atrial Rate 91 BPM    P-R Interval 144 ms    QRS Duration 88 ms    Q-T Interval 392 ms    QTC Calculation (Bezet) 482 ms    Calculated P Axis 60 degrees    Calculated R Axis -61 degrees    Calculated T Axis 70 degrees    Diagnosis       Normal sinus rhythm  Left axis deviation  Prolonged QT  Abnormal ECG  When compared with ECG of 14-MAR-2022 13:18,  No significant change was found  Confirmed by Chris Naranjo MD ([id number]) on 11/6/2022 7:22:36 AM     URINALYSIS W/MICROSCOPIC    Collection Time: 11/05/22  4:31 PM   Result Value Ref Range    Color YELLOW/STRAW      Appearance CLEAR CLEAR      pH (UA) 6.0 5.0 - 8.0      Protein Negative NEG mg/dL    Glucose Negative NEG mg/dL    Ketone Negative NEG mg/dL    Bilirubin Negative NEG      Blood SMALL (A) NEG      Urobilinogen 1.0 0.2 - 1.0 EU/dL    Nitrites Negative NEG      Leukocyte Esterase Negative NEG      WBC 0-4 0 - 4 /hpf    RBC 0-5 0 - 5 /hpf    Epithelial cells FEW FEW /lpf    Bacteria 1+ (A) NEG /hpf    Hyaline cast 0-2 0 - 5 /lpf   URINE CULTURE HOLD SAMPLE    Collection Time: 11/05/22  4:31 PM    Specimen: Serum; Urine   Result Value Ref Range    Urine culture hold        Urine on hold in Microbiology dept for 2 days. If unpreserved urine is submitted, it cannot be used for addtional testing after 24 hours, recollection will be required.    GLUCOSE, POC    Collection Time: 11/06/22 12:32 AM   Result Value Ref Range    Glucose (POC) 127 (H) 65 - 117 mg/dL    Performed by Felix Scott RN    HEMOGLOBIN A1C WITH EAG    Collection Time: 11/06/22  4:54 AM   Result Value Ref Range    Hemoglobin A1c 9.2 (H) 4.0 - 5.6 %    Est. average glucose 217 mg/dL   VITAMIN B12    Collection Time: 11/06/22  4:54 AM   Result Value Ref Range    Vitamin B12 676 193 - 986 pg/mL   C REACTIVE PROTEIN, QT    Collection Time: 11/06/22  4:54 AM   Result Value Ref Range    C-Reactive protein 5.25 (H) 0.00 - 0.60 mg/dL   TROPONIN-HIGH SENSITIVITY    Collection Time: 11/06/22  4:54 AM   Result Value Ref Range    Troponin-High Sensitivity 7 0 - 51 ng/L   LIPASE    Collection Time: 11/06/22  4:54 AM   Result Value Ref Range    Lipase 64 (L) 73 - 393 U/L   GLUCOSE, POC    Collection Time: 11/06/22  7:45 AM   Result Value Ref Range    Glucose (POC) 143 (H) 65 - 117 mg/dL    Performed by Eguenio Sarin   PCT    GLUCOSE, POC    Collection Time: 11/06/22 11:03 AM   Result Value Ref Range    Glucose (POC) 137 (H) 65 - 117 mg/dL    Performed by Eugenio Sarin   PCT      Recent Labs     11/05/22  1411   WBC 13.8*   HGB 10.8*   HCT 35.3        Recent Labs     11/05/22  1411      K 5.9*      CO2 30   BUN 29*   CREA 1.12*   *   CA 9.6     Recent Labs     11/06/22  0454 11/05/22 1411   ALT  --  28   AP  --  198*   TBILI  --  1.0   TP  --  8.3*   ALB  --  3.1*   GLOB  --  5.2*   LPSE 64*  --      Recent Labs     11/05/22 1411   INR 1.1   PTP 11.4*      No results for input(s): FE, TIBC, PSAT, FERR in the last 72 hours. Lab Results   Component Value Date/Time    Folate SPECIMEN HEMOLYZED, RESULTS MAY BE AFFECTED 03/14/2022 04:33 PM      No results for input(s): PH, PCO2, PO2 in the last 72 hours. No results for input(s): CPK, CKNDX, TROIQ in the last 72 hours.     No lab exists for component: CPKMB  Lab Results   Component Value Date/Time    Cholesterol, total 148 03/15/2022 05:16 AM    HDL Cholesterol 46 03/15/2022 05:16 AM    LDL, calculated 68.6 03/15/2022 05:16 AM    Triglyceride 167 (H) 03/15/2022 05:16 AM    CHOL/HDL Ratio 3.2 03/15/2022 05:16 AM     Lab Results   Component Value Date/Time    Glucose (POC) 137 (H) 11/06/2022 11:03 AM    Glucose (POC) 143 (H) 11/06/2022 07:45 AM    Glucose (POC) 127 (H) 11/06/2022 12:32 AM    Glucose (POC) 207 (H) 03/16/2022 04:38 PM    Glucose (POC) 267 (H) 03/16/2022 11:29 AM     Lab Results   Component Value Date/Time    Color YELLOW/STRAW 11/05/2022 04:31 PM    Appearance CLEAR 11/05/2022 04:31 PM    Specific gravity 1.018 09/23/2021 06:56 PM    Specific gravity >1.030 (H) 05/22/2021 04:09 AM    pH (UA) 6.0 11/05/2022 04:31 PM    Protein Negative 11/05/2022 04:31 PM    Glucose Negative 11/05/2022 04:31 PM    Ketone Negative 11/05/2022 04:31 PM    Bilirubin Negative 11/05/2022 04:31 PM    Urobilinogen 1.0 11/05/2022 04:31 PM    Nitrites Negative 11/05/2022 04:31 PM    Leukocyte Esterase Negative 11/05/2022 04:31 PM    Epithelial cells FEW 11/05/2022 04:31 PM    Bacteria 1+ (A) 11/05/2022 04:31 PM    WBC 0-4 11/05/2022 04:31 PM    RBC 0-5 11/05/2022 04:31 PM       Med list reviewed  Current Facility-Administered Medications   Medication Dose Route Frequency    levETIRAcetam (KEPPRA) injection 1,000 mg  1,000 mg IntraVENous Q12H    aspirin chewable tablet 81 mg  81 mg Oral DAILY    atorvastatin (LIPITOR) tablet 80 mg  80 mg Oral QHS    clopidogreL (PLAVIX) tablet 75 mg  75 mg Oral DAILY    insulin glargine (LANTUS) injection 48 Units  48 Units SubCUTAneous QHS    [Held by provider] levETIRAcetam (KEPPRA) 100 mg/mL solution 1,000 mg  1,000 mg Oral Q12H    lidocaine 4 % patch 1 Patch  1 Patch TransDERmal Q24H    methocarbamoL (ROBAXIN) tablet 500 mg  500 mg Oral Q8H PRN    pantoprazole (PROTONIX) tablet 20 mg  20 mg Oral DAILY    acetaminophen (TYLENOL) tablet 650 mg  650 mg Oral Q4H PRN    Or    acetaminophen (TYLENOL) solution 650 mg  650 mg Per NG tube Q4H PRN    Or    acetaminophen (TYLENOL) suppository 650 mg  650 mg Rectal Q4H PRN    ondansetron (ZOFRAN) injection 4 mg  4 mg IntraVENous Q6H PRN    bisacodyL (DULCOLAX) tablet 5 mg  5 mg Oral DAILY PRN    0.9% sodium chloride infusion  75 mL/hr IntraVENous CONTINUOUS    insulin lispro (HUMALOG) injection   SubCUTAneous AC&HS    glucose chewable tablet 16 g  4 Tablet Oral PRN    glucagon (GLUCAGEN) injection 1 mg  1 mg IntraMUSCular PRN    dextrose 10 % infusion 0-250 mL  0-250 mL IntraVENous PRN    enoxaparin (LOVENOX) injection 30 mg  30 mg SubCUTAneous Q12H       Care Plan discussed with:  Patient/Family and Nurse    Eduardo Foley MD  Internal Medicine  Date of Service: 11/6/2022

## 2022-11-06 NOTE — CONSULTS
Neurology Consult Note     NAME: Rhoda Nelson   :  1980   MRN:  [id number]   DATE:  2022       HPI:  Pt is a 45yo RH female with HTN, HLD, DM, TIA, CVA, LISA on CPAP on ASA 81mg and Plavix 75mg daily at home, admitted 22 with c/o right F/A/L weakness, worse than baseline since 10/31/22, has also been vomiting since then. She was seen here in March for the same and Dr. James Adrian started her on [address] for possible seizure as the cause of her worsened right sided deficits, subsequently found to have a small left frontal lobe infarct extending into left BG. EEG was neg, but pt was discharged on Keppra. It is unclear where \"history of seizure\" came from. There is no mention of this in neurology notes from [facility]. The pt tells me that she came to the hospital due to N/V and back pain. When asked about weakness, she states EMS told her that they thought she had another stroke. Pt reports that she has been drooling from right side of her mouth more than usual, right arm is plegic at baseline, right leg with some use, ambulates with hemiwalker, no decrease in right leg strength. CTH chronic right cerebellar and left BG infarct, no acute findings. CTA H/N no LVO, no significant stenosis, mild to mod IC athero. CTP neg. MRI brain neg. EKG with NSR, prolonged QT. HgbA1C 9.2. WBC 13.8, Hgb 10.8, BUN/Cr 29/1.12, AST 72.     PMH:  HLD  DM  HTN  LISA on CPAP  TIA  CVA 2021 right cerebellar s/p IV TPA at [facility], started on ASA 81mg  Chronic CVAs seen on MRI 2021 -Left external capsule, right thalamus  CVA 3/2022 left frontal lobe WM/BG, Plavix added to ASA  CN IV palsy, left, due to uncontrolled DM  GERD  PN, severe on NCS/EMG at [facility] 22  Ulnar neuropathy, right, severe on NCS/EMG 22  Diabetic retinopathy left eye  C-sec x 3  Depression  Gout  PTSD    ROS:  Per HPI o/w neg    MEDS:  Home:  Prior to Admission Medications   Prescriptions Last Dose Informant Patient Reported? Taking? HumaLOG KwikPen Insulin 100 unit/mL kwikpen   Yes No   Si Units by SubCUTAneous route Before breakfast, lunch, and dinner. aspirin 81 mg chewable tablet   Yes No   Sig: Take 81 mg by mouth daily. atorvastatin (LIPITOR) 80 mg tablet   Yes No   Sig: Take 1 Tablet by mouth nightly. clopidogreL (PLAVIX) 75 mg tab   No No   Sig: Take 1 Tablet by mouth daily. insulin glargine (LANTUS) 100 unit/mL injection   Yes No   Si Units by SubCUTAneous route nightly. levETIRAcetam (KEPPRA) 100 mg/mL solution   Yes No   Sig: Take 1,000 mg by mouth every twelve (12) hours. lidocaine (Lidocaine Pain Relief) 4 % patch   Yes No   Sig: Apply 1 patch to left side of neck topically once daily for pain management and remove per schedule. metFORMIN ER (GLUCOPHAGE XR) 500 mg tablet   Yes No   Sig: Take 500 mg by mouth daily. methocarbamoL (ROBAXIN) 500 mg tablet   Yes No   Sig: Take 500 mg by mouth every eight (8) hours as needed for Muscle Spasm(s). pantoprazole (PROTONIX) 20 mg tablet   Yes No   Sig: Take 20 mg by mouth daily. polyethylene glycol (MIRALAX) 17 gram packet   Yes No   Sig: Take 17 g by mouth daily as needed. polyvinyl alcohol (LIQUIFILM TEARS) 1.4 % ophthalmic solution   Yes No   Sig: Administer 1 Drop to both eyes four (4) times daily. Indications: for s/p eye surgery   senna (Fanny-joselito) 8.6 mg tablet   Yes No   Sig: Take 1 Tablet by mouth daily.       Facility-Administered Medications: None         Current Facility-Administered Medications:     levETIRAcetam (KEPPRA) injection 1,000 mg, 1,000 mg, IntraVENous, Q12H, Madala, Sushma, MD, 1,000 mg at 22 1054    aspirin chewable tablet 81 mg, 81 mg, Oral, DAILY, Mena Quinonez MD, 81 mg at 22 1051    atorvastatin (LIPITOR) tablet 80 mg, 80 mg, Oral, QHS, Mena Quinonez MD, 80 mg at 11/06/22 0142    clopidogreL (PLAVIX) tablet 75 mg, 75 mg, Oral, DAILY, Mena Quinonez MD, 75 mg at 11/06/22 1051    insulin glargine (LANTUS) injection 48 Units, 48 Units, SubCUTAneous, QHS, Mena Quinonez MD    [Held by provider] levETIRAcetam (KEPPRA) 100 mg/mL solution 1,000 mg, 1,000 mg, Oral, Q12H, Mena Quinonez MD, 1,000 mg at 11/06/22 0122    lidocaine 4 % patch 1 Patch, 1 Patch, TransDERmal, Q24H, Mena Quinonez MD, 1 Patch at 11/06/22 0122    methocarbamoL (ROBAXIN) tablet 500 mg, 500 mg, Oral, Q8H PRN, Mena Quinonez MD, 500 mg at 11/06/22 0142    pantoprazole (PROTONIX) tablet 20 mg, 20 mg, Oral, DAILY, Mena Quinonez MD, 20 mg at 11/06/22 1051    acetaminophen (TYLENOL) tablet 650 mg, 650 mg, Oral, Q4H PRN **OR** acetaminophen (TYLENOL) solution 650 mg, 650 mg, Per NG tube, Q4H PRN **OR** acetaminophen (TYLENOL) suppository 650 mg, 650 mg, Rectal, Q4H PRN, Mena Quinonez MD    ondansetron (ZOFRAN) injection 4 mg, 4 mg, IntraVENous, Q6H PRN, Mena Quinonez MD    bisacodyL (DULCOLAX) tablet 5 mg, 5 mg, Oral, DAILY PRN, Mena Quinonez MD    0.9% sodium chloride infusion, 75 mL/hr, IntraVENous, CONTINUOUS, Mena Quinonez MD, Last Rate: 75 mL/hr at 11/06/22 0921, 75 mL/hr at 11/06/22 0921    insulin lispro (HUMALOG) injection, , SubCUTAneous, AC&HS, Mena Quinonez MD, 2 Units at 11/06/22 0909    glucose chewable tablet 16 g, 4 Tablet, Oral, PRN, Mena Quinonez MD    glucagon (GLUCAGEN) injection 1 mg, 1 mg, IntraMUSCular, PRN, Mena Quinonez MD    dextrose 10 % infusion 0-250 mL, 0-250 mL, IntraVENous, PRN, Mena Quinonez MD    enoxaparin (LOVENOX) injection 30 mg, 30 mg, SubCUTAneous, Q12H, Mena Quinonez MD, 30 mg at 11/06/22 1051      No Known Allergies      SH:  [place] [facility] [place]  Previously a CNA at [employer]  No T/E/D use    FH:  M- Heart disease, HTN  F - DM, HTN, Glaucoma      PHYSICAL EXAM:    Visit Vitals  /65 Pulse 100   Temp 97.5 °F (36.4 °C)   Resp 20   Ht 5' 4\" (1.626 m)   Wt 258 lb 13.1 oz (117.4 kg)   SpO2 94%   BMI 44.43 kg/m²     Temp (24hrs), Av.5 °F (36.4 °C), Min:97.5 °F (36.4 °C), Max:97.5 °F (36.4 °C)        General: Well developed well nourished patient in no apparent distress. Cardiac: Regular rhythm, tachycardic, no murmurs. Carotids: 2+ symmetric, no bruits  Extremities: 2+ Radial pulses, no cyanosis or edema    Neurological Exam:  Mental Status: Oriented to time, place and person. Speech - mild dysarthria. language intact. Attention and fund of knowledge appropriate. Normal recent and remote memory. Cranial Nerves:   VFF, PERRL, EOMI with baseline dysconjugate gaze, no nystagmus, no diplopia, no ptosis. Facial sensation is normal. Facial movement is asymmetric on right. Palate is midline. Tongue is midline. Hearing is intact bilaterally. Motor:  5/5 strength on left. Right UE flaccid. Right LE with limited prox movement, 5/5 DF/PF. No tremors   Reflexes:   Deep tendon reflexes 2+ on left, 3+ on right in UE. Unable to elicit in LE possibly due to body habitus/positioning. Toes downgoing.    Sensory:   Intact to LT   Gait:  Unable to assess due to patient factors   Cerebellar:  Unable to assess due to patient factors         STUDIES AND REPORTS:  Recent Results (from the past 24 hour(s))   GLUCOSE, POC    Collection Time: 22 12:32 AM   Result Value Ref Range    Glucose (POC) 127 (H) 65 - 117 mg/dL    Performed by Madie Lipscomb RN    HEMOGLOBIN A1C WITH EAG    Collection Time: 22  4:54 AM   Result Value Ref Range    Hemoglobin A1c 9.2 (H) 4.0 - 5.6 %    Est. average glucose 217 mg/dL   VITAMIN B12    Collection Time: 22  4:54 AM   Result Value Ref Range    Vitamin B12 676 193 - 986 pg/mL   C REACTIVE PROTEIN, QT    Collection Time: 22  4:54 AM   Result Value Ref Range    C-Reactive protein 5.25 (H) 0.00 - 0.60 mg/dL   TROPONIN-HIGH SENSITIVITY    Collection Time: 11/06/22  4:54 AM   Result Value Ref Range    Troponin-High Sensitivity 7 0 - 51 ng/L   LIPASE    Collection Time: 11/06/22  4:54 AM   Result Value Ref Range    Lipase 64 (L) 73 - 393 U/L   GLUCOSE, POC    Collection Time: 11/06/22  7:45 AM   Result Value Ref Range    Glucose (POC) 143 (H) 65 - 117 mg/dL    Performed by Karsamy Wilkinsi   PCT    GLUCOSE, POC    Collection Time: 11/06/22 11:03 AM   Result Value Ref Range    Glucose (POC) 137 (H) 65 - 117 mg/dL    Performed by [employer] Marchi   PCT      MRI Results (most recent):  Results from [place] encounter on 11/05/22    MRI BRAIN WO CONT    Narrative  EXAM: MRI BRAIN WO CONT    INDICATION: Acute CVA    COMPARISON: MRI brain March 14, 2022. CONTRAST: None. TECHNIQUE:  Multiplanar multisequence acquisition without contrast of the brain. FINDINGS:  Chronic left basal ganglia and periventricular white matter infarct. No evidence  of acute ischemia. Chronic right cerebellar infarct. No mass, midline shift,, or  edema. Major intracranial flow voids are preserved. Impression  Chronic left periventricular white matter infarct and chronic right cerebellar  infarct. No evidence of acute ischemia. CT Results (most recent):  Results from Hospital Encounter encounter on 11/05/22    CT ABD PELV WO CONT    Narrative  INDICATION: Left flank pain. Exam: Noncontrast CT of the abdomen and pelvis is performed with 5 mm  collimation. Sagittal and coronal reformatted images were also performed. CT dose reduction was achieved through the use of a standardized protocol  tailored for this examination and automatic exposure control for dose  modulation. Direct comparison is made to prior CT dated May 2021. FINDINGS:    The visualized lung bases are clear. Abdomen:    Liver: The liver is normal on noncontrast images. Spleen: The spleen is normal on noncontrast images. Adrenals: The adrenals are normal on noncontrast images. Pancreas:  The pancreas is normal on noncontrast images. Gallbladder: The gallbladder is normal on noncontrast images. Kidneys: There is no perinephric stranding, hydronephrosis or hydroureter. No  renal, ureteral bladder calculus is visualized. Residual intravenous contrast is  within the renal collecting systems. Bowel: No thickened or dilated loop of large or small bowel seen. Appendix: The appendix is normal.    Pelvis: Urinary bladder is partially filled, opacified and grossly normal.    Miscellaneous: There is no free intraperitoneal gas or fluid. There is no focal  fluid collection to suggest abscess. IUD is present within the uterus. Impression  No renal calculi or evidence of obstructive uropathy. Assessment and Plan:   Pt is a 43yo RH female with HTN, HLD, DM, TIA, CVAs, LISA on CPAP on ASA 81mg and Plavix 75mg daily at home, admitted 11/5/22 with c/o N/V and back pain, pt denies worsening of baseline right sided weakness. CTH chronic right cerebellar and left BG infarct, no acute findings. CTA H/N no LVO, no significant stenosis, mild to mod IC athero. CTP neg. MRI brain neg. Exam with baseline right exotropia, right F/A/L weakness. No suspicion for seizure at this time, strokes are all subcortical or cerebellar, so post-stroke seizure unlikely. It is possible infection/dehydration caused recrudescence of underlying chronic stroke deficits. Continue ASA 81mg and Plavix 75mg daily. ASA Test and P2Y12 pending. Lipid panel pending. Will fu labs, o/w please call if needed. Signed: Ishan Murphy MD

## 2022-11-06 NOTE — PROGRESS NOTES
SPEECH PATHOLOGY BEDSIDE SWALLOW EVALUATION/DISCHARGE  Patient: El Freeman (38 y.o. female)  Date: 2022  Primary Diagnosis: Hemiparesis ([facility] [G81.90]       Precautions:   Fall, Bed Alarm    ASSESSMENT :  Based on the objective data described below, the patient presents with no oral or pharyngeal dysphagia with timely and complete mastication, and swallow initiation appearing timely with suspected functional hyolaryngeal elevation/excursion. No s/s of aspiration observed. Note ED nsg concerned regarding patient report of spitting up pills. Patient reports she has been vomiting for the last week due to upset stomach and that it is not specific to pills with no complaints of swallowing difficulty. Patient reports speech and swallow at baseline function (from strokes as far back as ). Skilled acute therapy provided by a speech-language pathologist is not indicated at this time. PLAN :  Recommendations:  --recommend full liquid diet at patient request.  Macey Pear to upgrade to regular when patient requests. She reports desire for foods that are soft on her stomach for now. Meds as tolerated. Discharge Recommendations: None     SUBJECTIVE:   Patient stated when I want real food, can I have a diabetic diet? .  Patient reports wanting food like broth and jello for now due to upset stomach PTA    OBJECTIVE:     Past Medical History:   Diagnosis Date    Depression     Diabetes ([facility]     Edema     Gout     Herpes genitalis     Hypertension     Ill-defined condition     gout    Mood disorder ([facility]     Other ill-defined conditions(799.89)     cholesterol     Psychiatric disorder     depression    Psychiatric disorder     PTSD    PTSD (post-traumatic stress disorder)     Sleep disorder     Stroke [facility])     UTI (lower urinary tract infection)      Past Surgical History:   Procedure Laterality Date    HX GYN       X 3     Prior Level of Function/Home Situation:   Home Situation  Home Environment: [address] [facility] Name: [facility] [place] [facility] [address]: No  Support Systems: [place]  Patient Expects to be Discharged to[de-identified] Skilled nursing facility  Current DME Used/Available at Home: Wheelchair, Other (comment) [facility])  Diet prior to admission: regular  Current Diet:  NPO    Cognitive and Communication Status:  Neurologic State: Alert  Orientation Level: Oriented X4  Cognition: Follows commands, Appropriate decision making  Perception: Appears intact  Perseveration: No perseveration noted  Safety/Judgement: Awareness of environment, Insight into deficits  Oral Assessment:  Oral Assessment  Labial: Right droop (slight, baseline per pt)  Dentition: Natural  Oral Hygiene: moist mucosa  Lingual: No impairment  P.O. Trials:  Patient Position: upright in bed  Vocal quality prior to P.O.: No impairment  Consistency Presented: Thin liquid; Solid (declined applesauce due to dislike)  How Presented: Self-fed/presented;SLP-fed/presented;Straw;Successive swallows     Bolus Acceptance: No impairment  Bolus Formation/Control: No impairment     Propulsion: No impairment  Oral Residue: None  Initiation of Swallow: No impairment  Laryngeal Elevation: Functional  Aspiration Signs/Symptoms: None  Pharyngeal Phase Characteristics: No impairment, issues, or problems              Oral Phase Severity: No impairment  Pharyngeal Phase Severity : No impairment  NOMS:   The NOMS functional outcome measure was used to quantify this patient's level of swallowing impairment. Based on the NOMS, the patient was determined to be at level 7 for swallow function     NOMS Swallowing Levels:  Level 1 (CN): NPO  Level 2 (CM): NPO but takes consistency in therapy  Level 3 (CL): Takes less than 50% of nutrition p.o. and continues with nonoral feedings; and/or safe with mod cues; and/or max diet restriction  Level 4 (CK):  Safe swallow but needs mod cues; and/or mod diet restriction; and/or still requires some nonoral feeding/supplements  Level 5 (CJ): Safe swallow with min diet restriction; and/or needs min cues  Level 6 (CI): Independent with p.o.; rare cues; usually self cues; may need to avoid some foods or needs extra time  Level 7 ([address] [place] [address]): Independent for all p.o.  ALVINA. (2003). National Outcomes Measurement System (NOMS): Adult Speech-Language Pathology User's Guide. Pain:           After treatment:   Patient left in no apparent distress in bed, Call bell within reach, and Nursing notified    COMMUNICATION/EDUCATION:     The patient's plan of care including recommendations, planned interventions, and recommended diet changes were discussed with: Registered nurse. Thank you for this referral.  Raymond Sanchez M.CD.  CCC-SLP   Time Calculation: 19 mins

## 2022-11-06 NOTE — ED NOTES
Pt resting comfortably at this time. NAD noted. Vitals stable  Pt has her call light on her right side and verbalizes how to use it.   Bed low and lights dimmed for Pt's comfort

## 2022-11-06 NOTE — PROGRESS NOTES
Problem: Aspiration - Risk of  Goal: *Absence of aspiration  Outcome: Progressing Towards Goal     Problem: Patient Education: Go to Patient Education Activity  Goal: Patient/Family Education  Outcome: Progressing Towards Goal     Problem: Diabetes Self-Management  Goal: *Disease process and treatment process  Description: Define diabetes and identify own type of diabetes; list 3 options for treating diabetes. Outcome: Progressing Towards Goal  Goal: *Incorporating nutritional management into lifestyle  Description: Describe effect of type, amount and timing of food on blood glucose; list 3 methods for planning meals. Outcome: Progressing Towards Goal  Goal: *Incorporating physical activity into lifestyle  Description: State effect of exercise on blood glucose levels. Outcome: Progressing Towards Goal  Goal: *Developing strategies to promote health/change behavior  Description: Define the ABC's of diabetes; identify appropriate screenings, schedule and personal plan for screenings. Outcome: Progressing Towards Goal  Goal: *Using medications safely  Description: State effect of diabetes medications on diabetes; name diabetes medication taking, action and side effects. Outcome: Progressing Towards Goal  Goal: *Monitoring blood glucose, interpreting and using results  Description: Identify recommended blood glucose targets  and personal targets. Outcome: Progressing Towards Goal  Goal: *Prevention, detection, treatment of acute complications  Description: List symptoms of hyper- and hypoglycemia; describe how to treat low blood sugar and actions for lowering  high blood glucose level. Outcome: Progressing Towards Goal  Goal: *Prevention, detection and treatment of chronic complications  Description: Define the natural course of diabetes and describe the relationship of blood glucose levels to long term complications of diabetes.   Outcome: Progressing Towards Goal  Goal: *Developing strategies to address psychosocial issues  Description: Describe feelings about living with diabetes; identify support needed and support network  Outcome: Progressing Towards Goal  Goal: *Insulin pump training  Outcome: Progressing Towards Goal  Goal: *Sick day guidelines  Outcome: Progressing Towards Goal  Goal: *Patient Specific Goal (EDIT GOAL, INSERT TEXT)  Outcome: Progressing Towards Goal     Problem: Patient Education: Go to Patient Education Activity  Goal: Patient/Family Education  Outcome: Progressing Towards Goal

## 2022-11-06 NOTE — PROGRESS NOTES
Lovenox Monitoring  Indication: DVT Prophylaxis  Recent Labs     11/05/22  1411   HGB 10.8*      INR 1.1   CREA 1.12*     Current Weight: 117.4 kg  Est. CrCl = 81.6 ml/min  Current Dose: 40 mg subcutaneously every 24 hours. Plan: Change to 30 mg every 12 hours per protocol based on weight.

## 2022-11-06 NOTE — H&P
[facility] [place] [facility] [place]  HISTORY AND PHYSICAL    Name:  Coretta Gitelman  MR#:  [id number]  :  1980  ACCOUNT #:  [de-identified]  ADMIT DATE:  2022      The patient was seen, evaluated, and admitted by me on 2022. PRIMARY CARE PHYSICIAN:  Ebony Martinez MD    SOURCE OF INFORMATION:  The patient and review of ED and old electronic medical records. CHIEF COMPLAINT: Right-sided weakness and facial droop. HISTORY OF PRESENT ILLNESS:  This is a 21-year-old woman with past medical history significant for dyslipidemia; type 2 diabetes; obstructive sleep apnea, suspected seizure disorder, on Keppra; and status post CVA with right-sided weakness; presented at the emergency room with right facial droop and right upper and lower extremity weakness. The patient's symptoms started at about 2300 yesterday. The patient was brought to the emergency room as Code Stroke. When the patient arrived at the emergency room, CT scan of the head was obtained. This shows chronic right cerebellar infarct and chronic left basilar ganglia infarct. No evidence of acute hemorrhage. This was followed with CTA of the head and neck which did not show any evidence of acute large vessel arterial occlusion. The patient was seen by teleneurologist at the request of the emergency room physician and the patient was subsequently referred to the hospitalist service for admission. The patient was last admitted to the hospital from 2022 to 2022. The patient presented with right-sided weakness at that time as well. The patient was admitted and MRI of the brain confirmed acute stroke, EEG was negative, but the patient was still discharged to home on Keppra. The patient denies fever, rigors, or chills. PAST MEDICAL HISTORY:  1.  Status post CVA with right-sided weakness. 2.  Dyslipidemia. 3.  Type 2 diabetes. 4.  Obstructive sleep apnea. 5.  Seizure disorder.   6.  Post-traumatic stress disorder and personality disorder. ALLERGIES:  NO KNOWN DRUG ALLERGIES. MEDICATIONS:  1. Aspirin 81 mg daily. 2.  Lipitor 80 mg daily. 3.  Plavix 75 mg daily. 4.  Lantus insulin 48 units subcutaneously daily at bedtime. 5.  Keppra 1000 mg every 12 hours. 6.  Glucophage 500 mg daily. 7.  Robaxin 500 mg every 8 hours as needed for muscle spasm. 8.  Protonix 20 mg daily. FAMILY HISTORY:  Her mother had hypertension and coronary artery disease. Her father had diabetes, kidney disease, and stroke. PAST SURGICAL HISTORY:  This is significant for  section. SOCIAL HISTORY:  No history of alcohol or tobacco abuse. REVIEW OF SYSTEMS:  HEAD, EYES, EARS, NOSE, AND THROAT:  No headache, no dizziness, no blurring of vision, no photophobia. RESPIRATORY SYSTEM:  No cough, no shortness of breath, no hemoptysis. CARDIOVASCULAR SYSTEM:  No chest pain, no orthopnea, no palpitations. GASTROINTESTINAL SYSTEM:  No nausea or vomiting. No diarrhea, no constipation. GENITOURINARY SYSTEM:  No dysuria, no urgency, no frequency. All other systems are reviewed and they are negative. PHYSICAL EXAMINATION:  GENERAL APPEARANCE:  The patient appeared ill, in moderate distress. VITAL SIGNS:  On arrival at the emergency room; temperature 98.3, pulse 90, respiratory rate 16, blood pressure 138/85, oxygen saturation 93%. HEENT:  Head:  Normocephalic, atraumatic. Eyes:  Normal eye movement. No redness, no drainage, no discharge. Ears:  Normal external ears with no evidence of drainage. Nose:  No deformity, no drainage. Mouth and Throat:  No visible oral lesion. NECK:  Neck is supple. No JVD, no thyromegaly. CHEST:  Clear breath sounds. No wheezing, no crackles. HEART:  Normal S1 and S2, regular. No clinically appreciable murmur. ABDOMEN:  Soft, nontender. Normal bowel sounds. CNS:  Alert, oriented x3. Residual aphasia noted and right sided weakness noted. EXTREMITIES:  No edema.   Pulses 2+ bilaterally. MUSCULOSKELETAL SYSTEM:  No obvious joint deformity and swelling. SKIN:  No active skin lesions seen in the exposed part of the body. PSYCHIATRY:  Unable to assess mood and affect. LYMPHATIC SYSTEM:  No cervical lymphadenopathy. DIAGNOSTIC DATA:  EKG shows sinus rhythm. No significant ST or T-waves abnormalities. CT scan of the head without contrast shows chronic right cerebellar infarct, chronic left basilar ganglia infarct. No evidence of acute hemorrhage or infarct. CTA of the head and neck with perfusion study, no evidence for acute large vessel arterial occlusion, no significant cerebral perfusion abnormalities. CT scan of the abdomen and pelvis without contrast, no renal calculi or evidence of obstructive uropathy. Chest x-ray, no acute process. LABORATORY DATA:  Coagulation profile:  INR 1.1, PT 11.4. Hematology:  WBC 13.8, hemoglobin 10.8, hematocrit 35.3, platelets 653. Cardiac profile:  Troponin high-sensitivity 7. Chemistry:  Sodium 136; potassium 5.9, this was reported as hemolyzed sample; chloride 100; CO2 of 30; glucose 156; BUN 29; creatinine 1.12; calcium 9.6. Total bilirubin 1.0, ALT 28, AST 72, alkaline phosphatase 198, total protein 8.2, albumin level 3.1, globulin 5.2. Urinalysis: This is significant for negative nitrite, negative leukocyte esterase, 1+ bacteria. ASSESSMENT:  1. Suspected acute recurrent cerebrovascular accident. 2.  Dyslipidemia. 3.  Type 2 diabetes. 4.  Morbid obesity. 5.  Obstructive sleep apnea. 6.  Seizure disorder. 7.  Hyperkalemia. 8.  Leukocytosis. PLAN:  1. Suspected acute recurrent CVA. The patient had a similar presentation and was admitted to this hospital in March of this year and MRI confirmed acute CVA. We will obtain MRI of the brain and echocardiogram.  We will check lipid profile. We will check hemoglobin A1c level. Inpatient Neurology consult will be requested to assist in further evaluation and treatment. We will check K89 and folic acid level. We will also check C-reactive protein level to evaluate the patient for systemic inflammation. We will also check urine drug screen. The patient does not appear to have passed her swallowing test in the emergency room and was having problem swallowing medications even with applesauce. The patient's nurse was advised not to continue with oral medication. Some of the patient's oral medication that is important such as Keppra will be converted to intravenous form. 2.  Dyslipidemia: We will resume medication once the patient has been cleared by Speech Therapy. 3.  Type 2 diabetes: The patient will be placed on sliding scale with insulin coverage. We will check hemoglobin A1c level. 4.  Morbid obesity:  The patient presented with BMI of 44.43. Diet and exercise advised. Consider inpatient dietary consult if indicated. 5.  Obstructive sleep apnea: We will place the patient on CPAP at bedtime. 6.  Seizure disorder: We will continue with Keppra. The patient will be placed on seizure precaution. 7.  Hyperkalemia: This was reported as hemolyzed sample. We will repeat potassium level. 8.  Leukocytosis:  The patient is afebrile, and not toxic. CT scan of the abdomen and pelvis did not show any source of infection. Chest x-ray is also negative. Urinalysis was also negative. We will check lipase level and repeat lab. OTHER ISSUES:  Code status: The patient is a full code. We will place the patient on Lovenox for DVT prophylaxis. FUNCTIONAL STATUS PRIOR TO ADMISSION:  The patient came from home. The patient is ambulatory with assistive device. COVID PRECAUTION:  The patient was wearing a face mask. I was wearing a face mask and gloves for this patient's encounter.         Sarah Matthews MD      [id number]  D:  11/06/2022 3:42  T:  11/06/2022 5:29  JOB #:  [id number]  CC:  Renuka Pop MD

## 2022-11-06 NOTE — PROGRESS NOTES
Problem: Self Care Deficits Care Plan (Adult)  Goal: *Acute Goals and Plan of Care (Insert Text)  Description: FUNCTIONAL STATUS PRIOR TO ADMISSION: Patient is questionable historian, reports requiring assistance with ADLs however able to complete some tasks without assist. Patient reports using kevin walker for very short distances, primarily uses WC for mobility. Hx of CVA with residual R sided weakness. HOME SUPPORT: Patient is resident at [facility]. Occupational Therapy Goals  Initiated 2022  1. Patient will perform lower body dressing with moderate assistance  within 7 day(s). 2.  Patient will perform upper body dressing with moderate assistance  within 7 day(s). 3.  Patient will perform seated grooming with minimal assistance/contact guard assist within 7 day(s). 4.  Patient will perform toilet transfers with maximal assistance within 7 day(s). 5.  Patient will perform all aspects of toileting with maximal assistance within 7 day(s). 6.  Patient will participate in upper extremity therapeutic exercise/activities with minimal assistance/contact guard assist for 5 minutes within 7 day(s). 7.  Patient will utilize energy conservation techniques during functional activities with verbal cues within 7 day(s). Outcome: Progressing Towards Goal     OCCUPATIONAL THERAPY EVALUATION  Patient: Konstantin Aragon (82 y.o. female)  Date: 2022  Primary Diagnosis: Hemiparesis ([facility] [address] [G81.90]       Precautions:   Fall, Bed Alarm    ASSESSMENT  Based on the objective data described below, the patient presents with  endurance, R sided facial drooping, R sided hemiplegia, decreased balance, impaired volar R hand sensation, difficulty with word finding, R eye deviation, decreased R sided peripheral vision, and generalized weakness. MRI pending at time of eval, CT showing chronic right cerebellar infarct, chronic left basal ganglia infarct, no acute infarct/hemorrage.  Patient agreeable to OT evaluation, required max A x2 to transition to EOB. Patient with limited sitting balance, min A - CGA provided to maintain. Patient with poor visual tracking to lower R quadrant. Patient with minimal activation of RUE, reports this is congruent with baseline since CVA in 2021. Attempted standing 1x during session, max A x2 and RLE blocking provided to assist to maintaining standing position/balance. Patient endorsing dizziness with brief standing, required transition to sit > supine. BP obtained and WNL. RN at bedside at conclusion of session. Recommend IPR at DC as patient is well below baseline LOF, if unable recommend SNF. Current Level of Function Impacting Discharge (ADLs/self-care):   Feeding: Moderate assistance    Oral Facial Hygiene/Grooming: Moderate assistance    Bathing: Total assistance    Upper Body Dressing: Maximum assistance    Lower Body Dressing: Total assistance    Toileting: Total assistance       Functional Outcome Measure: Will need Hollace Dung in future session if appropriate. Other factors to consider for discharge: hx CVA, MRI pending     Patient will benefit from skilled therapy intervention to address the above noted impairments. PLAN :  Recommendations and Planned Interventions: self care training, functional mobility training, therapeutic exercise, balance training, therapeutic activities, endurance activities, patient education, home safety training, and family training/education    Frequency/Duration: Patient will be followed by occupational therapy 5 times a week to address goals.     Recommendation for discharge: (in order for the patient to meet his/her long term goals)  Therapy 3 hours per day 5-7 days per week if unable recommend SNF    This discharge recommendation:  Has been made in collaboration with the attending provider and/or case management    IF patient discharges home will need the following DME: patient owns DME required for discharge       SUBJECTIVE: Patient stated I am having trouble with my words.     OBJECTIVE DATA SUMMARY:   HISTORY:   Past Medical History:   Diagnosis Date    Depression     Diabetes ([facility] [address]     Edema     Gout     Herpes genitalis     Hypertension     Ill-defined condition     gout    Mood disorder ([facility] [address]     Other ill-defined conditions(799.89)     cholesterol     Psychiatric disorder     depression    Psychiatric disorder     PTSD    PTSD (post-traumatic stress disorder)     Sleep disorder     Stroke [facility])     UTI (lower urinary tract infection)      Past Surgical History:   Procedure Laterality Date    HX GYN       X 3     Expanded or extensive additional review of patient history:     Home Situation  Home Environment: [address] [place] [address] Name: [facility] [address]: No  Support Systems: [place]  Patient Expects to be Discharged to[de-identified] Skilled nursing facility  Current DME Used/Available at Home: Wheelchair, Other (comment) ()        EXAMINATION OF PERFORMANCE DEFICITS:  Cognitive/Behavioral Status:  Neurologic State: Alert  Orientation Level: Oriented X4  Cognition: Follows commands; Appropriate decision making  Perception: Appears intact  Perseveration: No perseveration noted  Safety/Judgement: Awareness of environment; Insight into deficits      Hearing: Auditory  Auditory Impairment: None    Vision/Perceptual:    Tracking: Able to track left of midline; Able to track right of midline    Saccades: Decreased speed of pursuit between targets         Visual Fields: Difficulty detecting stimulus in right lower quadrant  Diplopia: No    Acuity: Within Defined Limits         Range of Motion:    AROM: Generally decreased, functional (R kevin)  PROM: Generally decreased, functional (R kevin)                      Strength:    Strength: Generally decreased, functional (RUE kevin)                Coordination:  Coordination: Generally decreased, functional (R kevin)  Fine Motor Skills-Upper: Right Impaired;Left Intact    Gross Motor Skills-Upper: Right Impaired;Left Intact    Tone & Sensation:    Tone: Abnormal  Sensation: Impaired (decreased volar sensation on RUE)                      Balance:  Sitting: Impaired  Sitting - Static: Fair (occasional)  Sitting - Dynamic: Fair (occasional)  Standing: Impaired  Standing - Static: Poor;Constant support  Standing - Dynamic : Not tested    Functional Mobility and Transfers for ADLs:  Bed Mobility:  Rolling: Maximum assistance;Assist x2  Supine to Sit: Maximum assistance;Assist x2  Sit to Supine: Maximum assistance;Assist x2  Scooting: Maximum assistance;Assist x2    Transfers:  Sit to Stand: Maximum assistance;Assist x2 (R knee blocking)  Stand to Sit: Maximum assistance;Assist x2    ADL Assessment:  Feeding: Moderate assistance    Oral Facial Hygiene/Grooming: Moderate assistance    Bathing: Total assistance         Upper Body Dressing: Maximum assistance    Lower Body Dressing: Total assistance    Toileting: Total assistance                ADL Intervention and task modifications:                                          Cognitive Retraining  Safety/Judgement: Awareness of environment; Insight into deficits    Functional Measure:    Barthel Index:  Bathin  Bladder: 0  Bowels: 0  Groomin  Dressin  Feedin  Mobility: 0  Stairs: 0  Toilet Use: 0  Transfer (Bed to Chair and Back): 5  Total: 5/100      The Barthel ADL Index: Guidelines  1. The index should be used as a record of what a patient does, not as a record of what a patient could do. 2. The main aim is to establish degree of independence from any help, physical or verbal, however minor and for whatever reason. 3. The need for supervision renders the patient not independent. 4. A patient's performance should be established using the best available evidence. Asking the patient, friends/relatives and nurses are the usual sources, but direct observation and common sense are also important. However direct testing is not needed. 5. Usually the patient's performance over the preceding 24-48 hours is important, but occasionally longer periods will be relevant. 6. Middle categories imply that the patient supplies over 50 per cent of the effort. 7. Use of aids to be independent is allowed. Score Interpretation (from [address] [facility])    Independent   60-79 Minimally independent   40-59 Partially dependent   20-39 Very dependent   <20 Totally dependent     -Carmela Drummond., Barthel, DSORAYA. (1965). Functional evaluation: the Barthel Index. [address] ([address] (1997). The Barthel activities of daily living index: self-reporting versus actual performance in the old (> or = 75 years). Journal [address] 45(7), [facility], GERSONYU, Rina Gerardo., [place]. (1999). Measuring the change in disability after inpatient rehabilitation; comparison of the responsiveness of the Barthel Index and Functional [place] Measure. Journal of Neurology, Neurosurgery, and Psychiatry, 66(4), 564-218. Arleen Palomares, N.J.A, JOSE Lloyd, & Faye Mendosa, MJAMAR. (2004) Assessment of post-stroke quality of life in cost-effectiveness studies: The usefulness of the Barthel Index and the EuroQoL-5D.  Quality of Life Research, 13, 427-43       Fugl-Kim Assessment of Motor Recovery after Stroke: needs to be completed in future session    Occupational Therapy Evaluation Charge Determination   History Examination Decision-Making   LOW Complexity : Brief history review  LOW Complexity : 1-3 performance deficits relating to physical, cognitive , or psychosocial skils that result in activity limitations and / or participation restrictions  LOW Complexity : No comorbidities that affect functional and no verbal or physical assistance needed to complete eval tasks       Based on the above components, the patient evaluation is determined to be of the following complexity level: LOW   Pain Rating:  Pt did not endorse pain during session. Activity Tolerance:   Fair and requires rest breaks    After treatment patient left in no apparent distress:    Supine in bed, Call bell within reach, Bed / chair alarm activated, Side rails x 3, and RN at bedside    COMMUNICATION/EDUCATION:   The patients plan of care was discussed with: Physical therapist, Occupational therapist, and Registered nurse. Patient/family have participated as able in goal setting and plan of care. This patients plan of care is appropriate for delegation to [facility].     Thank you for this referral.  Neha Glasgow OT  Time Calculation: 33 mins

## 2022-11-06 NOTE — PROGRESS NOTES
Orders received, chart reviewed and patient evaluated by physical therapy. Pending progression with skilled acute physical therapy, recommend:  Therapy 3 hours per day 5-7 days per week vs SNF    Recommend with nursing standing EOB daily with two assist and  gait belt . Thank you for completing as able in order to maintain patient strength, endurance and independence. Full evaluation to follow.       Thank you for your consideration,    Viktor Frost, PT, DPT

## 2022-11-06 NOTE — PROGRESS NOTES
Problem: Mobility Impaired (Adult and Pediatric)  Goal: *Acute Goals and Plan of Care (Insert Text)  Description:   FUNCTIONAL STATUS PRIOR TO ADMISSION: Per patient she used a HW to ambulate short distances and otherwise used a wheelchair. Resided in SNF for [address] [place] [address]. HOME SUPPORT PRIOR TO ADMISSION: Patient resided in SNF setting for LTC, 24/7 assistance    Physical Therapy Goals  Initiated 11/6/2022  1. Patient will move from supine to sit and sit to supine , scoot up and down, and roll side to side in bed with minimal assistance/contact guard assist within 7 day(s). 2.  Patient will transfer from bed to chair and chair to bed with minimal assistance/contact guard assist using the least restrictive device within 7 day(s). 3.  Patient will perform sit to stand with minimal assistance/contact guard assist within 7 day(s). 4.  Patient will ambulate with minimal assistance/contact guard assist for 10 feet with the least restrictive device within 7 day(s). 5.  Patient will improve Lezama Balance score by 7 points within 7 days. Outcome: Progressing Towards Goal     PHYSICAL THERAPY EVALUATION- NEURO POPULATION  Patient: Audra Morris (29 y.o. female)  Date: 11/6/2022  [facility] [G81.90]       Precautions:   Fall, Bed Alarm      ASSESSMENT  Based on the objective data described below, the patient presents with R sided facial droop, R hemiplegia, decreased standing balance, word finding deficits, poor activity tolerance, decreased R peripheral vision, s/s dizziness with change in position despite vitals stable. This is a 36year old female who presents to hospital with s/s of R weakness and facial droop. MRI pending at time of evaluation. CT shows chronic R cerebellar infarct, chronic L basal ganglia infarct, no acute infarct/hemorrhage. Patient overall requires max A x 2 to transition to EOB. Poor sitting balance noted.  When transferring, patient required R knee blocking to stand and constant support. Increased retrograde lean/LOB in standing. Patient noted to have increased complaints of dizziness and requested to sit. Vitals obtained no orthostatic BP noted. Patient appears well below reported functional baseline, at this time recommend discharge to [facility]. If not, then SNF. Will continue to follow in acute setting. Current Level of Function Impacting Discharge (mobility/balance): max A x 2 for bed mobility and transfers, constant support in standing requires R knee blocking     Functional Outcome Measure: The patient scored Total: 3/56 on the [facility] Assessment which is indicative of high fall risk. Other factors to consider for discharge: higher PLOF, resided in [address] [place] [address] setting prior     Patient will benefit from skilled therapy intervention to address the above noted impairments. PLAN :  Recommendations and Planned Interventions: bed mobility training, transfer training, gait training, therapeutic exercises, neuromuscular re-education, patient and family training/education, and therapeutic activities      Frequency/Duration: Patient will be followed by physical therapy:  5 times a week to address goals. Recommendation for discharge: (in order for the patient to meet his/her long term goals)  Therapy 3 hours per day 5-7 days per week    This discharge recommendation:  Has been made in collaboration with the attending provider and/or case management    IF patient discharges home will need the following DME: to be determined (TBD)         SUBJECTIVE:   Patient stated I feel dizzy.     OBJECTIVE DATA SUMMARY:   HISTORY:    Past Medical History:   Diagnosis Date    Depression     Diabetes ([facility] [address]     Edema     Gout     Herpes genitalis     Hypertension     Ill-defined condition     gout    Mood disorder ([facility] [address]     Other ill-defined conditions(799.89)     cholesterol     Psychiatric disorder     depression    Psychiatric disorder     PTSD    PTSD (post-traumatic stress disorder) Sleep disorder     Stroke [facility])     UTI (lower urinary tract infection)      Past Surgical History:   Procedure Laterality Date    HX GYN       X 3       Personal factors and/or comorbidities impacting plan of care: previous CVA    Home Situation  Home Environment: [address] [place] [address] [facility] [address]: No  Support Systems: [place]  Patient Expects to be Discharged to[de-identified] Skilled nursing facility  Current DME Used/Available at Home: Wheelchair, Other (comment) ([address] [place] [address] Dr Schmidt)    EXAMINATION/PRESENTATION/DECISION MAKING:   Critical Behavior:  Neurologic State: Alert  Orientation Level: Oriented X4  Cognition: Follows commands, Appropriate decision making  Safety/Judgement: Awareness of environment, Insight into deficits  Hearing: Auditory  Auditory Impairment: None  Range Of Motion:  AROM: Generally decreased, functional (R kevin)           PROM: Generally decreased, functional (R kevin)           Strength:    Strength: Generally decreased, functional (RUE kevin)                    Tone & Sensation:   Tone: Abnormal              Sensation: Impaired (decreased volar sensation on RUE)               Coordination:  Coordination: Generally decreased, functional (R kevin)  Vision:   Tracking: Able to track left of midline; Able to track right of midline  Saccades: Decreased speed of pursuit between targets  Visual Fields: Difficulty detecting stimulus in right lower quadrant  Diplopia: No  Acuity: Within Defined Limits  Functional Mobility:  Bed Mobility:  Rolling: Maximum assistance;Assist x2  Supine to Sit: Maximum assistance;Assist x2  Sit to Supine: Maximum assistance;Assist x2  Scooting: Maximum assistance;Assist x2  Transfers:  Sit to Stand: Maximum assistance;Assist x2 (R knee blocking)  Stand to Sit: Maximum assistance;Assist x2              Balance:   Sitting: Impaired  Sitting - Static: Fair (occasional)  Sitting - Dynamic: Fair (occasional)  Standing: Impaired  Standing - Static: Poor;Constant support  Standing - Dynamic : Not tested  Ambulation/Gait Training:      Not attempted due to dizziness in standing    Functional Measure  Lezama Balance Test:    Sitting to Standin  Standing Unsupported: 0  Sitting with Back Unsupported: 3  Standing to Sittin  Transfers: 0  Standing Unsupported with Eyes Closed: 0  Standing Unsupported with Feet Together: 0  Reach Forward with Outstretched Arm: 0   Object: 0  Turn to Look Over Shoulders: 0  Turn 360 Degrees: 0  Alternate Foot on Step/Stool: 0  Standing Unsupported One Foot in Front: 0  Stand on One Le  Total: 3/56         56=Maximum possible score;   0-20=High fall risk  21-40=Moderate fall risk   41-56=Low fall risk        Physical Therapy Evaluation Charge Determination   History Examination Presentation Decision-Making   HIGH Complexity :3+ comorbidities / personal factors will impact the outcome/ POC  LOW Complexity : 1-2 Standardized tests and measures addressing body structure, function, activity limitation and / or participation in recreation  LOW Complexity : Stable, uncomplicated  LOW Complexity : FOTO score of       Based on the above components, the patient evaluation is determined to be of the following complexity level: LOW     Pain Rating:  No pain reported     Activity Tolerance:   Fair and requires rest breaks      After treatment patient left in no apparent distress:   Supine in bed, Call bell within reach, Bed / chair alarm activated, and Side rails x 3    COMMUNICATION/EDUCATION:   The patients plan of care was discussed with: Occupational therapist and Registered nurse. Patient was educated regarding her deficit(s) of hemiparesis, imbalance, poor activity tolerance as this relates to her diagnosis of CVA work up. She demonstrated Fair understanding as evidenced by discussion of symptoms.     Patient and/or family was verbally educated on the BE FAST acronym for signs/symptoms of CVA and TIA. BE FAST was written on patient's communication board  for visual education and reinforcement. All questions answered with patient indicating fair understanding. Fall prevention education was provided and the patient/caregiver indicated understanding. and Patient/family have participated as able in goal setting and plan of care.     Thank you for this referral.  Barbara Masters, PT   Time Calculation: 23 mins

## 2022-11-06 NOTE — ED NOTES
[phone] Hospital or Facility: [facility] From: Rosilyn Soulier RE: Marilin Hansen 1980 RM: [id number] Hey I was doing PO meds with this Pt and gave them to her in applesauce per her request. She actually had some difficulty and spit them back up. She states that this has been going on for about 1 week. When I came on shift I did a bedside swallow study with water and she did not have any issues. I am going to complete another screening and fail her. Also she wears a CPAP at night.  Need Callback: NO CALLBACK REQ ER URGENT    Read 2:48 AM

## 2022-11-07 ENCOUNTER — APPOINTMENT (OUTPATIENT)
Dept: NON INVASIVE DIAGNOSTICS | Age: 42
DRG: 058 | End: 2022-11-07
Attending: INTERNAL MEDICINE
Payer: MEDICAID

## 2022-11-07 VITALS
OXYGEN SATURATION: 95 % | RESPIRATION RATE: 18 BRPM | TEMPERATURE: 98.2 F | BODY MASS INDEX: 43.43 KG/M2 | HEIGHT: 64 IN | SYSTOLIC BLOOD PRESSURE: 126 MMHG | DIASTOLIC BLOOD PRESSURE: 89 MMHG | HEART RATE: 84 BPM | WEIGHT: 254.41 LBS

## 2022-11-07 LAB
ANION GAP SERPL CALC-SCNC: 5 MMOL/L (ref 5–15)
ASPIRIN TEST, ASPIRN: 379 ARU
BASOPHILS # BLD: 0 K/UL (ref 0–0.1)
BASOPHILS NFR BLD: 0 % (ref 0–1)
BUN SERPL-MCNC: 11 MG/DL (ref 6–20)
BUN/CREAT SERPL: 15 (ref 12–20)
CALCIUM SERPL-MCNC: 9 MG/DL (ref 8.5–10.1)
CHLORIDE SERPL-SCNC: 106 MMOL/L (ref 97–108)
CO2 SERPL-SCNC: 29 MMOL/L (ref 21–32)
CREAT SERPL-MCNC: 0.71 MG/DL (ref 0.55–1.02)
DIFFERENTIAL METHOD BLD: ABNORMAL
EOSINOPHIL # BLD: 0.1 K/UL (ref 0–0.4)
EOSINOPHIL NFR BLD: 1 % (ref 0–7)
ERYTHROCYTE [DISTWIDTH] IN BLOOD BY AUTOMATED COUNT: 14 % (ref 11.5–14.5)
GLUCOSE BLD STRIP.AUTO-MCNC: 165 MG/DL (ref 65–117)
GLUCOSE BLD STRIP.AUTO-MCNC: 196 MG/DL (ref 65–117)
GLUCOSE SERPL-MCNC: 171 MG/DL (ref 65–100)
HCT VFR BLD AUTO: 29.4 % (ref 35–47)
HGB BLD-MCNC: 8.9 G/DL (ref 11.5–16)
IMM GRANULOCYTES # BLD AUTO: 0.1 K/UL (ref 0–0.04)
IMM GRANULOCYTES NFR BLD AUTO: 1 % (ref 0–0.5)
LYMPHOCYTES # BLD: 1.7 K/UL (ref 0.8–3.5)
LYMPHOCYTES NFR BLD: 16 % (ref 12–49)
MCH RBC QN AUTO: 24.8 PG (ref 26–34)
MCHC RBC AUTO-ENTMCNC: 30.3 G/DL (ref 30–36.5)
MCV RBC AUTO: 81.9 FL (ref 80–99)
MONOCYTES # BLD: 0.6 K/UL (ref 0–1)
MONOCYTES NFR BLD: 5 % (ref 5–13)
NEUTS SEG # BLD: 8.3 K/UL (ref 1.8–8)
NEUTS SEG NFR BLD: 77 % (ref 32–75)
NRBC # BLD: 0 K/UL (ref 0–0.01)
NRBC BLD-RTO: 0 PER 100 WBC
P2Y12 PLT RESPONSE,PPPR: 235 PRU (ref 194–418)
PLATELET # BLD AUTO: 221 K/UL (ref 150–400)
PMV BLD AUTO: 10.3 FL (ref 8.9–12.9)
POTASSIUM SERPL-SCNC: 3.7 MMOL/L (ref 3.5–5.1)
RBC # BLD AUTO: 3.59 M/UL (ref 3.8–5.2)
SERVICE CMNT-IMP: ABNORMAL
SERVICE CMNT-IMP: ABNORMAL
SODIUM SERPL-SCNC: 140 MMOL/L (ref 136–145)
WBC # BLD AUTO: 10.7 K/UL (ref 3.6–11)

## 2022-11-07 PROCEDURE — 85576 BLOOD PLATELET AGGREGATION: CPT

## 2022-11-07 PROCEDURE — 82962 GLUCOSE BLOOD TEST: CPT

## 2022-11-07 PROCEDURE — 74011250636 HC RX REV CODE- 250/636: Performed by: INTERNAL MEDICINE

## 2022-11-07 PROCEDURE — 74011250637 HC RX REV CODE- 250/637: Performed by: INTERNAL MEDICINE

## 2022-11-07 PROCEDURE — 74011636637 HC RX REV CODE- 636/637: Performed by: INTERNAL MEDICINE

## 2022-11-07 PROCEDURE — 85025 COMPLETE CBC W/AUTO DIFF WBC: CPT

## 2022-11-07 PROCEDURE — 36415 COLL VENOUS BLD VENIPUNCTURE: CPT

## 2022-11-07 PROCEDURE — 80048 BASIC METABOLIC PNL TOTAL CA: CPT

## 2022-11-07 PROCEDURE — 74011250637 HC RX REV CODE- 250/637: Performed by: PSYCHIATRY & NEUROLOGY

## 2022-11-07 PROCEDURE — 93306 TTE W/DOPPLER COMPLETE: CPT | Performed by: INTERNAL MEDICINE

## 2022-11-07 PROCEDURE — 93306 TTE W/DOPPLER COMPLETE: CPT

## 2022-11-07 RX ADMIN — PANTOPRAZOLE SODIUM 20 MG: 20 TABLET, DELAYED RELEASE ORAL at 09:17

## 2022-11-07 RX ADMIN — ASPIRIN 81 MG 81 MG: 81 TABLET ORAL at 09:17

## 2022-11-07 RX ADMIN — TICAGRELOR 60 MG: 60 TABLET ORAL at 12:27

## 2022-11-07 RX ADMIN — LEVETIRACETAM 1000 MG: 100 INJECTION INTRAVENOUS at 12:27

## 2022-11-07 RX ADMIN — ENOXAPARIN SODIUM 30 MG: 100 INJECTION SUBCUTANEOUS at 12:27

## 2022-11-07 RX ADMIN — Medication 2 UNITS: at 12:29

## 2022-11-07 RX ADMIN — Medication 2 UNITS: at 09:17

## 2022-11-07 RX ADMIN — CLOPIDOGREL BISULFATE 75 MG: 75 TABLET ORAL at 09:17

## 2022-11-07 NOTE — PROGRESS NOTES
Problem: Aspiration - Risk of  Goal: *Absence of aspiration  Outcome: Progressing Towards Goal     Problem: Patient Education: Go to Patient Education Activity  Goal: Patient/Family Education  Outcome: Progressing Towards Goal     Problem: Diabetes Self-Management  Goal: *Developing strategies to promote health/change behavior  Description: Define the ABC's of diabetes; identify appropriate screenings, schedule and personal plan for screenings. Outcome: Progressing Towards Goal  Goal: *Prevention, detection, treatment of acute complications  Description: List symptoms of hyper- and hypoglycemia; describe how to treat low blood sugar and actions for lowering  high blood glucose level. Outcome: Progressing Towards Goal     Problem: Falls - Risk of  Goal: *Absence of Falls  Description: Document Viry Rinconabner Fall Risk and appropriate interventions in the flowsheet.   Outcome: Progressing Towards Goal  Note: Fall Risk Interventions:            Medication Interventions: Bed/chair exit alarm, Patient to call before getting OOB    Elimination Interventions: Bed/chair exit alarm, Call light in reach              Problem: Patient Education: Go to Patient Education Activity  Goal: Patient/Family Education  Outcome: Progressing Towards Goal

## 2022-11-07 NOTE — DISCHARGE INSTRUCTIONS
Patient Discharge Instructions    Sakina Lucas / [id number] : 1980    Admitted 2022 Discharged: 2022 10:35 AM     Discharging provider: Ajit Tabares MD. To contact this provider call [phone] and ask  to page, if not available ask for triage hospitalist to be paged. Primary care provider: @[email or web address]@  . . . . . . . . . . . . . . . . . . . . . . . . . . . . . . . . . . . . . . . . . . . . . . . . . . . . . . . . . . . . . . . . . . . . . . . Yves Renteria FINAL DIAGNOSES & HOSPITAL COURSE:    CVA ruled out.   - CT head: Chronic right cerebellar infarct. Chronic left basal ganglia infarct. No evidence of acute hemorrhage or infarct.  - CTA head: No evidence for acute large vessel arterial occlusion. No significant cerebral perfusion abnormality  - MRI of the brain:   - LDL 56. A1c is 9.2  -  appreciate Neurology input   - SLP, PT/OT   - c/w aspirin, statin   - aspirin test wnl. P2y12 test - not therapeutic   - Plavix dced and started on Brilinta      Dyslipidemia: statin   Type 2 diabetes with hyperglycemia:  c/w ISS, lantus 48U  Morbid obesity: BMI of 44.43. Diet and exercise advised. Obstructive sleep apnea:  CPAP at bedtime. Seizure disorder:  c/w Keppra. seizure precaution. Leukocytosis:  mild- resolved  -  likely reactive   - afebrile, and not toxic.    - CT abd - no source of infection. Chest x-ray is also negative. Urinalysis clean. - will monitor off abx     FOLLOW-UP CARE RECOMMENDATIONS/TESTING/NURSING ORDERS:  PT/OT      DIET:  Resume previous diet    ACTIVITY:  Activity as tolerated, PT/OT to evaluate and treat, and OOB for meals    APPOINTMENTS:  Follow-up with primary care provider,  @[email or web address]@  -  Please call to set up an appointment to be seen in  1 week       It is very important that you keep follow-up appointment(s). Bring discharge papers, medication list (and/or medication bottles) to follow-up appointments for review by outpatient provider(s).       PENDING TEST RESULTS:  At the time of discharge the following test results are still pending: none. Please review these results as they become available. Specific symptoms to watch for: chest pain, shortness of breath, fever, chills, nausea, vomiting, diarrhea, change in mentation, falling, weakness, bleeding. Patient condition at discharge:   Functional status  X  Poor      Deconditioned      Independent   Cognition    Lucid     Forgetful (some sensescence)     Dementia   Catheters/lines (plus indication)    Sanderson     PICC      PEG         Code status  X  Full code      DNR         CHRONIC MEDICAL CONDITIONS:  [unfilled]      Information obtained by :   I understand that if any problems occur once I am at home I am to contact my physician. I understand and acknowledge receipt of the instructions indicated above.                                                                                                                                              Physician's or R.N.'s Signature                                                                  Date/Time                                                                                                                                              Patient or Representative Signature                                                          Date/Time

## 2022-11-07 NOTE — PROGRESS NOTES
Patient stuck several time to get blood draw. Unable to get blood. RT tried for arterial stick several times, only able to get a small amount of blood for patient.

## 2022-11-07 NOTE — PROGRESS NOTES
Transition of Care Plan  RUR- Low 12%  DISPOSITION: LTC - Return to [facility]  F/U with PCP/Specialist    Transport: [facility] requested for 1pm    Emergency contact: Mother, Jeff Resendiz, [phone]    CM barriers to discharge: None    Per Dr. Dima Hernandez, patient medically stable for discharge today back to LTC at [facility]. CM spoke with [facility],  #[phone], they are able to take patient back today. CM spoke with patient's mother, Radha Mauricio, and she is in agreement with patient discharging back to [facility]. CM also met with patient at bedside, she confirmed she is in agreement to return to [facility]. Call report #[phone] [facility]. PARAG requested Medicaid stretcher transport Trip [id number]. RN station # provided for transport. [facility] requested for 1pm and trip# provided. Irving Feng M.S.KAL.

## 2022-11-07 NOTE — DISCHARGE SUMMARY
Discharge Instructions/Summary     Patient: Otilia Schaefer       MRN: [id number]       YOB: 1980       Age: 36 y.o. Date of admission:  11/5/2022    Date of discharge:  11/7/2022    Primary care provider:  Margoth Yang MD     Admitting provider:  Elizabeth Ortega MD    Discharging provider:  Deandre Napier MD , to contact this individual call [phone] and ask the  to page, if unavailable ask for the triage hospitalist to be paged. Consultations  IP CONSULT TO NEUROLOGY    Procedures/Surgeries  * No surgery found *    Discharge destination: [address] . The patient is stable for discharge. Admission diagnosis  Hemiparesis ([facility] [address] [G81.90]      [address] COURSE:    This is a 28-year-old woman with past medical history significant for dyslipidemia; type 2 diabetes; obstructive sleep apnea, suspected seizure disorder, on Keppra; and status post CVA with right-sided weakness; presented at the emergency room with right facial droop and right upper and lower extremity weakness. The patient's symptoms started at about 2300 yesterday. The patient was brought to the emergency room as Code Stroke. When the patient arrived at the emergency room, CT scan of the head was obtained. This shows chronic right cerebellar infarct and chronic left basilar ganglia infarct. No evidence of acute hemorrhage. This was followed with CTA of the head and neck which did not show any evidence of acute large vessel arterial occlusion. The patient was seen by teleneurologist at the request of the emergency room physician and the patient was subsequently referred to the hospitalist service for admission. The patient was last admitted to the hospital from 03/14/2022 to 03/16/2022. The patient presented with right-sided weakness at that time as well.   The patient was admitted and MRI of the brain confirmed acute stroke, EEG was negative, but the patient was still discharged to home on Keppra. The patient denies fever, rigors, or chills. CVA ruled out.   - CT head: Chronic right cerebellar infarct. Chronic left basal ganglia infarct. No evidence of acute hemorrhage or infarct.  - CTA head: No evidence for acute large vessel arterial occlusion. No significant cerebral perfusion abnormality  - MRI of the brain: Chronic left periventricular white matter infarct and chronic right cerebellar infarct. No evidence of acute ischemia.  - LDL 56. A1c is 9.2  -  appreciate Neurology input   - SLP, PT/OT   - c/w aspirin, statin   - aspirin test wnl. P2y12 test - subtherapeutic   - Plavix dced and started on Brilinta per Neurology recs      Dyslipidemia: statin   Type 2 diabetes with hyperglycemia:  c/w ISS, lantus 48U  Morbid obesity: BMI of 44.43. Diet and exercise advised. Obstructive sleep apnea:  CPAP at bedtime. Seizure disorder:  c/w Keppra. seizure precaution. Leukocytosis:  mild- resolved  -  likely reactive   - afebrile, and not toxic.    - CT abd - no source of infection. Chest x-ray is also negative. Urinalysis clean. - will monitor off abx      Current Discharge Medication List        START taking these medications    Details   ticagrelor (BRILINTA) 60 mg tab tablet Take 1 Tablet by mouth every twelve (12) hours. Qty: 60 Tablet, Refills: 0  Start date: 11/7/2022           CONTINUE these medications which have NOT CHANGED    Details   aspirin 81 mg chewable tablet Take 81 mg by mouth daily. insulin glargine (LANTUS) 100 unit/mL injection 48 Units by SubCUTAneous route nightly. pantoprazole (PROTONIX) 20 mg tablet Take 20 mg by mouth daily. methocarbamoL (ROBAXIN) 500 mg tablet Take 500 mg by mouth every eight (8) hours as needed for Muscle Spasm(s).       lidocaine (Lidocaine Pain Relief) 4 % patch Apply 1 patch to left side of neck topically once daily for pain management and remove per schedule. polyethylene glycol (MIRALAX) 17 gram packet Take 17 g by mouth daily as needed. polyvinyl alcohol (LIQUIFILM TEARS) 1.4 % ophthalmic solution Administer 1 Drop to both eyes four (4) times daily. Indications: for s/p eye surgery      levETIRAcetam (KEPPRA) 100 mg/mL solution Take 1,000 mg by mouth every twelve (12) hours. metFORMIN ER (GLUCOPHAGE XR) 500 mg tablet Take 500 mg by mouth daily. atorvastatin (LIPITOR) 80 mg tablet Take 1 Tablet by mouth nightly. senna (Fanny-joselito) 8.6 mg tablet Take 1 Tablet by mouth daily. HumaLOG KwikPen Insulin 100 unit/mL kwikpen 8 Units by SubCUTAneous route Before breakfast, lunch, and dinner. STOP taking these medications       clopidogreL (PLAVIX) 75 mg tab Comments:   Reason for Stopping:                 FOLLOW-UP CARE RECOMMENDATIONS/TESTING/NURSING ORDERS:  PT/OT       DIET:  Resume previous diet    ACTIVITY:  Activity as tolerated, PT/OT to evaluate and treat, and OOB for meals      APPOINTMENTS:  Follow-up with primary care provider, Dr. Eveline mSith MD  -  Please call to set up an appointment to be seen in  1 week       PENDING TEST RESULTS:  At the time of discharge the following test results are still pending: none. Please review these results as they become available. Specific symptoms to watch for: chest pain, shortness of breath, fever, chills, nausea, vomiting, diarrhea, change in mentation, falling, weakness, bleeding.       SOCIAL HISTORY:     Social History     Socioeconomic History    Marital status: SINGLE     Spouse name: Not on file    Number of children: Not on file    Years of education: Not on file    Highest education level: Not on file   Occupational History    Occupation: CNA     Comment: [employer]    Occupation: nursing student     Comment: [employer]   Tobacco Use    Smoking status: Never    Smokeless tobacco: Never   Vaping Use    Vaping Use: Never used   Substance and Sexual Activity    Alcohol use: No     Alcohol/week: 0.0 standard drinks    Drug use: No    Sexual activity: Yes     Partners: Male     Birth control/protection: I.U.D. Other Topics Concern     Service Not Asked    Blood Transfusions Not Asked    Caffeine Concern Not Asked    Occupational Exposure Not Asked    Hobby Hazards Not Asked    Sleep Concern Not Asked    Stress Concern Not Asked    Weight Concern Not Asked    Special Diet Not Asked    Back Care Not Asked    Exercise Yes     Comment: Line Dancing with Son 1x/week, walking regularly, yoga occasionally    Bike Helmet Not Asked    Seat Belt Not Asked    Self-Exams Not Asked   Social History Narrative    Lives with son (14 yo). Boyfriend x 2 years is currently staying with pt and helping her.       Social Determinants of Health     Financial Resource Strain: Not on file   Food Insecurity: Not on file   Transportation Needs: Not on file   Physical Activity: Not on file   Stress: Not on file   Social Connections: Not on file   Intimate Partner Violence: Not on file   Housing Stability: Not on file       Patient condition at discharge:   Functional status  X  Poor      Deconditioned      Independent   Cognition    Lucid     Forgetful (some sensescence)     Dementia   Catheters/lines (plus indication)    Sanderson     PICC      PEG         Code status  X  Full code      DNR         CHRONIC MEDICAL CONDITIONS:  Problem List as of 11/7/2022 Date Reviewed: 11/5/2022            Codes Class Noted - Resolved    Hemiparesis ([facility] ICD-10-CM: G81.90  ICD-9-CM: 342.90  11/5/2022 - Present        * (Principal) Acute CVA (cerebrovascular accident) ([facility] ICD-10-CM: I63.9  ICD-9-CM: 434.91  3/14/2022 - Present        CVA, old, hemiparesis ([facility] ICD-10-CM: X33.117  ICD-9-CM: 438.20  9/26/2021 - Present        Hyperlipidemia LDL goal <70 ICD-10-CM: E78.5  ICD-9-CM: 272.4  5/22/2021 - Present        Sleep apnea ICD-10-CM: G47.30  ICD-9-CM: 780.57  5/22/2021 - Present Transient ischemic attack (TIA) ICD-10-CM: G45.9  ICD-9-CM: 435.9  5/22/2021 - Present        Uncontrolled type 2 diabetes mellitus with complication, with long-term current use of insulin ICD-10-CM: RSX4325  ICD-9-CM: 250.82, V58.67  11/6/2017 - Present        PTSD (post-traumatic stress disorder) ICD-10-CM: F43.10  ICD-9-CM: 309.81  4/26/2016 - Present        Essential hypertension with goal blood pressure less than 130/85 ICD-10-CM: I10  ICD-9-CM: 401.9  4/25/2016 - Present        Benign heart murmur ICD-10-CM: R01.0  ICD-9-CM: Lieutenant Click  2/12/2016 - Present    Overview Signed 2/12/2016 10:48 AM by Bella Harrell PA-C     Evaluated with Echo by Dr. Monica Major 2/2016. Genital herpes ICD-10-CM: A60.00  ICD-9-CM: 054.10  1/22/2016 - Present        Type 2 diabetes mellitus with hypoglycemia without coma ([facility] ICD-10-CM: E11.649  ICD-9-CM: 250.80  1/5/2016 - Present        Diabetes mellitus type 2 with neurological manifestations ([facility] ICD-10-CM: E11.49  ICD-9-CM: 250.60  1/5/2016 - Present        Depression ICD-10-CM: F32. A  ICD-9-CM: 986  8/7/2014 - Present        Personality disorder ([facility] ICD-10-CM: F60.9  ICD-9-CM: 301.9  6/30/2014 - Present        Obesity ICD-10-CM: E66.9  ICD-9-CM: 278.00  6/30/2014 - Present        RESOLVED: Essential hypertension ICD-10-CM: I10  ICD-9-CM: 401.9  11/6/2017 - 8/3/2021        RESOLVED: Type 2 diabetes mellitus with complication ([facility] PINO-97-MY: E11.8  ICD-9-CM: 250.90  1/5/2016 - 1/5/2016        RESOLVED: Type II diabetes mellitus with complication, uncontrolled ICD-10-CM: Heladio Lyon  ICD-9-CM: 250.92  1/5/2016 - 11/6/2017        RESOLVED: Diabetes mellitus ([facility] ICD-10-CM: E11.9  ICD-9-CM: 250.00  8/8/2014 - 1/5/2016        RESOLVED: Hypertension ICD-10-CM: I10  ICD-9-CM: 401.9  8/8/2014 - 4/25/2016        RESOLVED: Major depression ICD-10-CM: F32.9  ICD-9-CM: 296.20  8/7/2014 - 8/8/2014        RESOLVED: Adjustment disorder with depressed mood ICD-10-CM: F43.21  ICD-9-CM: 309.0  6/30/2014 - 8/8/2014        RESOLVED: UTI (lower urinary tract infection) ICD-10-CM: N39.0  ICD-9-CM: 599.0  6/30/2014 - 8/8/2014               Physical examination at discharge  Visit Vitals  /77 (BP 1 Location: Right upper arm, BP Patient Position: At rest)   Pulse 84   Temp 98 °F (36.7 °C)   Resp 17   Ht 5' 4\" (1.626 m)   Wt 115.4 kg (254 lb 6.6 oz)   SpO2 95%   BMI 43.67 kg/m²     Gen: NAD, chronically ill   HEENT: anicteric sclerae, normal conjunctiva, oropharynx clear, MM moist  Neck: supple, trachea midline, no adenopathy  Heart: RRR, no MRG, no JVD, no peripheral edema  Lungs: CTA b/l, non-labored respirations  Abd: soft, NT, ND, BS+, no organomegaly  Extr: warm  Skin: dry, no rash  Neuro: alert, oriented x 3, right hemiparesis - chronic     Significant Diagnostic Studies:   11/5/2022: BUN 29 MG/DL (H; Ref range: 6 - 20 MG/DL); Calcium 9.6 MG/DL (Ref range: 8.5 - 10.1 MG/DL); CO2 30 mmol/L (Ref range: 21 - 32 mmol/L); Creatinine 1.12 MG/DL (H; Ref range: 0.55 - 1.02 MG/DL); Glucose 156 mg/dL (H; Ref range: 65 - 100 mg/dL); HCT 35.3 % (Ref range: 35.0 - 47.0 %); HGB 10.8 g/dL (L; Ref range: 11.5 - 16.0 g/dL); Potassium 5.9 mmol/L (H; Ref range: 3.5 - 5.1 mmol/L); Sodium 136 mmol/L (Ref range: 136 - 145 mmol/L)  11/6/2022: BUN 21 MG/DL (H; Ref range: 6 - 20 MG/DL); Calcium 8.5 MG/DL (Ref range: 8.5 - 10.1 MG/DL); CO2 28 mmol/L (Ref range: 21 - 32 mmol/L); Creatinine 0.80 MG/DL (Ref range: 0.55 - 1.02 MG/DL); Glucose 216 mg/dL (H; Ref range: 65 - 100 mg/dL); HCT 30.4 % (L; Ref range: 35.0 - 47.0 %); HGB 9.2 g/dL (L; Ref range: 11.5 - 16.0 g/dL); Potassium 3.8 mmol/L (Ref range: 3.5 - 5.1 mmol/L);  Sodium 139 mmol/L (Ref range: 136 - 145 mmol/L)  Recent Labs     11/07/22  0742 11/06/22  1642   WBC 10.7 12.5*   HGB 8.9* 9.2*   HCT 29.4* 30.4*    219     Recent Labs     11/07/22  0742 11/06/22  1642 11/05/22  1411    139 136   K 3.7 3.8 5.9*    106 100   CO2 29 28 30   BUN 11 21* 29* CREA 0.71 0.80 1.12*   * 216* 156*   CA 9.0 8.5 9.6     Recent Labs     11/06/22  0454 11/05/22  1411   AP  --  198*   TP  --  8.3*   ALB  --  3.1*   GLOB  --  5.2*   LPSE 64*  --      Recent Labs     11/05/22  1411   INR 1.1   PTP 11.4*      No results for input(s): FE, TIBC, PSAT, FERR in the last 72 hours. No results for input(s): PH, PCO2, PO2 in the last 72 hours. No results for input(s): CPK, CKMB in the last 72 hours. No lab exists for component: TROPONINI  No components found for: [place]    Pertinent imaging studies:    Per EMR      Time spent on discharge related activities today greater than 30 minutes.       Signed:  Yessica Burnette MD                 Hospitalist                 11/7/2022                 11:22 AM        Cc: Greg Singh MD

## 2022-11-07 NOTE — PROGRESS NOTES
Pharmacist Discharge Medication Reconciliation    Discharging Provider: Dr. Jessica Chopra PMH:   Past Medical History:   Diagnosis Date    Depression     Diabetes ([facility] [address]     Edema     Gout     Herpes genitalis     Hypertension     Ill-defined condition     gout    Mood disorder ([facility] [address]     Other ill-defined conditions(799.89)     cholesterol     Psychiatric disorder     depression    Psychiatric disorder     PTSD    PTSD (post-traumatic stress disorder)     Sleep disorder     Stroke [facility])     UTI (lower urinary tract infection)      Chief Complaint for this Admission:   Chief Complaint   Patient presents with    Stroke     Allergies: Patient has no known allergies. Discharge Medications:   Current Discharge Medication List        START taking these medications    Details   ticagrelor (BRILINTA) 60 mg tab tablet Take 1 Tablet by mouth every twelve (12) hours. Qty: 60 Tablet, Refills: 0  Start date: 11/7/2022           CONTINUE these medications which have NOT CHANGED    Details   aspirin 81 mg chewable tablet Take 81 mg by mouth daily. insulin glargine (LANTUS) 100 unit/mL injection 48 Units by SubCUTAneous route nightly. pantoprazole (PROTONIX) 20 mg tablet Take 20 mg by mouth daily. methocarbamoL (ROBAXIN) 500 mg tablet Take 500 mg by mouth every eight (8) hours as needed for Muscle Spasm(s). lidocaine (Lidocaine Pain Relief) 4 % patch Apply 1 patch to left side of neck topically once daily for pain management and remove per schedule. polyethylene glycol (MIRALAX) 17 gram packet Take 17 g by mouth daily as needed. polyvinyl alcohol (LIQUIFILM TEARS) 1.4 % ophthalmic solution Administer 1 Drop to both eyes four (4) times daily. Indications: for s/p eye surgery      levETIRAcetam (KEPPRA) 100 mg/mL solution Take 1,000 mg by mouth every twelve (12) hours. metFORMIN ER (GLUCOPHAGE XR) 500 mg tablet Take 500 mg by mouth daily.       atorvastatin (LIPITOR) 80 mg tablet Take 1 Tablet by mouth nightly. senna (Fanny-joselito) 8.6 mg tablet Take 1 Tablet by mouth daily. HumaLOG KwikPen Insulin 100 unit/mL kwikpen 8 Units by SubCUTAneous route Before breakfast, lunch, and dinner. STOP taking these medications       clopidogreL (PLAVIX) 75 mg tab Comments:   Reason for Stopping:               The patient's chart, MAR and AVS were reviewed by Jaiden Del Angel.

## 2022-11-07 NOTE — PROGRESS NOTES
LDL is at goal, 68.6  ASA Test 379, therapeutic  P2Y12 235, subtherapeutic    Recommend continuing ASA 81mg daily  Stop Plavix  Switch to Brilinta 60mg bid    Please call with questions.

## 2023-04-18 ENCOUNTER — OFFICE VISIT (OUTPATIENT)
Dept: PODIATRY | Age: 43
End: 2023-04-18

## 2023-04-18 VITALS
HEART RATE: 85 BPM | RESPIRATION RATE: 16 BRPM | BODY MASS INDEX: 43.36 KG/M2 | SYSTOLIC BLOOD PRESSURE: 126 MMHG | HEIGHT: 64 IN | WEIGHT: 254 LBS | DIASTOLIC BLOOD PRESSURE: 75 MMHG

## 2023-04-18 DIAGNOSIS — R22.43 LOCALIZED SWELLING OF BOTH LOWER LEGS: Primary | ICD-10-CM

## 2023-04-18 NOTE — PROGRESS NOTES
Chief Complaint   Patient presents with    Ankle swelling     BILAT    Foot Swelling    Diabetes     Foot care, Last A1C 11/2022  9.2%.     Visit Vitals  /75   Pulse 85   Resp 16   Ht 5' 4\" (1.626 m)   Wt 254 lb (115.2 kg)   BMI 43.60 kg/m²     Oneida Galo, MARVINA

## 2023-04-18 NOTE — PROGRESS NOTES
Chief Complaint   Patient presents with    Ankle swelling     BILAT    Foot Swelling    Diabetes     Foot care, Last A1C 11/2022  9.2%.      Visit Vitals  /75   Pulse 85   Resp 16   Ht 5' 4\" (1.626 m)   Wt 254 lb (115.2 kg)   BMI 43.60 kg/m²     Sharee Peers, RMA

## 2023-05-16 ENCOUNTER — HOSPITAL ENCOUNTER (EMERGENCY)
Facility: HOSPITAL | Age: 43
Discharge: HOME | End: 2023-05-16
Attending: EMERGENCY MEDICINE
Payer: MEDICAID

## 2023-05-16 VITALS
HEART RATE: 88 BPM | DIASTOLIC BLOOD PRESSURE: 81 MMHG | RESPIRATION RATE: 16 BRPM | HEIGHT: 64 IN | SYSTOLIC BLOOD PRESSURE: 133 MMHG | OXYGEN SATURATION: 100 % | BODY MASS INDEX: 40.97 KG/M2 | TEMPERATURE: 98 F | WEIGHT: 240 LBS

## 2023-05-16 DIAGNOSIS — S46.211A STRAIN OF RIGHT BICEPS, INITIAL ENCOUNTER: ICD-10-CM

## 2023-05-16 DIAGNOSIS — M79.89 LEG SWELLING: Primary | ICD-10-CM

## 2023-05-16 LAB
ALBUMIN SERPL-MCNC: 3.6 G/DL (ref 3.5–5)
ALBUMIN/GLOB SERPL: 0.9 (ref 1.1–2.2)
ALP SERPL-CCNC: 172 U/L (ref 45–117)
ALT SERPL-CCNC: 21 U/L (ref 12–78)
ANION GAP SERPL CALC-SCNC: 8 MMOL/L (ref 5–15)
AST SERPL W P-5'-P-CCNC: 18 U/L (ref 15–37)
BASOPHILS # BLD: 0 K/UL (ref 0–0.1)
BASOPHILS NFR BLD: 0 % (ref 0–1)
BILIRUB SERPL-MCNC: 0.4 MG/DL (ref 0.2–1)
BNP SERPL-MCNC: 57 PG/ML
BUN SERPL-MCNC: 22 MG/DL (ref 6–20)
BUN/CREAT SERPL: 21 (ref 12–20)
CA-I BLD-MCNC: 9.2 MG/DL (ref 8.5–10.1)
CHLORIDE SERPL-SCNC: 102 MMOL/L (ref 97–108)
CO2 SERPL-SCNC: 30 MMOL/L (ref 21–32)
CREAT SERPL-MCNC: 1.05 MG/DL (ref 0.55–1.02)
DIFFERENTIAL METHOD BLD: ABNORMAL
EOSINOPHIL # BLD: 0.1 K/UL (ref 0–0.4)
EOSINOPHIL NFR BLD: 1 % (ref 0–7)
ERYTHROCYTE [DISTWIDTH] IN BLOOD BY AUTOMATED COUNT: 13.5 % (ref 11.5–14.5)
GLOBULIN SER CALC-MCNC: 4.2 G/DL (ref 2–4)
GLUCOSE SERPL-MCNC: 256 MG/DL (ref 65–100)
HCT VFR BLD AUTO: 33.9 % (ref 35–47)
HGB BLD-MCNC: 10.3 G/DL (ref 11.5–16)
IMM GRANULOCYTES # BLD AUTO: 0 K/UL (ref 0–0.04)
IMM GRANULOCYTES NFR BLD AUTO: 0 % (ref 0–0.5)
LYMPHOCYTES # BLD: 2.2 K/UL (ref 0.8–3.5)
LYMPHOCYTES NFR BLD: 19 % (ref 12–49)
MCH RBC QN AUTO: 25.1 PG (ref 26–34)
MCHC RBC AUTO-ENTMCNC: 30.4 G/DL (ref 30–36.5)
MCV RBC AUTO: 82.7 FL (ref 80–99)
MONOCYTES # BLD: 0.6 K/UL (ref 0–1)
MONOCYTES NFR BLD: 5 % (ref 5–13)
NEUTS SEG # BLD: 8.6 K/UL (ref 1.8–8)
NEUTS SEG NFR BLD: 75 % (ref 32–75)
PLATELET # BLD AUTO: 243 K/UL (ref 150–400)
PMV BLD AUTO: 11 FL (ref 8.9–12.9)
POTASSIUM SERPL-SCNC: 3.7 MMOL/L (ref 3.5–5.1)
PROT SERPL-MCNC: 7.8 G/DL (ref 6.4–8.2)
RBC # BLD AUTO: 4.1 M/UL (ref 3.8–5.2)
SODIUM SERPL-SCNC: 140 MMOL/L (ref 136–145)
WBC # BLD AUTO: 11.6 K/UL (ref 3.6–11)

## 2023-05-16 PROCEDURE — 85025 COMPLETE CBC W/AUTO DIFF WBC: CPT

## 2023-05-16 PROCEDURE — 6370000000 HC RX 637 (ALT 250 FOR IP): Performed by: EMERGENCY MEDICINE

## 2023-05-16 PROCEDURE — 6360000002 HC RX W HCPCS: Performed by: EMERGENCY MEDICINE

## 2023-05-16 PROCEDURE — 96374 THER/PROPH/DIAG INJ IV PUSH: CPT

## 2023-05-16 PROCEDURE — 83880 ASSAY OF NATRIURETIC PEPTIDE: CPT

## 2023-05-16 PROCEDURE — 99284 EMERGENCY DEPT VISIT MOD MDM: CPT

## 2023-05-16 PROCEDURE — 80053 COMPREHEN METABOLIC PANEL: CPT

## 2023-05-16 RX ORDER — METHOCARBAMOL 500 MG/1
500 TABLET, FILM COATED ORAL 4 TIMES DAILY
Qty: 40 TABLET | Refills: 0 | Status: SHIPPED | OUTPATIENT
Start: 2023-05-16 | End: 2023-05-26

## 2023-05-16 RX ORDER — FUROSEMIDE 10 MG/ML
60 INJECTION INTRAMUSCULAR; INTRAVENOUS ONCE
Status: COMPLETED | OUTPATIENT
Start: 2023-05-16 | End: 2023-05-16

## 2023-05-16 RX ORDER — METHOCARBAMOL 500 MG/1
500 TABLET, FILM COATED ORAL
Status: COMPLETED | OUTPATIENT
Start: 2023-05-16 | End: 2023-05-16

## 2023-05-16 RX ORDER — FUROSEMIDE 40 MG/1
40 TABLET ORAL DAILY
Qty: 7 TABLET | Refills: 0 | Status: SHIPPED | OUTPATIENT
Start: 2023-05-16 | End: 2023-05-23

## 2023-05-16 RX ADMIN — FUROSEMIDE 60 MG: 10 INJECTION, SOLUTION INTRAMUSCULAR; INTRAVENOUS at 14:13

## 2023-05-16 RX ADMIN — METHOCARBAMOL 500 MG: 500 TABLET ORAL at 16:51

## 2023-05-16 ASSESSMENT — LIFESTYLE VARIABLES
HOW OFTEN DO YOU HAVE A DRINK CONTAINING ALCOHOL: NEVER
HOW MANY STANDARD DRINKS CONTAINING ALCOHOL DO YOU HAVE ON A TYPICAL DAY: PATIENT DOES NOT DRINK

## 2023-05-16 ASSESSMENT — PAIN DESCRIPTION - LOCATION: LOCATION: LEG

## 2023-05-16 ASSESSMENT — PAIN SCALES - GENERAL: PAINLEVEL_OUTOF10: 10

## 2023-05-16 ASSESSMENT — PAIN - FUNCTIONAL ASSESSMENT: PAIN_FUNCTIONAL_ASSESSMENT: 0-10

## 2023-05-16 ASSESSMENT — PAIN DESCRIPTION - ORIENTATION: ORIENTATION: LEFT;RIGHT

## 2023-05-16 NOTE — DISCHARGE INSTRUCTIONS
78 U/L    Alk Phosphatase 172 (H) 45 - 117 U/L    Total Protein 7.8 6.4 - 8.2 g/dL    Albumin 3.6 3.5 - 5.0 g/dL    Globulin 4.2 (H) 2.0 - 4.0 g/dL    Albumin/Globulin Ratio 0.9 (L) 1.1 - 2.2     Brain Natriuretic Peptide    Collection Time: 05/16/23  2:11 PM   Result Value Ref Range    NT Pro-BNP 57 <125 pg/mL       Radiologic Studies  No orders to display     ------------------------------------------------------------------------------------------------------------  The exam and treatment you received in the Emergency Department were for an urgent problem and are not intended as complete care. It is important that you follow-up with a doctor, nurse practitioner, or physician assistant to:  (1) confirm your diagnosis,  (2) re-evaluation of changes in your illness and treatment, and  (3) for ongoing care. Please take your discharge instructions with you when you go to your follow-up appointment. If you have any problem arranging a follow-up appointment, contact the Emergency Department. If your symptoms become worse or you do not improve as expected and you are unable to reach your health care provider, please return to the Emergency Department. We are available 24 hours a day. If a prescription has been provided, please have it filled as soon as possible to prevent a delay in treatment. If you have any questions or reservations about taking the medication due to side effects or interactions with other medications, please call your primary care provider or contact the ER.

## 2023-05-16 NOTE — ED PROVIDER NOTES
[facility] EMERGENCY DEPARTMENT  EMERGENCY DEPARTMENT HISTORY AND PHYSICAL EXAM      Date: 2023  Patient Name: Cyndi Nichols  MRN: [id number]  [place]: 1980  Date of evaluation: 2023  Provider: Audie Barrientos MD   Note Started: 1:39 PM EDT 23    HISTORY OF PRESENT ILLNESS     Chief Complaint   Patient presents with    Leg Pain       History Provided By: Patient    HPI: Cyndi Nichols is a 36 y.o. female with a history of stroke with right-sided deficits, gout, diabetes, depression, presenting with bilateral leg swelling and right bicep pain. Patient states that for the past couple days has been having worsening leg swelling. Denies any history of liver, kidney or heart failure. States that she is on a baby dose of Lasix but does not know the dose. States that while she is wheelchair-bound, does use a walker sometimes to get up and get around. Regarding her right arm, denies any injury. Does have right arm deficits after the stroke but does not recall straining it. Does do exercises.     PAST MEDICAL HISTORY   Past Medical History:  Past Medical History:   Diagnosis Date    Depression     Diabetes ([address]     Edema     Gout     Herpes genitalis     Hypertension     Ill-defined condition     gout    Mood disorder ([facility] [facility]     Other ill-defined conditions(799.89)     cholesterol     Psychiatric disorder     depression    Psychiatric disorder     PTSD    PTSD (post-traumatic stress disorder)     Sleep disorder     Stroke [facility])     UTI (lower urinary tract infection)        Past Surgical History:  Past Surgical History:   Procedure Laterality Date    GYN       X 3       Family History:  Family History   Problem Relation Age of Onset    Hypertension Mother     Coronary Art Dis Mother     Diabetes Father     Stroke Father 46        pt estimates    Kidney Disease Father         secondary to diabetes    Breast Cancer Paternal Grandmother     Breast Cancer Cousin         numerous paternal cousins

## 2023-05-16 NOTE — ED TRIAGE NOTES
Pt c/o bilateral leg pain and swelling since Sunday. Hx fluid retention and 2 strokes with right side deficits. Pt is wheelchair bound.  Also c/o right arm pain

## 2023-06-08 ENCOUNTER — OFFICE VISIT (OUTPATIENT)
Facility: CLINIC | Age: 43
End: 2023-06-08
Payer: MEDICAID

## 2023-06-08 VITALS
WEIGHT: 240 LBS | RESPIRATION RATE: 16 BRPM | BODY MASS INDEX: 40.97 KG/M2 | SYSTOLIC BLOOD PRESSURE: 121 MMHG | TEMPERATURE: 97.5 F | OXYGEN SATURATION: 97 % | HEART RATE: 87 BPM | HEIGHT: 64 IN | DIASTOLIC BLOOD PRESSURE: 75 MMHG

## 2023-06-08 DIAGNOSIS — Z11.59 NEED FOR HEPATITIS C SCREENING TEST: ICD-10-CM

## 2023-06-08 DIAGNOSIS — Z11.4 ENCOUNTER FOR SCREENING FOR HIV: ICD-10-CM

## 2023-06-08 DIAGNOSIS — F43.10 PTSD (POST-TRAUMATIC STRESS DISORDER): ICD-10-CM

## 2023-06-08 DIAGNOSIS — E11.649 TYPE 2 DIABETES MELLITUS WITH HYPOGLYCEMIA WITHOUT COMA, WITH LONG-TERM CURRENT USE OF INSULIN (HCC): ICD-10-CM

## 2023-06-08 DIAGNOSIS — I69.359 CVA, OLD, HEMIPARESIS (HCC): ICD-10-CM

## 2023-06-08 DIAGNOSIS — Z13.21 ENCOUNTER FOR VITAMIN DEFICIENCY SCREENING: ICD-10-CM

## 2023-06-08 DIAGNOSIS — T56.0X1A LEAD-INDUCED CHRONIC GOUT OF LEFT FOOT WITHOUT TOPHUS, INITIAL ENCOUNTER: ICD-10-CM

## 2023-06-08 DIAGNOSIS — G45.9 TRANSIENT ISCHEMIC ATTACK (TIA): ICD-10-CM

## 2023-06-08 DIAGNOSIS — E78.5 HYPERLIPIDEMIA LDL GOAL <70: ICD-10-CM

## 2023-06-08 DIAGNOSIS — M1A.1720 LEAD-INDUCED CHRONIC GOUT OF LEFT FOOT WITHOUT TOPHUS, INITIAL ENCOUNTER: ICD-10-CM

## 2023-06-08 DIAGNOSIS — R60.9 EDEMA, UNSPECIFIED TYPE: Primary | ICD-10-CM

## 2023-06-08 DIAGNOSIS — Z79.4 TYPE 2 DIABETES MELLITUS WITH HYPOGLYCEMIA WITHOUT COMA, WITH LONG-TERM CURRENT USE OF INSULIN (HCC): ICD-10-CM

## 2023-06-08 DIAGNOSIS — R01.0 BENIGN HEART MURMUR: ICD-10-CM

## 2023-06-08 DIAGNOSIS — I10 ESSENTIAL HYPERTENSION WITH GOAL BLOOD PRESSURE LESS THAN 130/85: ICD-10-CM

## 2023-06-08 DIAGNOSIS — F60.9 PERSONALITY DISORDER (HCC): ICD-10-CM

## 2023-06-08 DIAGNOSIS — E66.01 CLASS 3 SEVERE OBESITY WITH SERIOUS COMORBIDITY AND BODY MASS INDEX (BMI) OF 40.0 TO 44.9 IN ADULT, UNSPECIFIED OBESITY TYPE (HCC): ICD-10-CM

## 2023-06-08 DIAGNOSIS — R56.9 SEIZURES (HCC): ICD-10-CM

## 2023-06-08 PROBLEM — M10.9 GOUT: Status: ACTIVE | Noted: 2023-06-08

## 2023-06-08 PROCEDURE — 3074F SYST BP LT 130 MM HG: CPT

## 2023-06-08 PROCEDURE — 3078F DIAST BP <80 MM HG: CPT

## 2023-06-08 PROCEDURE — 99204 OFFICE O/P NEW MOD 45 MIN: CPT

## 2023-06-08 RX ORDER — ERGOCALCIFEROL 1.25 MG/1
CAPSULE ORAL
COMMUNITY
Start: 2023-03-29

## 2023-06-08 RX ORDER — LISINOPRIL 20 MG/1
TABLET ORAL
COMMUNITY
Start: 2023-05-13

## 2023-06-08 RX ORDER — METHOCARBAMOL 750 MG/1
TABLET, FILM COATED ORAL
COMMUNITY
Start: 2023-05-15

## 2023-06-08 RX ORDER — BUMETANIDE 1 MG/1
1 TABLET ORAL DAILY
COMMUNITY

## 2023-06-08 RX ORDER — DULAGLUTIDE 0.75 MG/.5ML
INJECTION, SOLUTION SUBCUTANEOUS
COMMUNITY
Start: 2023-03-29

## 2023-06-08 SDOH — ECONOMIC STABILITY: INCOME INSECURITY: HOW HARD IS IT FOR YOU TO PAY FOR THE VERY BASICS LIKE FOOD, HOUSING, MEDICAL CARE, AND HEATING?: NOT HARD AT ALL

## 2023-06-08 SDOH — ECONOMIC STABILITY: FOOD INSECURITY: WITHIN THE PAST 12 MONTHS, YOU WORRIED THAT YOUR FOOD WOULD RUN OUT BEFORE YOU GOT MONEY TO BUY MORE.: NEVER TRUE

## 2023-06-08 SDOH — ECONOMIC STABILITY: FOOD INSECURITY: WITHIN THE PAST 12 MONTHS, THE FOOD YOU BOUGHT JUST DIDN'T LAST AND YOU DIDN'T HAVE MONEY TO GET MORE.: NEVER TRUE

## 2023-06-08 SDOH — ECONOMIC STABILITY: HOUSING INSECURITY
IN THE LAST 12 MONTHS, WAS THERE A TIME WHEN YOU DID NOT HAVE A STEADY PLACE TO SLEEP OR SLEPT IN A SHELTER (INCLUDING NOW)?: NO

## 2023-06-08 ASSESSMENT — PATIENT HEALTH QUESTIONNAIRE - PHQ9
SUM OF ALL RESPONSES TO PHQ QUESTIONS 1-9: 1
SUM OF ALL RESPONSES TO PHQ9 QUESTIONS 1 & 2: 1
9. THOUGHTS THAT YOU WOULD BE BETTER OFF DEAD, OR OF HURTING YOURSELF: 0
10. IF YOU CHECKED OFF ANY PROBLEMS, HOW DIFFICULT HAVE THESE PROBLEMS MADE IT FOR YOU TO DO YOUR WORK, TAKE CARE OF THINGS AT HOME, OR GET ALONG WITH OTHER PEOPLE: 0
8. MOVING OR SPEAKING SO SLOWLY THAT OTHER PEOPLE COULD HAVE NOTICED. OR THE OPPOSITE, BEING SO FIGETY OR RESTLESS THAT YOU HAVE BEEN MOVING AROUND A LOT MORE THAN USUAL: 0
SUM OF ALL RESPONSES TO PHQ QUESTIONS 1-9: 1
5. POOR APPETITE OR OVEREATING: 0
7. TROUBLE CONCENTRATING ON THINGS, SUCH AS READING THE NEWSPAPER OR WATCHING TELEVISION: 0
2. FEELING DOWN, DEPRESSED OR HOPELESS: 1
3. TROUBLE FALLING OR STAYING ASLEEP: 0
1. LITTLE INTEREST OR PLEASURE IN DOING THINGS: 0
4. FEELING TIRED OR HAVING LITTLE ENERGY: 0
6. FEELING BAD ABOUT YOURSELF - OR THAT YOU ARE A FAILURE OR HAVE LET YOURSELF OR YOUR FAMILY DOWN: 0

## 2023-06-08 ASSESSMENT — ENCOUNTER SYMPTOMS
TROUBLE SWALLOWING: 0
ABDOMINAL PAIN: 0
NAUSEA: 0
VOMITING: 0
COUGH: 0
BLOOD IN STOOL: 0
WHEEZING: 0
DIARRHEA: 0
CHEST TIGHTNESS: 0
PHOTOPHOBIA: 0
SHORTNESS OF BREATH: 0
BACK PAIN: 0
SORE THROAT: 0
EYE PAIN: 0
CONSTIPATION: 1

## 2023-06-08 NOTE — PROGRESS NOTES
1. \"Have you been to the ER, urgent care clinic since your last visit? Hospitalized since your last visit? \" [facility], feet and legs were swollen     2. \"Have you seen or consulted any other health care providers outside of the [address] [place] [address] since your last visit? \" No      3. For patients aged 39-70: Has the patient had a colonoscopy / FIT/ Cologuard? N/A      If the patient is female:    4. For patients aged 41-77: Has the patient had a mammogram within the past 2 years? No       5. For patients aged 21-65: Has the patient had a pap smear? No       Chief Complaint   Patient presents with    Establish Care     /75 (Site: Left Upper Arm, Position: Sitting, Cuff Size: Medium Adult)   Pulse 87   Temp 97.5 °F (36.4 °C) (Temporal)   Resp 16   Ht 5' 4\" (1.626 m)   Wt 240 lb (108.9 kg)   SpO2 97%   BMI 41.20 kg/m²     Pt is new here establishing care.

## 2023-06-08 NOTE — PROGRESS NOTES
Alycia Marie  36 y.o. female  1980  [id number]  [id number]     [facility]: Progress Note       Encounter Date: 6/8/2023    Patient presents with the following chief complaint(s)    Chief Complaint   Patient presents with    Establish Care        History provided by patient  History of Present Illness   Alycia Marie is a 36 y.o. female with past medical history listed, who presents to clinic today as a new patient to me to establish care. [place] She moved here and is now in nursing home. Pt was previously seeing Dr. Emmanuel Cardoso but decided to switch provider. She lives in residential home. She had stroke 2022 and 2021 and is currently wheelchair-bound. She is on Brilinta & asa 81. Patient also has history of seizures for which she is on Keppra 100mg/ml solution every 12 hours. Patient has history of diabetes for which she is on Trulicity, Humalog, Lantus. She is requesting a referral for endocrinology to be evaluated. Patient has history of TIA. She is currently on Lasix, atorvastatin 80, bumetanide. She is requesting a referral to cardiology. She has problems with shoulder pain for which she is on methocarbamol 750 and lidocaine patches as needed. Patient has history of constipation for which she is on senna and polyethylene powder as needed. She is on Zoloft 75 mg daily for PTSD and personality disorder. She reports history of gout for which she says she is under the care of Dr. aPddy Bhatia for her feet. She has an OBGYN appointment coming up on July 11 with Dr. Ventura Fus: Patient's last eye exam was last month  Oral Care: Patient's last dental exam was about 3 years.  Care: Patient's last OBGYN visit will be next month with Dr. Chandrakant Macdonald.      Health Maintenance    Health Maintenance Due   Topic Date Due    Diabetic foot exam  Never done    HIV screen  Never done    Diabetic

## 2023-06-21 PROBLEM — E11.65 POORLY CONTROLLED DIABETES MELLITUS (HCC): Status: ACTIVE | Noted: 2017-11-06

## 2023-06-22 NOTE — PROGRESS NOTES
Problem: Patient Education: Go to Patient Education Activity  Goal: Patient/Family Education  Outcome: Progressing Towards Goal     Problem: Patient Education: Go to Patient Education Activity  Goal: Patient/Family Education  Outcome: Progressing Towards Goal     Problem: Patient Education: Go to Patient Education Activity  Goal: Patient/Family Education  3/16/2022 1743 by RIKKI Herndon  Outcome: Resolved/Met  3/16/2022 1208 by RIKKI Herndon  Outcome: Progressing Towards Goal     Problem: Pressure Injury - Risk of  Goal: *Prevention of pressure injury  Description: Document Mahendra Scale and appropriate interventions in the flowsheet. 3/16/2022 1743 by RIKKI Herndon  Outcome: Resolved/Met  Note: Pressure Injury Interventions:  Sensory Interventions: Assess changes in LOC,Check visual cues for pain,Float heels,Keep linens dry and wrinkle-free,Maintain/enhance activity level,Minimize linen layers,Monitor skin under medical devices,Pad between skin to skin,Pressure redistribution bed/mattress (bed type)    Moisture Interventions: Absorbent underpads,Apply protective barrier, creams and emollients,Assess need for specialty bed,Check for incontinence Q2 hours and as needed,Internal/External urinary devices,Limit adult briefs,Maintain skin hydration (lotion/cream),Minimize layers    Activity Interventions: PT/OT evaluation,Pressure redistribution bed/mattress(bed type)    Mobility Interventions: PT/OT evaluation,Turn and reposition approx.  every two hours(pillow and wedges),Float heels    Nutrition Interventions: Document food/fluid/supplement intake,Offer support with meals,snacks and hydration    Friction and Shear Interventions: Feet elevated on foot rest,HOB 30 degrees or less,Minimize layers             3/16/2022 1208 by RIKKI Herndon  Outcome: Progressing Towards Goal  Note: Pressure Injury Interventions:  Sensory Interventions: Assess changes in LOC,Check visual cues for pain,Float heels,Keep linens dry and wrinkle-free,Maintain/enhance activity level,Minimize linen layers,Monitor skin under medical devices,Pad between skin to skin,Pressure redistribution bed/mattress (bed type)    Moisture Interventions: Absorbent underpads,Apply protective barrier, creams and emollients,Assess need for specialty bed,Check for incontinence Q2 hours and as needed,Internal/External urinary devices,Limit adult briefs,Maintain skin hydration (lotion/cream),Minimize layers    Activity Interventions: PT/OT evaluation,Pressure redistribution bed/mattress(bed type)    Mobility Interventions: PT/OT evaluation,Turn and reposition approx.  every two hours(pillow and wedges),Float heels    Nutrition Interventions: Document food/fluid/supplement intake,Offer support with meals,snacks and hydration    Friction and Shear Interventions: Feet elevated on foot rest,HOB 30 degrees or less,Minimize layers                Problem: Patient Education: Go to Patient Education Activity  Goal: Patient/Family Education  3/16/2022 1743 by RIKKI Brooks  Outcome: Resolved/Met  3/16/2022 1208 by RIKKI Brooks  Outcome: Progressing Towards Goal     Problem: Patient Education: Go to Patient Education Activity  Goal: Patient/Family Education  Outcome: Resolved/Met     Problem: TIA/CVA Stroke: 0-24 hours  Goal: Off Pathway (Use only if patient is Off Pathway)  Outcome: Resolved/Met  Goal: Activity/Safety  Outcome: Resolved/Met  Goal: Consults, if ordered  Outcome: Resolved/Met  Goal: Diagnostic Test/Procedures  Outcome: Resolved/Met  Goal: Nutrition/Diet  Outcome: Resolved/Met  Goal: Discharge Planning  Outcome: Resolved/Met  Goal: Medications  Outcome: Resolved/Met  Goal: Respiratory  Outcome: Resolved/Met  Goal: Treatments/Interventions/Procedures  Outcome: Resolved/Met  Goal: Minimize risk of bleeding post-thrombolytic infusion  Outcome: Resolved/Met  Goal: Monitor for complications post-thrombolytic infusion  Outcome: Resolved/Met  Goal: Psychosocial  Outcome: Resolved/Met  Goal: *Hemodynamically stable  Outcome: Resolved/Met  Goal: *Neurologically stable  Description: Absence of additional neurological deficits    Outcome: Resolved/Met  Goal: *Verbalizes anxiety and depression are reduced or absent  Outcome: Resolved/Met  Goal: *Absence of Signs of Aspiration on Current Diet  Outcome: Resolved/Met  Goal: *Absence of deep venous thrombosis signs and symptoms(Stroke Metric)  Outcome: Resolved/Met  Goal: *Ability to perform ADLs and demonstrates progressive mobility and function  Outcome: Resolved/Met  Goal: *Stroke education started(Stroke Metric)  Outcome: Resolved/Met  Goal: *Dysphagia screen performed(Stroke Metric)  Outcome: Resolved/Met  Goal: *Rehab consulted(Stroke Metric)  Outcome: Resolved/Met     Problem: TIA/CVA Stroke: Day 2 Until Discharge  Goal: Off Pathway (Use only if patient is Off Pathway)  Outcome: Resolved/Met  Goal: Activity/Safety  Outcome: Resolved/Met  Goal: Diagnostic Test/Procedures  Outcome: Resolved/Met  Goal: Nutrition/Diet  Outcome: Resolved/Met  Goal: Discharge Planning  Outcome: Resolved/Met  Goal: Medications  Outcome: Resolved/Met  Goal: Respiratory  Outcome: Resolved/Met  Goal: Treatments/Interventions/Procedures  Outcome: Resolved/Met  Goal: Psychosocial  Outcome: Resolved/Met  Goal: *Verbalizes anxiety and depression are reduced or absent  Outcome: Resolved/Met  Goal: *Absence of aspiration  Outcome: Resolved/Met  Goal: *Absence of deep venous thrombosis signs and symptoms(Stroke Metric)  Outcome: Resolved/Met  Goal: *Optimal pain control at patient's stated goal  Outcome: Resolved/Met  Goal: *Tolerating diet  Outcome: Resolved/Met  Goal: *Ability to perform ADLs and demonstrates progressive mobility and function  Outcome: Resolved/Met  Goal: *Stroke education continued(Stroke Metric)  Outcome: Resolved/Met     Problem: Ischemic Stroke: Discharge Outcomes  Goal: *Verbalizes anxiety and depression are reduced or absent  Outcome: Resolved/Met  Goal: *Verbalize understanding of risk factor modification(Stroke Metric)  Outcome: Resolved/Met  Goal: *Hemodynamically stable  Outcome: Resolved/Met  Goal: *Absence of aspiration pneumonia  Outcome: Resolved/Met  Goal: *Aware of needed dietary changes  Outcome: Resolved/Met  Goal: *Verbalize understanding of prescribed medications including anti-coagulants, anti-lipid, and/or anti-platelets(Stroke Metric)  Outcome: Resolved/Met  Goal: *Tolerating diet  Outcome: Resolved/Met  Goal: *Aware of follow-up diagnostics related to anticoagulants  Outcome: Resolved/Met  Goal: *Ability to perform ADLs and demonstrates progressive mobility and function  Outcome: Resolved/Met  Goal: *Absence of DVT(Stroke Metric)  Outcome: Resolved/Met  Goal: *Absence of aspiration  Outcome: Resolved/Met  Goal: *Optimal pain control at patient's stated goal  Outcome: Resolved/Met  Goal: *Home safety concerns addressed  Outcome: Resolved/Met  Goal: *Describes available resources and support systems  Outcome: Resolved/Met  Goal: *Verbalizes understanding of activation of EMS(911) for stroke symptoms(Stroke Metric)  Outcome: Resolved/Met  Goal: *Understands and describes signs and symptoms to report to providers(Stroke Metric)  Outcome: Resolved/Met  Goal: *Neurolgocially stable (absence of additional neurological deficits)  Outcome: Resolved/Met  Goal: *Verbalizes importance of follow-up with primary care physician(Stroke Metric)  Outcome: Resolved/Met  Goal: *Smoking cessation discussed,if applicable(Stroke Metric)  Outcome: Resolved/Met  Goal: *Depression screening completed(Stroke Metric)  Outcome: Resolved/Met     Problem: Falls - Risk of  Goal: *Absence of Falls  Description: Document Felicita Fall Risk and appropriate interventions in the flowsheet.   3/16/2022 5313 by RIKKI Herrmann  Outcome: Resolved/Met  3/16/2022 1208 by RIKKI Herrmann  Outcome: Progressing Towards Goal  Note: Fall Risk Interventions:  Mobility Interventions: Bed/chair exit alarm,OT consult for ADLs,Communicate number of staff needed for ambulation/transfer,PT Consult for mobility concerns         Medication Interventions: Bed/chair exit alarm,Teach patient to arise slowly    Elimination Interventions: Call light in reach,Patient to call for help with toileting needs,Toilet paper/wipes in reach,Toileting schedule/hourly rounds              Problem: Patient Education: Go to Patient Education Activity  Goal: Patient/Family Education  3/16/2022 1743 by RIKKI Brooks  Outcome: Resolved/Met  3/16/2022 1208 by RIKKI Brooks  Outcome: Progressing Towards Goal     Problem: Diabetes Self-Management  Goal: *Disease process and treatment process  Description: Define diabetes and identify own type of diabetes; list 3 options for treating diabetes. 3/16/2022 1743 by RIKKI Brooks  Outcome: Resolved/Met  3/16/2022 1208 by RIKKI Brooks  Outcome: Progressing Towards Goal  Goal: *Incorporating nutritional management into lifestyle  Description: Describe effect of type, amount and timing of food on blood glucose; list 3 methods for planning meals. 3/16/2022 1743 by RIKKI Brooks  Outcome: Resolved/Met  3/16/2022 1208 by RIKKI Brooks  Outcome: Progressing Towards Goal  Goal: *Incorporating physical activity into lifestyle  Description: State effect of exercise on blood glucose levels. 3/16/2022 1743 by RIKKI Brooks  Outcome: Resolved/Met  3/16/2022 1208 by RIKKI Brooks  Outcome: Progressing Towards Goal  Goal: *Developing strategies to promote health/change behavior  Description: Define the ABC's of diabetes; identify appropriate screenings, schedule and personal plan for screenings.   3/16/2022 1743 by RIKKI Brooks  Outcome: Resolved/Met  3/16/2022 1208 by RIKKI Brooks  Outcome: Progressing Towards Goal  Goal: *Using medications safely  Description: State effect of diabetes medications on diabetes; name diabetes medication taking, action and side effects. 3/16/2022 1743 by RIKKI Ness  Outcome: Resolved/Met  3/16/2022 1208 by RIKKI Ness  Outcome: Progressing Towards Goal  Goal: *Monitoring blood glucose, interpreting and using results  Description: Identify recommended blood glucose targets  and personal targets. 3/16/2022 1743 by RIKKI Ness  Outcome: Resolved/Met  3/16/2022 1208 by RIKKI Ness  Outcome: Progressing Towards Goal  Goal: *Prevention, detection, treatment of acute complications  Description: List symptoms of hyper- and hypoglycemia; describe how to treat low blood sugar and actions for lowering  high blood glucose level. 3/16/2022 1743 by RIKKI Ness  Outcome: Resolved/Met  3/16/2022 1208 by RIKKI Ness  Outcome: Progressing Towards Goal  Goal: *Prevention, detection and treatment of chronic complications  Description: Define the natural course of diabetes and describe the relationship of blood glucose levels to long term complications of diabetes.   3/16/2022 1743 by RIKKI Ness  Outcome: Resolved/Met  3/16/2022 1208 by RIKKI Ness  Outcome: Progressing Towards Goal  Goal: *Developing strategies to address psychosocial issues  Description: Describe feelings about living with diabetes; identify support needed and support network  3/16/2022 1743 by RIKKI Ness  Outcome: Resolved/Met  3/16/2022 1208 by RIKKI Ness  Outcome: Progressing Towards Goal  Goal: *Insulin pump training  3/16/2022 1743 by RIKKI Ness  Outcome: Resolved/Met  3/16/2022 1208 by RIKKI Ness  Outcome: Progressing Towards Goal  Goal: *Sick day guidelines  3/16/2022 1743 by RIKKI Ness  Outcome: Resolved/Met  3/16/2022 1208 by RIKKI Ness  Outcome: Progressing Towards Goal  Goal: *Patient Specific Goal (EDIT GOAL, INSERT TEXT)  3/16/2022 1743 by RIKKI Ness  Outcome: Resolved/Met  3/16/2022 1208 by RIKKI Ness  Outcome: Progressing Towards Goal     Problem: Patient Education: Go to Patient Education Activity  Goal: Patient/Family Education  3/16/2022 1743 by RIKKI Ness  Outcome: Resolved/Met  3/16/2022 1208 by RIKKI Ness  Outcome: Progressing Towards Goal     Problem: Patient Education: Go to Patient Education Activity  Goal: Patient/Family Education  3/16/2022 1743 by RIKKI Ness  Outcome: Resolved/Met  3/16/2022 1208 by RIKKI Ness  Outcome: Progressing Towards Goal     Problem: Pressure Injury - Risk of  Goal: *Prevention of pressure injury  Description: Document Mahendra Scale and appropriate interventions in the flowsheet. 3/16/2022 1743 by RIKKI Ness  Outcome: Resolved/Met  Note: Pressure Injury Interventions:  Sensory Interventions: Assess changes in LOC,Check visual cues for pain,Float heels,Keep linens dry and wrinkle-free,Maintain/enhance activity level,Minimize linen layers,Monitor skin under medical devices,Pad between skin to skin,Pressure redistribution bed/mattress (bed type)    Moisture Interventions: Absorbent underpads,Apply protective barrier, creams and emollients,Assess need for specialty bed,Check for incontinence Q2 hours and as needed,Internal/External urinary devices,Limit adult briefs,Maintain skin hydration (lotion/cream),Minimize layers    Activity Interventions: PT/OT evaluation,Pressure redistribution bed/mattress(bed type)    Mobility Interventions: PT/OT evaluation,Turn and reposition approx.  every two hours(pillow and wedges),Float heels    Nutrition Interventions: Document food/fluid/supplement intake,Offer support with meals,snacks and hydration    Friction and Shear Interventions: Feet elevated on foot rest,HOB 30 degrees or less,Minimize layers 3/16/2022 1208 by RIKKI Ray  Outcome: Progressing Towards Goal  Note: Pressure Injury Interventions:  Sensory Interventions: Assess changes in LOC,Check visual cues for pain,Float heels,Keep linens dry and wrinkle-free,Maintain/enhance activity level,Minimize linen layers,Monitor skin under medical devices,Pad between skin to skin,Pressure redistribution bed/mattress (bed type)    Moisture Interventions: Absorbent underpads,Apply protective barrier, creams and emollients,Assess need for specialty bed,Check for incontinence Q2 hours and as needed,Internal/External urinary devices,Limit adult briefs,Maintain skin hydration (lotion/cream),Minimize layers    Activity Interventions: PT/OT evaluation,Pressure redistribution bed/mattress(bed type)    Mobility Interventions: PT/OT evaluation,Turn and reposition approx.  every two hours(pillow and wedges),Float heels    Nutrition Interventions: Document food/fluid/supplement intake,Offer support with meals,snacks and hydration    Friction and Shear Interventions: Feet elevated on foot rest,HOB 30 degrees or less,Minimize layers                Problem: Patient Education: Go to Patient Education Activity  Goal: Patient/Family Education  3/16/2022 1743 by RIKKI Ray  Outcome: Resolved/Met  3/16/2022 1208 by RIKKI Ray  Outcome: Progressing Towards Goal     Problem: Patient Education: Go to Patient Education Activity  Goal: Patient/Family Education  Outcome: Resolved/Met     Problem: TIA/CVA Stroke: 0-24 hours  Goal: Off Pathway (Use only if patient is Off Pathway)  Outcome: Resolved/Met  Goal: Activity/Safety  Outcome: Resolved/Met  Goal: Consults, if ordered  Outcome: Resolved/Met  Goal: Diagnostic Test/Procedures  Outcome: Resolved/Met  Goal: Nutrition/Diet  Outcome: Resolved/Met  Goal: Discharge Planning  Outcome: Resolved/Met  Goal: Medications  Outcome: Resolved/Met  Goal: Respiratory  Outcome: Resolved/Met  Goal: Treatments/Interventions/Procedures  Outcome: Resolved/Met  Goal: Minimize risk of bleeding post-thrombolytic infusion  Outcome: Resolved/Met  Goal: Monitor for complications post-thrombolytic infusion  Outcome: Resolved/Met  Goal: Psychosocial  Outcome: Resolved/Met  Goal: *Hemodynamically stable  Outcome: Resolved/Met  Goal: *Neurologically stable  Description: Absence of additional neurological deficits    Outcome: Resolved/Met  Goal: *Verbalizes anxiety and depression are reduced or absent  Outcome: Resolved/Met  Goal: *Absence of Signs of Aspiration on Current Diet  Outcome: Resolved/Met  Goal: *Absence of deep venous thrombosis signs and symptoms(Stroke Metric)  Outcome: Resolved/Met  Goal: *Ability to perform ADLs and demonstrates progressive mobility and function  Outcome: Resolved/Met  Goal: *Stroke education started(Stroke Metric)  Outcome: Resolved/Met  Goal: *Dysphagia screen performed(Stroke Metric)  Outcome: Resolved/Met  Goal: *Rehab consulted(Stroke Metric)  Outcome: Resolved/Met     Problem: TIA/CVA Stroke: Day 2 Until Discharge  Goal: Off Pathway (Use only if patient is Off Pathway)  Outcome: Resolved/Met  Goal: Activity/Safety  Outcome: Resolved/Met  Goal: Diagnostic Test/Procedures  Outcome: Resolved/Met  Goal: Nutrition/Diet  Outcome: Resolved/Met  Goal: Discharge Planning  Outcome: Resolved/Met  Goal: Medications  Outcome: Resolved/Met  Goal: Respiratory  Outcome: Resolved/Met  Goal: Treatments/Interventions/Procedures  Outcome: Resolved/Met  Goal: Psychosocial  Outcome: Resolved/Met  Goal: *Verbalizes anxiety and depression are reduced or absent  Outcome: Resolved/Met  Goal: *Absence of aspiration  Outcome: Resolved/Met  Goal: *Absence of deep venous thrombosis signs and symptoms(Stroke Metric)  Outcome: Resolved/Met  Goal: *Optimal pain control at patient's stated goal  Outcome: Resolved/Met  Goal: *Tolerating diet  Outcome: Resolved/Met  Goal: *Ability to perform ADLs and demonstrates progressive mobility and function  Outcome: Resolved/Met  Goal: *Stroke education continued(Stroke Metric)  Outcome: Resolved/Met     Problem: Ischemic Stroke: Discharge Outcomes  Goal: *Verbalizes anxiety and depression are reduced or absent  Outcome: Resolved/Met  Goal: *Verbalize understanding of risk factor modification(Stroke Metric)  Outcome: Resolved/Met  Goal: *Hemodynamically stable  Outcome: Resolved/Met  Goal: *Absence of aspiration pneumonia  Outcome: Resolved/Met  Goal: *Aware of needed dietary changes  Outcome: Resolved/Met  Goal: *Verbalize understanding of prescribed medications including anti-coagulants, anti-lipid, and/or anti-platelets(Stroke Metric)  Outcome: Resolved/Met  Goal: *Tolerating diet  Outcome: Resolved/Met  Goal: *Aware of follow-up diagnostics related to anticoagulants  Outcome: Resolved/Met  Goal: *Ability to perform ADLs and demonstrates progressive mobility and function  Outcome: Resolved/Met  Goal: *Absence of DVT(Stroke Metric)  Outcome: Resolved/Met  Goal: *Absence of aspiration  Outcome: Resolved/Met  Goal: *Optimal pain control at patient's stated goal  Outcome: Resolved/Met  Goal: *Home safety concerns addressed  Outcome: Resolved/Met  Goal: *Describes available resources and support systems  Outcome: Resolved/Met  Goal: *Verbalizes understanding of activation of EMS(911) for stroke symptoms(Stroke Metric)  Outcome: Resolved/Met  Goal: *Understands and describes signs and symptoms to report to providers(Stroke Metric)  Outcome: Resolved/Met  Goal: *Neurolgocially stable (absence of additional neurological deficits)  Outcome: Resolved/Met  Goal: *Verbalizes importance of follow-up with primary care physician(Stroke Metric)  Outcome: Resolved/Met  Goal: *Smoking cessation discussed,if applicable(Stroke Metric)  Outcome: Resolved/Met  Goal: *Depression screening completed(Stroke Metric)  Outcome: Resolved/Met     Problem: Falls - Risk of  Goal: *Absence of Falls  Description: Document Ger Serrato Fall Risk and appropriate interventions in the flowsheet. 3/16/2022 1743 by RIKKI Ma  Outcome: Resolved/Met  3/16/2022 1208 by RIKKI Ma  Outcome: Progressing Towards Goal  Note: Fall Risk Interventions:  Mobility Interventions: Bed/chair exit alarm,OT consult for ADLs,Communicate number of staff needed for ambulation/transfer,PT Consult for mobility concerns         Medication Interventions: Bed/chair exit alarm,Teach patient to arise slowly    Elimination Interventions: Call light in reach,Patient to call for help with toileting needs,Toilet paper/wipes in reach,Toileting schedule/hourly rounds              Problem: Patient Education: Go to Patient Education Activity  Goal: Patient/Family Education  3/16/2022 1743 by RIKKI Ma  Outcome: Resolved/Met  3/16/2022 1208 by RIKKI aM  Outcome: Progressing Towards Goal     Problem: Diabetes Self-Management  Goal: *Disease process and treatment process  Description: Define diabetes and identify own type of diabetes; list 3 options for treating diabetes. 3/16/2022 1743 by RIKKI Ma  Outcome: Resolved/Met  3/16/2022 1208 by RIKKI Ma  Outcome: Progressing Towards Goal  Goal: *Incorporating nutritional management into lifestyle  Description: Describe effect of type, amount and timing of food on blood glucose; list 3 methods for planning meals. 3/16/2022 1743 by RIKKI Ma  Outcome: Resolved/Met  3/16/2022 1208 by RIKKI Ma  Outcome: Progressing Towards Goal  Goal: *Incorporating physical activity into lifestyle  Description: State effect of exercise on blood glucose levels.   3/16/2022 1743 by RIKKI Ma  Outcome: Resolved/Met  3/16/2022 1208 by RIKKI Ma  Outcome: Progressing Towards Goal  Goal: *Developing strategies to promote health/change behavior  Description: Define the ABC's of diabetes; identify appropriate screenings, schedule and personal plan for screenings. 3/16/2022 1743 by RIKKI Winkler  Outcome: Resolved/Met  3/16/2022 1208 by RIKKI Winkler  Outcome: Progressing Towards Goal  Goal: *Using medications safely  Description: State effect of diabetes medications on diabetes; name diabetes medication taking, action and side effects. 3/16/2022 1743 by RIKKI Winkler  Outcome: Resolved/Met  3/16/2022 1208 by RIKKI Winkler  Outcome: Progressing Towards Goal  Goal: *Monitoring blood glucose, interpreting and using results  Description: Identify recommended blood glucose targets  and personal targets. 3/16/2022 1743 by RIKKI Winkler  Outcome: Resolved/Met  3/16/2022 1208 by RIKKI Winkler  Outcome: Progressing Towards Goal  Goal: *Prevention, detection, treatment of acute complications  Description: List symptoms of hyper- and hypoglycemia; describe how to treat low blood sugar and actions for lowering  high blood glucose level. 3/16/2022 1743 by RIKKI Winkler  Outcome: Resolved/Met  3/16/2022 1208 by RIKKI Winkler  Outcome: Progressing Towards Goal  Goal: *Prevention, detection and treatment of chronic complications  Description: Define the natural course of diabetes and describe the relationship of blood glucose levels to long term complications of diabetes.   3/16/2022 1743 by RIKKI Winkler  Outcome: Resolved/Met  3/16/2022 1208 by RIKKI Winkler  Outcome: Progressing Towards Goal  Goal: *Developing strategies to address psychosocial issues  Description: Describe feelings about living with diabetes; identify support needed and support network  3/16/2022 1743 by RIKKI Winkler  Outcome: Resolved/Met  3/16/2022 1208 by RIKKI Winkler  Outcome: Progressing Towards Goal  Goal: *Insulin pump training  3/16/2022 1743 by RIKKI Winkler  Outcome: Resolved/Met  3/16/2022 1208 by Thelma Santana GN  Outcome: Progressing Towards Goal  Goal: *Sick day guidelines  3/16/2022 1743 by RIKKI Pisano  Outcome: Resolved/Met  3/16/2022 1208 by RIKKI Pisano  Outcome: Progressing Towards Goal  Goal: *Patient Specific Goal (EDIT GOAL, INSERT TEXT)  3/16/2022 1743 by RIKKI Pisano  Outcome: Resolved/Met  3/16/2022 1208 by RIKKI Pisano  Outcome: Progressing Towards Goal     Problem: Patient Education: Go to Patient Education Activity  Goal: Patient/Family Education  3/16/2022 1743 by RIKKI Pisano  Outcome: Resolved/Met  3/16/2022 1208 by RIKKI Pisano  Outcome: Progressing Towards Goal     Problem: Patient Education: Go to Patient Education Activity  Goal: Patient/Family Education  3/16/2022 1743 by RIKKI Pisano  Outcome: Resolved/Met  3/16/2022 1208 by RIKKI Pisano  Outcome: Progressing Towards Goal     Problem: Patient Education: Go to Patient Education Activity  Goal: Patient/Family Education  3/16/2022 1743 by RIKKI Pisano  Outcome: Resolved/Met  3/16/2022 1208 by RIKKI Pisano  Outcome: Progressing Towards Goal Admission

## 2023-07-06 ENCOUNTER — TELEPHONE (OUTPATIENT)
Age: 43
End: 2023-07-06

## 2023-07-06 NOTE — TELEPHONE ENCOUNTER
Spoke to Kale LAI' . Patients Care taker, She is aware that the appointment for July 11th has changed locations to [facility] instead of [place].  Address was given

## 2023-07-11 ENCOUNTER — OFFICE VISIT (OUTPATIENT)
Age: 43
End: 2023-07-11
Payer: MEDICAID

## 2023-07-11 VITALS
DIASTOLIC BLOOD PRESSURE: 71 MMHG | WEIGHT: 220 LBS | HEART RATE: 85 BPM | HEIGHT: 64 IN | OXYGEN SATURATION: 99 % | SYSTOLIC BLOOD PRESSURE: 123 MMHG | RESPIRATION RATE: 18 BRPM | BODY MASS INDEX: 37.56 KG/M2

## 2023-07-11 DIAGNOSIS — Z12.4 ENCOUNTER FOR SCREENING FOR MALIGNANT NEOPLASM OF CERVIX: ICD-10-CM

## 2023-07-11 DIAGNOSIS — Z01.419 GYNECOLOGIC EXAM NORMAL: Primary | ICD-10-CM

## 2023-07-11 DIAGNOSIS — Z30.431 IUD CHECK UP: ICD-10-CM

## 2023-07-11 DIAGNOSIS — Z12.39 SCREENING BREAST EXAMINATION: ICD-10-CM

## 2023-07-11 DIAGNOSIS — Z12.11 SCREEN FOR COLON CANCER: ICD-10-CM

## 2023-07-11 PROCEDURE — 3078F DIAST BP <80 MM HG: CPT | Performed by: OBSTETRICS & GYNECOLOGY

## 2023-07-11 PROCEDURE — 99386 PREV VISIT NEW AGE 40-64: CPT | Performed by: OBSTETRICS & GYNECOLOGY

## 2023-07-11 PROCEDURE — 3074F SYST BP LT 130 MM HG: CPT | Performed by: OBSTETRICS & GYNECOLOGY

## 2023-07-11 RX ORDER — SERTRALINE HYDROCHLORIDE 25 MG/1
75 TABLET, FILM COATED ORAL DAILY
COMMUNITY

## 2023-07-11 RX ORDER — LISINOPRIL 20 MG/1
20 TABLET ORAL DAILY
COMMUNITY

## 2023-07-13 LAB
., LABCORP: NORMAL
CYTOLOGIST CVX/VAG CYTO: NORMAL
CYTOLOGY CVX/VAG DOC CYTO: NORMAL
CYTOLOGY CVX/VAG DOC THIN PREP: NORMAL
DX ICD CODE: NORMAL
Lab: NORMAL
OTHER STN SPEC: NORMAL
STAT OF ADQ CVX/VAG CYTO-IMP: NORMAL

## 2023-07-21 DIAGNOSIS — Z30.430 ENCOUNTER FOR INTRAUTERINE DEVICE PLACEMENT: Primary | ICD-10-CM

## 2023-07-31 ENCOUNTER — TELEPHONE (OUTPATIENT)
Age: 43
End: 2023-07-31

## 2023-07-31 NOTE — TELEPHONE ENCOUNTER
Patient needs to reschedule her appointment for her procedure she is not able to make that appointment. Please contact patient with a new appointment.

## 2023-08-24 DIAGNOSIS — E11.65 UNCONTROLLED TYPE 2 DIABETES MELLITUS WITH HYPERGLYCEMIA (HCC): Primary | ICD-10-CM

## 2023-09-07 ENCOUNTER — OFFICE VISIT (OUTPATIENT)
Facility: CLINIC | Age: 43
End: 2023-09-07
Payer: MEDICAID

## 2023-09-07 VITALS
BODY MASS INDEX: 37.42 KG/M2 | SYSTOLIC BLOOD PRESSURE: 136 MMHG | OXYGEN SATURATION: 98 % | DIASTOLIC BLOOD PRESSURE: 81 MMHG | HEART RATE: 85 BPM | RESPIRATION RATE: 16 BRPM | TEMPERATURE: 98.9 F | WEIGHT: 218 LBS

## 2023-09-07 DIAGNOSIS — E66.01 CLASS 2 SEVERE OBESITY WITH SERIOUS COMORBIDITY AND BODY MASS INDEX (BMI) OF 37.0 TO 37.9 IN ADULT, UNSPECIFIED OBESITY TYPE (HCC): ICD-10-CM

## 2023-09-07 DIAGNOSIS — R01.0 BENIGN HEART MURMUR: ICD-10-CM

## 2023-09-07 DIAGNOSIS — E11.649 TYPE 2 DIABETES MELLITUS WITH HYPOGLYCEMIA WITHOUT COMA, WITH LONG-TERM CURRENT USE OF INSULIN (HCC): Primary | ICD-10-CM

## 2023-09-07 DIAGNOSIS — I10 ESSENTIAL HYPERTENSION WITH GOAL BLOOD PRESSURE LESS THAN 130/85: ICD-10-CM

## 2023-09-07 DIAGNOSIS — E55.9 VITAMIN D DEFICIENCY: ICD-10-CM

## 2023-09-07 DIAGNOSIS — F43.10 PTSD (POST-TRAUMATIC STRESS DISORDER): ICD-10-CM

## 2023-09-07 DIAGNOSIS — R60.9 EDEMA, UNSPECIFIED TYPE: ICD-10-CM

## 2023-09-07 DIAGNOSIS — R56.9 SEIZURES (HCC): ICD-10-CM

## 2023-09-07 DIAGNOSIS — Z86.73 HISTORY OF TIA (TRANSIENT ISCHEMIC ATTACK): ICD-10-CM

## 2023-09-07 DIAGNOSIS — E78.5 HYPERLIPIDEMIA LDL GOAL <70: ICD-10-CM

## 2023-09-07 DIAGNOSIS — Z86.73 HISTORY OF CVA (CEREBROVASCULAR ACCIDENT): ICD-10-CM

## 2023-09-07 DIAGNOSIS — Z79.4 TYPE 2 DIABETES MELLITUS WITH HYPOGLYCEMIA WITHOUT COMA, WITH LONG-TERM CURRENT USE OF INSULIN (HCC): Primary | ICD-10-CM

## 2023-09-07 DIAGNOSIS — K21.9 GERD WITHOUT ESOPHAGITIS: ICD-10-CM

## 2023-09-07 DIAGNOSIS — M25.512 CHRONIC LEFT SHOULDER PAIN: ICD-10-CM

## 2023-09-07 DIAGNOSIS — G89.29 CHRONIC LEFT SHOULDER PAIN: ICD-10-CM

## 2023-09-07 LAB
GLUCOSE, POC: 198 MG/DL
HBA1C MFR BLD: 8.7 %

## 2023-09-07 PROCEDURE — 99214 OFFICE O/P EST MOD 30 MIN: CPT

## 2023-09-07 PROCEDURE — 83036 HEMOGLOBIN GLYCOSYLATED A1C: CPT

## 2023-09-07 PROCEDURE — 82962 GLUCOSE BLOOD TEST: CPT

## 2023-09-07 PROCEDURE — 3079F DIAST BP 80-89 MM HG: CPT

## 2023-09-07 PROCEDURE — 3075F SYST BP GE 130 - 139MM HG: CPT

## 2023-09-07 RX ORDER — ASPIRIN 81 MG/1
81 TABLET, CHEWABLE ORAL DAILY
Qty: 90 TABLET | Refills: 0 | Status: SHIPPED | OUTPATIENT
Start: 2023-09-07

## 2023-09-07 RX ORDER — DULAGLUTIDE 0.75 MG/.5ML
INJECTION, SOLUTION SUBCUTANEOUS
Qty: 12 ADJUSTABLE DOSE PRE-FILLED PEN SYRINGE | Refills: 0 | Status: SHIPPED | OUTPATIENT
Start: 2023-09-07

## 2023-09-07 RX ORDER — LIDOCAINE 4 G/G
PATCH TOPICAL
Qty: 90 PATCH | Refills: 0 | Status: SHIPPED | OUTPATIENT
Start: 2023-09-07

## 2023-09-07 RX ORDER — LEVETIRACETAM 100 MG/ML
SOLUTION ORAL
Qty: 180 ML | Refills: 0 | Status: SHIPPED | OUTPATIENT
Start: 2023-09-07

## 2023-09-07 RX ORDER — INSULIN GLARGINE 100 [IU]/ML
INJECTION, SOLUTION SUBCUTANEOUS
Qty: 10 ML | Status: CANCELLED | OUTPATIENT
Start: 2023-09-07

## 2023-09-07 RX ORDER — FUROSEMIDE 40 MG/1
40 TABLET ORAL DAILY
Qty: 90 TABLET | Refills: 0 | Status: SHIPPED | OUTPATIENT
Start: 2023-09-07

## 2023-09-07 RX ORDER — INSULIN LISPRO 100 [IU]/ML
8 INJECTION, SOLUTION INTRAVENOUS; SUBCUTANEOUS
Qty: 5 ADJUSTABLE DOSE PRE-FILLED PEN SYRINGE | Refills: 1 | Status: SHIPPED | OUTPATIENT
Start: 2023-09-07

## 2023-09-07 RX ORDER — ATORVASTATIN CALCIUM 80 MG/1
80 TABLET, FILM COATED ORAL DAILY
Qty: 90 TABLET | Refills: 0 | Status: SHIPPED | OUTPATIENT
Start: 2023-09-07

## 2023-09-07 RX ORDER — LISINOPRIL 20 MG/1
20 TABLET ORAL DAILY
Qty: 90 TABLET | Refills: 0 | Status: SHIPPED | OUTPATIENT
Start: 2023-09-07

## 2023-09-07 RX ORDER — ERGOCALCIFEROL 1.25 MG/1
50000 CAPSULE ORAL WEEKLY
Qty: 12 CAPSULE | Refills: 0 | Status: SHIPPED | OUTPATIENT
Start: 2023-09-07

## 2023-09-07 RX ORDER — BUMETANIDE 1 MG/1
1 TABLET ORAL 2 TIMES DAILY
Qty: 180 TABLET | Refills: 0 | Status: SHIPPED | OUTPATIENT
Start: 2023-09-07

## 2023-09-07 RX ORDER — SERTRALINE HYDROCHLORIDE 25 MG/1
75 TABLET, FILM COATED ORAL DAILY
Qty: 90 TABLET | Refills: 0 | Status: SHIPPED | OUTPATIENT
Start: 2023-09-07

## 2023-09-07 RX ORDER — PANTOPRAZOLE SODIUM 20 MG/1
20 TABLET, DELAYED RELEASE ORAL DAILY
Qty: 90 TABLET | Refills: 0 | Status: SHIPPED | OUTPATIENT
Start: 2023-09-07

## 2023-09-07 RX ORDER — METFORMIN HYDROCHLORIDE 500 MG/1
500 TABLET, EXTENDED RELEASE ORAL DAILY
Qty: 90 TABLET | Refills: 0 | Status: SHIPPED | OUTPATIENT
Start: 2023-09-07

## 2023-09-07 ASSESSMENT — ENCOUNTER SYMPTOMS
BACK PAIN: 0
PHOTOPHOBIA: 0
ABDOMINAL PAIN: 0
NAUSEA: 0
BLOOD IN STOOL: 0
DIARRHEA: 0
TROUBLE SWALLOWING: 0
VOMITING: 0
COUGH: 0
SORE THROAT: 0
SHORTNESS OF BREATH: 0
CONSTIPATION: 0
CHEST TIGHTNESS: 0
EYE PAIN: 0
WHEEZING: 0

## 2023-09-07 NOTE — PROGRESS NOTES
Aubrey Ramírez  39 y.o. female  1980  [id number] [address] [facility] [address]  [id number]     [facility]: Progress Note       Encounter Date: 9/7/2023    Patient presents with the following chief complaint(s)    Chief Complaint   Patient presents with    3 Month Follow-Up    Medication Refill        History provided by patient  History of Present Illness   Aubrey Ramírez is a 39 y.o. female with past medical history listed, who presents to clinic today for a follow up visit. Patient is here along with caregiver to receive medication refills for chronic conditions today. Patient says that she is tolerating her medications well without complaints or problems today. She says she is taking her medications daily as directed. Patient did not get labs done since last appt. Referral orders were also placed for evaluation by neurology for her history of seizures and cardiology for evaluation of her heart murmur, but did get a chance to be seen. Patient has T2DM and was previously seeing [facility] virtually. Patient would like to establish in person care with a local endocrinologist for medication adjustments for her T2DM. Pt and caregiver would like order placed today for occupational therapy to assist with residual right-sided hemiparesis from previous CVA. Previous order was placed, but was not established.     Health Maintenance    Health Maintenance Due   Topic Date Due    Diabetic foot exam  Never done    HIV screen  Never done    Diabetic retinal exam  Never done    Hepatitis C screen  Never done    Hepatitis B vaccine (1 of 3 - Risk 3-dose series) Never done    COVID-19 Vaccine (3 - Booster for Sonja series) 04/26/2022    Diabetic Alb to Cr ratio (uACR) test  05/20/2022    Flu vaccine (1) 08/01/2023       Vitals:     Vitals:    09/07/23 1008   BP: 136/81   Site: Left Upper Arm   Position: Sitting   Cuff Size: Medium Adult   Pulse: 85   Resp: 16

## 2023-09-29 ENCOUNTER — TELEPHONE (OUTPATIENT)
Age: 43
End: 2023-09-29

## 2023-10-03 ENCOUNTER — HOSPITAL ENCOUNTER (EMERGENCY)
Facility: HOSPITAL | Age: 43
Discharge: HOME | End: 2023-10-03
Attending: STUDENT IN AN ORGANIZED HEALTH CARE EDUCATION/TRAINING PROGRAM
Payer: MEDICAID

## 2023-10-03 ENCOUNTER — APPOINTMENT (OUTPATIENT)
Facility: HOSPITAL | Age: 43
End: 2023-10-03
Payer: MEDICAID

## 2023-10-03 VITALS
WEIGHT: 215 LBS | DIASTOLIC BLOOD PRESSURE: 77 MMHG | HEIGHT: 64 IN | BODY MASS INDEX: 36.7 KG/M2 | SYSTOLIC BLOOD PRESSURE: 128 MMHG | TEMPERATURE: 98.7 F | RESPIRATION RATE: 16 BRPM | HEART RATE: 80 BPM | OXYGEN SATURATION: 100 %

## 2023-10-03 DIAGNOSIS — R60.0 LEG EDEMA: ICD-10-CM

## 2023-10-03 DIAGNOSIS — H53.8 BLURRY VISION, LEFT EYE: Primary | ICD-10-CM

## 2023-10-03 LAB
ALBUMIN SERPL-MCNC: 3.4 G/DL (ref 3.5–5)
ALBUMIN/GLOB SERPL: 1 (ref 1.1–2.2)
ALP SERPL-CCNC: 138 U/L (ref 45–117)
ALT SERPL-CCNC: 15 U/L (ref 12–78)
ANION GAP SERPL CALC-SCNC: 8 MMOL/L (ref 5–15)
AST SERPL W P-5'-P-CCNC: 5 U/L (ref 15–37)
BASOPHILS # BLD: 0 K/UL (ref 0–0.1)
BASOPHILS NFR BLD: 0 % (ref 0–1)
BILIRUB SERPL-MCNC: 0.3 MG/DL (ref 0.2–1)
BNP SERPL-MCNC: 47 PG/ML
BUN SERPL-MCNC: 10 MG/DL (ref 6–20)
BUN/CREAT SERPL: 12 (ref 12–20)
CA-I BLD-MCNC: 8.5 MG/DL (ref 8.5–10.1)
CHLORIDE SERPL-SCNC: 106 MMOL/L (ref 97–108)
CO2 SERPL-SCNC: 30 MMOL/L (ref 21–32)
CREAT SERPL-MCNC: 0.81 MG/DL (ref 0.55–1.02)
DIFFERENTIAL METHOD BLD: ABNORMAL
EOSINOPHIL # BLD: 0.1 K/UL (ref 0–0.4)
EOSINOPHIL NFR BLD: 1 % (ref 0–7)
ERYTHROCYTE [DISTWIDTH] IN BLOOD BY AUTOMATED COUNT: 13.1 % (ref 11.5–14.5)
GLOBULIN SER CALC-MCNC: 3.4 G/DL (ref 2–4)
GLUCOSE SERPL-MCNC: 215 MG/DL (ref 65–100)
HCT VFR BLD AUTO: 30.4 % (ref 35–47)
HGB BLD-MCNC: 9.8 G/DL (ref 11.5–16)
IMM GRANULOCYTES # BLD AUTO: 0 K/UL (ref 0–0.04)
IMM GRANULOCYTES NFR BLD AUTO: 0 % (ref 0–0.5)
LYMPHOCYTES # BLD: 2.1 K/UL (ref 0.8–3.5)
LYMPHOCYTES NFR BLD: 24 % (ref 12–49)
MCH RBC QN AUTO: 25.8 PG (ref 26–34)
MCHC RBC AUTO-ENTMCNC: 32.2 G/DL (ref 30–36.5)
MCV RBC AUTO: 80 FL (ref 80–99)
MONOCYTES # BLD: 0.5 K/UL (ref 0–1)
MONOCYTES NFR BLD: 5 % (ref 5–13)
NEUTS SEG # BLD: 6.2 K/UL (ref 1.8–8)
NEUTS SEG NFR BLD: 70 % (ref 32–75)
NRBC # BLD: 0 K/UL (ref 0–0.01)
NRBC BLD-RTO: 0 PER 100 WBC
PLATELET # BLD AUTO: 195 K/UL (ref 150–400)
PMV BLD AUTO: 10.3 FL (ref 8.9–12.9)
POTASSIUM SERPL-SCNC: 3.5 MMOL/L (ref 3.5–5.1)
PROT SERPL-MCNC: 6.8 G/DL (ref 6.4–8.2)
RBC # BLD AUTO: 3.8 M/UL (ref 3.8–5.2)
SODIUM SERPL-SCNC: 144 MMOL/L (ref 136–145)
WBC # BLD AUTO: 8.9 K/UL (ref 3.6–11)

## 2023-10-03 PROCEDURE — 85025 COMPLETE CBC W/AUTO DIFF WBC: CPT

## 2023-10-03 PROCEDURE — 70450 CT HEAD/BRAIN W/O DYE: CPT

## 2023-10-03 PROCEDURE — 99284 EMERGENCY DEPT VISIT MOD MDM: CPT

## 2023-10-03 PROCEDURE — 6370000000 HC RX 637 (ALT 250 FOR IP): Performed by: STUDENT IN AN ORGANIZED HEALTH CARE EDUCATION/TRAINING PROGRAM

## 2023-10-03 PROCEDURE — 36415 COLL VENOUS BLD VENIPUNCTURE: CPT

## 2023-10-03 PROCEDURE — 83880 ASSAY OF NATRIURETIC PEPTIDE: CPT

## 2023-10-03 PROCEDURE — 93005 ELECTROCARDIOGRAM TRACING: CPT | Performed by: STUDENT IN AN ORGANIZED HEALTH CARE EDUCATION/TRAINING PROGRAM

## 2023-10-03 PROCEDURE — 80053 COMPREHEN METABOLIC PANEL: CPT

## 2023-10-03 RX ORDER — TETRACAINE HYDROCHLORIDE 5 MG/ML
1 SOLUTION OPHTHALMIC
Status: COMPLETED | OUTPATIENT
Start: 2023-10-03 | End: 2023-10-03

## 2023-10-03 RX ADMIN — FLUORESCEIN SODIUM 1 MG: 1 STRIP OPHTHALMIC at 12:55

## 2023-10-03 RX ADMIN — TETRACAINE HYDROCHLORIDE 1 DROP: 5 SOLUTION OPHTHALMIC at 12:55

## 2023-10-03 ASSESSMENT — PAIN SCALES - GENERAL: PAINLEVEL_OUTOF10: 10

## 2023-10-03 ASSESSMENT — PAIN - FUNCTIONAL ASSESSMENT: PAIN_FUNCTIONAL_ASSESSMENT: 0-10

## 2023-10-03 ASSESSMENT — PAIN DESCRIPTION - ORIENTATION: ORIENTATION: RIGHT;LEFT

## 2023-10-03 NOTE — ED NOTES
Visual acuity screen performed.      Right - unable to see  Left - 20/70  Right & left - 20/50     Constanza Woodson, [address]  10/03/23 0647

## 2023-10-03 NOTE — DISCHARGE INSTRUCTIONS
Thank you! Thank you for allowing me to care for you in the emergency department. I sincerely hope that you are satisfied with your visit today. It is my goal to provide you with excellent care. Below you will find a list of your labs and imaging from your visit today if applicable. Should you have any questions regarding these results please do not hesitate to call the emergency department. Please review [place] [facility] for a more detailed result list since the below list may not be comprehensive. Instructions on how to sign up to [facility] should be provided in this packet.     Labs -     Recent Results (from the past 12 hour(s))   CBC with Auto Differential    Collection Time: 10/03/23  1:54 PM   Result Value Ref Range    WBC 8.9 3.6 - 11.0 K/uL    RBC 3.80 3.80 - 5.20 M/uL    Hemoglobin 9.8 (L) 11.5 - 16.0 g/dL    Hematocrit 30.4 (L) 35.0 - 47.0 %    MCV 80.0 80.0 - 99.0 FL    MCH 25.8 (L) 26.0 - 34.0 PG    MCHC 32.2 30.0 - 36.5 g/dL    RDW 13.1 11.5 - 14.5 %    Platelets 061 982 - 643 K/uL    MPV 10.3 8.9 - 12.9 FL    Nucleated RBCs 0.0 0.0  WBC    nRBC 0.00 0.00 - 0.01 K/uL    Neutrophils % 70 32 - 75 %    Lymphocytes % 24 12 - 49 %    Monocytes % 5 5 - 13 %    Eosinophils % 1 0 - 7 %    Basophils % 0 0 - 1 %    Immature Granulocytes 0 0 - 0.5 %    Neutrophils Absolute 6.2 1.8 - 8.0 K/UL    Lymphocytes Absolute 2.1 0.8 - 3.5 K/UL    Monocytes Absolute 0.5 0.0 - 1.0 K/UL    Eosinophils Absolute 0.1 0.0 - 0.4 K/UL    Basophils Absolute 0.0 0.0 - 0.1 K/UL    Absolute Immature Granulocyte 0.0 0.00 - 0.04 K/UL    Differential Type AUTOMATED     CMP    Collection Time: 10/03/23  1:54 PM   Result Value Ref Range    Sodium 144 136 - 145 mmol/L    Potassium 3.5 3.5 - 5.1 mmol/L    Chloride 106 97 - 108 mmol/L    CO2 30 21 - 32 mmol/L    Anion Gap 8 5 - 15 mmol/L    Glucose 215 (H) 65 - 100 mg/dL    BUN 10 6 - 20 mg/dL    Creatinine 0.81 0.55 - 1.02 mg/dL    Bun/Cre Ratio 12 12 - 20      Est, Glom Filt Rate >60 >60

## 2023-10-03 NOTE — ED NOTES
Patient ready for discharge. Waiting for Angelo Coughlin to pick patient up from ED.       Saida Llamas RN  10/03/23 8332

## 2023-10-03 NOTE — ED TRIAGE NOTES
Patient states she has cataracts in R eye. Patient states L eye she is \"seeing things\" describes them as \"lines in her eyes\" and at night patient states she sees \"blood in my eye\" - patient also c/o shoulder pain in L arm & swelling and pain to both lower extremities.

## 2023-10-04 LAB
EKG ATRIAL RATE: 79 BPM
EKG DIAGNOSIS: NORMAL
EKG P AXIS: 39 DEGREES
EKG P-R INTERVAL: 154 MS
EKG Q-T INTERVAL: 386 MS
EKG QRS DURATION: 76 MS
EKG QTC CALCULATION (BAZETT): 442 MS
EKG R AXIS: -29 DEGREES
EKG T AXIS: 37 DEGREES
EKG VENTRICULAR RATE: 79 BPM

## 2023-10-05 ENCOUNTER — TELEPHONE (OUTPATIENT)
Age: 43
End: 2023-10-05

## 2023-10-05 NOTE — TELEPHONE ENCOUNTER
Called and left a voicemail on 10/05 at 2:54 PM. Appointment needs to be rescheduled.  Per Janis patient can schedule the appointment on 11/2 at 1:00 PM.

## 2023-10-05 NOTE — Clinical Note
Patient Class[de-identified] OBSERVATION [104]   Type of Bed: Neuro/Stroke [9]   Cardiac Monitoring Required?: Yes   Reason for Observation: tia   Admitting Diagnosis: CVA (cerebral vascular accident) [facility]) [[id number]]   Admitting Physician: Juanito Gracia [[id number]]   Attending Physician: Charly Stuart
1

## 2023-10-16 ENCOUNTER — HOSPITAL ENCOUNTER (INPATIENT)
Facility: HOSPITAL | Age: 43
LOS: 3 days | Discharge: HOME HEALTH | DRG: 463 | End: 2023-10-19
Attending: STUDENT IN AN ORGANIZED HEALTH CARE EDUCATION/TRAINING PROGRAM | Admitting: INTERNAL MEDICINE
Payer: MEDICAID

## 2023-10-16 ENCOUNTER — APPOINTMENT (OUTPATIENT)
Facility: HOSPITAL | Age: 43
DRG: 463 | End: 2023-10-16
Payer: MEDICAID

## 2023-10-16 DIAGNOSIS — G45.9 TIA (TRANSIENT ISCHEMIC ATTACK): ICD-10-CM

## 2023-10-16 DIAGNOSIS — R55 SYNCOPE AND COLLAPSE: Primary | ICD-10-CM

## 2023-10-16 LAB
BASOPHILS # BLD: 0 K/UL (ref 0–0.1)
BASOPHILS NFR BLD: 0 % (ref 0–1)
DIFFERENTIAL METHOD BLD: ABNORMAL
EOSINOPHIL # BLD: 0.1 K/UL (ref 0–0.4)
EOSINOPHIL NFR BLD: 1 % (ref 0–7)
ERYTHROCYTE [DISTWIDTH] IN BLOOD BY AUTOMATED COUNT: 13.3 % (ref 11.5–14.5)
GLUCOSE BLD STRIP.AUTO-MCNC: 120 MG/DL (ref 65–100)
HCT VFR BLD AUTO: 31.3 % (ref 35–47)
HGB BLD-MCNC: 9.9 G/DL (ref 11.5–16)
IMM GRANULOCYTES # BLD AUTO: 0 K/UL (ref 0–0.04)
IMM GRANULOCYTES NFR BLD AUTO: 0 % (ref 0–0.5)
LYMPHOCYTES # BLD: 1.8 K/UL (ref 0.8–3.5)
LYMPHOCYTES NFR BLD: 20 % (ref 12–49)
MCH RBC QN AUTO: 25.6 PG (ref 26–34)
MCHC RBC AUTO-ENTMCNC: 31.6 G/DL (ref 30–36.5)
MCV RBC AUTO: 80.9 FL (ref 80–99)
MONOCYTES # BLD: 0.5 K/UL (ref 0–1)
MONOCYTES NFR BLD: 6 % (ref 5–13)
NEUTS SEG # BLD: 6.4 K/UL (ref 1.8–8)
NEUTS SEG NFR BLD: 73 % (ref 32–75)
NRBC # BLD: 0 K/UL (ref 0–0.01)
NRBC BLD-RTO: 0 PER 100 WBC
PERFORMED BY:: ABNORMAL
PLATELET # BLD AUTO: 207 K/UL (ref 150–400)
PMV BLD AUTO: 11 FL (ref 8.9–12.9)
RBC # BLD AUTO: 3.87 M/UL (ref 3.8–5.2)
TROPONIN I SERPL HS-MCNC: 8 NG/L (ref 0–51)
WBC # BLD AUTO: 8.8 K/UL (ref 3.6–11)

## 2023-10-16 PROCEDURE — 70450 CT HEAD/BRAIN W/O DYE: CPT

## 2023-10-16 PROCEDURE — 6370000000 HC RX 637 (ALT 250 FOR IP): Performed by: INTERNAL MEDICINE

## 2023-10-16 PROCEDURE — 93005 ELECTROCARDIOGRAM TRACING: CPT | Performed by: STUDENT IN AN ORGANIZED HEALTH CARE EDUCATION/TRAINING PROGRAM

## 2023-10-16 PROCEDURE — 0042T CT BRAIN PERFUSION: CPT

## 2023-10-16 PROCEDURE — 1100000000 HC RM PRIVATE

## 2023-10-16 PROCEDURE — 82962 GLUCOSE BLOOD TEST: CPT

## 2023-10-16 PROCEDURE — 6360000004 HC RX CONTRAST MEDICATION: Performed by: STUDENT IN AN ORGANIZED HEALTH CARE EDUCATION/TRAINING PROGRAM

## 2023-10-16 PROCEDURE — 85025 COMPLETE CBC W/AUTO DIFF WBC: CPT

## 2023-10-16 PROCEDURE — 4A03X5D MEASUREMENT OF ARTERIAL FLOW, INTRACRANIAL, EXTERNAL APPROACH: ICD-10-PCS | Performed by: INTERNAL MEDICINE

## 2023-10-16 PROCEDURE — 70496 CT ANGIOGRAPHY HEAD: CPT

## 2023-10-16 PROCEDURE — 80053 COMPREHEN METABOLIC PANEL: CPT

## 2023-10-16 PROCEDURE — 99285 EMERGENCY DEPT VISIT HI MDM: CPT

## 2023-10-16 PROCEDURE — 84484 ASSAY OF TROPONIN QUANT: CPT

## 2023-10-16 RX ORDER — INSULIN LISPRO 100 [IU]/ML
0-8 INJECTION, SOLUTION INTRAVENOUS; SUBCUTANEOUS
Status: DISCONTINUED | OUTPATIENT
Start: 2023-10-17 | End: 2023-10-19 | Stop reason: HOSPADM

## 2023-10-16 RX ORDER — PANTOPRAZOLE SODIUM 20 MG/1
20 TABLET, DELAYED RELEASE ORAL DAILY
Status: DISCONTINUED | OUTPATIENT
Start: 2023-10-17 | End: 2023-10-19 | Stop reason: HOSPADM

## 2023-10-16 RX ORDER — ACETAMINOPHEN 500 MG
1000 TABLET ORAL
Status: COMPLETED | OUTPATIENT
Start: 2023-10-16 | End: 2023-10-16

## 2023-10-16 RX ORDER — LEVETIRACETAM 100 MG/ML
1000 SOLUTION ORAL 2 TIMES DAILY
Status: DISCONTINUED | OUTPATIENT
Start: 2023-10-16 | End: 2023-10-19 | Stop reason: HOSPADM

## 2023-10-16 RX ORDER — ASPIRIN 81 MG/1
81 TABLET, CHEWABLE ORAL DAILY
Status: DISCONTINUED | OUTPATIENT
Start: 2023-10-17 | End: 2023-10-19 | Stop reason: HOSPADM

## 2023-10-16 RX ORDER — ATORVASTATIN CALCIUM 40 MG/1
80 TABLET, FILM COATED ORAL DAILY
Status: DISCONTINUED | OUTPATIENT
Start: 2023-10-17 | End: 2023-10-19 | Stop reason: HOSPADM

## 2023-10-16 RX ORDER — INSULIN LISPRO 100 [IU]/ML
0-4 INJECTION, SOLUTION INTRAVENOUS; SUBCUTANEOUS NIGHTLY
Status: DISCONTINUED | OUTPATIENT
Start: 2023-10-16 | End: 2023-10-19 | Stop reason: HOSPADM

## 2023-10-16 RX ORDER — LIDOCAINE 4 G/G
1 PATCH TOPICAL DAILY
Status: DISCONTINUED | OUTPATIENT
Start: 2023-10-17 | End: 2023-10-19 | Stop reason: HOSPADM

## 2023-10-16 RX ORDER — ENOXAPARIN SODIUM 100 MG/ML
40 INJECTION SUBCUTANEOUS DAILY
Status: DISCONTINUED | OUTPATIENT
Start: 2023-10-17 | End: 2023-10-19 | Stop reason: HOSPADM

## 2023-10-16 RX ORDER — POLYETHYLENE GLYCOL 3350 17 G/17G
17 POWDER, FOR SOLUTION ORAL DAILY PRN
Status: DISCONTINUED | OUTPATIENT
Start: 2023-10-16 | End: 2023-10-19 | Stop reason: HOSPADM

## 2023-10-16 RX ORDER — SODIUM CHLORIDE 9 MG/ML
INJECTION, SOLUTION INTRAVENOUS PRN
Status: DISCONTINUED | OUTPATIENT
Start: 2023-10-16 | End: 2023-10-19 | Stop reason: HOSPADM

## 2023-10-16 RX ORDER — BUMETANIDE 1 MG/1
1 TABLET ORAL 2 TIMES DAILY
Status: DISCONTINUED | OUTPATIENT
Start: 2023-10-16 | End: 2023-10-16

## 2023-10-16 RX ORDER — ACETAMINOPHEN 325 MG/1
650 TABLET ORAL EVERY 6 HOURS PRN
Status: DISCONTINUED | OUTPATIENT
Start: 2023-10-16 | End: 2023-10-19 | Stop reason: HOSPADM

## 2023-10-16 RX ORDER — ONDANSETRON 2 MG/ML
4 INJECTION INTRAMUSCULAR; INTRAVENOUS EVERY 6 HOURS PRN
Status: DISCONTINUED | OUTPATIENT
Start: 2023-10-16 | End: 2023-10-19 | Stop reason: HOSPADM

## 2023-10-16 RX ORDER — SODIUM CHLORIDE 0.9 % (FLUSH) 0.9 %
5-40 SYRINGE (ML) INJECTION PRN
Status: DISCONTINUED | OUTPATIENT
Start: 2023-10-16 | End: 2023-10-19 | Stop reason: HOSPADM

## 2023-10-16 RX ORDER — DEXTROSE MONOHYDRATE 100 MG/ML
INJECTION, SOLUTION INTRAVENOUS CONTINUOUS PRN
Status: DISCONTINUED | OUTPATIENT
Start: 2023-10-16 | End: 2023-10-19 | Stop reason: HOSPADM

## 2023-10-16 RX ORDER — FUROSEMIDE 40 MG/1
40 TABLET ORAL DAILY
Status: DISCONTINUED | OUTPATIENT
Start: 2023-10-17 | End: 2023-10-19 | Stop reason: HOSPADM

## 2023-10-16 RX ORDER — INSULIN LISPRO 100 [IU]/ML
5 INJECTION, SOLUTION INTRAVENOUS; SUBCUTANEOUS
Status: DISCONTINUED | OUTPATIENT
Start: 2023-10-17 | End: 2023-10-19 | Stop reason: HOSPADM

## 2023-10-16 RX ORDER — ONDANSETRON 4 MG/1
4 TABLET, ORALLY DISINTEGRATING ORAL EVERY 8 HOURS PRN
Status: DISCONTINUED | OUTPATIENT
Start: 2023-10-16 | End: 2023-10-19 | Stop reason: HOSPADM

## 2023-10-16 RX ORDER — LISINOPRIL 10 MG/1
20 TABLET ORAL DAILY
Status: DISCONTINUED | OUTPATIENT
Start: 2023-10-17 | End: 2023-10-19 | Stop reason: HOSPADM

## 2023-10-16 RX ORDER — CLOPIDOGREL BISULFATE 75 MG/1
75 TABLET ORAL
Status: COMPLETED | OUTPATIENT
Start: 2023-10-16 | End: 2023-10-16

## 2023-10-16 RX ORDER — INSULIN GLARGINE 100 [IU]/ML
45 INJECTION, SOLUTION SUBCUTANEOUS NIGHTLY
Status: DISCONTINUED | OUTPATIENT
Start: 2023-10-16 | End: 2023-10-19 | Stop reason: HOSPADM

## 2023-10-16 RX ORDER — SODIUM CHLORIDE 0.9 % (FLUSH) 0.9 %
5-40 SYRINGE (ML) INJECTION EVERY 12 HOURS SCHEDULED
Status: DISCONTINUED | OUTPATIENT
Start: 2023-10-16 | End: 2023-10-19 | Stop reason: HOSPADM

## 2023-10-16 RX ORDER — ACETAMINOPHEN 650 MG/1
650 SUPPOSITORY RECTAL EVERY 6 HOURS PRN
Status: DISCONTINUED | OUTPATIENT
Start: 2023-10-16 | End: 2023-10-19 | Stop reason: HOSPADM

## 2023-10-16 RX ADMIN — IOPAMIDOL 100 ML: 755 INJECTION, SOLUTION INTRAVENOUS at 21:33

## 2023-10-16 RX ADMIN — CLOPIDOGREL BISULFATE 75 MG: 75 TABLET ORAL at 22:55

## 2023-10-16 RX ADMIN — ACETAMINOPHEN 1000 MG: 500 TABLET ORAL at 22:54

## 2023-10-16 ASSESSMENT — PAIN SCALES - GENERAL
PAINLEVEL_OUTOF10: 8

## 2023-10-16 ASSESSMENT — PAIN - FUNCTIONAL ASSESSMENT: PAIN_FUNCTIONAL_ASSESSMENT: 0-10

## 2023-10-16 ASSESSMENT — PAIN DESCRIPTION - LOCATION: LOCATION: HEAD;NECK

## 2023-10-16 ASSESSMENT — LIFESTYLE VARIABLES
HOW MANY STANDARD DRINKS CONTAINING ALCOHOL DO YOU HAVE ON A TYPICAL DAY: PATIENT DOES NOT DRINK
HOW OFTEN DO YOU HAVE A DRINK CONTAINING ALCOHOL: NEVER

## 2023-10-17 ENCOUNTER — APPOINTMENT (OUTPATIENT)
Facility: HOSPITAL | Age: 43
DRG: 463 | End: 2023-10-17
Payer: MEDICAID

## 2023-10-17 LAB
ALBUMIN SERPL-MCNC: 3.4 G/DL (ref 3.5–5)
ALBUMIN/GLOB SERPL: 0.9 (ref 1.1–2.2)
ALP SERPL-CCNC: 141 U/L (ref 45–117)
ALT SERPL-CCNC: 14 U/L (ref 12–78)
ANION GAP SERPL CALC-SCNC: 5 MMOL/L (ref 5–15)
ANION GAP SERPL CALC-SCNC: 6 MMOL/L (ref 5–15)
APPEARANCE UR: CLEAR
AST SERPL W P-5'-P-CCNC: 11 U/L (ref 15–37)
BACTERIA URNS QL MICRO: ABNORMAL /HPF
BASOPHILS # BLD: 0 K/UL (ref 0–0.1)
BASOPHILS NFR BLD: 0 % (ref 0–1)
BILIRUB SERPL-MCNC: 0.3 MG/DL (ref 0.2–1)
BILIRUB UR QL: NEGATIVE
BUN SERPL-MCNC: 16 MG/DL (ref 6–20)
BUN SERPL-MCNC: 17 MG/DL (ref 6–20)
BUN/CREAT SERPL: 16 (ref 12–20)
BUN/CREAT SERPL: 17 (ref 12–20)
CA-I BLD-MCNC: 8.9 MG/DL (ref 8.5–10.1)
CA-I BLD-MCNC: 9 MG/DL (ref 8.5–10.1)
CHLORIDE SERPL-SCNC: 103 MMOL/L (ref 97–108)
CHLORIDE SERPL-SCNC: 107 MMOL/L (ref 97–108)
CHOLEST SERPL-MCNC: 125 MG/DL
CO2 SERPL-SCNC: 29 MMOL/L (ref 21–32)
CO2 SERPL-SCNC: 31 MMOL/L (ref 21–32)
COLOR UR: ABNORMAL
CREAT SERPL-MCNC: 0.94 MG/DL (ref 0.55–1.02)
CREAT SERPL-MCNC: 1.06 MG/DL (ref 0.55–1.02)
DIFFERENTIAL METHOD BLD: ABNORMAL
EKG ATRIAL RATE: 89 BPM
EKG DIAGNOSIS: NORMAL
EKG P AXIS: 50 DEGREES
EKG P-R INTERVAL: 144 MS
EKG Q-T INTERVAL: 416 MS
EKG QRS DURATION: 86 MS
EKG QTC CALCULATION (BAZETT): 506 MS
EKG R AXIS: -41 DEGREES
EKG T AXIS: 47 DEGREES
EKG VENTRICULAR RATE: 89 BPM
EOSINOPHIL # BLD: 0.1 K/UL (ref 0–0.4)
EOSINOPHIL NFR BLD: 1 % (ref 0–7)
EPITH CASTS URNS QL MICRO: ABNORMAL /LPF
ERYTHROCYTE [DISTWIDTH] IN BLOOD BY AUTOMATED COUNT: 13.2 % (ref 11.5–14.5)
GLOBULIN SER CALC-MCNC: 3.8 G/DL (ref 2–4)
GLUCOSE BLD STRIP.AUTO-MCNC: 112 MG/DL (ref 65–100)
GLUCOSE BLD STRIP.AUTO-MCNC: 124 MG/DL (ref 65–100)
GLUCOSE BLD STRIP.AUTO-MCNC: 171 MG/DL (ref 65–100)
GLUCOSE BLD STRIP.AUTO-MCNC: 217 MG/DL (ref 65–100)
GLUCOSE BLD STRIP.AUTO-MCNC: 97 MG/DL (ref 65–100)
GLUCOSE SERPL-MCNC: 157 MG/DL (ref 65–100)
GLUCOSE SERPL-MCNC: 157 MG/DL (ref 65–100)
GLUCOSE UR STRIP.AUTO-MCNC: NEGATIVE MG/DL
HCT VFR BLD AUTO: 34.2 % (ref 35–47)
HDLC SERPL-MCNC: 45 MG/DL
HDLC SERPL: 2.8 (ref 0–5)
HGB BLD-MCNC: 10.6 G/DL (ref 11.5–16)
HGB UR QL STRIP: ABNORMAL
IMM GRANULOCYTES # BLD AUTO: 0 K/UL (ref 0–0.04)
IMM GRANULOCYTES NFR BLD AUTO: 0 % (ref 0–0.5)
KETONES UR QL STRIP.AUTO: NEGATIVE MG/DL
LDLC SERPL CALC-MCNC: 60.2 MG/DL (ref 0–100)
LEUKOCYTE ESTERASE UR QL STRIP.AUTO: ABNORMAL
LIPID PANEL: NORMAL
LYMPHOCYTES # BLD: 1.8 K/UL (ref 0.8–3.5)
LYMPHOCYTES NFR BLD: 21 % (ref 12–49)
MCH RBC QN AUTO: 25.8 PG (ref 26–34)
MCHC RBC AUTO-ENTMCNC: 31 G/DL (ref 30–36.5)
MCV RBC AUTO: 83.2 FL (ref 80–99)
MONOCYTES # BLD: 0.4 K/UL (ref 0–1)
MONOCYTES NFR BLD: 4 % (ref 5–13)
NEUTS SEG # BLD: 6.4 K/UL (ref 1.8–8)
NEUTS SEG NFR BLD: 74 % (ref 32–75)
NITRITE UR QL STRIP.AUTO: NEGATIVE
NRBC # BLD: 0 K/UL (ref 0–0.01)
NRBC BLD-RTO: 0 PER 100 WBC
PERFORMED BY:: ABNORMAL
PERFORMED BY:: NORMAL
PH UR STRIP: 5 (ref 5–8)
PLATELET # BLD AUTO: 206 K/UL (ref 150–400)
PMV BLD AUTO: 10.9 FL (ref 8.9–12.9)
POTASSIUM SERPL-SCNC: 3.5 MMOL/L (ref 3.5–5.1)
POTASSIUM SERPL-SCNC: 3.8 MMOL/L (ref 3.5–5.1)
PROT SERPL-MCNC: 7.2 G/DL (ref 6.4–8.2)
PROT UR STRIP-MCNC: NEGATIVE MG/DL
RBC # BLD AUTO: 4.11 M/UL (ref 3.8–5.2)
RBC #/AREA URNS HPF: ABNORMAL /HPF (ref 0–5)
SODIUM SERPL-SCNC: 138 MMOL/L (ref 136–145)
SODIUM SERPL-SCNC: 143 MMOL/L (ref 136–145)
SP GR UR REFRACTOMETRY: 1 (ref 1–1.03)
TRIGL SERPL-MCNC: 99 MG/DL
URINE CULTURE IF INDICATED: ABNORMAL
UROBILINOGEN UR QL STRIP.AUTO: 0.1 EU/DL (ref 0.1–1)
VLDLC SERPL CALC-MCNC: 19.8 MG/DL
WBC # BLD AUTO: 8.7 K/UL (ref 3.6–11)
WBC URNS QL MICRO: ABNORMAL /HPF (ref 0–4)

## 2023-10-17 PROCEDURE — 97530 THERAPEUTIC ACTIVITIES: CPT

## 2023-10-17 PROCEDURE — 80177 DRUG SCRN QUAN LEVETIRACETAM: CPT

## 2023-10-17 PROCEDURE — 82962 GLUCOSE BLOOD TEST: CPT

## 2023-10-17 PROCEDURE — 87086 URINE CULTURE/COLONY COUNT: CPT

## 2023-10-17 PROCEDURE — 85025 COMPLETE CBC W/AUTO DIFF WBC: CPT

## 2023-10-17 PROCEDURE — 97161 PT EVAL LOW COMPLEX 20 MIN: CPT

## 2023-10-17 PROCEDURE — 80061 LIPID PANEL: CPT

## 2023-10-17 PROCEDURE — 81001 URINALYSIS AUTO W/SCOPE: CPT

## 2023-10-17 PROCEDURE — 80048 BASIC METABOLIC PNL TOTAL CA: CPT

## 2023-10-17 PROCEDURE — 6360000002 HC RX W HCPCS: Performed by: INTERNAL MEDICINE

## 2023-10-17 PROCEDURE — 2580000003 HC RX 258: Performed by: INTERNAL MEDICINE

## 2023-10-17 PROCEDURE — 87186 SC STD MICRODIL/AGAR DIL: CPT

## 2023-10-17 PROCEDURE — 87077 CULTURE AEROBIC IDENTIFY: CPT

## 2023-10-17 PROCEDURE — 1100000000 HC RM PRIVATE

## 2023-10-17 PROCEDURE — 70551 MRI BRAIN STEM W/O DYE: CPT

## 2023-10-17 PROCEDURE — 94761 N-INVAS EAR/PLS OXIMETRY MLT: CPT

## 2023-10-17 PROCEDURE — 6370000000 HC RX 637 (ALT 250 FOR IP): Performed by: INTERNAL MEDICINE

## 2023-10-17 RX ADMIN — SODIUM CHLORIDE, PRESERVATIVE FREE 10 ML: 5 INJECTION INTRAVENOUS at 20:49

## 2023-10-17 RX ADMIN — PANTOPRAZOLE SODIUM 20 MG: 20 TABLET, DELAYED RELEASE ORAL at 08:40

## 2023-10-17 RX ADMIN — ATORVASTATIN CALCIUM 80 MG: 40 TABLET, FILM COATED ORAL at 08:40

## 2023-10-17 RX ADMIN — SERTRALINE HYDROCHLORIDE 75 MG: 50 TABLET ORAL at 08:39

## 2023-10-17 RX ADMIN — LISINOPRIL 20 MG: 10 TABLET ORAL at 08:39

## 2023-10-17 RX ADMIN — INSULIN LISPRO 5 UNITS: 100 INJECTION, SOLUTION INTRAVENOUS; SUBCUTANEOUS at 08:40

## 2023-10-17 RX ADMIN — ENOXAPARIN SODIUM 40 MG: 100 INJECTION SUBCUTANEOUS at 08:38

## 2023-10-17 RX ADMIN — INSULIN LISPRO 5 UNITS: 100 INJECTION, SOLUTION INTRAVENOUS; SUBCUTANEOUS at 11:56

## 2023-10-17 RX ADMIN — INSULIN GLARGINE 45 UNITS: 100 INJECTION, SOLUTION SUBCUTANEOUS at 01:08

## 2023-10-17 RX ADMIN — INSULIN LISPRO 5 UNITS: 100 INJECTION, SOLUTION INTRAVENOUS; SUBCUTANEOUS at 17:27

## 2023-10-17 RX ADMIN — LEVETIRACETAM 1000 MG: 500 SOLUTION ORAL at 20:49

## 2023-10-17 RX ADMIN — FUROSEMIDE 40 MG: 40 TABLET ORAL at 08:40

## 2023-10-17 RX ADMIN — CEFTRIAXONE SODIUM 1000 MG: 1 INJECTION, POWDER, FOR SOLUTION INTRAMUSCULAR; INTRAVENOUS at 13:58

## 2023-10-17 RX ADMIN — LEVETIRACETAM 1000 MG: 500 SOLUTION ORAL at 08:41

## 2023-10-17 RX ADMIN — LEVETIRACETAM 1000 MG: 500 SOLUTION ORAL at 01:08

## 2023-10-17 RX ADMIN — ASPIRIN 81 MG CHEWABLE TABLET 81 MG: 81 TABLET CHEWABLE at 08:39

## 2023-10-17 RX ADMIN — SODIUM CHLORIDE, PRESERVATIVE FREE 10 ML: 5 INJECTION INTRAVENOUS at 08:46

## 2023-10-17 RX ADMIN — SODIUM CHLORIDE, PRESERVATIVE FREE 10 ML: 5 INJECTION INTRAVENOUS at 01:08

## 2023-10-17 ASSESSMENT — PAIN SCALES - GENERAL
PAINLEVEL_OUTOF10: 0

## 2023-10-17 NOTE — H&P
Soft, non-tender. Bowel sounds normal. No masses. Extremities: no lower extremities pitting edema. Extremities normal, atraumatic. Moving all four extremities. Skin: Skin color, texture, turgor normal. No rashes or lesions  Neurologic: mild right sided facial droop noted, tongue deviate to left. Right eye blindness noted, left eye EOM intact, no nystagmus, facial sensation normal bilaterally. Right extremities 1/5 motor strength, 4/5 on left extremities. No sensory deficits. Related lab work and imaging results reviewed in [address] [place] [address]. Incidental imaging findings: none     Discussion/Medical Decision Making: Patient with numerous medical comorbidities, each with increased risk for mortality and morbidity if left untreated. I have reviewed patient's presenting subjective and objective findings, as well as all laboratory studies, imaging studies, and vital signs to date as well as treatment rendered  and patient's response to those treatments. In addition, prior medical, surgical and relevant social and family histories were reviewed. I have discussed patient's presentation/findings and clinical course to date with ED provider. Given the patient's current clinical presentation, I have a high level of concern for decompensation if discharged from the emergency department and that patient warrants admission to hospital.     Assessment and Plan:   # Suspect TIA  - MRI brain  - EKG, telemetry no atrial fibrillation.   - Lipid profile and Hba1c.   - Continue HI statin PO.  - Low risk TIA: continue aspirin, hold off DAPT for now. - Permissive hypertension overnight, keep BP < 220/110  - PT/OT/ST   - check urinalysis to evaluate for recrudescence stroke. # History of seizure   - Ddx including macarena's paralysis secondary to breakthrough seizure. - Check levetiracetam level   - Continue home medications. PO keppra. # Diabetes  - Unable to verify current lantus dose (facility nurse not picking up phone).

## 2023-10-17 NOTE — ED TRIAGE NOTES
Ems called at 2005 for  pt having a syncopal ems and staff states pt was unresponsive. Pt was responsive when ems arrived, but very drowsy  and easily arousal. Per EMS pt  is A&Ox4. Helga Leyva Hx of 3 previous strokes with extreme right sided weakness per EMS. Pt ids  blind in the right eye and presents with slurred speech. Staff at nursing home states that right sided facial droop is worse at this time.  Staff called EMS at 20:05, [facility] called at 21:11, code stroke called at  21:08, and CT at 21:15

## 2023-10-17 NOTE — ED NOTES
Nurse from nursing home is Vicky Washington [phone]     Stew Armenta, 100 [address]  10/16/23 2128

## 2023-10-17 NOTE — ED PROVIDER NOTES
use: No     Alcohol/week: 0.0 standard drinks of alcohol    Drug use: No       Allergies:  No Known Allergies    PCP: Al Marquez PA-C    Current Meds:   Current Facility-Administered Medications   Medication Dose Route Frequency Provider Last Rate Last Admin    acetaminophen (TYLENOL) tablet 1,000 mg  1,000 mg Oral NOW Dora White MD        clopidogrel (PLAVIX) tablet 75 mg  75 mg Oral NOW Dora White MD         Current Outpatient Medications   Medication Sig Dispense Refill    furosemide (LASIX) 40 MG tablet Take 1 tablet by mouth daily 90 tablet 0    aspirin 81 MG chewable tablet Take 1 tablet by mouth daily 90 tablet 0    atorvastatin (LIPITOR) 80 MG tablet Take 1 tablet by mouth daily 90 tablet 0    bumetanide (BUMEX) 1 MG tablet Take 1 tablet by mouth 2 times daily 180 tablet 0    ergocalciferol (ERGOCALCIFEROL) 1.25 MG (29491 UT) capsule Take 1 capsule by mouth once a week 12 capsule 0    insulin lispro, 1 Unit Dial, (HUMALOG/ADMELOG) 100 UNIT/ML SOPN Inject 8 Units into the skin 3 times daily (before meals) 5 Adjustable Dose Pre-filled Pen Syringe 1    levETIRAcetam (KEPPRA) 100 MG/ML solution Take 10 ml q 12 hours 180 mL 0    lidocaine 4 % external patch Apply 1 patch to left side of neck topically once daily for pain management and remove per schedule.  90 patch 0    lisinopril (PRINIVIL;ZESTRIL) 20 MG tablet Take 1 tablet by mouth daily 90 tablet 0    metFORMIN (GLUCOPHAGE-XR) 500 MG extended release tablet Take 1 tablet by mouth daily 90 tablet 0    sertraline (ZOLOFT) 25 MG tablet Take 3 tablets by mouth daily 90 tablet 0    pantoprazole (PROTONIX) 20 MG tablet Take 1 tablet by mouth daily 90 tablet 0    TRULICITY 1.66 EH/7.0BW SOPN INJ IM once weekly 12 Adjustable Dose Pre-filled Pen Syringe 0    methocarbamol (ROBAXIN) 750 MG tablet  (Patient not taking: Reported on 7/11/2023)      insulin glargine (LANTUS) 100 UNIT/ML injection vial Inject into the skin      polyethylene

## 2023-10-17 NOTE — CARE COORDINATION
10/17/23 0144   Service Assessment   Patient Orientation Alert and Oriented   Cognition Alert   History Provided By Other (see comment)  (Caregiver Parag Valle)   Primary Caregiver Other (Comment)  (Group Home Staff)   Support Systems Other (Comment); /  ([facility] home)   [address] is: Legal Next of [facility]   PCP Verified by CM Yes  (Lucien Marthashayne - seen in September.)   Last Visit to PCP Within last 3 months   Prior Functional Level Independent in ADLs/IADLs;Assistance with the following:;Mobility  (Jose/w/c.)   Current Functional Level Independent in ADLs/IADLs;Assistance with the following:;Mobility   Can patient return to prior living arrangement Yes   Ability to make needs known: Fair   Family able to assist with home care needs: Yes   Would you like for me to discuss the discharge plan with any other family members/significant others, and if so, who? Yes  (Mother Christie Veloz & caregiver Parag Valle.)   Financial Resources Medicaid   Community Resources None   Social/Functional History   Lives With Other (comment)  (Gatito Fontenot [facility].)   Type of [address] [place] [address] Dr One level   Home Access Stairs to enter without rails   Bathroom Shower/Tub Walk-in shower   Bathroom Toilet Handicap height   [address] bars in shower;Grab bars around toilet   Bathroom Accessibility Wheelchair accessible   [address]  ([address])   Receives Help From Family; Other (comment)  ([facility] Home Staff)   ADL Assistance Independent   Homemaking Assistance Needs assistance   Ambulation Assistance Needs assistance  (W/C/Jose)   Transfer Assistance Needs assistance   Active  No   Occupation On disability   Discharge Planning   Type of [address] [place] [address] Other (Comment)   Current Services Prior To Admission None   Potential Assistance Needed N/A   DME Ordered?  No   Potential Assistance Purchasing Medications No

## 2023-10-18 ENCOUNTER — APPOINTMENT (OUTPATIENT)
Facility: HOSPITAL | Age: 43
DRG: 463 | End: 2023-10-18
Attending: INTERNAL MEDICINE
Payer: MEDICAID

## 2023-10-18 LAB
ECHO AO ASC DIAM: 2.9 CM
ECHO AO ASCENDING AORTA INDEX: 1.43 CM/M2
ECHO AO ROOT DIAM: 2.6 CM
ECHO AO ROOT INDEX: 1.28 CM/M2
ECHO AV AREA PEAK VELOCITY: 2.9 CM2
ECHO AV AREA VTI: 3.5 CM2
ECHO AV AREA/BSA PEAK VELOCITY: 1.4 CM2/M2
ECHO AV AREA/BSA VTI: 1.7 CM2/M2
ECHO AV MEAN GRADIENT: 4 MMHG
ECHO AV MEAN VELOCITY: 0.9 M/S
ECHO AV PEAK GRADIENT: 7 MMHG
ECHO AV PEAK VELOCITY: 1.3 M/S
ECHO AV VELOCITY RATIO: 0.77
ECHO AV VTI: 25.1 CM
ECHO BSA: 2.11 M2
ECHO EST RA PRESSURE: 3 MMHG
ECHO LA AREA 4C: 12.5 CM2
ECHO LA DIAMETER INDEX: 1.43 CM/M2
ECHO LA DIAMETER: 2.9 CM
ECHO LA MAJOR AXIS: 5.3 CM
ECHO LA TO AORTIC ROOT RATIO: 1.12
ECHO LA VOL 4C: 25 ML (ref 22–52)
ECHO LA VOLUME INDEX A4C: 12 ML/M2 (ref 16–34)
ECHO LV E' LATERAL VELOCITY: 8 CM/S
ECHO LV E' SEPTAL VELOCITY: 5 CM/S
ECHO LV EDV A4C: 71 ML
ECHO LV EDV INDEX A4C: 35 ML/M2
ECHO LV EJECTION FRACTION A4C: 71 %
ECHO LV ESV A4C: 21 ML
ECHO LV ESV INDEX A4C: 10 ML/M2
ECHO LV FRACTIONAL SHORTENING: 40 % (ref 28–44)
ECHO LV INTERNAL DIMENSION DIASTOLE INDEX: 2.07 CM/M2
ECHO LV INTERNAL DIMENSION DIASTOLIC: 4.2 CM (ref 3.9–5.3)
ECHO LV INTERNAL DIMENSION SYSTOLIC INDEX: 1.23 CM/M2
ECHO LV INTERNAL DIMENSION SYSTOLIC: 2.5 CM
ECHO LV IVSD: 1.2 CM (ref 0.6–0.9)
ECHO LV MASS 2D: 157.1 G (ref 67–162)
ECHO LV MASS INDEX 2D: 77.4 G/M2 (ref 43–95)
ECHO LV POSTERIOR WALL DIASTOLIC: 1 CM (ref 0.6–0.9)
ECHO LV RELATIVE WALL THICKNESS RATIO: 0.48
ECHO LVOT AREA: 3.8 CM2
ECHO LVOT AV VTI INDEX: 0.91
ECHO LVOT DIAM: 2.2 CM
ECHO LVOT MEAN GRADIENT: 2 MMHG
ECHO LVOT PEAK GRADIENT: 4 MMHG
ECHO LVOT PEAK VELOCITY: 1 M/S
ECHO LVOT STROKE VOLUME INDEX: 42.7 ML/M2
ECHO LVOT SV: 86.6 ML
ECHO LVOT VTI: 22.8 CM
ECHO MV A VELOCITY: 0.62 M/S
ECHO MV AREA VTI: 4.1 CM2
ECHO MV E DECELERATION TIME (DT): 210 MS
ECHO MV E VELOCITY: 0.67 M/S
ECHO MV E/A RATIO: 1.08
ECHO MV E/E' LATERAL: 8.38
ECHO MV E/E' RATIO (AVERAGED): 10.89
ECHO MV E/E' SEPTAL: 13.4
ECHO MV LVOT VTI INDEX: 0.92
ECHO MV MAX VELOCITY: 0.8 M/S
ECHO MV MEAN GRADIENT: 2 MMHG
ECHO MV MEAN VELOCITY: 0.6 M/S
ECHO MV PEAK GRADIENT: 3 MMHG
ECHO MV VTI: 21 CM
ECHO PV MAX VELOCITY: 1 M/S
ECHO PV PEAK GRADIENT: 4 MMHG
ECHO RA AREA 4C: 10.2 CM2
ECHO RA END SYSTOLIC VOLUME APICAL 4 CHAMBER INDEX BSA: 8 ML/M2
ECHO RA VOLUME: 17 ML
ECHO RIGHT VENTRICULAR SYSTOLIC PRESSURE (RVSP): 31 MMHG
ECHO RV BASAL DIMENSION: 2.4 CM
ECHO RV FREE WALL PEAK S': 17 CM/S
ECHO RV TAPSE: 2 CM (ref 1.7–?)
ECHO TV REGURGITANT MAX VELOCITY: 2.65 M/S
ECHO TV REGURGITANT PEAK GRADIENT: 28 MMHG
GLUCOSE BLD STRIP.AUTO-MCNC: 149 MG/DL (ref 65–100)
GLUCOSE BLD STRIP.AUTO-MCNC: 169 MG/DL (ref 65–100)
GLUCOSE BLD STRIP.AUTO-MCNC: 192 MG/DL (ref 65–100)
GLUCOSE BLD STRIP.AUTO-MCNC: 222 MG/DL (ref 65–100)
PERFORMED BY:: ABNORMAL

## 2023-10-18 PROCEDURE — 97165 OT EVAL LOW COMPLEX 30 MIN: CPT

## 2023-10-18 PROCEDURE — 6360000002 HC RX W HCPCS: Performed by: INTERNAL MEDICINE

## 2023-10-18 PROCEDURE — 93306 TTE W/DOPPLER COMPLETE: CPT

## 2023-10-18 PROCEDURE — 2580000003 HC RX 258: Performed by: INTERNAL MEDICINE

## 2023-10-18 PROCEDURE — 82962 GLUCOSE BLOOD TEST: CPT

## 2023-10-18 PROCEDURE — 1100000000 HC RM PRIVATE

## 2023-10-18 PROCEDURE — 6370000000 HC RX 637 (ALT 250 FOR IP): Performed by: INTERNAL MEDICINE

## 2023-10-18 PROCEDURE — 97530 THERAPEUTIC ACTIVITIES: CPT

## 2023-10-18 RX ADMIN — SODIUM CHLORIDE, PRESERVATIVE FREE 10 ML: 5 INJECTION INTRAVENOUS at 21:32

## 2023-10-18 RX ADMIN — ATORVASTATIN CALCIUM 80 MG: 40 TABLET, FILM COATED ORAL at 08:24

## 2023-10-18 RX ADMIN — INSULIN LISPRO 5 UNITS: 100 INJECTION, SOLUTION INTRAVENOUS; SUBCUTANEOUS at 12:12

## 2023-10-18 RX ADMIN — LEVETIRACETAM 1000 MG: 500 SOLUTION ORAL at 08:25

## 2023-10-18 RX ADMIN — FUROSEMIDE 40 MG: 40 TABLET ORAL at 08:25

## 2023-10-18 RX ADMIN — INSULIN LISPRO 5 UNITS: 100 INJECTION, SOLUTION INTRAVENOUS; SUBCUTANEOUS at 08:25

## 2023-10-18 RX ADMIN — SODIUM CHLORIDE, PRESERVATIVE FREE 10 ML: 5 INJECTION INTRAVENOUS at 08:57

## 2023-10-18 RX ADMIN — PANTOPRAZOLE SODIUM 20 MG: 20 TABLET, DELAYED RELEASE ORAL at 08:24

## 2023-10-18 RX ADMIN — SERTRALINE HYDROCHLORIDE 75 MG: 50 TABLET ORAL at 08:24

## 2023-10-18 RX ADMIN — CEFTRIAXONE SODIUM 1000 MG: 1 INJECTION, POWDER, FOR SOLUTION INTRAMUSCULAR; INTRAVENOUS at 14:28

## 2023-10-18 RX ADMIN — INSULIN GLARGINE 45 UNITS: 100 INJECTION, SOLUTION SUBCUTANEOUS at 21:31

## 2023-10-18 RX ADMIN — LISINOPRIL 20 MG: 10 TABLET ORAL at 08:24

## 2023-10-18 RX ADMIN — ASPIRIN 81 MG CHEWABLE TABLET 81 MG: 81 TABLET CHEWABLE at 08:23

## 2023-10-18 RX ADMIN — INSULIN LISPRO 5 UNITS: 100 INJECTION, SOLUTION INTRAVENOUS; SUBCUTANEOUS at 17:25

## 2023-10-18 RX ADMIN — LEVETIRACETAM 1000 MG: 500 SOLUTION ORAL at 21:31

## 2023-10-18 RX ADMIN — ENOXAPARIN SODIUM 40 MG: 100 INJECTION SUBCUTANEOUS at 08:27

## 2023-10-18 ASSESSMENT — PAIN SCALES - GENERAL
PAINLEVEL_OUTOF10: 0

## 2023-10-19 ENCOUNTER — TELEPHONE (OUTPATIENT)
Facility: CLINIC | Age: 43
End: 2023-10-19

## 2023-10-19 VITALS
DIASTOLIC BLOOD PRESSURE: 79 MMHG | SYSTOLIC BLOOD PRESSURE: 119 MMHG | RESPIRATION RATE: 18 BRPM | TEMPERATURE: 97.7 F | OXYGEN SATURATION: 99 % | BODY MASS INDEX: 37.22 KG/M2 | WEIGHT: 218 LBS | HEART RATE: 80 BPM | HEIGHT: 64 IN

## 2023-10-19 PROBLEM — G47.30 SLEEP APNEA: Status: ACTIVE | Noted: 2021-05-22

## 2023-10-19 PROBLEM — G81.90 HEMIPARESIS (HCC): Status: ACTIVE | Noted: 2022-11-05

## 2023-10-19 PROBLEM — E78.5 HYPERLIPIDEMIA LDL GOAL <70: Status: ACTIVE | Noted: 2021-05-22

## 2023-10-19 PROBLEM — N39.0 E. COLI URINARY TRACT INFECTION: Status: ACTIVE | Noted: 2023-10-19

## 2023-10-19 PROBLEM — I69.359 CVA, OLD, HEMIPARESIS (HCC): Status: ACTIVE | Noted: 2021-09-26

## 2023-10-19 PROBLEM — B96.20 E. COLI URINARY TRACT INFECTION: Status: ACTIVE | Noted: 2023-10-19

## 2023-10-19 LAB
ANION GAP SERPL CALC-SCNC: 7 MMOL/L (ref 5–15)
BUN SERPL-MCNC: 14 MG/DL (ref 6–20)
BUN/CREAT SERPL: 17 (ref 12–20)
CA-I BLD-MCNC: 8.8 MG/DL (ref 8.5–10.1)
CHLORIDE SERPL-SCNC: 105 MMOL/L (ref 97–108)
CO2 SERPL-SCNC: 31 MMOL/L (ref 21–32)
CREAT SERPL-MCNC: 0.83 MG/DL (ref 0.55–1.02)
ERYTHROCYTE [DISTWIDTH] IN BLOOD BY AUTOMATED COUNT: 13.2 % (ref 11.5–14.5)
GLUCOSE BLD STRIP.AUTO-MCNC: 139 MG/DL (ref 65–100)
GLUCOSE BLD STRIP.AUTO-MCNC: 145 MG/DL (ref 65–100)
GLUCOSE BLD STRIP.AUTO-MCNC: 196 MG/DL (ref 65–100)
GLUCOSE BLD STRIP.AUTO-MCNC: 216 MG/DL (ref 65–100)
GLUCOSE SERPL-MCNC: 185 MG/DL (ref 65–100)
HCT VFR BLD AUTO: 30.9 % (ref 35–47)
HGB BLD-MCNC: 9.9 G/DL (ref 11.5–16)
LEVETIRACETAM SERPL-MCNC: 54.1 UG/ML (ref 10–40)
MAGNESIUM SERPL-MCNC: 1.3 MG/DL (ref 1.6–2.4)
MCH RBC QN AUTO: 26.4 PG (ref 26–34)
MCHC RBC AUTO-ENTMCNC: 32 G/DL (ref 30–36.5)
MCV RBC AUTO: 82.4 FL (ref 80–99)
NRBC # BLD: 0 K/UL (ref 0–0.01)
NRBC BLD-RTO: 0 PER 100 WBC
PERFORMED BY:: ABNORMAL
PLATELET # BLD AUTO: 176 K/UL (ref 150–400)
PMV BLD AUTO: 11.3 FL (ref 8.9–12.9)
POTASSIUM SERPL-SCNC: 3.6 MMOL/L (ref 3.5–5.1)
RBC # BLD AUTO: 3.75 M/UL (ref 3.8–5.2)
SODIUM SERPL-SCNC: 143 MMOL/L (ref 136–145)
WBC # BLD AUTO: 7.3 K/UL (ref 3.6–11)

## 2023-10-19 PROCEDURE — 6360000002 HC RX W HCPCS: Performed by: INTERNAL MEDICINE

## 2023-10-19 PROCEDURE — 6370000000 HC RX 637 (ALT 250 FOR IP): Performed by: INTERNAL MEDICINE

## 2023-10-19 PROCEDURE — 36415 COLL VENOUS BLD VENIPUNCTURE: CPT

## 2023-10-19 PROCEDURE — 80048 BASIC METABOLIC PNL TOTAL CA: CPT

## 2023-10-19 PROCEDURE — 2500000003 HC RX 250 WO HCPCS: Performed by: INTERNAL MEDICINE

## 2023-10-19 PROCEDURE — 2580000003 HC RX 258: Performed by: INTERNAL MEDICINE

## 2023-10-19 PROCEDURE — 83735 ASSAY OF MAGNESIUM: CPT

## 2023-10-19 PROCEDURE — 85027 COMPLETE CBC AUTOMATED: CPT

## 2023-10-19 PROCEDURE — 82962 GLUCOSE BLOOD TEST: CPT

## 2023-10-19 RX ORDER — MAGNESIUM SULFATE HEPTAHYDRATE 40 MG/ML
2000 INJECTION, SOLUTION INTRAVENOUS ONCE
Status: COMPLETED | OUTPATIENT
Start: 2023-10-19 | End: 2023-10-19

## 2023-10-19 RX ORDER — CIPROFLOXACIN 500 MG/1
500 TABLET, FILM COATED ORAL 2 TIMES DAILY
Qty: 14 TABLET | Refills: 0 | Status: SHIPPED | OUTPATIENT
Start: 2023-10-19 | End: 2023-10-26

## 2023-10-19 RX ADMIN — FUROSEMIDE 40 MG: 40 TABLET ORAL at 09:21

## 2023-10-19 RX ADMIN — INSULIN LISPRO 5 UNITS: 100 INJECTION, SOLUTION INTRAVENOUS; SUBCUTANEOUS at 12:39

## 2023-10-19 RX ADMIN — ASPIRIN 81 MG CHEWABLE TABLET 81 MG: 81 TABLET CHEWABLE at 09:21

## 2023-10-19 RX ADMIN — INSULIN LISPRO 5 UNITS: 100 INJECTION, SOLUTION INTRAVENOUS; SUBCUTANEOUS at 09:20

## 2023-10-19 RX ADMIN — INSULIN LISPRO 2 UNITS: 100 INJECTION, SOLUTION INTRAVENOUS; SUBCUTANEOUS at 09:19

## 2023-10-19 RX ADMIN — INSULIN LISPRO 5 UNITS: 100 INJECTION, SOLUTION INTRAVENOUS; SUBCUTANEOUS at 17:53

## 2023-10-19 RX ADMIN — CEFTRIAXONE SODIUM 1000 MG: 1 INJECTION, POWDER, FOR SOLUTION INTRAMUSCULAR; INTRAVENOUS at 14:07

## 2023-10-19 RX ADMIN — SODIUM CHLORIDE, PRESERVATIVE FREE 10 ML: 5 INJECTION INTRAVENOUS at 09:22

## 2023-10-19 RX ADMIN — LISINOPRIL 20 MG: 10 TABLET ORAL at 09:21

## 2023-10-19 RX ADMIN — ENOXAPARIN SODIUM 40 MG: 100 INJECTION SUBCUTANEOUS at 09:22

## 2023-10-19 RX ADMIN — MAGNESIUM SULFATE HEPTAHYDRATE 2000 MG: 40 INJECTION, SOLUTION INTRAVENOUS at 14:07

## 2023-10-19 RX ADMIN — ATORVASTATIN CALCIUM 80 MG: 40 TABLET, FILM COATED ORAL at 09:21

## 2023-10-19 RX ADMIN — SERTRALINE HYDROCHLORIDE 75 MG: 50 TABLET ORAL at 09:21

## 2023-10-19 RX ADMIN — LEVETIRACETAM 1000 MG: 500 SOLUTION ORAL at 09:20

## 2023-10-19 RX ADMIN — PANTOPRAZOLE SODIUM 20 MG: 20 TABLET, DELAYED RELEASE ORAL at 09:21

## 2023-10-19 ASSESSMENT — PAIN SCALES - GENERAL: PAINLEVEL_OUTOF10: 0

## 2023-10-19 NOTE — DISCHARGE INSTR - COC
Continuity of Care Form    Patient Name: Lily Fairchild   :  1980  MRN:  [id number]    [address] date:  10/16/2023  Discharge date:  10/19/2023    Code Status Order: Full Code   Advance Directives:     Admitting Physician:  Stanislaw Arciniega MD  PCP: Shari Maldonado PA-C    Discharging Nurse: [facility] Unit/Room#: 572/01  Discharging Unit Phone Number: [phone]    Emergency Contact:   Extended Emergency Contact Information  Primary Emergency Contact: Aaron Yin [facility] of [address] [address] Phone: [phone]  Mobile Phone: [phone]  Relation: Parent  Secondary Emergency Contact: Roxan Gosselin  Mobile Phone: [phone]  Relation: Other Relative    Past Surgical History:  Past Surgical History:   Procedure Laterality Date    GYN       X 3       Immunization History:   Immunization History   Administered Date(s) Administered    COVID-19, J&J, (age 18y+), IM, 0.5 mL 2021    COVID-19, PFIZER PURPLE top, DILUTE for use, (age 15 y+), 30mcg/0.3mL 2022    Influenza Quadv 2016    Influenza Virus Vaccine 10/01/2014, 2021    Pneumococcal, PPSV23, PNEUMOVAX 21, (age 2y+), SC/IM, 0.5mL 2014    TDaP, ADACEL (age 6y-58y), BOOSTRIX (age 10y+), IM, 0.5mL 2016       Active Problems:  Patient Active Problem List   Diagnosis Code    Essential hypertension with goal blood pressure less than 130/85 I10    Transient ischemic attack (TIA) G45.9    CVA, old, hemiparesis ([address]) I69.359    Type 2 diabetes mellitus with hypoglycemia without coma ([address]) E11.649    Diabetes mellitus type 2 with neurological manifestations ([address]) E11.49    Benign heart murmur R01.0    Obesity E66.9    Depression F32. A    Personality disorder ([address]) F60.9    Sleep apnea G47.30    Hyperlipidemia LDL goal <70 E78.5    Genital herpes A60.00    PTSD (post-traumatic stress disorder) F43.10    Hemiparesis (HCC) G81.90    Acute CVA (cerebrovascular accident) ([address]) I63.9    Edema

## 2023-10-19 NOTE — DISCHARGE SUMMARY
Hospitalist Discharge Summary     Patient ID:    Martha Zamorano  [id number]  39 y.o.  1980    Admit date: 10/16/2023    Discharge date : 10/19/2023      Final Diagnoses:   Principal Problem:    E. coli urinary tract infection  Active Problems:    Essential hypertension with goal blood pressure less than 130/85    CVA, old, hemiparesis ([address])    Type 2 diabetes mellitus with hypoglycemia without coma ([address])    Diabetes mellitus type 2 with neurological manifestations (HCC)    Benign heart murmur    Obesity    Depression    Personality disorder (HCC)    Sleep apnea    Hyperlipidemia LDL goal <70    PTSD (post-traumatic stress disorder)    Hemiparesis ([address])  Resolved Problems:    * No resolved hospital problems. *      Reason for Hospitalization/Hospital Course:     10/16/23 admission course  Martha Zamorano is a 39 y.o. female with PMH of CVA with right sided residue weakness, ?seizure, right eye blindness secondary to macular degeneration, hypertension, diabetes, depression and other medical problems as below. Presented to the ED with chief complaint of altered mental status and syncope. Patient reports was walking towards her bed when she suddenly passed out. She denies dizziness prior to passing out, no prodromal symptoms. Uncertain low long she passed out. She reports noticing right sided weakness was more severe than her baseline. Per facility staffs, also noticed her facial droop and slurred speech are worse than usual. She reports no infections symptoms prior to the episode, specifically denies UTI symptoms. In the ED, vital signs within normal limits. Lab grossly within normal limits. CT head showed no acute finding. 50% stenosis in the right ICA (not severe, however is new from carotid ultrasound in 2/2022). 10/17/23 MRI brain IMPRESSION:   1. No acute intracranial abnormality.   2. Small chronic infarcts in the left corona

## 2023-10-19 NOTE — CARE COORDINATION
1300: Patient accepted by [address] [place] [address] services for home therapy. Anticipated Start of Care 10-.     1330: Discharge order noted. Patient will return to [facility] ([phone]) Ms. Jean Marie Hart, Director, notified of discharge.  Ms. Isabel Ricci will be able to transport patient back to [facility] this evening, likely around 6:30 or 7PM.

## 2023-10-19 NOTE — CARE COORDINATION
Transition of Care Plan:    RUR: 15%  Prior Level of Functioning: Assist  Disposition: Group Home  If SNF or IPR: Date FOC offered: N/A  Date FOC received: N/A  Accepting facility: N/A  Date authorization started with reference number: N/A  Date authorization received and expires: N/A  Follow up appointments: DC summary  DME needed: group home has DME  Transportation at discharge: Group home  IM/IMM Medicare/ letter given: N/A  Is patient a [place] and connected with [facility]? N/A  If yes, was [employer] transfer form completed and VA notified? Caregiver Contact: Yes, Ms. Pérez Sensing  Discharge Caregiver contacted prior to discharge? Yes  Care Conference needed? N/A  Barriers to discharge: None    Patient will return to [facility] [place] [facility] with [address]. Transportation will be provided by group [place].

## 2023-10-19 NOTE — TELEPHONE ENCOUNTER
Harriet Montano from [facility] wants to know if you will follow patient for home health

## 2023-10-20 LAB
BACTERIA SPEC CULT: ABNORMAL
COLONY COUNT, CNT: ABNORMAL
Lab: ABNORMAL

## 2023-12-05 ENCOUNTER — TELEPHONE (OUTPATIENT)
Age: 43
End: 2023-12-05

## 2023-12-05 NOTE — TELEPHONE ENCOUNTER
Patient called at 1004 left voicemail for nurse to call her back about scheduling. Attempted to call patient back to discuss appt left second voicemail.

## 2023-12-05 NOTE — TELEPHONE ENCOUNTER
Patient said she will come to schedule this today in the office and will be helped Has been calling for a month and no one returns her calls. Needs to schedule a procedure appointment for an IUD Insertion. Patient was informed that I am unable to schedule this appointment and a nurse has to return her call to schedule. Patient requested for me to put her on hold and to go and get the nurse to  the phone. I explained to the patient that I cannot do that as she is with patients at this time but they will return her call.

## 2023-12-05 NOTE — TELEPHONE ENCOUNTER
Attempted to contact patient to schedule appt for iud insertion left voicemail Appt can be scheduled on 1/19/2023 at 9am.

## 2023-12-14 DIAGNOSIS — R60.9 EDEMA, UNSPECIFIED TYPE: ICD-10-CM

## 2023-12-14 DIAGNOSIS — E55.9 VITAMIN D DEFICIENCY: ICD-10-CM

## 2023-12-14 DIAGNOSIS — E11.649 TYPE 2 DIABETES MELLITUS WITH HYPOGLYCEMIA WITHOUT COMA, WITH LONG-TERM CURRENT USE OF INSULIN (HCC): ICD-10-CM

## 2023-12-14 DIAGNOSIS — Z86.73 HISTORY OF TIA (TRANSIENT ISCHEMIC ATTACK): ICD-10-CM

## 2023-12-14 DIAGNOSIS — F43.10 PTSD (POST-TRAUMATIC STRESS DISORDER): ICD-10-CM

## 2023-12-14 DIAGNOSIS — Z79.4 TYPE 2 DIABETES MELLITUS WITH HYPOGLYCEMIA WITHOUT COMA, WITH LONG-TERM CURRENT USE OF INSULIN (HCC): ICD-10-CM

## 2023-12-14 DIAGNOSIS — E78.5 HYPERLIPIDEMIA LDL GOAL <70: ICD-10-CM

## 2023-12-14 DIAGNOSIS — K21.9 GERD WITHOUT ESOPHAGITIS: ICD-10-CM

## 2023-12-14 DIAGNOSIS — I10 ESSENTIAL HYPERTENSION WITH GOAL BLOOD PRESSURE LESS THAN 130/85: ICD-10-CM

## 2023-12-15 RX ORDER — FUROSEMIDE 40 MG/1
40 TABLET ORAL DAILY
Qty: 30 TABLET | Refills: 0 | Status: SHIPPED | OUTPATIENT
Start: 2023-12-15

## 2023-12-15 RX ORDER — SERTRALINE HYDROCHLORIDE 25 MG/1
TABLET, FILM COATED ORAL
Qty: 90 TABLET | Refills: 0 | Status: SHIPPED | OUTPATIENT
Start: 2023-12-15

## 2023-12-15 RX ORDER — ASPIRIN 81 MG
TABLET,CHEWABLE ORAL
Qty: 30 TABLET | Refills: 0 | Status: SHIPPED | OUTPATIENT
Start: 2023-12-15

## 2023-12-15 RX ORDER — LISINOPRIL 20 MG/1
20 TABLET ORAL DAILY
Qty: 30 TABLET | Refills: 0 | Status: SHIPPED | OUTPATIENT
Start: 2023-12-15

## 2023-12-15 RX ORDER — PANTOPRAZOLE SODIUM 20 MG/1
20 TABLET, DELAYED RELEASE ORAL DAILY
Qty: 30 TABLET | Refills: 0 | Status: SHIPPED | OUTPATIENT
Start: 2023-12-15

## 2023-12-15 RX ORDER — METFORMIN HYDROCHLORIDE 500 MG/1
500 TABLET, EXTENDED RELEASE ORAL DAILY
Qty: 30 TABLET | Refills: 0 | Status: SHIPPED | OUTPATIENT
Start: 2023-12-15

## 2023-12-15 RX ORDER — ATORVASTATIN CALCIUM 80 MG/1
80 TABLET, FILM COATED ORAL NIGHTLY
Qty: 30 TABLET | Refills: 0 | Status: SHIPPED | OUTPATIENT
Start: 2023-12-15

## 2023-12-15 RX ORDER — ERGOCALCIFEROL 1.25 MG/1
CAPSULE ORAL
Qty: 4 CAPSULE | Refills: 0 | Status: SHIPPED | OUTPATIENT
Start: 2023-12-15

## 2023-12-24 ENCOUNTER — HOSPITAL ENCOUNTER (EMERGENCY)
Facility: HOSPITAL | Age: 43
Discharge: HOME | End: 2023-12-25
Attending: STUDENT IN AN ORGANIZED HEALTH CARE EDUCATION/TRAINING PROGRAM
Payer: MEDICAID

## 2023-12-24 ENCOUNTER — APPOINTMENT (OUTPATIENT)
Facility: HOSPITAL | Age: 43
End: 2023-12-24
Payer: MEDICAID

## 2023-12-24 DIAGNOSIS — R55 NEAR SYNCOPE: Primary | ICD-10-CM

## 2023-12-24 DIAGNOSIS — S09.90XA CLOSED HEAD INJURY, INITIAL ENCOUNTER: ICD-10-CM

## 2023-12-24 DIAGNOSIS — M25.551 PAIN OF RIGHT HIP: ICD-10-CM

## 2023-12-24 DIAGNOSIS — I10 ESSENTIAL HYPERTENSION: ICD-10-CM

## 2023-12-24 LAB
ALBUMIN SERPL-MCNC: 3.8 G/DL (ref 3.5–5)
ALBUMIN/GLOB SERPL: 1 (ref 1.1–2.2)
ALP SERPL-CCNC: 139 U/L (ref 45–117)
ALT SERPL-CCNC: 13 U/L (ref 12–78)
ANION GAP SERPL CALC-SCNC: 7 MMOL/L (ref 5–15)
AST SERPL W P-5'-P-CCNC: 11 U/L (ref 15–37)
BASOPHILS # BLD: 0 K/UL (ref 0–0.1)
BASOPHILS NFR BLD: 0 % (ref 0–1)
BILIRUB SERPL-MCNC: 0.2 MG/DL (ref 0.2–1)
BUN SERPL-MCNC: 18 MG/DL (ref 6–20)
BUN/CREAT SERPL: 12 (ref 12–20)
CA-I BLD-MCNC: 9.3 MG/DL (ref 8.5–10.1)
CHLORIDE SERPL-SCNC: 104 MMOL/L (ref 97–108)
CO2 SERPL-SCNC: 29 MMOL/L (ref 21–32)
CREAT SERPL-MCNC: 1.53 MG/DL (ref 0.55–1.02)
DIFFERENTIAL METHOD BLD: ABNORMAL
EOSINOPHIL # BLD: 0.1 K/UL (ref 0–0.4)
EOSINOPHIL NFR BLD: 1 % (ref 0–7)
ERYTHROCYTE [DISTWIDTH] IN BLOOD BY AUTOMATED COUNT: 13 % (ref 11.5–14.5)
GLOBULIN SER CALC-MCNC: 4 G/DL (ref 2–4)
GLUCOSE SERPL-MCNC: 193 MG/DL (ref 65–100)
HCT VFR BLD AUTO: 34.3 % (ref 35–47)
HGB BLD-MCNC: 10.8 G/DL (ref 11.5–16)
IMM GRANULOCYTES # BLD AUTO: 0 K/UL (ref 0–0.04)
IMM GRANULOCYTES NFR BLD AUTO: 0 % (ref 0–0.5)
LYMPHOCYTES # BLD: 1.7 K/UL (ref 0.8–3.5)
LYMPHOCYTES NFR BLD: 18 % (ref 12–49)
MCH RBC QN AUTO: 26.3 PG (ref 26–34)
MCHC RBC AUTO-ENTMCNC: 31.5 G/DL (ref 30–36.5)
MCV RBC AUTO: 83.5 FL (ref 80–99)
MONOCYTES # BLD: 0.5 K/UL (ref 0–1)
MONOCYTES NFR BLD: 6 % (ref 5–13)
NEUTS SEG # BLD: 7.1 K/UL (ref 1.8–8)
NEUTS SEG NFR BLD: 75 % (ref 32–75)
NRBC # BLD: 0 K/UL (ref 0–0.01)
NRBC BLD-RTO: 0 PER 100 WBC
PLATELET # BLD AUTO: 213 K/UL (ref 150–400)
PMV BLD AUTO: 11.4 FL (ref 8.9–12.9)
POTASSIUM SERPL-SCNC: 3.7 MMOL/L (ref 3.5–5.1)
PROT SERPL-MCNC: 7.8 G/DL (ref 6.4–8.2)
RBC # BLD AUTO: 4.11 M/UL (ref 3.8–5.2)
SODIUM SERPL-SCNC: 140 MMOL/L (ref 136–145)
TROPONIN I SERPL HS-MCNC: 5 NG/L (ref 0–51)
WBC # BLD AUTO: 9.4 K/UL (ref 3.6–11)

## 2023-12-24 PROCEDURE — 99285 EMERGENCY DEPT VISIT HI MDM: CPT

## 2023-12-24 PROCEDURE — 94762 N-INVAS EAR/PLS OXIMTRY CONT: CPT

## 2023-12-24 PROCEDURE — 36415 COLL VENOUS BLD VENIPUNCTURE: CPT

## 2023-12-24 PROCEDURE — 73502 X-RAY EXAM HIP UNI 2-3 VIEWS: CPT

## 2023-12-24 PROCEDURE — 80053 COMPREHEN METABOLIC PANEL: CPT

## 2023-12-24 PROCEDURE — 84484 ASSAY OF TROPONIN QUANT: CPT

## 2023-12-24 PROCEDURE — 70450 CT HEAD/BRAIN W/O DYE: CPT

## 2023-12-24 PROCEDURE — 71045 X-RAY EXAM CHEST 1 VIEW: CPT

## 2023-12-24 PROCEDURE — 85025 COMPLETE CBC W/AUTO DIFF WBC: CPT

## 2023-12-24 PROCEDURE — 93005 ELECTROCARDIOGRAM TRACING: CPT | Performed by: STUDENT IN AN ORGANIZED HEALTH CARE EDUCATION/TRAINING PROGRAM

## 2023-12-25 VITALS
BODY MASS INDEX: 36.19 KG/M2 | TEMPERATURE: 98.1 F | DIASTOLIC BLOOD PRESSURE: 96 MMHG | SYSTOLIC BLOOD PRESSURE: 161 MMHG | OXYGEN SATURATION: 100 % | RESPIRATION RATE: 18 BRPM | HEIGHT: 64 IN | HEART RATE: 66 BPM | WEIGHT: 212 LBS

## 2023-12-25 LAB — TROPONIN I SERPL HS-MCNC: 6 NG/L (ref 0–51)

## 2023-12-25 PROCEDURE — 36415 COLL VENOUS BLD VENIPUNCTURE: CPT

## 2023-12-25 PROCEDURE — 84484 ASSAY OF TROPONIN QUANT: CPT

## 2023-12-25 NOTE — ED NOTES
This writer spoke to pts Mother Ramon N[place]otte and caretaker Yvonne Dale with update on pt. Mother requested a callback with updates when possible.

## 2023-12-25 NOTE — ED NOTES
Assumed care of patient. Verbal  shift report received from [address] [place] [address] Seth RN at this time. Pt resting quietly at this time without complaint.

## 2023-12-25 NOTE — ED TRIAGE NOTES
Pt arrives via EMS from home for syncopal episode. EMS reports the pt was on the toilet when she passed out. Pt was seen to be hypotensive upon EMS arrival but more alert once they sat her up. Pt alert and oriented at this time. She states she was using the restroom when she felt lightheaded and passed out. Notes falling into the floor and is now c/o R side pain. Unknown if she hit her head. Denies any blood thinners.

## 2023-12-25 NOTE — ED NOTES
ED tech Lora Katz called to set up transport for patient home by stretcher. [facility] unable to pick patient up until closer to shift change due to high volume of transferred patients.

## 2023-12-25 NOTE — ED NOTES
Stretcher transport set up at this time. Trip number [id number]. Please call Lynda Reynolds (caregiver) [phone] when patient is picked up.

## 2023-12-25 NOTE — ED NOTES
Called patient's caretaker, Troy Thompson, to inform that patient would be discharged. Caretaker noted that she would have to be transported back by ambulance.

## 2023-12-25 NOTE — ED PROVIDER NOTES
[facility] EMERGENCY DEPT  EMERGENCY DEPARTMENT HISTORY AND PHYSICAL EXAM      Date: 2023  Patient Name: Micheline De La Paz  MRN: [id number]  [address] [facility] 1980  Date of evaluation: 2023  Provider: Juanito Alicia MD   Note Started: 10:27 PM EST 23    HISTORY OF PRESENT ILLNESS     Chief Complaint   Patient presents with    Loss of Consciousness       History Provided By: Patient    HPI: Micheline De La Paz is a 39 y.o. female with PMH of DM, HTN, CVA, seizure disorder, DONNA, and psychiatric disorders comes to the ED with syncope. Patient report that she was using the bathroom to micturate and while trying to micturate she felt faint and lost consciousness. She report that she hit her head. There is no preceding chest pain or shortness of breath. She is not feeling faint or lightheadedness. Secondary to her head injury she reports mild headache without specific revealing relieving factors. She reports also she is complaining of right hip pain secondary to the fall. She is able to move her head with some discomfort. Denies any abdominal pain. No recent change in her bowel, dater, renal habits from baseline. No other symptoms today.     PAST MEDICAL HISTORY   Past Medical History:  Past Medical History:   Diagnosis Date    Depression     Diabetes ([address])     Edema     Gout     Herpes genitalis     Hypertension     Ill-defined condition     gout    Mood disorder ([address])     Other ill-defined conditions(799.89)     cholesterol     Psychiatric disorder     depression    Psychiatric disorder     PTSD    PTSD (post-traumatic stress disorder)     PTSD (post-traumatic stress disorder) 2016    Seizures ([address]) 2023    Sleep disorder     Stroke ([address])     Type 2 diabetes mellitus without complication (HCC)     UTI (lower urinary tract infection)        Past Surgical History:  Past Surgical History:   Procedure Laterality Date    GYN       X 3       Family History:  Family History   Problem Relation

## 2023-12-26 LAB
EKG ATRIAL RATE: 80 BPM
EKG DIAGNOSIS: NORMAL
EKG P AXIS: 52 DEGREES
EKG P-R INTERVAL: 138 MS
EKG Q-T INTERVAL: 406 MS
EKG QRS DURATION: 82 MS
EKG QTC CALCULATION (BAZETT): 468 MS
EKG R AXIS: -35 DEGREES
EKG T AXIS: 49 DEGREES
EKG VENTRICULAR RATE: 80 BPM

## 2024-01-02 ENCOUNTER — OFFICE VISIT (OUTPATIENT)
Facility: CLINIC | Age: 44
End: 2024-01-02
Payer: MEDICAID

## 2024-01-02 VITALS
RESPIRATION RATE: 16 BRPM | SYSTOLIC BLOOD PRESSURE: 118 MMHG | DIASTOLIC BLOOD PRESSURE: 68 MMHG | HEIGHT: 64 IN | TEMPERATURE: 98 F | BODY MASS INDEX: 36.37 KG/M2 | HEART RATE: 78 BPM | OXYGEN SATURATION: 99 % | WEIGHT: 213 LBS

## 2024-01-02 DIAGNOSIS — R60.9 EDEMA, UNSPECIFIED TYPE: ICD-10-CM

## 2024-01-02 DIAGNOSIS — E78.5 HYPERLIPIDEMIA LDL GOAL <70: ICD-10-CM

## 2024-01-02 DIAGNOSIS — E11.649 TYPE 2 DIABETES MELLITUS WITH HYPOGLYCEMIA WITHOUT COMA, WITH LONG-TERM CURRENT USE OF INSULIN (HCC): Primary | ICD-10-CM

## 2024-01-02 DIAGNOSIS — R01.1 HEART MURMUR: ICD-10-CM

## 2024-01-02 DIAGNOSIS — F43.10 PTSD (POST-TRAUMATIC STRESS DISORDER): ICD-10-CM

## 2024-01-02 DIAGNOSIS — Z86.73 HISTORY OF TIA (TRANSIENT ISCHEMIC ATTACK): ICD-10-CM

## 2024-01-02 DIAGNOSIS — R56.9 SEIZURES (HCC): ICD-10-CM

## 2024-01-02 DIAGNOSIS — Z79.4 TYPE 2 DIABETES MELLITUS WITH HYPOGLYCEMIA WITHOUT COMA, WITH LONG-TERM CURRENT USE OF INSULIN (HCC): Primary | ICD-10-CM

## 2024-01-02 DIAGNOSIS — K21.9 GERD WITHOUT ESOPHAGITIS: ICD-10-CM

## 2024-01-02 DIAGNOSIS — J30.9 ALLERGIC RHINITIS, UNSPECIFIED SEASONALITY, UNSPECIFIED TRIGGER: ICD-10-CM

## 2024-01-02 DIAGNOSIS — Z23 NEED FOR IMMUNIZATION AGAINST INFLUENZA: ICD-10-CM

## 2024-01-02 DIAGNOSIS — I10 ESSENTIAL HYPERTENSION WITH GOAL BLOOD PRESSURE LESS THAN 130/85: ICD-10-CM

## 2024-01-02 DIAGNOSIS — Z79.4 TYPE 2 DIABETES MELLITUS WITH HYPOGLYCEMIA WITHOUT COMA, WITH LONG-TERM CURRENT USE OF INSULIN (HCC): ICD-10-CM

## 2024-01-02 DIAGNOSIS — Z01.818 PRE-OPERATIVE CLEARANCE: ICD-10-CM

## 2024-01-02 DIAGNOSIS — E11.649 TYPE 2 DIABETES MELLITUS WITH HYPOGLYCEMIA WITHOUT COMA, WITH LONG-TERM CURRENT USE OF INSULIN (HCC): ICD-10-CM

## 2024-01-02 LAB — GLUCOSE, POC: 213 MG/DL

## 2024-01-02 PROCEDURE — 3078F DIAST BP <80 MM HG: CPT

## 2024-01-02 PROCEDURE — 90674 CCIIV4 VAC NO PRSV 0.5 ML IM: CPT

## 2024-01-02 PROCEDURE — 3074F SYST BP LT 130 MM HG: CPT

## 2024-01-02 PROCEDURE — 90471 IMMUNIZATION ADMIN: CPT

## 2024-01-02 PROCEDURE — 99214 OFFICE O/P EST MOD 30 MIN: CPT

## 2024-01-02 PROCEDURE — 82962 GLUCOSE BLOOD TEST: CPT

## 2024-01-02 RX ORDER — SERTRALINE HYDROCHLORIDE 25 MG/1
TABLET, FILM COATED ORAL
Qty: 90 TABLET | Refills: 0 | Status: SHIPPED | OUTPATIENT
Start: 2024-01-02

## 2024-01-02 RX ORDER — CETIRIZINE HYDROCHLORIDE 10 MG/1
10 TABLET ORAL DAILY
Qty: 90 TABLET | Refills: 0 | Status: SHIPPED | OUTPATIENT
Start: 2024-01-02

## 2024-01-02 RX ORDER — PANTOPRAZOLE SODIUM 20 MG/1
20 TABLET, DELAYED RELEASE ORAL DAILY
Qty: 90 TABLET | Refills: 0 | Status: SHIPPED | OUTPATIENT
Start: 2024-01-02

## 2024-01-02 RX ORDER — LISINOPRIL 20 MG/1
20 TABLET ORAL DAILY
Qty: 90 TABLET | Refills: 0 | Status: SHIPPED | OUTPATIENT
Start: 2024-01-02

## 2024-01-02 RX ORDER — METFORMIN HYDROCHLORIDE 500 MG/1
500 TABLET, EXTENDED RELEASE ORAL DAILY
Qty: 90 TABLET | Refills: 0 | Status: SHIPPED | OUTPATIENT
Start: 2024-01-02

## 2024-01-02 RX ORDER — LEVETIRACETAM 100 MG/ML
SOLUTION ORAL
Qty: 180 ML | Refills: 0 | Status: SHIPPED | OUTPATIENT
Start: 2024-01-02

## 2024-01-02 RX ORDER — FUROSEMIDE 40 MG/1
40 TABLET ORAL DAILY
Qty: 90 TABLET | Refills: 0 | Status: SHIPPED | OUTPATIENT
Start: 2024-01-02

## 2024-01-02 RX ORDER — ATORVASTATIN CALCIUM 80 MG/1
80 TABLET, FILM COATED ORAL NIGHTLY
Qty: 90 TABLET | Refills: 0 | Status: SHIPPED | OUTPATIENT
Start: 2024-01-02

## 2024-01-02 RX ORDER — ASPIRIN 81 MG/1
TABLET, CHEWABLE ORAL
Qty: 90 TABLET | Refills: 0 | Status: SHIPPED | OUTPATIENT
Start: 2024-01-02

## 2024-01-02 ASSESSMENT — PATIENT HEALTH QUESTIONNAIRE - PHQ9
4. FEELING TIRED OR HAVING LITTLE ENERGY: 0
SUM OF ALL RESPONSES TO PHQ QUESTIONS 1-9: 0
7. TROUBLE CONCENTRATING ON THINGS, SUCH AS READING THE NEWSPAPER OR WATCHING TELEVISION: 0
10. IF YOU CHECKED OFF ANY PROBLEMS, HOW DIFFICULT HAVE THESE PROBLEMS MADE IT FOR YOU TO DO YOUR WORK, TAKE CARE OF THINGS AT HOME, OR GET ALONG WITH OTHER PEOPLE: 0
8. MOVING OR SPEAKING SO SLOWLY THAT OTHER PEOPLE COULD HAVE NOTICED. OR THE OPPOSITE, BEING SO FIGETY OR RESTLESS THAT YOU HAVE BEEN MOVING AROUND A LOT MORE THAN USUAL: 0
SUM OF ALL RESPONSES TO PHQ9 QUESTIONS 1 & 2: 0
3. TROUBLE FALLING OR STAYING ASLEEP: 0
2. FEELING DOWN, DEPRESSED OR HOPELESS: 0
5. POOR APPETITE OR OVEREATING: 0
SUM OF ALL RESPONSES TO PHQ QUESTIONS 1-9: 0
SUM OF ALL RESPONSES TO PHQ QUESTIONS 1-9: 0
6. FEELING BAD ABOUT YOURSELF - OR THAT YOU ARE A FAILURE OR HAVE LET YOURSELF OR YOUR FAMILY DOWN: 0
SUM OF ALL RESPONSES TO PHQ QUESTIONS 1-9: 0
1. LITTLE INTEREST OR PLEASURE IN DOING THINGS: 0
9. THOUGHTS THAT YOU WOULD BE BETTER OFF DEAD, OR OF HURTING YOURSELF: 0

## 2024-01-02 ASSESSMENT — ENCOUNTER SYMPTOMS
CHEST TIGHTNESS: 0
SHORTNESS OF BREATH: 0
BLOOD IN STOOL: 0
WHEEZING: 0
COUGH: 0
CONSTIPATION: 0
TROUBLE SWALLOWING: 0
EYE PAIN: 0
RHINORRHEA: 1
PHOTOPHOBIA: 0
NAUSEA: 0
VOMITING: 0
SORE THROAT: 0
BACK PAIN: 0
ABDOMINAL PAIN: 0
DIARRHEA: 0

## 2024-01-02 NOTE — PROGRESS NOTES
1. \"Have you been to the ER, urgent care clinic since your last visit?  Hospitalized since your last visit?\" Yes , [facility] , passed out , 1 month ago     2. \"Have you seen or consulted any other health care providers outside of the [facility] since your last visit?\" no     3. For patients aged 45-75: Has the patient had a colonoscopy / FIT/ Cologuard? No      If the patient is female:    4. For patients aged 40-74: Has the patient had a mammogram within the past 2 years? no      5. For patients aged 21-65: Has the patient had a pap smear? Yes    Pt is here for a follow up .     Chief Complaint   Patient presents with    Cholesterol Problem    Hypertension    Mood Swings    Follow-up     /68 (Site: Left Upper Arm, Position: Sitting, Cuff Size: Medium Adult)   Pulse 78   Temp 98 °F (36.7 °C) (Temporal)   Resp 16   Ht 1.626 m (5' 4\")   Wt 96.6 kg (213 lb)   SpO2 99%   BMI 36.56 kg/m²     
reviewing previous notes, test results and face to face with the patient discussing the diagnosis and importance of compliance with the treatment plan as well as documenting on the day of the visit.    Electronically Signed: Mary Alice Brewer PA-C  I  do attest that this note was reviewed and I was available for  SHELLEY Brewer on this day of service and will follow along with SHELLEY Howell MD       History/Allergies   Patients past medical, surgical and family histories were reviewed and updated.    Past Medical History:   Diagnosis Date    Depression     Diabetes (HCC)     Edema     Gout     Herpes genitalis     Hypertension     Ill-defined condition     gout    Mood disorder (HCC)     Other ill-defined conditions(799.89)     cholesterol     Psychiatric disorder     depression    Psychiatric disorder     PTSD    PTSD (post-traumatic stress disorder)     PTSD (post-traumatic stress disorder) 2016    Seizures (HCC) 2023    Sleep disorder     Stroke (HCC)     Type 2 diabetes mellitus without complication (HCC)     UTI (lower urinary tract infection)       Past Surgical History:   Procedure Laterality Date    GYN       X 3     Family History   Problem Relation Age of Onset    Hypertension Mother     Coronary Art Dis Mother     Diabetes Father     Stroke Father 51        pt estimates    Kidney Disease Father         secondary to diabetes    Breast Cancer Paternal Grandmother     Breast Cancer Cousin         numerous paternal cousins     Social History     Tobacco Use    Smoking status: Never    Smokeless tobacco: Never   Substance Use Topics    Alcohol use: No     Alcohol/week: 0.0 standard drinks of alcohol       No Known Allergies    Disposition   No follow-up provider specified.  Future Appointments   Date Time Provider Department Center   1/10/2024 11:15 AM Zacarias Ray DPM [facility] AMB   2024 10:45 AM Mary Varela MD [facility] AMB   4/3/2024 10:00 AM [facility]

## 2024-01-15 DIAGNOSIS — E78.5 HYPERLIPIDEMIA LDL GOAL <70: ICD-10-CM

## 2024-01-15 DIAGNOSIS — Z79.4 TYPE 2 DIABETES MELLITUS WITH HYPOGLYCEMIA WITHOUT COMA, WITH LONG-TERM CURRENT USE OF INSULIN (HCC): ICD-10-CM

## 2024-01-15 DIAGNOSIS — Z86.73 HISTORY OF TIA (TRANSIENT ISCHEMIC ATTACK): ICD-10-CM

## 2024-01-15 DIAGNOSIS — R60.9 EDEMA, UNSPECIFIED TYPE: ICD-10-CM

## 2024-01-15 DIAGNOSIS — E11.649 TYPE 2 DIABETES MELLITUS WITH HYPOGLYCEMIA WITHOUT COMA, WITH LONG-TERM CURRENT USE OF INSULIN (HCC): ICD-10-CM

## 2024-01-15 DIAGNOSIS — F43.10 PTSD (POST-TRAUMATIC STRESS DISORDER): ICD-10-CM

## 2024-01-15 DIAGNOSIS — E55.9 VITAMIN D DEFICIENCY: ICD-10-CM

## 2024-01-15 RX ORDER — METFORMIN HYDROCHLORIDE 500 MG/1
500 TABLET, EXTENDED RELEASE ORAL DAILY
Qty: 30 TABLET | Refills: 0 | OUTPATIENT
Start: 2024-01-15

## 2024-01-15 RX ORDER — ASPIRIN 81 MG/1
TABLET, CHEWABLE ORAL
Qty: 30 TABLET | Refills: 0 | OUTPATIENT
Start: 2024-01-15

## 2024-01-15 RX ORDER — SERTRALINE HYDROCHLORIDE 25 MG/1
TABLET, FILM COATED ORAL
Qty: 90 TABLET | Refills: 0 | OUTPATIENT
Start: 2024-01-15

## 2024-01-15 RX ORDER — FUROSEMIDE 40 MG/1
40 TABLET ORAL DAILY
Qty: 30 TABLET | Refills: 0 | OUTPATIENT
Start: 2024-01-15

## 2024-01-15 RX ORDER — ATORVASTATIN CALCIUM 80 MG/1
80 TABLET, FILM COATED ORAL NIGHTLY
Qty: 30 TABLET | Refills: 0 | OUTPATIENT
Start: 2024-01-15

## 2024-01-15 RX ORDER — ERGOCALCIFEROL 1.25 MG/1
CAPSULE ORAL
Qty: 4 CAPSULE | Refills: 0 | OUTPATIENT
Start: 2024-01-15

## 2024-01-16 ENCOUNTER — TELEPHONE (OUTPATIENT)
Facility: CLINIC | Age: 44
End: 2024-01-16

## 2024-01-19 ENCOUNTER — TELEPHONE (OUTPATIENT)
Facility: CLINIC | Age: 44
End: 2024-01-19

## 2024-01-22 DIAGNOSIS — E55.9 VITAMIN D DEFICIENCY: ICD-10-CM

## 2024-01-22 DIAGNOSIS — E78.5 HYPERLIPIDEMIA LDL GOAL <70: ICD-10-CM

## 2024-01-22 DIAGNOSIS — E11.649 TYPE 2 DIABETES MELLITUS WITH HYPOGLYCEMIA WITHOUT COMA, WITH LONG-TERM CURRENT USE OF INSULIN (HCC): ICD-10-CM

## 2024-01-22 DIAGNOSIS — Z79.4 TYPE 2 DIABETES MELLITUS WITH HYPOGLYCEMIA WITHOUT COMA, WITH LONG-TERM CURRENT USE OF INSULIN (HCC): ICD-10-CM

## 2024-01-22 DIAGNOSIS — Z86.73 HISTORY OF TIA (TRANSIENT ISCHEMIC ATTACK): ICD-10-CM

## 2024-01-22 DIAGNOSIS — R60.9 EDEMA, UNSPECIFIED TYPE: ICD-10-CM

## 2024-01-22 DIAGNOSIS — F43.10 PTSD (POST-TRAUMATIC STRESS DISORDER): ICD-10-CM

## 2024-01-22 RX ORDER — ERGOCALCIFEROL 1.25 MG/1
CAPSULE ORAL
Qty: 12 CAPSULE | Refills: 0 | Status: SHIPPED | OUTPATIENT
Start: 2024-01-22

## 2024-01-22 RX ORDER — ATORVASTATIN CALCIUM 80 MG/1
80 TABLET, FILM COATED ORAL NIGHTLY
Qty: 90 TABLET | Refills: 0 | Status: SHIPPED | OUTPATIENT
Start: 2024-01-22

## 2024-01-22 RX ORDER — FUROSEMIDE 40 MG/1
40 TABLET ORAL DAILY
Qty: 90 TABLET | Refills: 0 | Status: SHIPPED | OUTPATIENT
Start: 2024-01-22

## 2024-01-22 RX ORDER — SERTRALINE HYDROCHLORIDE 25 MG/1
TABLET, FILM COATED ORAL
Qty: 90 TABLET | Refills: 0 | Status: SHIPPED | OUTPATIENT
Start: 2024-01-22

## 2024-01-22 RX ORDER — METFORMIN HYDROCHLORIDE 500 MG/1
500 TABLET, EXTENDED RELEASE ORAL DAILY
Qty: 90 TABLET | Refills: 0 | Status: SHIPPED | OUTPATIENT
Start: 2024-01-22

## 2024-01-22 RX ORDER — ASPIRIN 81 MG/1
TABLET, CHEWABLE ORAL
Qty: 90 TABLET | Refills: 0 | Status: SHIPPED | OUTPATIENT
Start: 2024-01-22

## 2024-01-22 NOTE — TELEPHONE ENCOUNTER
[facility] script called its not cover the medication is expensive's wants to know if we can switch it something else more affordable.

## 2024-01-24 RX ORDER — PEN NEEDLE, DIABETIC, SAFETY 30 GX3/16"
NEEDLE, DISPOSABLE MISCELLANEOUS
Qty: 300 EACH | Refills: 3 | Status: SHIPPED | OUTPATIENT
Start: 2024-01-24

## 2024-01-24 RX ORDER — LANCETS 28 GAUGE
EACH MISCELLANEOUS
Qty: 300 EACH | Refills: 3 | Status: SHIPPED | OUTPATIENT
Start: 2024-01-24

## 2024-01-25 ENCOUNTER — PROCEDURE VISIT (OUTPATIENT)
Age: 44
End: 2024-01-25
Payer: MEDICAID

## 2024-01-25 VITALS — SYSTOLIC BLOOD PRESSURE: 115 MMHG | DIASTOLIC BLOOD PRESSURE: 71 MMHG

## 2024-01-25 DIAGNOSIS — Z30.431 IUD CHECK UP: ICD-10-CM

## 2024-01-25 DIAGNOSIS — Z76.89 ENCOUNTER FOR MENSTRUAL REGULATION: Primary | ICD-10-CM

## 2024-01-25 PROCEDURE — 76830 TRANSVAGINAL US NON-OB: CPT | Performed by: OBSTETRICS & GYNECOLOGY

## 2024-01-25 PROCEDURE — 58301 REMOVE INTRAUTERINE DEVICE: CPT | Performed by: OBSTETRICS & GYNECOLOGY

## 2024-01-25 PROCEDURE — 58300 INSERT INTRAUTERINE DEVICE: CPT | Performed by: OBSTETRICS & GYNECOLOGY

## 2024-01-25 RX ORDER — IBUPROFEN 800 MG/1
800 TABLET ORAL EVERY 8 HOURS PRN
Qty: 30 TABLET | Refills: 0 | Status: SHIPPED | OUTPATIENT
Start: 2024-01-25

## 2024-01-26 LAB
ALBUMIN SERPL-MCNC: 4.4 G/DL (ref 3.9–4.9)
ALBUMIN/GLOB SERPL: 1.7 {RATIO} (ref 1.2–2.2)
ALP SERPL-CCNC: 156 IU/L (ref 44–121)
ALT SERPL-CCNC: 8 IU/L (ref 0–32)
AST SERPL-CCNC: 14 IU/L (ref 0–40)
BILIRUB SERPL-MCNC: 0.2 MG/DL (ref 0–1.2)
BUN SERPL-MCNC: 20 MG/DL (ref 6–24)
BUN/CREAT SERPL: 19 (ref 9–23)
CALCIUM SERPL-MCNC: 9.3 MG/DL (ref 8.7–10.2)
CHLORIDE SERPL-SCNC: 100 MMOL/L (ref 96–106)
CHOLEST SERPL-MCNC: 161 MG/DL (ref 100–199)
CO2 SERPL-SCNC: 25 MMOL/L (ref 20–29)
CREAT SERPL-MCNC: 1.03 MG/DL (ref 0.57–1)
EGFRCR SERPLBLD CKD-EPI 2021: 69 ML/MIN/1.73
ERYTHROCYTE [DISTWIDTH] IN BLOOD BY AUTOMATED COUNT: 13.1 % (ref 11.7–15.4)
GLOBULIN SER CALC-MCNC: 2.6 G/DL (ref 1.5–4.5)
GLUCOSE SERPL-MCNC: 206 MG/DL (ref 70–99)
HBA1C MFR BLD: 6.8 % (ref 4.8–5.6)
HCT VFR BLD AUTO: 31.6 % (ref 34–46.6)
HDLC SERPL-MCNC: 49 MG/DL
HGB BLD-MCNC: 10 G/DL (ref 11.1–15.9)
LDLC SERPL CALC-MCNC: 89 MG/DL (ref 0–99)
MCH RBC QN AUTO: 26.2 PG (ref 26.6–33)
MCHC RBC AUTO-ENTMCNC: 31.6 G/DL (ref 31.5–35.7)
MCV RBC AUTO: 83 FL (ref 79–97)
PLATELET # BLD AUTO: 187 X10E3/UL (ref 150–450)
POTASSIUM SERPL-SCNC: 4.2 MMOL/L (ref 3.5–5.2)
PROT SERPL-MCNC: 7 G/DL (ref 6–8.5)
RBC # BLD AUTO: 3.81 X10E6/UL (ref 3.77–5.28)
SODIUM SERPL-SCNC: 144 MMOL/L (ref 134–144)
TRIGL SERPL-MCNC: 132 MG/DL (ref 0–149)
VLDLC SERPL CALC-MCNC: 23 MG/DL (ref 5–40)
WBC # BLD AUTO: 7.7 X10E3/UL (ref 3.4–10.8)

## 2024-02-01 NOTE — PROGRESS NOTES
years is currently staying with pt and helping her.      Social Determinants of Health     Financial Resource Strain: Low Risk  (6/8/2023)    Overall Financial Resource Strain (CARDIA)    • Difficulty of Paying Living Expenses: Not hard at all   Food Insecurity: Not on file (6/8/2023)   Transportation Needs: Unknown (6/8/2023)    PRAPARE - Transportation    • Lack of Transportation (Medical): Not on file    • Lack of Transportation (Non-Medical): No   Physical Activity: Not on file   Stress: Not on file   Social Connections: Not on file   Intimate Partner Violence: Not on file   Housing Stability: Unknown (6/8/2023)    Housing Stability Vital Sign    • Unable to Pay for Housing in the Last Year: Not on file    • Number of Places Lived in the Last Year: Not on file    • Unstable Housing in the Last Year: No         HPI  Patient presents for removal and reinsertion of IUD    Review of Systems   All other systems reviewed and are negative.       /71 (Site: Left Upper Arm, Position: Sitting, Cuff Size: Large Adult)   OBGyn Exam   PE:  Constitutional: General Appearance: healthy-appearing, well-nourished, well-developed, and well groomed.     Psychiatric: Orientation: to time, place, and person. Mood and Affect: normal mood and affect and appropriate and active and alert.     Abdomen: Auscultation/Inspection/Palpation: no tenderness and mass and non-distended. Hernia: none palpated.     Female Genitalia: Vulva: no masses, atrophy, or lesions.    Bladder/Urethra: no urethral discharge or mass and normal meatus and bladder non distended.     Vagina no tenderness, erythema, cystocele, rectocele, abnormal vaginal discharge, or vesicle(s) or ulcers.     Cervix: no discharge or cervical motion tenderness and grossly normal. IUD string not seen    Uterus: mobile, non-tender, and no uterine prolapse.     Adnexa/Parametria: no adnexal tenderness.         As IUD string was not seen at the cervix, patient was taken to the

## 2024-02-01 NOTE — RESULT ENCOUNTER NOTE
Please contact patient regarding labs results and recommendations. Thank you.     Dear Fern Jacobsen,    Thank you for completing your lab work. I have reviewed your lab results and interpretations are as follows:    1. A1c significantly improved. Be sure to monitor sugar and carb & continue medications    2. Anemia stable     3. Kidney function improving    4. Cholesterol elevating. Continue medications. Diet recommendations for high cholesterol:  Avoid sugary drinks (i.e. sodas), eat only 1 starch per meal, portion should be not more than 1 cup, avoid bad fats that are in anything deep fried, fast foods, eat good fats that are in avocado, nuts, fish, seafood, olive oil, eat lean meats

## 2024-02-15 DIAGNOSIS — F43.10 PTSD (POST-TRAUMATIC STRESS DISORDER): ICD-10-CM

## 2024-02-15 DIAGNOSIS — K21.9 GERD WITHOUT ESOPHAGITIS: ICD-10-CM

## 2024-02-15 DIAGNOSIS — I10 ESSENTIAL HYPERTENSION WITH GOAL BLOOD PRESSURE LESS THAN 130/85: ICD-10-CM

## 2024-02-15 RX ORDER — LISINOPRIL 20 MG/1
20 TABLET ORAL DAILY
Qty: 90 TABLET | Refills: 0 | Status: SHIPPED | OUTPATIENT
Start: 2024-02-15

## 2024-02-15 RX ORDER — SERTRALINE HYDROCHLORIDE 25 MG/1
TABLET, FILM COATED ORAL
Qty: 270 TABLET | Refills: 0 | Status: SHIPPED | OUTPATIENT
Start: 2024-02-15

## 2024-02-15 RX ORDER — PANTOPRAZOLE SODIUM 20 MG/1
20 TABLET, DELAYED RELEASE ORAL DAILY
Qty: 90 TABLET | Refills: 0 | Status: SHIPPED | OUTPATIENT
Start: 2024-02-15

## 2024-02-26 DIAGNOSIS — E11.649 TYPE 2 DIABETES MELLITUS WITH HYPOGLYCEMIA WITHOUT COMA, WITH LONG-TERM CURRENT USE OF INSULIN (HCC): ICD-10-CM

## 2024-02-26 DIAGNOSIS — Z79.4 TYPE 2 DIABETES MELLITUS WITH HYPOGLYCEMIA WITHOUT COMA, WITH LONG-TERM CURRENT USE OF INSULIN (HCC): ICD-10-CM

## 2024-02-27 RX ORDER — METFORMIN HYDROCHLORIDE 500 MG/1
TABLET, EXTENDED RELEASE ORAL
Qty: 31 TABLET | Refills: 0 | OUTPATIENT
Start: 2024-02-27

## 2024-03-04 DIAGNOSIS — E11.649 TYPE 2 DIABETES MELLITUS WITH HYPOGLYCEMIA WITHOUT COMA, WITH LONG-TERM CURRENT USE OF INSULIN (HCC): ICD-10-CM

## 2024-03-04 DIAGNOSIS — Z79.4 TYPE 2 DIABETES MELLITUS WITH HYPOGLYCEMIA WITHOUT COMA, WITH LONG-TERM CURRENT USE OF INSULIN (HCC): ICD-10-CM

## 2024-03-05 DIAGNOSIS — E11.649 TYPE 2 DIABETES MELLITUS WITH HYPOGLYCEMIA WITHOUT COMA, WITH LONG-TERM CURRENT USE OF INSULIN (HCC): ICD-10-CM

## 2024-03-05 DIAGNOSIS — Z79.4 TYPE 2 DIABETES MELLITUS WITH HYPOGLYCEMIA WITHOUT COMA, WITH LONG-TERM CURRENT USE OF INSULIN (HCC): ICD-10-CM

## 2024-03-05 RX ORDER — METFORMIN HYDROCHLORIDE 500 MG/1
TABLET, EXTENDED RELEASE ORAL
Qty: 31 TABLET | Refills: 0 | Status: SHIPPED | OUTPATIENT
Start: 2024-03-05

## 2024-03-05 RX ORDER — METFORMIN HYDROCHLORIDE 500 MG/1
TABLET, EXTENDED RELEASE ORAL
Qty: 31 TABLET | Refills: 0 | OUTPATIENT
Start: 2024-03-05

## 2024-03-19 DIAGNOSIS — E55.9 VITAMIN D DEFICIENCY: ICD-10-CM

## 2024-03-19 DIAGNOSIS — F43.10 PTSD (POST-TRAUMATIC STRESS DISORDER): ICD-10-CM

## 2024-03-19 RX ORDER — SERTRALINE HYDROCHLORIDE 25 MG/1
TABLET, FILM COATED ORAL
Qty: 90 TABLET | Refills: 0 | Status: SHIPPED | OUTPATIENT
Start: 2024-03-19

## 2024-03-19 RX ORDER — ERGOCALCIFEROL 1.25 MG/1
CAPSULE ORAL
Qty: 4 CAPSULE | Refills: 0 | Status: SHIPPED | OUTPATIENT
Start: 2024-03-19

## 2024-03-25 ENCOUNTER — HOSPITAL ENCOUNTER (EMERGENCY)
Facility: HOSPITAL | Age: 44
Discharge: HOME | End: 2024-03-25
Attending: EMERGENCY MEDICINE
Payer: MEDICAID

## 2024-03-25 VITALS
SYSTOLIC BLOOD PRESSURE: 118 MMHG | BODY MASS INDEX: 36.7 KG/M2 | DIASTOLIC BLOOD PRESSURE: 92 MMHG | HEIGHT: 64 IN | RESPIRATION RATE: 11 BRPM | TEMPERATURE: 98.2 F | OXYGEN SATURATION: 100 % | WEIGHT: 215 LBS | HEART RATE: 79 BPM

## 2024-03-25 DIAGNOSIS — R73.9 HYPERGLYCEMIA: Primary | ICD-10-CM

## 2024-03-25 LAB
ALBUMIN SERPL-MCNC: 4.2 G/DL (ref 3.5–5)
ALBUMIN/GLOB SERPL: 1.1 (ref 1.1–2.2)
ALP SERPL-CCNC: 204 U/L (ref 45–117)
ALT SERPL-CCNC: 15 U/L (ref 12–78)
ANION GAP SERPL CALC-SCNC: 3 MMOL/L (ref 5–15)
AST SERPL W P-5'-P-CCNC: 8 U/L (ref 15–37)
BASOPHILS # BLD: 0.1 K/UL (ref 0–0.1)
BASOPHILS NFR BLD: 1 % (ref 0–1)
BILIRUB SERPL-MCNC: 0.3 MG/DL (ref 0.2–1)
BUN SERPL-MCNC: 29 MG/DL (ref 6–20)
BUN/CREAT SERPL: 19 (ref 12–20)
CA-I BLD-MCNC: 9.6 MG/DL (ref 8.5–10.1)
CHLORIDE SERPL-SCNC: 103 MMOL/L (ref 97–108)
CO2 SERPL-SCNC: 35 MMOL/L (ref 21–32)
CREAT SERPL-MCNC: 1.52 MG/DL (ref 0.55–1.02)
DIFFERENTIAL METHOD BLD: ABNORMAL
EOSINOPHIL # BLD: 0.1 K/UL (ref 0–0.4)
EOSINOPHIL NFR BLD: 1 % (ref 0–7)
ERYTHROCYTE [DISTWIDTH] IN BLOOD BY AUTOMATED COUNT: 12.8 % (ref 11.5–14.5)
GLOBULIN SER CALC-MCNC: 3.7 G/DL (ref 2–4)
GLUCOSE BLD STRIP.AUTO-MCNC: 317 MG/DL (ref 65–100)
GLUCOSE SERPL-MCNC: 210 MG/DL (ref 65–100)
HCT VFR BLD AUTO: 33.3 % (ref 35–47)
HGB BLD-MCNC: 10.4 G/DL (ref 11.5–16)
IMM GRANULOCYTES # BLD AUTO: 0 K/UL (ref 0–0.04)
IMM GRANULOCYTES NFR BLD AUTO: 0 % (ref 0–0.5)
LYMPHOCYTES # BLD: 2.2 K/UL (ref 0.8–3.5)
LYMPHOCYTES NFR BLD: 25 % (ref 12–49)
MCH RBC QN AUTO: 26.6 PG (ref 26–34)
MCHC RBC AUTO-ENTMCNC: 31.2 G/DL (ref 30–36.5)
MCV RBC AUTO: 85.2 FL (ref 80–99)
MONOCYTES # BLD: 0.5 K/UL (ref 0–1)
MONOCYTES NFR BLD: 6 % (ref 5–13)
NEUTS SEG # BLD: 6 K/UL (ref 1.8–8)
NEUTS SEG NFR BLD: 67 % (ref 32–75)
NRBC # BLD: 0 K/UL (ref 0–0.01)
NRBC BLD-RTO: 0 PER 100 WBC
PERFORMED BY:: ABNORMAL
PLATELET # BLD AUTO: 194 K/UL (ref 150–400)
PMV BLD AUTO: 11.5 FL (ref 8.9–12.9)
POTASSIUM SERPL-SCNC: 3.8 MMOL/L (ref 3.5–5.1)
PROT SERPL-MCNC: 7.9 G/DL (ref 6.4–8.2)
RBC # BLD AUTO: 3.91 M/UL (ref 3.8–5.2)
SODIUM SERPL-SCNC: 141 MMOL/L (ref 136–145)
WBC # BLD AUTO: 8.8 K/UL (ref 3.6–11)

## 2024-03-25 PROCEDURE — 99284 EMERGENCY DEPT VISIT MOD MDM: CPT

## 2024-03-25 PROCEDURE — 96360 HYDRATION IV INFUSION INIT: CPT

## 2024-03-25 PROCEDURE — 80053 COMPREHEN METABOLIC PANEL: CPT

## 2024-03-25 PROCEDURE — 85025 COMPLETE CBC W/AUTO DIFF WBC: CPT

## 2024-03-25 PROCEDURE — 36415 COLL VENOUS BLD VENIPUNCTURE: CPT

## 2024-03-25 PROCEDURE — 82962 GLUCOSE BLOOD TEST: CPT

## 2024-03-25 PROCEDURE — 2580000003 HC RX 258: Performed by: EMERGENCY MEDICINE

## 2024-03-25 RX ORDER — INSULIN LISPRO 100 [IU]/ML
6 INJECTION, SOLUTION INTRAVENOUS; SUBCUTANEOUS
Status: DISCONTINUED | OUTPATIENT
Start: 2024-03-25 | End: 2024-03-25

## 2024-03-25 RX ORDER — 0.9 % SODIUM CHLORIDE 0.9 %
1000 INTRAVENOUS SOLUTION INTRAVENOUS ONCE
Status: COMPLETED | OUTPATIENT
Start: 2024-03-25 | End: 2024-03-25

## 2024-03-25 RX ADMIN — SODIUM CHLORIDE 1000 ML: 9 INJECTION, SOLUTION INTRAVENOUS at 15:26

## 2024-03-25 ASSESSMENT — PAIN - FUNCTIONAL ASSESSMENT
PAIN_FUNCTIONAL_ASSESSMENT: 0-10
PAIN_FUNCTIONAL_ASSESSMENT: NONE - DENIES PAIN

## 2024-03-25 NOTE — ED PROVIDER NOTES
computer software. Please disregards these errors. Please excuse any errors that have escaped final proofreading.)      Martinez Miller MD  03/25/24 [id number]

## 2024-03-25 NOTE — DISCHARGE INSTRUCTIONS
New Plan per Endocrinology note after visit on 3/8     -- INCREASE trulicity to 1.5 mg once weekly. Advised on side effect profile.  -- DISCONTINUE Humalog 8 units with meals  -- START Humalog on a \"sliding scale\" as below:  If blood sugar before a meal is < 200: give 0 units   If blood sugar before a meal is 200-249: give 2 units  If blood sugar before a meal is 250-299: give 4 units  If blood sugar before a meal is > 300: give 6 units

## 2024-03-30 ENCOUNTER — APPOINTMENT (OUTPATIENT)
Facility: HOSPITAL | Age: 44
End: 2024-03-30
Payer: MEDICAID

## 2024-03-30 ENCOUNTER — HOSPITAL ENCOUNTER (EMERGENCY)
Facility: HOSPITAL | Age: 44
Discharge: HOME | End: 2024-03-30
Attending: STUDENT IN AN ORGANIZED HEALTH CARE EDUCATION/TRAINING PROGRAM
Payer: MEDICAID

## 2024-03-30 VITALS
SYSTOLIC BLOOD PRESSURE: 122 MMHG | WEIGHT: 214.95 LBS | DIASTOLIC BLOOD PRESSURE: 70 MMHG | OXYGEN SATURATION: 99 % | HEIGHT: 64 IN | HEART RATE: 78 BPM | RESPIRATION RATE: 18 BRPM | TEMPERATURE: 97.9 F | BODY MASS INDEX: 36.7 KG/M2
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
## 2024-03-30 PROCEDURE — 70450 CT HEAD/BRAIN W/O DYE: CPT

## 2024-03-30 PROCEDURE — 99284 EMERGENCY DEPT VISIT MOD MDM: CPT

## 2024-03-30 PROCEDURE — 72170 X-RAY EXAM OF PELVIS: CPT

## 2024-03-30 PROCEDURE — 73080 X-RAY EXAM OF ELBOW: CPT

## 2024-03-30 PROCEDURE — 72131 CT LUMBAR SPINE W/O DYE: CPT

## 2024-03-30 PROCEDURE — 73030 X-RAY EXAM OF SHOULDER: CPT

## 2024-03-30 PROCEDURE — 6370000000 HC RX 637 (ALT 250 FOR IP): Performed by: STUDENT IN AN ORGANIZED HEALTH CARE EDUCATION/TRAINING PROGRAM

## 2024-03-30 RX ORDER — LIDOCAINE 50 MG/G
1 PATCH TOPICAL DAILY
Qty: 10 PATCH | Refills: 0 | Status: SHIPPED | OUTPATIENT
Start: 2024-03-30 | End: 2024-04-09

## 2024-03-30 RX ORDER — ACETAMINOPHEN 160 MG/5ML
650 LIQUID ORAL
Status: COMPLETED | OUTPATIENT
Start: 2024-03-30 | End: 2024-03-30

## 2024-03-30 RX ORDER — ACETAMINOPHEN 160 MG/5ML
650 SUSPENSION ORAL EVERY 6 HOURS PRN
Qty: 473 ML | Refills: 3 | Status: SHIPPED | OUTPATIENT
Start: 2024-03-30 | End: 2024-04-13

## 2024-03-30 RX ADMIN — ACETAMINOPHEN 650 MG: 650 SOLUTION ORAL at 08:44

## 2024-03-30 ASSESSMENT — PAIN SCALES - GENERAL
PAINLEVEL_OUTOF10: 0
PAINLEVEL_OUTOF10: 7

## 2024-03-30 ASSESSMENT — PAIN - FUNCTIONAL ASSESSMENT: PAIN_FUNCTIONAL_ASSESSMENT: 0-10

## 2024-03-30 ASSESSMENT — PAIN DESCRIPTION - ORIENTATION: ORIENTATION: RIGHT

## 2024-03-30 ASSESSMENT — PAIN DESCRIPTION - LOCATION: LOCATION: SHOULDER

## 2024-03-30 ASSESSMENT — PAIN DESCRIPTION - PAIN TYPE: TYPE: ACUTE PAIN

## 2024-03-30 ASSESSMENT — PAIN DESCRIPTION - DESCRIPTORS: DESCRIPTORS: ACHING

## 2024-03-30 NOTE — ED PROVIDER NOTES
[facility] EMERGENCY DEPT  EMERGENCY DEPARTMENT HISTORY AND PHYSICAL EXAM      Date: 3/30/2024  Patient Name: Fern Jacobsen  MRN: [id number]  YOB: 1980  Date of evaluation: 3/30/2024  Provider: Dixon García MD   Note Started: 7:19 AM EDT 3/30/24    HISTORY OF PRESENT ILLNESS     Chief Complaint   Patient presents with    Fall     GLF       History Provided By: Patient    HPI: Fern Jacobsen is a 43 y.o. female history of previous CVA with residual right-sided deficits presents after ground-level fall.  Patient states she was up early in the morning after her alarm went off.  Went to sit back in the bed but fell forward, striking her head and her right upper extremity on the dresser and wall.  She reports headache, right elbow pain, and low back pain.  She is unsure if she lost consciousness.    PAST MEDICAL HISTORY   Past Medical History:  Past Medical History:   Diagnosis Date    Depression     Diabetes (HCC)     Edema     Gout     Herpes genitalis     Hypertension     Ill-defined condition     gout    Mood disorder (HCC)     Other ill-defined conditions(799.89)     cholesterol     Psychiatric disorder     depression    Psychiatric disorder     PTSD    PTSD (post-traumatic stress disorder)     PTSD (post-traumatic stress disorder) 2016    Seizures (HCC) 2023    Sleep disorder     Stroke (HCC)     Type 2 diabetes mellitus without complication (HCC)     UTI (lower urinary tract infection)        Past Surgical History:  Past Surgical History:   Procedure Laterality Date    GYN       X 3       Family History:  Family History   Problem Relation Age of Onset    Hypertension Mother     Coronary Art Dis Mother     Diabetes Father     Stroke Father 51        pt estimates    Kidney Disease Father         secondary to diabetes    Breast Cancer Paternal Grandmother     Breast Cancer Cousin         numerous paternal cousins       Social History:  Social History     Tobacco Use    Smoking

## 2024-03-30 NOTE — ED TRIAGE NOTES
BIB EMS for GLF today, unwitnessed fall. She states she went to turn off her alarm when she lost her balance and fell on her right side. Hit her head on the wall   (+) on ASA, (-) LOC.  Now complaining of right shoulder pain, lower back pain PS 7/10    Hx CVA

## 2024-03-30 NOTE — DISCHARGE INSTRUCTIONS
Thank you!  Thank you for allowing me to care for you in the emergency department. It is my goal to provide you with excellent care.  Please fill out the survey that will come to you by mail or email since we listen to your feedback!     Below you will find a list of your tests from today's visit.  Should you have any questions, please do not hesitate to call the emergency department.    Labs  No results found for this or any previous visit (from the past 12 hour(s)).    Radiologic Studies  CT HEAD WO CONTRAST   Final Result   No interval change.      XR ELBOW RIGHT (MIN 3 VIEWS)   Final Result   No acute fracture or dislocation.         XR SHOULDER RIGHT (MIN 2 VIEWS)   Final Result   No acute process.         XR PELVIS (1-2 VIEWS)   Final Result   No acute fracture.         CT LUMBAR SPINE WO CONTRAST    (Results Pending)     ------------------------------------------------------------------------------------------------------------  The exam and treatment you received in the Emergency Department were for an urgent problem and are not intended as complete care. It is important that you follow-up with a doctor, nurse practitioner, or physician assistant to:  (1) confirm your diagnosis,  (2) re-evaluation of changes in your illness and treatment, and (3) for ongoing care. Please take your discharge instructions with you when you go to your follow-up appointment.     If you have any problem arranging a follow-up appointment, contact the Emergency Department.  If your symptoms become worse or you do not improve as expected and you are unable to reach your health care provider, please return to the Emergency Department. We are available 24 hours a day.

## 2024-04-03 ENCOUNTER — OFFICE VISIT (OUTPATIENT)
Facility: CLINIC | Age: 44
End: 2024-04-03
Payer: MEDICAID

## 2024-04-03 VITALS
TEMPERATURE: 98.2 F | WEIGHT: 211.4 LBS | HEART RATE: 75 BPM | DIASTOLIC BLOOD PRESSURE: 72 MMHG | OXYGEN SATURATION: 98 % | HEIGHT: 64 IN | SYSTOLIC BLOOD PRESSURE: 120 MMHG | BODY MASS INDEX: 36.09 KG/M2

## 2024-04-03 DIAGNOSIS — M25.512 CHRONIC LEFT SHOULDER PAIN: ICD-10-CM

## 2024-04-03 DIAGNOSIS — E66.01 SEVERE OBESITY (BMI 35.0-39.9) WITH COMORBIDITY (HCC): ICD-10-CM

## 2024-04-03 DIAGNOSIS — F43.10 PTSD (POST-TRAUMATIC STRESS DISORDER): ICD-10-CM

## 2024-04-03 DIAGNOSIS — Z00.00 INITIAL MEDICARE ANNUAL WELLNESS VISIT: Primary | ICD-10-CM

## 2024-04-03 DIAGNOSIS — R32 URINARY INCONTINENCE, UNSPECIFIED TYPE: ICD-10-CM

## 2024-04-03 DIAGNOSIS — Z91.81 AT RISK FOR FALLS: ICD-10-CM

## 2024-04-03 DIAGNOSIS — E78.5 HYPERLIPIDEMIA LDL GOAL <70: ICD-10-CM

## 2024-04-03 DIAGNOSIS — R56.9 SEIZURES (HCC): ICD-10-CM

## 2024-04-03 DIAGNOSIS — G81.90 HEMIPARESIS, UNSPECIFIED HEMIPARESIS ETIOLOGY, UNSPECIFIED LATERALITY (HCC): ICD-10-CM

## 2024-04-03 DIAGNOSIS — E55.9 VITAMIN D DEFICIENCY: ICD-10-CM

## 2024-04-03 DIAGNOSIS — E11.49 DIABETES MELLITUS TYPE 2 WITH NEUROLOGICAL MANIFESTATIONS (HCC): ICD-10-CM

## 2024-04-03 DIAGNOSIS — G89.29 CHRONIC LEFT SHOULDER PAIN: ICD-10-CM

## 2024-04-03 DIAGNOSIS — F33.0 MAJOR DEPRESSIVE DISORDER, RECURRENT, MILD (HCC): ICD-10-CM

## 2024-04-03 DIAGNOSIS — I10 ESSENTIAL HYPERTENSION WITH GOAL BLOOD PRESSURE LESS THAN 130/85: ICD-10-CM

## 2024-04-03 DIAGNOSIS — J30.9 ALLERGIC RHINITIS, UNSPECIFIED SEASONALITY, UNSPECIFIED TRIGGER: ICD-10-CM

## 2024-04-03 DIAGNOSIS — R60.9 EDEMA, UNSPECIFIED TYPE: ICD-10-CM

## 2024-04-03 DIAGNOSIS — F60.9 PERSONALITY DISORDER (HCC): ICD-10-CM

## 2024-04-03 DIAGNOSIS — Z86.73 HISTORY OF TIA (TRANSIENT ISCHEMIC ATTACK): ICD-10-CM

## 2024-04-03 DIAGNOSIS — K21.9 GERD WITHOUT ESOPHAGITIS: ICD-10-CM

## 2024-04-03 PROCEDURE — 1036F TOBACCO NON-USER: CPT

## 2024-04-03 PROCEDURE — 3044F HG A1C LEVEL LT 7.0%: CPT

## 2024-04-03 PROCEDURE — 2022F DILAT RTA XM EVC RTNOPTHY: CPT

## 2024-04-03 PROCEDURE — 3078F DIAST BP <80 MM HG: CPT

## 2024-04-03 PROCEDURE — 99214 OFFICE O/P EST MOD 30 MIN: CPT

## 2024-04-03 PROCEDURE — G0438 PPPS, INITIAL VISIT: HCPCS

## 2024-04-03 PROCEDURE — G8427 DOCREV CUR MEDS BY ELIG CLIN: HCPCS

## 2024-04-03 PROCEDURE — G8417 CALC BMI ABV UP PARAM F/U: HCPCS

## 2024-04-03 PROCEDURE — 3074F SYST BP LT 130 MM HG: CPT

## 2024-04-03 RX ORDER — LIDOCAINE 4 G/G
PATCH TOPICAL
Qty: 90 PATCH | Refills: 1 | Status: SHIPPED | OUTPATIENT
Start: 2024-04-03

## 2024-04-03 RX ORDER — ERGOCALCIFEROL 1.25 MG/1
CAPSULE ORAL
Qty: 12 CAPSULE | Refills: 1 | Status: SHIPPED | OUTPATIENT
Start: 2024-04-03

## 2024-04-03 RX ORDER — ATORVASTATIN CALCIUM 80 MG/1
80 TABLET, FILM COATED ORAL NIGHTLY
Qty: 90 TABLET | Refills: 1 | Status: SHIPPED | OUTPATIENT
Start: 2024-04-03

## 2024-04-03 RX ORDER — LEVETIRACETAM 100 MG/ML
SOLUTION ORAL
Qty: 180 ML | Refills: 1 | Status: SHIPPED | OUTPATIENT
Start: 2024-04-03

## 2024-04-03 RX ORDER — FUROSEMIDE 40 MG/1
40 TABLET ORAL DAILY
Qty: 90 TABLET | Refills: 1 | Status: SHIPPED | OUTPATIENT
Start: 2024-04-03

## 2024-04-03 RX ORDER — LISINOPRIL 20 MG/1
20 TABLET ORAL DAILY
Qty: 90 TABLET | Refills: 1 | Status: SHIPPED | OUTPATIENT
Start: 2024-04-03

## 2024-04-03 RX ORDER — ASPIRIN 81 MG/1
TABLET, CHEWABLE ORAL
Qty: 90 TABLET | Refills: 1 | Status: SHIPPED | OUTPATIENT
Start: 2024-04-03

## 2024-04-03 RX ORDER — SERTRALINE HYDROCHLORIDE 25 MG/1
75 TABLET, FILM COATED ORAL DAILY
Qty: 270 TABLET | Refills: 1 | Status: SHIPPED | OUTPATIENT
Start: 2024-04-03

## 2024-04-03 RX ORDER — PANTOPRAZOLE SODIUM 20 MG/1
20 TABLET, DELAYED RELEASE ORAL DAILY
Qty: 90 TABLET | Refills: 1 | Status: SHIPPED | OUTPATIENT
Start: 2024-04-03

## 2024-04-03 RX ORDER — CETIRIZINE HYDROCHLORIDE 10 MG/1
10 TABLET ORAL DAILY
Qty: 90 TABLET | Refills: 1 | Status: SHIPPED | OUTPATIENT
Start: 2024-04-03

## 2024-04-03 ASSESSMENT — PATIENT HEALTH QUESTIONNAIRE - PHQ9
8. MOVING OR SPEAKING SO SLOWLY THAT OTHER PEOPLE COULD HAVE NOTICED. OR THE OPPOSITE, BEING SO FIGETY OR RESTLESS THAT YOU HAVE BEEN MOVING AROUND A LOT MORE THAN USUAL: NOT AT ALL
9. THOUGHTS THAT YOU WOULD BE BETTER OFF DEAD, OR OF HURTING YOURSELF: NOT AT ALL
6. FEELING BAD ABOUT YOURSELF - OR THAT YOU ARE A FAILURE OR HAVE LET YOURSELF OR YOUR FAMILY DOWN: NOT AT ALL
4. FEELING TIRED OR HAVING LITTLE ENERGY: NOT AT ALL
1. LITTLE INTEREST OR PLEASURE IN DOING THINGS: NOT AT ALL
7. TROUBLE CONCENTRATING ON THINGS, SUCH AS READING THE NEWSPAPER OR WATCHING TELEVISION: NOT AT ALL
SUM OF ALL RESPONSES TO PHQ QUESTIONS 1-9: 0
2. FEELING DOWN, DEPRESSED OR HOPELESS: NOT AT ALL
10. IF YOU CHECKED OFF ANY PROBLEMS, HOW DIFFICULT HAVE THESE PROBLEMS MADE IT FOR YOU TO DO YOUR WORK, TAKE CARE OF THINGS AT HOME, OR GET ALONG WITH OTHER PEOPLE: NOT DIFFICULT AT ALL
SUM OF ALL RESPONSES TO PHQ9 QUESTIONS 1 & 2: 0
SUM OF ALL RESPONSES TO PHQ QUESTIONS 1-9: 0
SUM OF ALL RESPONSES TO PHQ QUESTIONS 1-9: 0
5. POOR APPETITE OR OVEREATING: NOT AT ALL
SUM OF ALL RESPONSES TO PHQ QUESTIONS 1-9: 0
3. TROUBLE FALLING OR STAYING ASLEEP: NOT AT ALL

## 2024-04-03 NOTE — PROGRESS NOTES
Chief Complaint   Patient presents with    Medicare AWV     /72 (Site: Left Upper Arm, Position: Sitting, Cuff Size: Large Adult)   Pulse 75   Temp 98.2 °F (36.8 °C) (Oral)   Ht 1.626 m (5' 4\")   Wt 95.9 kg (211 lb 6.4 oz)   LMP 02/01/2024 (Approximate)   SpO2 98%   BMI 36.29 kg/m²       \"Have you been to the ER, urgent care clinic since your last visit?  Hospitalized since your last visit?\"    YES - When: approximately 4 days ago.  Where and Why: [facility]. fall.    “Have you seen or consulted any other health care providers outside of [facility] since your last visit?”    NO            Click Here for Release of Records Request   No data recorded       
  metFORMIN (GLUCOPHAGE-XR) 500 MG extended release tablet TAKE 1 TABLET BY MOUTH DAILY FOR DM Yes Mary Alice Brewer PA-C   ibuprofen (ADVIL;MOTRIN) 800 MG tablet Take 1 tablet by mouth every 8 hours as needed for Pain Yes Mary Varela MD   Easy Touch Lancets 28G MISC USE TO CHECK BLOOD SUGAR FOR DIABETIC MONITORING. Yes Mary Alice Brewer PA-C   Insulin Pen Needle (BD AUTOSHIELD DUO) 30G X 5 MM MISC USE TO INJECT INSULINS AS PRESCRIBED. Yes Mary Alice Brewer PA-C   dapagliflozin (FARXIGA) 10 MG tablet Take 1 tablet by mouth every morning For diabetes Yes Mary Alice Brewer PA-C   insulin lispro, 1 Unit Dial, (HUMALOG/ADMELOG) 100 UNIT/ML SOPN Inject 8 Units into the skin 3 times daily (before meals) Yes Mary Alice Brewer PA-C   TRULICITY 0.75 MG/0.5ML SOPN INJ IM once weekly Yes Mary Alice Brewer PA-C   lidocaine (LIDODERM) 5 % Place 1 patch onto the skin daily for 10 days 12 hours on, 12 hours off.  Patient not taking: Reported on 4/3/2024  Dixon García MD   insulin glargine (LANTUS) 100 UNIT/ML injection vial Inject 45 Units into the skin nightly  Patient not taking: Reported on 4/3/2024  Automatic Reconciliation, Ar       CareTeam (Including outside providers/suppliers regularly involved in providing care):   Patient Care Team:  Mary Alice Brewer PA-C as PCP - General (Physician Assistant)  Mary Alice Brewer PA-C as PCP - Empaneled Provider     Reviewed and updated this visit:  Tobacco  Allergies  Meds  Problems  Med Hx  Surg Hx  Soc Hx  Fam Hx      I  do attest that this note was reviewed and I was available for  SHELLEY Brewer on this day of service and will follow along with SHELLEY Howell MD

## 2024-04-03 NOTE — PATIENT INSTRUCTIONS
.  Order or download the FREE \"Exercise & Physical Activity: Your Everyday Guide\" from [employer] [place]. Call 1-457.753.6108 or search [employer] [place] [employer] online.  You need 0712-3407 mg of calcium and 1255-9603 IU of vitamin D per day. It is possible to meet your calcium requirement with diet alone, but a vitamin D supplement is usually necessary to meet this goal.  When exposed to the sun, use a sunscreen that protects against both UVA and UVB radiation with an SPF of 30 or greater. Reapply every 2 to 3 hours or after sweating, drying off with a towel, or swimming.  Always wear a seat belt when traveling in a car. Always wear a helmet when riding a bicycle or motorcycle.

## 2024-04-09 DIAGNOSIS — Z79.4 TYPE 2 DIABETES MELLITUS WITH HYPOGLYCEMIA WITHOUT COMA, WITH LONG-TERM CURRENT USE OF INSULIN (HCC): ICD-10-CM

## 2024-04-09 DIAGNOSIS — E11.649 TYPE 2 DIABETES MELLITUS WITH HYPOGLYCEMIA WITHOUT COMA, WITH LONG-TERM CURRENT USE OF INSULIN (HCC): ICD-10-CM

## 2024-04-09 DIAGNOSIS — E78.5 HYPERLIPIDEMIA LDL GOAL <70: ICD-10-CM

## 2024-04-09 DIAGNOSIS — F43.10 PTSD (POST-TRAUMATIC STRESS DISORDER): ICD-10-CM

## 2024-04-09 DIAGNOSIS — Z86.73 HISTORY OF TIA (TRANSIENT ISCHEMIC ATTACK): ICD-10-CM

## 2024-04-09 DIAGNOSIS — E55.9 VITAMIN D DEFICIENCY: ICD-10-CM

## 2024-04-09 DIAGNOSIS — R60.9 EDEMA, UNSPECIFIED TYPE: ICD-10-CM

## 2024-04-09 RX ORDER — ERGOCALCIFEROL 1.25 MG/1
CAPSULE ORAL
Qty: 4 CAPSULE | Refills: 0 | OUTPATIENT
Start: 2024-04-09

## 2024-04-09 RX ORDER — SERTRALINE HYDROCHLORIDE 25 MG/1
TABLET, FILM COATED ORAL
Qty: 90 TABLET | Refills: 0 | OUTPATIENT
Start: 2024-04-09

## 2024-04-09 RX ORDER — METFORMIN HYDROCHLORIDE 500 MG/1
500 TABLET, EXTENDED RELEASE ORAL DAILY
Qty: 30 TABLET | Refills: 0 | OUTPATIENT
Start: 2024-04-09

## 2024-04-09 RX ORDER — METFORMIN HYDROCHLORIDE 500 MG/1
TABLET, EXTENDED RELEASE ORAL
Qty: 90 TABLET | Refills: 1 | Status: SHIPPED | OUTPATIENT
Start: 2024-04-09

## 2024-04-09 RX ORDER — ATORVASTATIN CALCIUM 80 MG/1
80 TABLET, FILM COATED ORAL NIGHTLY
Qty: 30 TABLET | Refills: 0 | OUTPATIENT
Start: 2024-04-09

## 2024-04-09 RX ORDER — FUROSEMIDE 40 MG/1
40 TABLET ORAL DAILY
Qty: 30 TABLET | Refills: 0 | OUTPATIENT
Start: 2024-04-09

## 2024-04-09 RX ORDER — ASPIRIN 81 MG/1
TABLET, CHEWABLE ORAL
Qty: 30 TABLET | Refills: 0 | OUTPATIENT
Start: 2024-04-09

## 2024-04-11 DIAGNOSIS — E55.9 VITAMIN D DEFICIENCY: ICD-10-CM

## 2024-04-11 DIAGNOSIS — E11.649 TYPE 2 DIABETES MELLITUS WITH HYPOGLYCEMIA WITHOUT COMA, WITH LONG-TERM CURRENT USE OF INSULIN (HCC): ICD-10-CM

## 2024-04-11 DIAGNOSIS — E78.5 HYPERLIPIDEMIA LDL GOAL <70: ICD-10-CM

## 2024-04-11 DIAGNOSIS — F43.10 PTSD (POST-TRAUMATIC STRESS DISORDER): ICD-10-CM

## 2024-04-11 DIAGNOSIS — Z86.73 HISTORY OF TIA (TRANSIENT ISCHEMIC ATTACK): ICD-10-CM

## 2024-04-11 DIAGNOSIS — R60.9 EDEMA, UNSPECIFIED TYPE: ICD-10-CM

## 2024-04-11 DIAGNOSIS — Z79.4 TYPE 2 DIABETES MELLITUS WITH HYPOGLYCEMIA WITHOUT COMA, WITH LONG-TERM CURRENT USE OF INSULIN (HCC): ICD-10-CM

## 2024-04-11 RX ORDER — FUROSEMIDE 40 MG/1
40 TABLET ORAL DAILY
Qty: 30 TABLET | Refills: 0 | OUTPATIENT
Start: 2024-04-11

## 2024-04-11 RX ORDER — ERGOCALCIFEROL 1.25 MG/1
CAPSULE ORAL
Qty: 4 CAPSULE | Refills: 0 | OUTPATIENT
Start: 2024-04-11

## 2024-04-11 RX ORDER — ASPIRIN 81 MG/1
TABLET, CHEWABLE ORAL
Qty: 30 TABLET | Refills: 0 | OUTPATIENT
Start: 2024-04-11

## 2024-04-11 RX ORDER — METFORMIN HYDROCHLORIDE 500 MG/1
TABLET, EXTENDED RELEASE ORAL
Qty: 30 TABLET | Refills: 0 | OUTPATIENT
Start: 2024-04-11

## 2024-04-11 RX ORDER — ATORVASTATIN CALCIUM 80 MG/1
80 TABLET, FILM COATED ORAL NIGHTLY
Qty: 30 TABLET | Refills: 0 | OUTPATIENT
Start: 2024-04-11

## 2024-04-11 RX ORDER — SERTRALINE HYDROCHLORIDE 25 MG/1
TABLET, FILM COATED ORAL
Qty: 90 TABLET | Refills: 0 | OUTPATIENT
Start: 2024-04-11

## 2024-04-15 DIAGNOSIS — E78.5 HYPERLIPIDEMIA LDL GOAL <70: ICD-10-CM

## 2024-04-15 DIAGNOSIS — Z79.4 TYPE 2 DIABETES MELLITUS WITH HYPOGLYCEMIA WITHOUT COMA, WITH LONG-TERM CURRENT USE OF INSULIN (HCC): ICD-10-CM

## 2024-04-15 DIAGNOSIS — R60.9 EDEMA, UNSPECIFIED TYPE: ICD-10-CM

## 2024-04-15 DIAGNOSIS — E11.649 TYPE 2 DIABETES MELLITUS WITH HYPOGLYCEMIA WITHOUT COMA, WITH LONG-TERM CURRENT USE OF INSULIN (HCC): ICD-10-CM

## 2024-04-15 DIAGNOSIS — Z86.73 HISTORY OF TIA (TRANSIENT ISCHEMIC ATTACK): ICD-10-CM

## 2024-04-15 DIAGNOSIS — E55.9 VITAMIN D DEFICIENCY: ICD-10-CM

## 2024-04-15 RX ORDER — ATORVASTATIN CALCIUM 80 MG/1
80 TABLET, FILM COATED ORAL NIGHTLY
Qty: 30 TABLET | Refills: 0 | OUTPATIENT
Start: 2024-04-15

## 2024-04-15 RX ORDER — ERGOCALCIFEROL 1.25 MG/1
CAPSULE ORAL
Qty: 4 CAPSULE | Refills: 0 | OUTPATIENT
Start: 2024-04-15

## 2024-04-15 RX ORDER — METFORMIN HYDROCHLORIDE 500 MG/1
TABLET, EXTENDED RELEASE ORAL
Qty: 30 TABLET | Refills: 0 | OUTPATIENT
Start: 2024-04-15

## 2024-04-15 RX ORDER — ASPIRIN 81 MG/1
TABLET, CHEWABLE ORAL
Qty: 30 TABLET | Refills: 0 | OUTPATIENT
Start: 2024-04-15

## 2024-04-15 RX ORDER — FUROSEMIDE 40 MG/1
40 TABLET ORAL DAILY
Qty: 30 TABLET | Refills: 0 | OUTPATIENT
Start: 2024-04-15

## 2024-04-19 DIAGNOSIS — E78.5 HYPERLIPIDEMIA LDL GOAL <70: ICD-10-CM

## 2024-04-19 DIAGNOSIS — E11.649 TYPE 2 DIABETES MELLITUS WITH HYPOGLYCEMIA WITHOUT COMA, WITH LONG-TERM CURRENT USE OF INSULIN (HCC): ICD-10-CM

## 2024-04-19 DIAGNOSIS — Z79.4 TYPE 2 DIABETES MELLITUS WITH HYPOGLYCEMIA WITHOUT COMA, WITH LONG-TERM CURRENT USE OF INSULIN (HCC): ICD-10-CM

## 2024-04-19 DIAGNOSIS — E55.9 VITAMIN D DEFICIENCY: ICD-10-CM

## 2024-04-19 DIAGNOSIS — R60.9 EDEMA, UNSPECIFIED TYPE: ICD-10-CM

## 2024-04-19 RX ORDER — ATORVASTATIN CALCIUM 80 MG/1
80 TABLET, FILM COATED ORAL NIGHTLY
Qty: 30 TABLET | Refills: 0 | OUTPATIENT
Start: 2024-04-19

## 2024-04-19 RX ORDER — FUROSEMIDE 40 MG/1
40 TABLET ORAL DAILY
Qty: 30 TABLET | Refills: 0 | OUTPATIENT
Start: 2024-04-19

## 2024-04-19 RX ORDER — ERGOCALCIFEROL 1.25 MG/1
CAPSULE ORAL
Qty: 4 CAPSULE | Refills: 0 | OUTPATIENT
Start: 2024-04-19

## 2024-04-19 RX ORDER — METFORMIN HYDROCHLORIDE 500 MG/1
TABLET, EXTENDED RELEASE ORAL
Qty: 30 TABLET | Refills: 0 | OUTPATIENT
Start: 2024-04-19

## 2024-04-24 RX ORDER — CALCIUM CITRATE/VITAMIN D3 200MG-6.25
TABLET ORAL
COMMUNITY
Start: 2023-02-27 | End: 2024-04-24 | Stop reason: SDUPTHER

## 2024-04-24 RX ORDER — CALCIUM CITRATE/VITAMIN D3 200MG-6.25
TABLET ORAL
Qty: 400 EACH | Refills: 5 | Status: SHIPPED | OUTPATIENT
Start: 2024-04-24

## 2024-04-25 RX ORDER — CALCIUM CITRATE/VITAMIN D3 200MG-6.25
TABLET ORAL
Qty: 100 STRIP | Refills: 0 | OUTPATIENT
Start: 2024-04-25

## 2024-04-30 ENCOUNTER — HOSPITAL ENCOUNTER (EMERGENCY)
Facility: HOSPITAL | Age: 44
Discharge: HOME | End: 2024-04-30
Attending: STUDENT IN AN ORGANIZED HEALTH CARE EDUCATION/TRAINING PROGRAM | Admitting: STUDENT IN AN ORGANIZED HEALTH CARE EDUCATION/TRAINING PROGRAM
Payer: MEDICAID

## 2024-04-30 ENCOUNTER — APPOINTMENT (OUTPATIENT)
Facility: HOSPITAL | Age: 44
End: 2024-04-30
Payer: MEDICAID

## 2024-04-30 VITALS
BODY MASS INDEX: 38.24 KG/M2 | RESPIRATION RATE: 13 BRPM | DIASTOLIC BLOOD PRESSURE: 61 MMHG | TEMPERATURE: 98.6 F | OXYGEN SATURATION: 100 % | HEART RATE: 85 BPM | WEIGHT: 224 LBS | HEIGHT: 64 IN | SYSTOLIC BLOOD PRESSURE: 102 MMHG

## 2024-04-30 DIAGNOSIS — R11.2 NAUSEA AND VOMITING, UNSPECIFIED VOMITING TYPE: Primary | ICD-10-CM

## 2024-04-30 LAB
ALBUMIN SERPL-MCNC: 4.1 G/DL (ref 3.5–5)
ALBUMIN/GLOB SERPL: 1.2 (ref 1.1–2.2)
ALP SERPL-CCNC: 160 U/L (ref 45–117)
ALT SERPL-CCNC: 17 U/L (ref 12–78)
ANION GAP SERPL CALC-SCNC: 7 MMOL/L (ref 5–15)
AST SERPL W P-5'-P-CCNC: 10 U/L (ref 15–37)
BASOPHILS # BLD: 0 K/UL (ref 0–0.1)
BASOPHILS NFR BLD: 0 % (ref 0–1)
BILIRUB SERPL-MCNC: 0.5 MG/DL (ref 0.2–1)
BUN SERPL-MCNC: 34 MG/DL (ref 6–20)
BUN/CREAT SERPL: 21 (ref 12–20)
CA-I BLD-MCNC: 10.1 MG/DL (ref 8.5–10.1)
CHLORIDE SERPL-SCNC: 101 MMOL/L (ref 97–108)
CO2 SERPL-SCNC: 32 MMOL/L (ref 21–32)
CREAT SERPL-MCNC: 1.6 MG/DL (ref 0.55–1.02)
DIFFERENTIAL METHOD BLD: ABNORMAL
EOSINOPHIL # BLD: 0.1 K/UL (ref 0–0.4)
EOSINOPHIL NFR BLD: 1 % (ref 0–7)
ERYTHROCYTE [DISTWIDTH] IN BLOOD BY AUTOMATED COUNT: 13.1 % (ref 11.5–14.5)
GLOBULIN SER CALC-MCNC: 3.4 G/DL (ref 2–4)
GLUCOSE SERPL-MCNC: 179 MG/DL (ref 65–100)
HCT VFR BLD AUTO: 32.6 % (ref 35–47)
HGB BLD-MCNC: 10.4 G/DL (ref 11.5–16)
IMM GRANULOCYTES # BLD AUTO: 0 K/UL (ref 0–0.04)
IMM GRANULOCYTES NFR BLD AUTO: 0 % (ref 0–0.5)
LIPASE SERPL-CCNC: 37 U/L (ref 13–75)
LYMPHOCYTES # BLD: 2.1 K/UL (ref 0.8–3.5)
LYMPHOCYTES NFR BLD: 20 % (ref 12–49)
MCH RBC QN AUTO: 27.1 PG (ref 26–34)
MCHC RBC AUTO-ENTMCNC: 31.9 G/DL (ref 30–36.5)
MCV RBC AUTO: 84.9 FL (ref 80–99)
MONOCYTES # BLD: 0.7 K/UL (ref 0–1)
MONOCYTES NFR BLD: 7 % (ref 5–13)
NEUTS SEG # BLD: 7.5 K/UL (ref 1.8–8)
NEUTS SEG NFR BLD: 72 % (ref 32–75)
NRBC # BLD: 0 K/UL (ref 0–0.01)
NRBC BLD-RTO: 0 PER 100 WBC
PLATELET # BLD AUTO: 205 K/UL (ref 150–400)
PMV BLD AUTO: 11.7 FL (ref 8.9–12.9)
POTASSIUM SERPL-SCNC: 3.8 MMOL/L (ref 3.5–5.1)
PROT SERPL-MCNC: 7.5 G/DL (ref 6.4–8.2)
RBC # BLD AUTO: 3.84 M/UL (ref 3.8–5.2)
SODIUM SERPL-SCNC: 140 MMOL/L (ref 136–145)
WBC # BLD AUTO: 10.4 K/UL (ref 3.6–11)

## 2024-04-30 PROCEDURE — 85025 COMPLETE CBC W/AUTO DIFF WBC: CPT

## 2024-04-30 PROCEDURE — 6360000002 HC RX W HCPCS: Performed by: STUDENT IN AN ORGANIZED HEALTH CARE EDUCATION/TRAINING PROGRAM

## 2024-04-30 PROCEDURE — 74176 CT ABD & PELVIS W/O CONTRAST: CPT

## 2024-04-30 PROCEDURE — 83690 ASSAY OF LIPASE: CPT

## 2024-04-30 PROCEDURE — 6370000000 HC RX 637 (ALT 250 FOR IP): Performed by: STUDENT IN AN ORGANIZED HEALTH CARE EDUCATION/TRAINING PROGRAM

## 2024-04-30 PROCEDURE — 96372 THER/PROPH/DIAG INJ SC/IM: CPT

## 2024-04-30 PROCEDURE — 36415 COLL VENOUS BLD VENIPUNCTURE: CPT

## 2024-04-30 PROCEDURE — 99284 EMERGENCY DEPT VISIT MOD MDM: CPT

## 2024-04-30 PROCEDURE — 80053 COMPREHEN METABOLIC PANEL: CPT

## 2024-04-30 PROCEDURE — 96374 THER/PROPH/DIAG INJ IV PUSH: CPT

## 2024-04-30 RX ORDER — ONDANSETRON 2 MG/ML
4 INJECTION INTRAMUSCULAR; INTRAVENOUS ONCE
Status: COMPLETED | OUTPATIENT
Start: 2024-04-30 | End: 2024-04-30

## 2024-04-30 RX ORDER — ONDANSETRON 4 MG/1
4 TABLET, ORALLY DISINTEGRATING ORAL 3 TIMES DAILY PRN
Qty: 21 TABLET | Refills: 0 | Status: SHIPPED | OUTPATIENT
Start: 2024-04-30

## 2024-04-30 RX ORDER — DICYCLOMINE HYDROCHLORIDE 10 MG/1
10 CAPSULE ORAL
Qty: 20 CAPSULE | Refills: 0 | Status: SHIPPED | OUTPATIENT
Start: 2024-04-30

## 2024-04-30 RX ORDER — OXYCODONE HYDROCHLORIDE 5 MG/1
5 TABLET ORAL
Status: COMPLETED | OUTPATIENT
Start: 2024-04-30 | End: 2024-04-30

## 2024-04-30 RX ORDER — DICYCLOMINE HYDROCHLORIDE 10 MG/ML
20 INJECTION INTRAMUSCULAR ONCE
Status: COMPLETED | OUTPATIENT
Start: 2024-04-30 | End: 2024-04-30

## 2024-04-30 RX ADMIN — OXYCODONE HYDROCHLORIDE 5 MG: 5 TABLET ORAL at 21:22

## 2024-04-30 RX ADMIN — DICYCLOMINE HYDROCHLORIDE 20 MG: 10 INJECTION, SOLUTION INTRAMUSCULAR at 17:37

## 2024-04-30 RX ADMIN — ONDANSETRON 4 MG: 2 INJECTION INTRAMUSCULAR; INTRAVENOUS at 17:37

## 2024-04-30 ASSESSMENT — PAIN DESCRIPTION - LOCATION
LOCATION: ABDOMEN
LOCATION: BACK
LOCATION: BACK

## 2024-04-30 ASSESSMENT — PAIN SCALES - GENERAL
PAINLEVEL_OUTOF10: 6
PAINLEVEL_OUTOF10: 10
PAINLEVEL_OUTOF10: 10

## 2024-04-30 ASSESSMENT — PAIN - FUNCTIONAL ASSESSMENT
PAIN_FUNCTIONAL_ASSESSMENT: 0-10
PAIN_FUNCTIONAL_ASSESSMENT: 0-10

## 2024-04-30 NOTE — ED PROVIDER NOTES
[facility] EMERGENCY DEPT  EMERGENCY DEPARTMENT HISTORY AND PHYSICAL EXAM      Date: 2024  Patient Name: Fern Jacobsen  MRN: [id number]  Birthdate 1980  Date of evaluation: 2024  Provider: Kaitlin Jackson MD   Note Started: 5:24 PM EDT 24    HISTORY OF PRESENT ILLNESS     Chief Complaint   Patient presents with    Emesis       History Provided By: Patient    HPI: Fern Jacobsen is a 43 y.o. female with past medical history of depression, DM, HTN, PTSD, seizure disorder, CVA, DM, and intellectual disability comes to the ED from a group home with concerns for abdominal pain, nausea and vomiting has been going on for 3 days.  Patient reports that her pain is generalized without specific relieving factor associate with several episodes of emesis.  She is denying any diarrhea.  Denies any pain anywhere else.  Patient is accompanied by group home staff reports that the patient has been her normal state of health 3 days ago which does not explain the symptoms and there are no new symptoms.    PAST MEDICAL HISTORY   Past Medical History:  Past Medical History:   Diagnosis Date    Depression     Diabetes (HCC)     Edema     Gout     Herpes genitalis     Hypertension     Ill-defined condition     gout    Mood disorder (HCC)     Other ill-defined conditions(799.89)     cholesterol     Psychiatric disorder     depression    Psychiatric disorder     PTSD    PTSD (post-traumatic stress disorder)     PTSD (post-traumatic stress disorder) 2016    Seizures (HCC) 2023    Sleep disorder     Stroke (HCC)     Type 2 diabetes mellitus without complication (HCC)     UTI (lower urinary tract infection)        Past Surgical History:  Past Surgical History:   Procedure Laterality Date    GYN       X 3       Family History:  Family History   Problem Relation Age of Onset    Hypertension Mother     Coronary Art Dis Mother     Diabetes Father     Stroke Father 51        pt estimates    Kidney Disease  CONTRAST Additional Contrast? None   Final Result      1. Punctate nonobstructing bilateral renal stones. No hydronephrosis.   2. IUD within the uterus.   3. No acute abnormalities.           ED COURSE and DIFFERENTIAL DIAGNOSIS/MDM   2:25 PM Differential and Considerations:     Records Reviewed (source and summary of external notes): Prior medical records and Nursing notes.    Vitals:    Vitals:    04/30/24 2005 04/30/24 2013 04/30/24 2043 04/30/24 2138   BP:  112/65 (!) 100/57 102/61   Pulse: 81 89 82 85   Resp: 12 22 11 13   Temp:    98.6 °F (37 °C)   TempSrc:    Oral   SpO2: 99% 98% 99% 100%   Weight:       Height:            Patient is 43-year-old female with PMH of depression, PTSD, seizure disorder, DM, HTN, CVA, and intellectual disability comes to the ED with abdominal pain, nausea and vomiting.  Presentation appears to be consistent with possibly gastritis.  However, differential diagnosis was broad and and SBO and pancreatitis was concerned the differential and labs were obtained including CBC, chemistry, lipase.  CBC without leukocytosis, significant anemia, abnormal platelet count.  Chemistry showed no acute Actilite derangement or transaminitis.  Mild renal impairment consistent with baseline.  Lipase unremarkable.  Patient continues I did order CT of the abdomen which showed that the patient has punctate nonobstructive bilateral renal stones, no hydronephrosis, IUD within the uterus and no acute abnormalities.  After analgesia, patient for significantly better.  No acute findings.  Able tolerate p.o.  Thus, discharge back to group home.    Patient was given the following medications:  Medications   ondansetron (ZOFRAN) injection 4 mg (4 mg IntraVENous Given 4/30/24 1737)   dicyclomine (BENTYL) injection 20 mg (20 mg IntraMUSCular Given 4/30/24 1737)   oxyCODONE (ROXICODONE) immediate release tablet 5 mg (5 mg Oral Given 4/30/24 2122)     After completion of workup and discussion of results and

## 2024-05-03 RX ORDER — GLUCOSAM/CHON-MSM1/C/MANG/BOSW 500-416.6
TABLET ORAL
Qty: 100 EACH | Refills: 5 | Status: SHIPPED | OUTPATIENT
Start: 2024-05-03

## 2024-05-08 DIAGNOSIS — E78.5 HYPERLIPIDEMIA LDL GOAL <70: ICD-10-CM

## 2024-05-08 DIAGNOSIS — F43.10 PTSD (POST-TRAUMATIC STRESS DISORDER): ICD-10-CM

## 2024-05-08 DIAGNOSIS — E55.9 VITAMIN D DEFICIENCY: ICD-10-CM

## 2024-05-08 DIAGNOSIS — R60.9 EDEMA, UNSPECIFIED TYPE: ICD-10-CM

## 2024-05-08 DIAGNOSIS — K21.9 GERD WITHOUT ESOPHAGITIS: ICD-10-CM

## 2024-05-08 DIAGNOSIS — I10 ESSENTIAL HYPERTENSION WITH GOAL BLOOD PRESSURE LESS THAN 130/85: ICD-10-CM

## 2024-05-08 RX ORDER — ATORVASTATIN CALCIUM 80 MG/1
80 TABLET, FILM COATED ORAL NIGHTLY
Qty: 30 TABLET | Refills: 0 | OUTPATIENT
Start: 2024-05-08

## 2024-05-08 RX ORDER — PANTOPRAZOLE SODIUM 20 MG/1
20 TABLET, DELAYED RELEASE ORAL DAILY
Qty: 30 TABLET | Refills: 0 | OUTPATIENT
Start: 2024-05-08

## 2024-05-08 RX ORDER — ERGOCALCIFEROL 1.25 MG/1
CAPSULE ORAL
Qty: 4 CAPSULE | Refills: 0 | OUTPATIENT
Start: 2024-05-08

## 2024-05-08 RX ORDER — LISINOPRIL 20 MG/1
20 TABLET ORAL DAILY
Qty: 30 TABLET | Refills: 0 | OUTPATIENT
Start: 2024-05-08

## 2024-05-08 RX ORDER — SERTRALINE HYDROCHLORIDE 25 MG/1
TABLET, FILM COATED ORAL
Qty: 90 TABLET | Refills: 0 | OUTPATIENT
Start: 2024-05-08

## 2024-05-08 RX ORDER — FUROSEMIDE 40 MG/1
40 TABLET ORAL DAILY
Qty: 30 TABLET | Refills: 0 | OUTPATIENT
Start: 2024-05-08

## 2024-05-30 DIAGNOSIS — Z79.4 TYPE 2 DIABETES MELLITUS WITH HYPOGLYCEMIA WITHOUT COMA, WITH LONG-TERM CURRENT USE OF INSULIN (HCC): ICD-10-CM

## 2024-05-30 DIAGNOSIS — E11.649 TYPE 2 DIABETES MELLITUS WITH HYPOGLYCEMIA WITHOUT COMA, WITH LONG-TERM CURRENT USE OF INSULIN (HCC): ICD-10-CM

## 2024-05-30 RX ORDER — DAPAGLIFLOZIN 10 MG/1
TABLET, FILM COATED ORAL
Qty: 30 TABLET | Refills: 0 | OUTPATIENT
Start: 2024-05-30

## 2024-05-30 RX ORDER — DAPAGLIFLOZIN 10 MG/1
TABLET, FILM COATED ORAL
Qty: 90 TABLET | Refills: 1 | Status: SHIPPED | OUTPATIENT
Start: 2024-05-30

## 2024-06-13 ENCOUNTER — APPOINTMENT (OUTPATIENT)
Facility: HOSPITAL | Age: 44
DRG: 682 | End: 2024-06-13
Payer: MEDICARE

## 2024-06-13 ENCOUNTER — HOSPITAL ENCOUNTER (EMERGENCY)
Facility: HOSPITAL | Age: 44
Discharge: HOME | DRG: 682 | End: 2024-06-13
Attending: EMERGENCY MEDICINE
Payer: MEDICARE

## 2024-06-13 ENCOUNTER — HOSPITAL ENCOUNTER (INPATIENT)
Facility: HOSPITAL | Age: 44
LOS: 4 days | Discharge: NURSING, REHAB OR LONG-TERM CARE | DRG: 682 | End: 2024-06-18
Attending: EMERGENCY MEDICINE | Admitting: FAMILY MEDICINE
Payer: MEDICARE

## 2024-06-13 VITALS
RESPIRATION RATE: 11 BRPM | TEMPERATURE: 98.5 F | HEART RATE: 88 BPM | WEIGHT: 232.81 LBS | DIASTOLIC BLOOD PRESSURE: 85 MMHG | SYSTOLIC BLOOD PRESSURE: 120 MMHG | HEIGHT: 64 IN | BODY MASS INDEX: 39.75 KG/M2 | OXYGEN SATURATION: 98 %

## 2024-06-13 DIAGNOSIS — R11.0 NAUSEA: Primary | ICD-10-CM

## 2024-06-13 DIAGNOSIS — R55 SYNCOPE AND COLLAPSE: Primary | ICD-10-CM

## 2024-06-13 DIAGNOSIS — N17.9 AKI (ACUTE KIDNEY INJURY) (HCC): ICD-10-CM

## 2024-06-13 DIAGNOSIS — R60.0 BILATERAL LOWER EXTREMITY EDEMA: ICD-10-CM

## 2024-06-13 LAB
ALBUMIN SERPL-MCNC: 4.2 G/DL (ref 3.5–5)
ALBUMIN/GLOB SERPL: 1 (ref 1.1–2.2)
ALBUMIN/GLOB SERPL: 1.1 (ref 1.1–2.2)
ALP SERPL-CCNC: 161 U/L (ref 45–117)
ALT SERPL-CCNC: 17 U/L (ref 12–78)
ALT SERPL-CCNC: 21 U/L (ref 12–78)
ANION GAP SERPL CALC-SCNC: 7 MMOL/L (ref 5–15)
ANION GAP SERPL CALC-SCNC: 9 MMOL/L (ref 5–15)
APPEARANCE UR: CLEAR
AST SERPL-CCNC: 12 U/L (ref 15–37)
AST SERPL-CCNC: 18 U/L (ref 15–37)
BACTERIA URNS QL MICRO: ABNORMAL /HPF
BASOPHILS # BLD: 0 K/UL (ref 0–0.1)
BASOPHILS # BLD: 0 K/UL (ref 0–0.1)
BASOPHILS NFR BLD: 0 % (ref 0–1)
BILIRUB SERPL-MCNC: 0.5 MG/DL (ref 0.2–1)
BILIRUB SERPL-MCNC: 0.5 MG/DL (ref 0.2–1)
BILIRUB UR QL: NEGATIVE
BUN SERPL-MCNC: 32 MG/DL (ref 6–20)
BUN SERPL-MCNC: 36 MG/DL (ref 6–20)
BUN/CREAT SERPL: 15 (ref 12–20)
BUN/CREAT SERPL: 20 (ref 12–20)
CALCIUM SERPL-MCNC: 10.3 MG/DL (ref 8.5–10.1)
CALCIUM SERPL-MCNC: 9.4 MG/DL (ref 8.5–10.1)
CHLORIDE SERPL-SCNC: 105 MMOL/L (ref 97–108)
CHLORIDE SERPL-SCNC: 108 MMOL/L (ref 97–108)
CO2 SERPL-SCNC: 28 MMOL/L (ref 21–32)
CO2 SERPL-SCNC: 29 MMOL/L (ref 21–32)
COLOR UR: ABNORMAL
COMMENT:: NORMAL
COMMENT:: NORMAL
CREAT SERPL-MCNC: 1.82 MG/DL (ref 0.55–1.02)
CREAT SERPL-MCNC: 2.1 MG/DL (ref 0.55–1.02)
DIFFERENTIAL METHOD BLD: ABNORMAL
DIFFERENTIAL METHOD BLD: ABNORMAL
EKG ATRIAL RATE: 94 BPM
EKG DIAGNOSIS: NORMAL
EKG P AXIS: 64 DEGREES
EKG P-R INTERVAL: 138 MS
EKG Q-T INTERVAL: 370 MS
EKG QRS DURATION: 78 MS
EKG QTC CALCULATION (BAZETT): 462 MS
EKG R AXIS: -63 DEGREES
EKG T AXIS: 59 DEGREES
EKG VENTRICULAR RATE: 94 BPM
EOSINOPHIL # BLD: 0 K/UL (ref 0–0.4)
EOSINOPHIL # BLD: 0 K/UL (ref 0–0.4)
EOSINOPHIL NFR BLD: 0 % (ref 0–7)
EPITH CASTS URNS QL MICRO: ABNORMAL /LPF
ERYTHROCYTE [DISTWIDTH] IN BLOOD BY AUTOMATED COUNT: 12.6 % (ref 11.5–14.5)
ERYTHROCYTE [DISTWIDTH] IN BLOOD BY AUTOMATED COUNT: 12.7 % (ref 11.5–14.5)
GLOBULIN SER CALC-MCNC: 3.5 G/DL (ref 2–4)
GLOBULIN SER CALC-MCNC: 4 G/DL (ref 2–4)
GLUCOSE BLD STRIP.AUTO-MCNC: 135 MG/DL (ref 65–117)
GLUCOSE SERPL-MCNC: 146 MG/DL (ref 65–100)
GLUCOSE SERPL-MCNC: 154 MG/DL (ref 65–100)
GLUCOSE UR STRIP.AUTO-MCNC: >1000 MG/DL
HCG SERPL QL: NEGATIVE
HCT VFR BLD AUTO: 30.7 % (ref 35–47)
HCT VFR BLD AUTO: 34 % (ref 35–47)
HGB BLD-MCNC: 11 G/DL (ref 11.5–16)
HGB BLD-MCNC: 9.8 G/DL (ref 11.5–16)
HGB UR QL STRIP: NEGATIVE
IMM GRANULOCYTES # BLD AUTO: 0 K/UL (ref 0–0.04)
IMM GRANULOCYTES NFR BLD AUTO: 0 % (ref 0–0.5)
IMM GRANULOCYTES NFR BLD AUTO: 0 % (ref 0–0.5)
KETONES UR QL STRIP.AUTO: NEGATIVE MG/DL
LEUKOCYTE ESTERASE UR QL STRIP.AUTO: NEGATIVE
LYMPHOCYTES # BLD: 1.4 K/UL (ref 0.8–3.5)
LYMPHOCYTES # BLD: 2.1 K/UL (ref 0.8–3.5)
LYMPHOCYTES NFR BLD: 20 % (ref 12–49)
MAGNESIUM SERPL-MCNC: 1.7 MG/DL (ref 1.6–2.4)
MCH RBC QN AUTO: 27.3 PG (ref 26–34)
MCHC RBC AUTO-ENTMCNC: 31.9 G/DL (ref 30–36.5)
MCHC RBC AUTO-ENTMCNC: 32.4 G/DL (ref 30–36.5)
MCV RBC AUTO: 84.4 FL (ref 80–99)
MCV RBC AUTO: 85.5 FL (ref 80–99)
MONOCYTES # BLD: 0.5 K/UL (ref 0–1)
MONOCYTES # BLD: 0.6 K/UL (ref 0–1)
MONOCYTES NFR BLD: 4 % (ref 5–13)
MONOCYTES NFR BLD: 6 % (ref 5–13)
NEUTS SEG # BLD: 7.5 K/UL (ref 1.8–8)
NEUTS SEG # BLD: 8 K/UL (ref 1.8–8)
NEUTS SEG NFR BLD: 76 % (ref 32–75)
NEUTS SEG NFR BLD: 80 % (ref 32–75)
NITRITE UR QL STRIP.AUTO: NEGATIVE
NRBC # BLD: 0 K/UL (ref 0–0.01)
NRBC # BLD: 0 K/UL (ref 0–0.01)
NRBC BLD-RTO: 0 PER 100 WBC
NRBC BLD-RTO: 0 PER 100 WBC
PH UR STRIP: 5 (ref 5–8)
PLATELET # BLD AUTO: 167 K/UL (ref 150–400)
PLATELET # BLD AUTO: 205 K/UL (ref 150–400)
PMV BLD AUTO: 11.2 FL (ref 8.9–12.9)
PMV BLD AUTO: 11.6 FL (ref 8.9–12.9)
POTASSIUM SERPL-SCNC: 3.8 MMOL/L (ref 3.5–5.1)
POTASSIUM SERPL-SCNC: 4.2 MMOL/L (ref 3.5–5.1)
PROT SERPL-MCNC: 7.1 G/DL (ref 6.4–8.2)
PROT SERPL-MCNC: 8.2 G/DL (ref 6.4–8.2)
PROT UR STRIP-MCNC: NEGATIVE MG/DL
RBC # BLD AUTO: 4.03 M/UL (ref 3.8–5.2)
RBC #/AREA URNS HPF: ABNORMAL /HPF (ref 0–5)
SERVICE CMNT-IMP: ABNORMAL
SODIUM SERPL-SCNC: 142 MMOL/L (ref 136–145)
SODIUM SERPL-SCNC: 144 MMOL/L (ref 136–145)
SP GR UR REFRACTOMETRY: 1.01 (ref 1–1.03)
SPECIMEN HOLD: NORMAL
TROPONIN I SERPL HS-MCNC: 13 NG/L (ref 0–51)
UROBILINOGEN UR QL STRIP.AUTO: 0.2 EU/DL (ref 0.2–1)
WBC # BLD AUTO: 10.6 K/UL (ref 3.6–11)
WBC # BLD AUTO: 9.6 K/UL (ref 3.6–11)
WBC URNS QL MICRO: ABNORMAL /HPF (ref 0–4)
YEAST URNS QL MICRO: PRESENT

## 2024-06-13 PROCEDURE — 81001 URINALYSIS AUTO W/SCOPE: CPT

## 2024-06-13 PROCEDURE — 2580000003 HC RX 258: Performed by: EMERGENCY MEDICINE

## 2024-06-13 PROCEDURE — 93010 ELECTROCARDIOGRAM REPORT: CPT | Performed by: INTERNAL MEDICINE

## 2024-06-13 PROCEDURE — 71045 X-RAY EXAM CHEST 1 VIEW: CPT

## 2024-06-13 PROCEDURE — 80053 COMPREHEN METABOLIC PANEL: CPT

## 2024-06-13 PROCEDURE — 85025 COMPLETE CBC W/AUTO DIFF WBC: CPT

## 2024-06-13 PROCEDURE — 93005 ELECTROCARDIOGRAM TRACING: CPT | Performed by: EMERGENCY MEDICINE

## 2024-06-13 PROCEDURE — 99284 EMERGENCY DEPT VISIT MOD MDM: CPT

## 2024-06-13 PROCEDURE — 84703 CHORIONIC GONADOTROPIN ASSAY: CPT

## 2024-06-13 PROCEDURE — 36415 COLL VENOUS BLD VENIPUNCTURE: CPT

## 2024-06-13 PROCEDURE — 83735 ASSAY OF MAGNESIUM: CPT

## 2024-06-13 PROCEDURE — 70450 CT HEAD/BRAIN W/O DYE: CPT

## 2024-06-13 PROCEDURE — 82962 GLUCOSE BLOOD TEST: CPT

## 2024-06-13 PROCEDURE — 80048 BASIC METABOLIC PNL TOTAL CA: CPT

## 2024-06-13 PROCEDURE — 6360000004 HC RX CONTRAST MEDICATION: Performed by: RADIOLOGY

## 2024-06-13 PROCEDURE — 74177 CT ABD & PELVIS W/CONTRAST: CPT

## 2024-06-13 PROCEDURE — 96360 HYDRATION IV INFUSION INIT: CPT

## 2024-06-13 PROCEDURE — 96361 HYDRATE IV INFUSION ADD-ON: CPT

## 2024-06-13 PROCEDURE — 84484 ASSAY OF TROPONIN QUANT: CPT

## 2024-06-13 PROCEDURE — 99285 EMERGENCY DEPT VISIT HI MDM: CPT

## 2024-06-13 RX ORDER — SODIUM CHLORIDE, SODIUM LACTATE, POTASSIUM CHLORIDE, AND CALCIUM CHLORIDE .6; .31; .03; .02 G/100ML; G/100ML; G/100ML; G/100ML
1000 INJECTION, SOLUTION INTRAVENOUS ONCE
Status: COMPLETED | OUTPATIENT
Start: 2024-06-13 | End: 2024-06-13

## 2024-06-13 RX ORDER — 0.9 % SODIUM CHLORIDE 0.9 %
1000 INTRAVENOUS SOLUTION INTRAVENOUS ONCE
Status: COMPLETED | OUTPATIENT
Start: 2024-06-13 | End: 2024-06-13

## 2024-06-13 RX ADMIN — SODIUM CHLORIDE, POTASSIUM CHLORIDE, SODIUM LACTATE AND CALCIUM CHLORIDE 1000 ML: 600; 310; 30; 20 INJECTION, SOLUTION INTRAVENOUS at 21:30

## 2024-06-13 RX ADMIN — IOPAMIDOL 100 ML: 755 INJECTION, SOLUTION INTRAVENOUS at 22:19

## 2024-06-13 RX ADMIN — SODIUM CHLORIDE 1000 ML: 9 INJECTION, SOLUTION INTRAVENOUS at 11:51

## 2024-06-13 RX ADMIN — SODIUM CHLORIDE 1000 ML: 9 INJECTION, SOLUTION INTRAVENOUS at 15:44

## 2024-06-13 ASSESSMENT — PAIN SCALES - GENERAL
PAINLEVEL_OUTOF10: 0
PAINLEVEL_OUTOF10: 5
PAINLEVEL_OUTOF10: 8

## 2024-06-13 ASSESSMENT — PAIN DESCRIPTION - LOCATION
LOCATION: ABDOMEN
LOCATION: ABDOMEN

## 2024-06-13 ASSESSMENT — PAIN - FUNCTIONAL ASSESSMENT
PAIN_FUNCTIONAL_ASSESSMENT: 0-10
PAIN_FUNCTIONAL_ASSESSMENT: 0-10
PAIN_FUNCTIONAL_ASSESSMENT: PREVENTS OR INTERFERES SOME ACTIVE ACTIVITIES AND ADLS

## 2024-06-13 ASSESSMENT — PAIN DESCRIPTION - ORIENTATION: ORIENTATION: LOWER

## 2024-06-13 ASSESSMENT — PAIN DESCRIPTION - DESCRIPTORS: DESCRIPTORS: OTHER (COMMENT)

## 2024-06-13 NOTE — DISCHARGE INSTRUCTIONS
As we discussed please follow-up with your doctor regarding switching medications as we think the Ozempic is likely the cause of your symptoms

## 2024-06-13 NOTE — ED PROVIDER NOTES
[facility] EMERGENCY DEP  EMERGENCY DEPARTMENT ENCOUNTER      Pt Name: Fern Jacobsen  MRN: [id number]  Birthdate 1980  Date of evaluation: 2024  Provider: Sharita Thompson DO    CHIEF COMPLAINT       Chief Complaint   Patient presents with    Nausea & Vomiting         HISTORY OF PRESENT ILLNESS   (Location/Symptom, Timing/Onset, Context/Setting, Quality, Duration, Modifying Factors, Severity)  Note limiting factors.   HPI      Review of External Medical Records:     Nursing Notes were reviewed.    REVIEW OF SYSTEMS    (2-9 systems for level 4, 10 or more for level 5)     Review of Systems    Except as noted above the remainder of the review of systems was reviewed and negative.       PAST MEDICAL HISTORY     Past Medical History:   Diagnosis Date    Depression     Diabetes (HCC)     Edema     Gout     Herpes genitalis     Hypertension     Ill-defined condition     gout    Mood disorder (HCC)     Other ill-defined conditions(799.89)     cholesterol     Psychiatric disorder     depression    Psychiatric disorder     PTSD    PTSD (post-traumatic stress disorder)     PTSD (post-traumatic stress disorder) 2016    Seizures (HCC) 2023    Sleep disorder     Stroke (HCC)     Type 2 diabetes mellitus without complication ([facility])     UTI (lower urinary tract infection)          SURGICAL HISTORY       Past Surgical History:   Procedure Laterality Date    GYN       X 3         CURRENT MEDICATIONS       Previous Medications    ACETAMINOPHEN (TYLENOL) 160 MG/5ML LIQUID    Take 20.3 mLs by mouth every 6 hours as needed for Pain    ASPIRIN (ASPIRIN LOW DOSE) 81 MG CHEWABLE TABLET    CHEW ONE TABLET AND SWALLOW DAILY.    ATORVASTATIN (LIPITOR) 80 MG TABLET    Take 1 tablet by mouth nightly    CETIRIZINE (ZYRTEC) 10 MG TABLET    Take 1 tablet by mouth daily    DAPAGLIFLOZIN (FARXIGA) 10 MG TABLET    TAKE ONE TABLET BY MOUTH EVERY MORNING FOR DIABETES    DICYCLOMINE (BENTYL) 10 MG CAPSULE    Take 1 capsule

## 2024-06-13 NOTE — ED NOTES
Patient and family want to leave. They do not want to wait for CT scan, patient states \"I feel much better.\"

## 2024-06-13 NOTE — ED TRIAGE NOTES
Pt arrives via wheelchair and walker , c/o no appetite with n/v x 2 weeks, started Ozempic 4 weeks ago , recent loss of a friend, denies fever , +lower abd pain, denies urinary symptoms, denies diarrhea or constipation

## 2024-06-13 NOTE — ED PROVIDER NOTES
Care signed out to me by Dr. Thompson pending results of the patient's CT scan.  Please see initial notes for details.    Apparently a saline reportedly infiltrated into the patient's arm and she no longer wanted to undergo CT.  I explained to her the reasons for wanting to get a CT scan and potential missed diagnosis without the study..  I even offered a noncontrasted CT.  Patient says she is feeling much better and declines.  It is likely that her symptoms are secondary to the Ozempic that she has been taking.  Her caregiver has already scheduled an appointment with her doctor regarding switching medications and further management of her diabetes.  She was ultimately discharged in stable condition     Jad Chopra MD  06/13/24 1458       Jad Chopra MD  06/13/24 3715

## 2024-06-14 ENCOUNTER — APPOINTMENT (OUTPATIENT)
Facility: HOSPITAL | Age: 44
DRG: 682 | End: 2024-06-14
Payer: MEDICARE

## 2024-06-14 PROBLEM — R55 SYNCOPE AND COLLAPSE: Status: ACTIVE | Noted: 2024-06-14

## 2024-06-14 LAB
ANION GAP SERPL CALC-SCNC: 9 MMOL/L (ref 5–15)
ANION GAP SERPL CALC-SCNC: 9 MMOL/L (ref 5–15)
BASOPHILS # BLD: 0 K/UL (ref 0–0.1)
BASOPHILS NFR BLD: 0 % (ref 0–1)
BUN SERPL-MCNC: 30 MG/DL (ref 6–20)
BUN SERPL-MCNC: 36 MG/DL (ref 6–20)
BUN/CREAT SERPL: 17 (ref 12–20)
BUN/CREAT SERPL: 22 (ref 12–20)
CALCIUM SERPL-MCNC: 9.2 MG/DL (ref 8.5–10.1)
CALCIUM SERPL-MCNC: 9.7 MG/DL (ref 8.5–10.1)
CHLORIDE SERPL-SCNC: 108 MMOL/L (ref 97–108)
CHLORIDE SERPL-SCNC: 108 MMOL/L (ref 97–108)
CO2 SERPL-SCNC: 27 MMOL/L (ref 21–32)
CO2 SERPL-SCNC: 27 MMOL/L (ref 21–32)
CREAT SERPL-MCNC: 1.34 MG/DL (ref 0.55–1.02)
CREAT SERPL-MCNC: 2.11 MG/DL (ref 0.55–1.02)
DIFFERENTIAL METHOD BLD: ABNORMAL
EKG ATRIAL RATE: 92 BPM
EKG DIAGNOSIS: NORMAL
EKG P AXIS: 54 DEGREES
EKG P-R INTERVAL: 140 MS
EKG Q-T INTERVAL: 396 MS
EKG QRS DURATION: 76 MS
EKG QTC CALCULATION (BAZETT): 489 MS
EKG R AXIS: -30 DEGREES
EKG T AXIS: 27 DEGREES
EKG VENTRICULAR RATE: 92 BPM
EOSINOPHIL # BLD: 0 K/UL (ref 0–0.4)
EOSINOPHIL NFR BLD: 0 % (ref 0–7)
ERYTHROCYTE [DISTWIDTH] IN BLOOD BY AUTOMATED COUNT: 12.6 % (ref 11.5–14.5)
EST. AVERAGE GLUCOSE BLD GHB EST-MCNC: 166 MG/DL
FERRITIN SERPL-MCNC: 61 NG/ML (ref 26–388)
FOLATE SERPL-MCNC: 32.5 NG/ML (ref 5–21)
GLUCOSE BLD STRIP.AUTO-MCNC: 115 MG/DL (ref 65–117)
GLUCOSE BLD STRIP.AUTO-MCNC: 214 MG/DL (ref 65–117)
GLUCOSE BLD STRIP.AUTO-MCNC: 96 MG/DL (ref 65–117)
GLUCOSE BLD STRIP.AUTO-MCNC: 99 MG/DL (ref 65–117)
GLUCOSE SERPL-MCNC: 154 MG/DL (ref 65–100)
GLUCOSE SERPL-MCNC: 93 MG/DL (ref 65–100)
HBA1C MFR BLD: 7.4 % (ref 4–5.6)
HCT VFR BLD AUTO: 30.7 % (ref 35–47)
HGB BLD-MCNC: 9.8 G/DL (ref 11.5–16)
IMM GRANULOCYTES # BLD AUTO: 0 K/UL (ref 0–0.04)
IMM GRANULOCYTES NFR BLD AUTO: 0 % (ref 0–0.5)
IRON SATN MFR SERPL: 22 % (ref 20–50)
IRON SERPL-MCNC: 52 UG/DL (ref 35–150)
LIPASE SERPL-CCNC: 21 U/L (ref 13–75)
LYMPHOCYTES # BLD: 1.4 K/UL (ref 0.8–3.5)
LYMPHOCYTES NFR BLD: 14 % (ref 12–49)
MCH RBC QN AUTO: 27.7 PG (ref 26–34)
MCHC RBC AUTO-ENTMCNC: 31.9 G/DL (ref 30–36.5)
MCV RBC AUTO: 86.7 FL (ref 80–99)
MONOCYTES # BLD: 0.6 K/UL (ref 0–1)
MONOCYTES NFR BLD: 6 % (ref 5–13)
NEUTS SEG # BLD: 7.7 K/UL (ref 1.8–8)
NEUTS SEG NFR BLD: 80 % (ref 32–75)
NRBC # BLD: 0 K/UL (ref 0–0.01)
NRBC BLD-RTO: 0 PER 100 WBC
PLATELET # BLD AUTO: 182 K/UL (ref 150–400)
PMV BLD AUTO: 12 FL (ref 8.9–12.9)
POTASSIUM SERPL-SCNC: 3.6 MMOL/L (ref 3.5–5.1)
POTASSIUM SERPL-SCNC: 3.9 MMOL/L (ref 3.5–5.1)
RBC # BLD AUTO: 3.54 M/UL (ref 3.8–5.2)
RETICS # AUTO: 0.05 M/UL (ref 0.02–0.08)
RETICS/RBC NFR AUTO: 1.4 % (ref 0.7–2.1)
SERVICE CMNT-IMP: ABNORMAL
SERVICE CMNT-IMP: NORMAL
SODIUM SERPL-SCNC: 144 MMOL/L (ref 136–145)
SODIUM SERPL-SCNC: 144 MMOL/L (ref 136–145)
T4 FREE SERPL-MCNC: 1.2 NG/DL (ref 0.8–1.5)
TIBC SERPL-MCNC: 234 UG/DL (ref 250–450)
TSH SERPL DL<=0.05 MIU/L-ACNC: 0.21 UIU/ML (ref 0.36–3.74)
VIT B12 SERPL-MCNC: 478 PG/ML (ref 193–986)
WBC # BLD AUTO: 9.9 K/UL (ref 3.6–11)

## 2024-06-14 PROCEDURE — 82962 GLUCOSE BLOOD TEST: CPT

## 2024-06-14 PROCEDURE — 80048 BASIC METABOLIC PNL TOTAL CA: CPT

## 2024-06-14 PROCEDURE — 3430000000 HC RX DIAGNOSTIC RADIOPHARMACEUTICAL: Performed by: FAMILY MEDICINE

## 2024-06-14 PROCEDURE — 83540 ASSAY OF IRON: CPT

## 2024-06-14 PROCEDURE — 2060000000 HC ICU INTERMEDIATE R&B

## 2024-06-14 PROCEDURE — 97161 PT EVAL LOW COMPLEX 20 MIN: CPT

## 2024-06-14 PROCEDURE — 84443 ASSAY THYROID STIM HORMONE: CPT

## 2024-06-14 PROCEDURE — 84439 ASSAY OF FREE THYROXINE: CPT

## 2024-06-14 PROCEDURE — 78264 GASTRIC EMPTYING IMG STUDY: CPT

## 2024-06-14 PROCEDURE — 97530 THERAPEUTIC ACTIVITIES: CPT

## 2024-06-14 PROCEDURE — 82728 ASSAY OF FERRITIN: CPT

## 2024-06-14 PROCEDURE — 85045 AUTOMATED RETICULOCYTE COUNT: CPT

## 2024-06-14 PROCEDURE — 93010 ELECTROCARDIOGRAM REPORT: CPT | Performed by: INTERNAL MEDICINE

## 2024-06-14 PROCEDURE — 6370000000 HC RX 637 (ALT 250 FOR IP): Performed by: FAMILY MEDICINE

## 2024-06-14 PROCEDURE — 36415 COLL VENOUS BLD VENIPUNCTURE: CPT

## 2024-06-14 PROCEDURE — 93970 EXTREMITY STUDY: CPT

## 2024-06-14 PROCEDURE — A9541 TC99M SULFUR COLLOID: HCPCS | Performed by: FAMILY MEDICINE

## 2024-06-14 PROCEDURE — 83690 ASSAY OF LIPASE: CPT

## 2024-06-14 PROCEDURE — 82746 ASSAY OF FOLIC ACID SERUM: CPT

## 2024-06-14 PROCEDURE — 83550 IRON BINDING TEST: CPT

## 2024-06-14 PROCEDURE — 83036 HEMOGLOBIN GLYCOSYLATED A1C: CPT

## 2024-06-14 PROCEDURE — 6370000000 HC RX 637 (ALT 250 FOR IP)

## 2024-06-14 PROCEDURE — 82607 VITAMIN B-12: CPT

## 2024-06-14 PROCEDURE — 2580000003 HC RX 258: Performed by: FAMILY MEDICINE

## 2024-06-14 RX ORDER — DEXTROSE MONOHYDRATE 100 MG/ML
INJECTION, SOLUTION INTRAVENOUS CONTINUOUS PRN
Status: DISCONTINUED | OUTPATIENT
Start: 2024-06-14 | End: 2024-06-18 | Stop reason: HOSPADM

## 2024-06-14 RX ORDER — SODIUM CHLORIDE 9 MG/ML
INJECTION, SOLUTION INTRAVENOUS PRN
Status: DISCONTINUED | OUTPATIENT
Start: 2024-06-14 | End: 2024-06-18 | Stop reason: HOSPADM

## 2024-06-14 RX ORDER — TECHNETIUM TC 99M SULFUR COLLOID 2 MG
0.32 KIT MISCELLANEOUS
Status: COMPLETED | OUTPATIENT
Start: 2024-06-14 | End: 2024-06-14

## 2024-06-14 RX ORDER — ONDANSETRON 2 MG/ML
4 INJECTION INTRAMUSCULAR; INTRAVENOUS EVERY 6 HOURS PRN
Status: DISCONTINUED | OUTPATIENT
Start: 2024-06-14 | End: 2024-06-18 | Stop reason: HOSPADM

## 2024-06-14 RX ORDER — BUMETANIDE 1 MG/1
1 TABLET ORAL 2 TIMES DAILY
COMMUNITY

## 2024-06-14 RX ORDER — POTASSIUM CHLORIDE 750 MG/1
40 TABLET, FILM COATED, EXTENDED RELEASE ORAL PRN
Status: DISCONTINUED | OUTPATIENT
Start: 2024-06-14 | End: 2024-06-18 | Stop reason: HOSPADM

## 2024-06-14 RX ORDER — SODIUM CHLORIDE 9 MG/ML
INJECTION, SOLUTION INTRAVENOUS CONTINUOUS
Status: DISCONTINUED | OUTPATIENT
Start: 2024-06-14 | End: 2024-06-18 | Stop reason: HOSPADM

## 2024-06-14 RX ORDER — SODIUM CHLORIDE 0.9 % (FLUSH) 0.9 %
5-40 SYRINGE (ML) INJECTION EVERY 12 HOURS SCHEDULED
Status: DISCONTINUED | OUTPATIENT
Start: 2024-06-14 | End: 2024-06-18 | Stop reason: HOSPADM

## 2024-06-14 RX ORDER — SODIUM CHLORIDE 0.9 % (FLUSH) 0.9 %
5-40 SYRINGE (ML) INJECTION PRN
Status: DISCONTINUED | OUTPATIENT
Start: 2024-06-14 | End: 2024-06-18 | Stop reason: HOSPADM

## 2024-06-14 RX ORDER — ATORVASTATIN CALCIUM 40 MG/1
80 TABLET, FILM COATED ORAL NIGHTLY
Status: DISCONTINUED | OUTPATIENT
Start: 2024-06-14 | End: 2024-06-18 | Stop reason: HOSPADM

## 2024-06-14 RX ORDER — PANTOPRAZOLE SODIUM 20 MG/1
20 TABLET, DELAYED RELEASE ORAL DAILY
Status: DISCONTINUED | OUTPATIENT
Start: 2024-06-14 | End: 2024-06-18 | Stop reason: HOSPADM

## 2024-06-14 RX ORDER — ASPIRIN 81 MG/1
81 TABLET, CHEWABLE ORAL DAILY
Status: DISCONTINUED | OUTPATIENT
Start: 2024-06-14 | End: 2024-06-18 | Stop reason: HOSPADM

## 2024-06-14 RX ORDER — ACETAMINOPHEN 325 MG/1
650 TABLET ORAL EVERY 6 HOURS PRN
Status: DISCONTINUED | OUTPATIENT
Start: 2024-06-14 | End: 2024-06-18 | Stop reason: HOSPADM

## 2024-06-14 RX ORDER — ACETAMINOPHEN 650 MG/1
650 SUPPOSITORY RECTAL EVERY 6 HOURS PRN
Status: DISCONTINUED | OUTPATIENT
Start: 2024-06-14 | End: 2024-06-18 | Stop reason: HOSPADM

## 2024-06-14 RX ORDER — METHOCARBAMOL 750 MG/1
750 TABLET, FILM COATED ORAL EVERY 4 HOURS PRN
COMMUNITY
Start: 2024-05-24

## 2024-06-14 RX ORDER — POTASSIUM CHLORIDE 7.45 MG/ML
10 INJECTION INTRAVENOUS PRN
Status: DISCONTINUED | OUTPATIENT
Start: 2024-06-14 | End: 2024-06-18 | Stop reason: HOSPADM

## 2024-06-14 RX ORDER — LEVETIRACETAM 100 MG/ML
1000 SOLUTION ORAL 2 TIMES DAILY
Status: DISCONTINUED | OUTPATIENT
Start: 2024-06-14 | End: 2024-06-18 | Stop reason: HOSPADM

## 2024-06-14 RX ORDER — INSULIN LISPRO 100 [IU]/ML
0-4 INJECTION, SOLUTION INTRAVENOUS; SUBCUTANEOUS
Status: DISCONTINUED | OUTPATIENT
Start: 2024-06-14 | End: 2024-06-18 | Stop reason: HOSPADM

## 2024-06-14 RX ORDER — POLYETHYLENE GLYCOL 3350 17 G/17G
17 POWDER, FOR SOLUTION ORAL DAILY PRN
Status: DISCONTINUED | OUTPATIENT
Start: 2024-06-14 | End: 2024-06-18 | Stop reason: HOSPADM

## 2024-06-14 RX ORDER — MAGNESIUM SULFATE IN WATER 40 MG/ML
2000 INJECTION, SOLUTION INTRAVENOUS PRN
Status: DISCONTINUED | OUTPATIENT
Start: 2024-06-14 | End: 2024-06-18 | Stop reason: HOSPADM

## 2024-06-14 RX ORDER — ONDANSETRON 4 MG/1
4 TABLET, ORALLY DISINTEGRATING ORAL EVERY 8 HOURS PRN
Status: DISCONTINUED | OUTPATIENT
Start: 2024-06-14 | End: 2024-06-18 | Stop reason: HOSPADM

## 2024-06-14 RX ORDER — CETIRIZINE HYDROCHLORIDE 10 MG/1
10 TABLET ORAL DAILY
Status: DISCONTINUED | OUTPATIENT
Start: 2024-06-14 | End: 2024-06-18 | Stop reason: HOSPADM

## 2024-06-14 RX ORDER — INSULIN LISPRO 100 [IU]/ML
0-4 INJECTION, SOLUTION INTRAVENOUS; SUBCUTANEOUS NIGHTLY
Status: DISCONTINUED | OUTPATIENT
Start: 2024-06-14 | End: 2024-06-18 | Stop reason: HOSPADM

## 2024-06-14 RX ADMIN — ATORVASTATIN CALCIUM 80 MG: 40 TABLET, FILM COATED ORAL at 20:43

## 2024-06-14 RX ADMIN — SERTRALINE HYDROCHLORIDE 75 MG: 50 TABLET ORAL at 09:27

## 2024-06-14 RX ADMIN — SODIUM CHLORIDE: 9 INJECTION, SOLUTION INTRAVENOUS at 04:22

## 2024-06-14 RX ADMIN — LEVETIRACETAM 1000 MG: 100 SOLUTION ORAL at 12:50

## 2024-06-14 RX ADMIN — SODIUM CHLORIDE: 9 INJECTION, SOLUTION INTRAVENOUS at 14:28

## 2024-06-14 RX ADMIN — ASPIRIN 81 MG CHEWABLE TABLET 81 MG: 81 TABLET CHEWABLE at 09:27

## 2024-06-14 RX ADMIN — LEVETIRACETAM 1000 MG: 100 SOLUTION ORAL at 20:43

## 2024-06-14 RX ADMIN — SODIUM CHLORIDE, PRESERVATIVE FREE 10 ML: 5 INJECTION INTRAVENOUS at 09:28

## 2024-06-14 RX ADMIN — SODIUM CHLORIDE: 9 INJECTION, SOLUTION INTRAVENOUS at 23:57

## 2024-06-14 RX ADMIN — ACETAMINOPHEN 650 MG: 325 TABLET ORAL at 13:28

## 2024-06-14 RX ADMIN — CETIRIZINE HYDROCHLORIDE 10 MG: 10 TABLET, FILM COATED ORAL at 09:28

## 2024-06-14 RX ADMIN — PANTOPRAZOLE SODIUM 20 MG: 20 TABLET, DELAYED RELEASE ORAL at 09:28

## 2024-06-14 RX ADMIN — TECHNETIUM TC 99M SULFUR COLLOID 0.32 MILLICURIE: KIT at 10:10

## 2024-06-14 RX ADMIN — SODIUM CHLORIDE, PRESERVATIVE FREE 10 ML: 5 INJECTION INTRAVENOUS at 20:44

## 2024-06-14 ASSESSMENT — PAIN SCALES - GENERAL
PAINLEVEL_OUTOF10: 4
PAINLEVEL_OUTOF10: 0
PAINLEVEL_OUTOF10: 0

## 2024-06-14 ASSESSMENT — PAIN DESCRIPTION - DESCRIPTORS: DESCRIPTORS: ACHING

## 2024-06-14 ASSESSMENT — PAIN DESCRIPTION - LOCATION: LOCATION: NECK

## 2024-06-14 ASSESSMENT — PAIN DESCRIPTION - ORIENTATION: ORIENTATION: LEFT

## 2024-06-14 NOTE — ED NOTES
ED TO INPATIENT SBAR HANDOFF    Patient Name: Fern Jacobsen   :  1980  43 y.o.   MRN:  [id number]  ED Room #:  ER20/20  Family/Caregiver Present yes       Situation  Code Status: Full Code     Allergies: Patient has no known allergies.  Weight: Patient Vitals for the past 96 hrs (Last 3 readings):   Weight   24 2102 87 kg (191 lb 12.8 oz)     Arrived from: home  Chief Complaint:   Chief Complaint   Patient presents with    Loss of Consciousness     Hospital Problem/Diagnosis:  Principal Problem:    Syncope and collapse  Resolved Problems:    * No resolved hospital problems. *    Imaging:   CT Head W/O Contrast   Final Result   No acute intracranial abnormalities.         Electronically signed by [facility]      CT ABDOMEN PELVIS W IV CONTRAST Additional Contrast? None   Final Result   1.  No acute abnormality.   2.  There may be uterine fibroid.      Electronically signed by [facility]      XR CHEST PORTABLE   Final Result      No acute process on portable chest.         Electronically signed by Chin Gold MD        Abnormal labs:   Abnormal Labs Reviewed   CBC WITH AUTO DIFFERENTIAL - Abnormal; Notable for the following components:       Result Value    RBC 3.59 (*)     Hemoglobin 9.8 (*)     Hematocrit 30.7 (*)     Neutrophils % 80 (*)     All other components within normal limits   COMPREHENSIVE METABOLIC PANEL - Abnormal; Notable for the following components:    Glucose 154 (*)     BUN 32 (*)     Creatinine 2.10 (*)     Est, Glom Filt Rate 29 (*)     AST 12 (*)     Alk Phosphatase 134 (*)     Albumin/Globulin Ratio 1.0 (*)     All other components within normal limits     Abnormal Assessment Findings: none    SAFETY    Mobility: limited transfer mobility   ED Fall Risk: Presents to emergency department  because of falls (Syncope, seizure, or loss of consciousness): Yes, Age > 70: No, Altered Mental Status, Intoxication with alcohol or substance confusion (Disorientation, impaired judgment, poor

## 2024-06-14 NOTE — H&P
History and Physical    Date of Service:  2024  Primary Care Provider: Mary Alice Brewer PA-C  Source of information: patient, family members, electronic medical record    Chief Complaint: Loss of Consciousness      History of Presenting Illness:   Fern Jacobsen is a 43 y.o. female with a pmhx DM II, HTN, gout, dyslipidemia, past seizures, past stroke, depression, and anxiety who presents with two weeks of anorexia, nausea, and vomiting, and is being admitted for syncope.    In the ED, she was initially hypotensive to 67/43 with improvement to 108/74 after IVF.  Other VSS.  Labs showed stable normocytic anemia with hgb 11>9.8, creatinine 1.82>2.10(b/l 1.5), glucosuria, and yeast in urine. CT head negative for acute intracranial abnormality, and CT abdomen/pelvis with a fibroid.  EKG with normal sinus rhythm.      In the ED, she received 2L crystalloid this morning when in the ED, and 2L  LR now.         REVIEW OF SYSTEMS:  A comprehensive review of systems was negative except for that written in the History of Present Illness.     Past Medical History:   Diagnosis Date    Depression     Diabetes (HCC)     Edema     Gout     Herpes genitalis     Hypertension     Ill-defined condition     gout    Mood disorder (HCC)     Other ill-defined conditions(799.89)     cholesterol     Psychiatric disorder     depression    Psychiatric disorder     PTSD    PTSD (post-traumatic stress disorder)     PTSD (post-traumatic stress disorder) 2016    Seizures (HCC) 2023    Sleep disorder     Stroke (HCC)     Type 2 diabetes mellitus without complication (HCC)     UTI (lower urinary tract infection)       Past Surgical History:   Procedure Laterality Date    GYN       X 3     Prior to Admission medications    Medication Sig Start Date End Date Taking? Authorizing Provider   dapagliflozin (FARXIGA) 10 MG tablet TAKE ONE TABLET BY MOUTH EVERY MORNING FOR DIABETES 24   Mary Alice Brewer PA-C

## 2024-06-14 NOTE — CONSULTS
----- Message from Jermaine Ho DO sent at 8/10/2022 10:30 AM CDT -----  You have a bacterial vaginosis infection.  The nurses will call you with treatment options.  You do not have Trichomonas or yeast.  Dr. HOOD   Comprehensive Nutrition Assessment    Type and Reason for Visit: Initial, Consult    Nutrition Recommendations/Plan:   Continue clear liquids, advance diet as medically appropriate/as pt tolerated    Consider SLP consult - pt endorses trouble chewing/swallowing, unable to complete swallow screen per flowsheets   Check A1C    Ensure Clear 1/day, Prosource Gelatein 2x/day   Please monitor/document intakes in flowsheets     Suspected gastroparesis per chart review; gastroparesis diet edu consult acknowledged. Will provide edu once diet advanced/if + for gastroparesis.       Malnutrition Assessment:  Malnutrition Status:  Severe malnutrition (06/14/24 1419)    Context:  Acute Illness     Findings of the 6 clinical characteristics of malnutrition:  Energy Intake:  50% or less of estimated energy requirements for 5 or more days  Weight Loss:  Greater than 5% over 1 month     Body Fat Loss:  No significant body fat loss     Muscle Mass Loss:  No significant muscle mass loss    Fluid Accumulation:  No significant fluid accumulation     Strength:  Not Performed       Nutrition Assessment:    Past Medical History:   Diagnosis Date    Depression     Diabetes (HCC)     Edema     Gout     Herpes genitalis     Hypertension     Ill-defined condition     gout    Mood disorder (HCC)     Other ill-defined conditions(799.89)     cholesterol     Psychiatric disorder     depression    Psychiatric disorder     PTSD    PTSD (post-traumatic stress disorder)     PTSD (post-traumatic stress disorder) 04/26/2016    Seizures (HCC) 06/08/2023    Sleep disorder     Stroke (HCC)     Type 2 diabetes mellitus without complication (HCC)     UTI (lower urinary tract infection)    NKA    Presents w/ 2 wks of nausea/vomiting, admitted for syncope.     RD consulted for poor appetite/intake x 5 or more days. Spoke w/ pt, reports ongoing nausea/vomiting over the past 2 wks; reports minimal food/fluid intake 2/2 inability to keep them down. Pt endorses

## 2024-06-15 LAB
ANION GAP SERPL CALC-SCNC: 6 MMOL/L (ref 5–15)
BASOPHILS # BLD: 0 K/UL (ref 0–0.1)
BASOPHILS NFR BLD: 0 % (ref 0–1)
BUN SERPL-MCNC: 16 MG/DL (ref 6–20)
BUN/CREAT SERPL: 19 (ref 12–20)
CALCIUM SERPL-MCNC: 8.7 MG/DL (ref 8.5–10.1)
CHLORIDE SERPL-SCNC: 110 MMOL/L (ref 97–108)
CO2 SERPL-SCNC: 30 MMOL/L (ref 21–32)
CREAT SERPL-MCNC: 0.86 MG/DL (ref 0.55–1.02)
DIFFERENTIAL METHOD BLD: ABNORMAL
EOSINOPHIL NFR BLD: 1 % (ref 0–7)
ERYTHROCYTE [DISTWIDTH] IN BLOOD BY AUTOMATED COUNT: 12.4 % (ref 11.5–14.5)
GLUCOSE BLD STRIP.AUTO-MCNC: 104 MG/DL (ref 65–117)
GLUCOSE BLD STRIP.AUTO-MCNC: 164 MG/DL (ref 65–117)
GLUCOSE BLD STRIP.AUTO-MCNC: 169 MG/DL (ref 65–117)
GLUCOSE SERPL-MCNC: 104 MG/DL (ref 65–100)
HCT VFR BLD AUTO: 30.5 % (ref 35–47)
HGB BLD-MCNC: 9.8 G/DL (ref 11.5–16)
IMM GRANULOCYTES # BLD AUTO: 0 K/UL (ref 0–0.04)
IMM GRANULOCYTES NFR BLD AUTO: 0 % (ref 0–0.5)
LYMPHOCYTES NFR BLD: 25 % (ref 12–49)
MAGNESIUM SERPL-MCNC: 1.5 MG/DL (ref 1.6–2.4)
MCH RBC QN AUTO: 27.5 PG (ref 26–34)
MCHC RBC AUTO-ENTMCNC: 32.1 G/DL (ref 30–36.5)
MCV RBC AUTO: 85.4 FL (ref 80–99)
MONOCYTES # BLD: 0.4 K/UL (ref 0–1)
MONOCYTES NFR BLD: 6 % (ref 5–13)
NEUTS SEG # BLD: 4.8 K/UL (ref 1.8–8)
NEUTS SEG NFR BLD: 68 % (ref 32–75)
NRBC # BLD: 0 K/UL (ref 0–0.01)
NRBC BLD-RTO: 0 PER 100 WBC
PLATELET # BLD AUTO: 153 K/UL (ref 150–400)
PMV BLD AUTO: 11.4 FL (ref 8.9–12.9)
POTASSIUM SERPL-SCNC: 3.5 MMOL/L (ref 3.5–5.1)
SERVICE CMNT-IMP: ABNORMAL
SERVICE CMNT-IMP: NORMAL
SODIUM SERPL-SCNC: 146 MMOL/L (ref 136–145)
WBC # BLD AUTO: 7.1 K/UL (ref 3.6–11)

## 2024-06-15 PROCEDURE — 2580000003 HC RX 258: Performed by: FAMILY MEDICINE

## 2024-06-15 PROCEDURE — 6370000000 HC RX 637 (ALT 250 FOR IP): Performed by: FAMILY MEDICINE

## 2024-06-15 PROCEDURE — 6370000000 HC RX 637 (ALT 250 FOR IP)

## 2024-06-15 PROCEDURE — 85025 COMPLETE CBC W/AUTO DIFF WBC: CPT

## 2024-06-15 PROCEDURE — 97165 OT EVAL LOW COMPLEX 30 MIN: CPT

## 2024-06-15 PROCEDURE — 80048 BASIC METABOLIC PNL TOTAL CA: CPT

## 2024-06-15 PROCEDURE — 6360000002 HC RX W HCPCS: Performed by: STUDENT IN AN ORGANIZED HEALTH CARE EDUCATION/TRAINING PROGRAM

## 2024-06-15 PROCEDURE — 83735 ASSAY OF MAGNESIUM: CPT

## 2024-06-15 PROCEDURE — 6370000000 HC RX 637 (ALT 250 FOR IP): Performed by: STUDENT IN AN ORGANIZED HEALTH CARE EDUCATION/TRAINING PROGRAM

## 2024-06-15 PROCEDURE — 36415 COLL VENOUS BLD VENIPUNCTURE: CPT

## 2024-06-15 PROCEDURE — 2060000000 HC ICU INTERMEDIATE R&B

## 2024-06-15 PROCEDURE — 2580000003 HC RX 258: Performed by: STUDENT IN AN ORGANIZED HEALTH CARE EDUCATION/TRAINING PROGRAM

## 2024-06-15 PROCEDURE — 82962 GLUCOSE BLOOD TEST: CPT

## 2024-06-15 PROCEDURE — 97530 THERAPEUTIC ACTIVITIES: CPT

## 2024-06-15 RX ORDER — METOCLOPRAMIDE HYDROCHLORIDE 5 MG/ML
5 INJECTION INTRAMUSCULAR; INTRAVENOUS
Status: DISCONTINUED | OUTPATIENT
Start: 2024-06-15 | End: 2024-06-16

## 2024-06-15 RX ORDER — BUMETANIDE 1 MG/1
1 TABLET ORAL 2 TIMES DAILY
Status: DISCONTINUED | OUTPATIENT
Start: 2024-06-15 | End: 2024-06-18 | Stop reason: HOSPADM

## 2024-06-15 RX ADMIN — BUMETANIDE 1 MG: 1 TABLET ORAL at 15:24

## 2024-06-15 RX ADMIN — LEVETIRACETAM 1000 MG: 100 SOLUTION ORAL at 08:56

## 2024-06-15 RX ADMIN — ATORVASTATIN CALCIUM 80 MG: 40 TABLET, FILM COATED ORAL at 21:23

## 2024-06-15 RX ADMIN — SERTRALINE HYDROCHLORIDE 75 MG: 50 TABLET ORAL at 08:56

## 2024-06-15 RX ADMIN — PANTOPRAZOLE SODIUM 20 MG: 20 TABLET, DELAYED RELEASE ORAL at 08:56

## 2024-06-15 RX ADMIN — LEVETIRACETAM 1000 MG: 100 SOLUTION ORAL at 21:23

## 2024-06-15 RX ADMIN — METOCLOPRAMIDE 5 MG: 5 INJECTION, SOLUTION INTRAMUSCULAR; INTRAVENOUS at 12:14

## 2024-06-15 RX ADMIN — SODIUM CHLORIDE: 9 INJECTION, SOLUTION INTRAVENOUS at 15:24

## 2024-06-15 RX ADMIN — SODIUM CHLORIDE, PRESERVATIVE FREE 10 ML: 5 INJECTION INTRAVENOUS at 20:38

## 2024-06-15 RX ADMIN — ASPIRIN 81 MG CHEWABLE TABLET 81 MG: 81 TABLET CHEWABLE at 08:56

## 2024-06-15 RX ADMIN — CETIRIZINE HYDROCHLORIDE 10 MG: 10 TABLET, FILM COATED ORAL at 08:56

## 2024-06-15 RX ADMIN — MAGNESIUM SULFATE HEPTAHYDRATE 3000 MG: 500 INJECTION, SOLUTION INTRAMUSCULAR; INTRAVENOUS at 10:57

## 2024-06-15 RX ADMIN — SODIUM CHLORIDE, PRESERVATIVE FREE 10 ML: 5 INJECTION INTRAVENOUS at 08:57

## 2024-06-15 RX ADMIN — METOCLOPRAMIDE 5 MG: 5 INJECTION, SOLUTION INTRAMUSCULAR; INTRAVENOUS at 17:16

## 2024-06-15 RX ADMIN — BUMETANIDE 1 MG: 1 TABLET ORAL at 21:23

## 2024-06-15 NOTE — CONSULTS
[facility] [address] [id number] [phone]        Gastroenterology Consultation Note      NAME:  Fern Jacobsen   :   1980   MRN:   [id number]     Referring Physician: Akiko Duke MD     Date of Admission: 2024  Consult Date: 6/15/2024     Chief Complaint:   Chief Complaint   Patient presents with    Loss of Consciousness       Reason for Consult: Gastroparesis    Assessment:   Nausea, vomiting: Secondary to ozempic. Appears to have resolved. TCT negative. olerating clear liquid diet.   Abnormal gastric emptying study: This does not indicate gastroparesis. It is likely secondary to ozempic. Should be able to come off reglan. Altered gastric motility resolves once medication is stopped.   Co-morbdities: CKD, DM2, GERD, prolonged Qtc, h/o CVA with residual left sided weakness (on aspirin)       Recommendations:   Advance diet as tolerated  Okay to stop reglan  Consider discharge once tolerating diet  Hold off on ozempic for now - consider resuming after a few weeks at lower dose if still needed   No need for further GI evaluation at this time - can be referred as outpatient if recurrent symptoms.       Thank you for allowing us to participate in the care of this patient. Please do not hesitate to contact us with any further questions/concerns.    Andrew Loyola MD  [facility]    ------------------------------------------------------------------------------------------------------------------    History of Present Illness:  Patient is a 43 y.o. with past medical history significant for CKD, DM2, GERD, prolonged Qtc, h/o CVA with residual left sided weakness (on aspirin) who presented with nausea, vomiting and we were consulted for above reason.     History obtained from chart review, patient    Reports onset of nausea, vomiting about 2 weeks back. Did not improve and eventually had a fainting spell. Brought in for further evaluation.

## 2024-06-16 LAB
ANION GAP SERPL CALC-SCNC: 2 MMOL/L (ref 5–15)
BASOPHILS # BLD: 0 K/UL (ref 0–0.1)
BUN SERPL-MCNC: 8 MG/DL (ref 6–20)
BUN/CREAT SERPL: 10 (ref 12–20)
CALCIUM SERPL-MCNC: 8.8 MG/DL (ref 8.5–10.1)
CHLORIDE SERPL-SCNC: 112 MMOL/L (ref 97–108)
CO2 SERPL-SCNC: 30 MMOL/L (ref 21–32)
CREAT SERPL-MCNC: 0.8 MG/DL (ref 0.55–1.02)
DIFFERENTIAL METHOD BLD: ABNORMAL
EKG ATRIAL RATE: 91 BPM
EKG DIAGNOSIS: NORMAL
EKG P AXIS: 27 DEGREES
EKG P-R INTERVAL: 144 MS
EKG Q-T INTERVAL: 372 MS
EKG QRS DURATION: 84 MS
EKG QTC CALCULATION (BAZETT): 457 MS
EKG R AXIS: -32 DEGREES
EKG T AXIS: 70 DEGREES
EKG VENTRICULAR RATE: 91 BPM
EOSINOPHIL # BLD: 0.1 K/UL (ref 0–0.4)
EOSINOPHIL NFR BLD: 1 % (ref 0–7)
ERYTHROCYTE [DISTWIDTH] IN BLOOD BY AUTOMATED COUNT: 12.6 % (ref 11.5–14.5)
GLUCOSE BLD STRIP.AUTO-MCNC: 124 MG/DL (ref 65–117)
GLUCOSE BLD STRIP.AUTO-MCNC: 162 MG/DL (ref 65–117)
GLUCOSE BLD STRIP.AUTO-MCNC: 164 MG/DL (ref 65–117)
GLUCOSE BLD STRIP.AUTO-MCNC: 191 MG/DL (ref 65–117)
GLUCOSE SERPL-MCNC: 118 MG/DL (ref 65–100)
HCT VFR BLD AUTO: 31.3 % (ref 35–47)
HGB BLD-MCNC: 10.1 G/DL (ref 11.5–16)
IMM GRANULOCYTES # BLD AUTO: 0 K/UL (ref 0–0.04)
IMM GRANULOCYTES NFR BLD AUTO: 0 % (ref 0–0.5)
LYMPHOCYTES # BLD: 1.4 K/UL (ref 0.8–3.5)
LYMPHOCYTES NFR BLD: 16 % (ref 12–49)
MAGNESIUM SERPL-MCNC: 1.7 MG/DL (ref 1.6–2.4)
MCH RBC QN AUTO: 27.2 PG (ref 26–34)
MCV RBC AUTO: 84.4 FL (ref 80–99)
MONOCYTES # BLD: 0.6 K/UL (ref 0–1)
MONOCYTES NFR BLD: 7 % (ref 5–13)
NEUTS SEG # BLD: 6.8 K/UL (ref 1.8–8)
NEUTS SEG NFR BLD: 76 % (ref 32–75)
NRBC # BLD: 0 K/UL (ref 0–0.01)
NRBC BLD-RTO: 0 PER 100 WBC
PMV BLD AUTO: 11 FL (ref 8.9–12.9)
POTASSIUM SERPL-SCNC: 3.4 MMOL/L (ref 3.5–5.1)
RBC # BLD AUTO: 3.71 M/UL (ref 3.8–5.2)
SERVICE CMNT-IMP: ABNORMAL
SODIUM SERPL-SCNC: 144 MMOL/L (ref 136–145)
WBC # BLD AUTO: 9 K/UL (ref 3.6–11)

## 2024-06-16 PROCEDURE — 6370000000 HC RX 637 (ALT 250 FOR IP)

## 2024-06-16 PROCEDURE — 36415 COLL VENOUS BLD VENIPUNCTURE: CPT

## 2024-06-16 PROCEDURE — 94760 N-INVAS EAR/PLS OXIMETRY 1: CPT

## 2024-06-16 PROCEDURE — 85025 COMPLETE CBC W/AUTO DIFF WBC: CPT

## 2024-06-16 PROCEDURE — 82962 GLUCOSE BLOOD TEST: CPT

## 2024-06-16 PROCEDURE — 80048 BASIC METABOLIC PNL TOTAL CA: CPT

## 2024-06-16 PROCEDURE — 83735 ASSAY OF MAGNESIUM: CPT

## 2024-06-16 PROCEDURE — 6370000000 HC RX 637 (ALT 250 FOR IP): Performed by: FAMILY MEDICINE

## 2024-06-16 PROCEDURE — 93005 ELECTROCARDIOGRAM TRACING: CPT | Performed by: STUDENT IN AN ORGANIZED HEALTH CARE EDUCATION/TRAINING PROGRAM

## 2024-06-16 PROCEDURE — 2580000003 HC RX 258: Performed by: FAMILY MEDICINE

## 2024-06-16 PROCEDURE — 1100000000 HC RM PRIVATE

## 2024-06-16 PROCEDURE — 6360000002 HC RX W HCPCS: Performed by: STUDENT IN AN ORGANIZED HEALTH CARE EDUCATION/TRAINING PROGRAM

## 2024-06-16 PROCEDURE — 6370000000 HC RX 637 (ALT 250 FOR IP): Performed by: STUDENT IN AN ORGANIZED HEALTH CARE EDUCATION/TRAINING PROGRAM

## 2024-06-16 RX ADMIN — ATORVASTATIN CALCIUM 80 MG: 40 TABLET, FILM COATED ORAL at 20:42

## 2024-06-16 RX ADMIN — METOCLOPRAMIDE 5 MG: 5 INJECTION, SOLUTION INTRAMUSCULAR; INTRAVENOUS at 06:29

## 2024-06-16 RX ADMIN — SODIUM CHLORIDE: 9 INJECTION, SOLUTION INTRAVENOUS at 20:48

## 2024-06-16 RX ADMIN — BUMETANIDE 1 MG: 1 TABLET ORAL at 09:33

## 2024-06-16 RX ADMIN — ASPIRIN 81 MG CHEWABLE TABLET 81 MG: 81 TABLET CHEWABLE at 09:33

## 2024-06-16 RX ADMIN — SODIUM CHLORIDE: 9 INJECTION, SOLUTION INTRAVENOUS at 12:09

## 2024-06-16 RX ADMIN — BUMETANIDE 1 MG: 1 TABLET ORAL at 20:42

## 2024-06-16 RX ADMIN — SODIUM CHLORIDE, PRESERVATIVE FREE 10 ML: 5 INJECTION INTRAVENOUS at 09:33

## 2024-06-16 RX ADMIN — LEVETIRACETAM 1000 MG: 100 SOLUTION ORAL at 20:42

## 2024-06-16 RX ADMIN — CETIRIZINE HYDROCHLORIDE 10 MG: 10 TABLET, FILM COATED ORAL at 09:32

## 2024-06-16 RX ADMIN — SODIUM CHLORIDE: 9 INJECTION, SOLUTION INTRAVENOUS at 01:29

## 2024-06-16 RX ADMIN — SERTRALINE HYDROCHLORIDE 75 MG: 50 TABLET ORAL at 09:33

## 2024-06-16 RX ADMIN — PANTOPRAZOLE SODIUM 20 MG: 20 TABLET, DELAYED RELEASE ORAL at 09:33

## 2024-06-16 RX ADMIN — LEVETIRACETAM 1000 MG: 100 SOLUTION ORAL at 09:31

## 2024-06-16 NOTE — CONSULTS
Transition of Care: Plan for SNF, referrals are pending with [facility], [facility] and [facility]. Insurance auth will need to be obtained prior to discharge.     Caregiver/ is Sarahi #[phone]     RUR: 16%  Prior Level of Functioning: Lives in residential group home, ambulated with wesley walker prior to admission, facility assists with ADLs as needed.  Disposition: SNF  If SNF or IPR: Date FOC offered: 6/16/24  Date FOC received: 6/16/24  Accepting facility: pending  Date authorization started with reference number:   Date authorization received and expires:   Follow up appointments: TBD  DME needed: N/A  Transportation at discharge: TBD, caregiver vs. Transport to be arranged, pending progress.  IM/IMM Medicare/ letter given: 2nd IMM needed prior to DC.  Discharge Caregiver contacted prior to discharge? Yes, Caregiver Sarahi #[phone]  Care Conference needed? Not at this time  Barriers to discharge:  placement, auth    CM spoke with attending who is requesting CM work on SNF placement. Noted, patient requested caregiver Sarahi Boykin be contacted regarding discharge planning at [phone].     CM called caregiver Sarahi. Patient resides in a Residential home managed by Sarahi. They assist with Med management, meals, and can assist with ADLs as needed. Caregiver stated patient primarily uses wesley walker and she plans to bring this to the hospital today. There are 2 stairs to enter the home. [facility]. CM faxed updated clinicals to the group home at F# [phone]    2:52 PM: CM met with patient at bedside, caregiver Sarahi was not present. Patient stated she feels she would benefit from SNF for short term rehab, and she has been to [facility] before.     CM met with patient and caregiver Sarahi at bedside. Caregiver concerned as [facility] is far away, and patient is in agreement to go to a

## 2024-06-17 LAB
ANION GAP SERPL CALC-SCNC: 4 MMOL/L (ref 5–15)
BASOPHILS # BLD: 0 K/UL (ref 0–0.1)
BASOPHILS NFR BLD: 0 % (ref 0–1)
BUN SERPL-MCNC: 11 MG/DL (ref 6–20)
BUN/CREAT SERPL: 14 (ref 12–20)
CALCIUM SERPL-MCNC: 8.8 MG/DL (ref 8.5–10.1)
CHLORIDE SERPL-SCNC: 109 MMOL/L (ref 97–108)
CO2 SERPL-SCNC: 28 MMOL/L (ref 21–32)
CREAT SERPL-MCNC: 0.78 MG/DL (ref 0.55–1.02)
DIFFERENTIAL METHOD BLD: ABNORMAL
ECHO BSA: 1.98 M2
EOSINOPHIL # BLD: 0.1 K/UL (ref 0–0.4)
EOSINOPHIL NFR BLD: 1 % (ref 0–7)
ERYTHROCYTE [DISTWIDTH] IN BLOOD BY AUTOMATED COUNT: 12.4 % (ref 11.5–14.5)
GLUCOSE BLD STRIP.AUTO-MCNC: 151 MG/DL (ref 65–117)
GLUCOSE BLD STRIP.AUTO-MCNC: 161 MG/DL (ref 65–117)
GLUCOSE BLD STRIP.AUTO-MCNC: 180 MG/DL (ref 65–117)
GLUCOSE BLD STRIP.AUTO-MCNC: 187 MG/DL (ref 65–117)
GLUCOSE BLD STRIP.AUTO-MCNC: 226 MG/DL (ref 65–117)
GLUCOSE SERPL-MCNC: 163 MG/DL (ref 65–100)
HCT VFR BLD AUTO: 30.4 % (ref 35–47)
HGB BLD-MCNC: 9.9 G/DL (ref 11.5–16)
IMM GRANULOCYTES # BLD AUTO: 0.1 K/UL (ref 0–0.04)
IMM GRANULOCYTES NFR BLD AUTO: 1 % (ref 0–0.5)
LYMPHOCYTES # BLD: 1.2 K/UL (ref 0.8–3.5)
LYMPHOCYTES NFR BLD: 15 % (ref 12–49)
MAGNESIUM SERPL-MCNC: 1.5 MG/DL (ref 1.6–2.4)
MCH RBC QN AUTO: 27.3 PG (ref 26–34)
MCHC RBC AUTO-ENTMCNC: 32.6 G/DL (ref 30–36.5)
MCV RBC AUTO: 83.7 FL (ref 80–99)
MONOCYTES # BLD: 0.5 K/UL (ref 0–1)
MONOCYTES NFR BLD: 6 % (ref 5–13)
NEUTS SEG # BLD: 6.4 K/UL (ref 1.8–8)
NEUTS SEG NFR BLD: 77 % (ref 32–75)
NRBC # BLD: 0 K/UL (ref 0–0.01)
NRBC BLD-RTO: 0 PER 100 WBC
PLATELET # BLD AUTO: 151 K/UL (ref 150–400)
PMV BLD AUTO: 11.8 FL (ref 8.9–12.9)
POTASSIUM SERPL-SCNC: 3.3 MMOL/L (ref 3.5–5.1)
RBC # BLD AUTO: 3.63 M/UL (ref 3.8–5.2)
SERVICE CMNT-IMP: ABNORMAL
SODIUM SERPL-SCNC: 141 MMOL/L (ref 136–145)
WBC # BLD AUTO: 8.3 K/UL (ref 3.6–11)

## 2024-06-17 PROCEDURE — 2580000003 HC RX 258: Performed by: STUDENT IN AN ORGANIZED HEALTH CARE EDUCATION/TRAINING PROGRAM

## 2024-06-17 PROCEDURE — 2580000003 HC RX 258: Performed by: FAMILY MEDICINE

## 2024-06-17 PROCEDURE — 85025 COMPLETE CBC W/AUTO DIFF WBC: CPT

## 2024-06-17 PROCEDURE — 97535 SELF CARE MNGMENT TRAINING: CPT

## 2024-06-17 PROCEDURE — 80048 BASIC METABOLIC PNL TOTAL CA: CPT

## 2024-06-17 PROCEDURE — 6370000000 HC RX 637 (ALT 250 FOR IP): Performed by: FAMILY MEDICINE

## 2024-06-17 PROCEDURE — 6370000000 HC RX 637 (ALT 250 FOR IP): Performed by: STUDENT IN AN ORGANIZED HEALTH CARE EDUCATION/TRAINING PROGRAM

## 2024-06-17 PROCEDURE — 6360000002 HC RX W HCPCS: Performed by: STUDENT IN AN ORGANIZED HEALTH CARE EDUCATION/TRAINING PROGRAM

## 2024-06-17 PROCEDURE — 36415 COLL VENOUS BLD VENIPUNCTURE: CPT

## 2024-06-17 PROCEDURE — 97116 GAIT TRAINING THERAPY: CPT

## 2024-06-17 PROCEDURE — 1100000000 HC RM PRIVATE

## 2024-06-17 PROCEDURE — 6370000000 HC RX 637 (ALT 250 FOR IP)

## 2024-06-17 PROCEDURE — 83735 ASSAY OF MAGNESIUM: CPT

## 2024-06-17 PROCEDURE — 97530 THERAPEUTIC ACTIVITIES: CPT

## 2024-06-17 RX ORDER — POTASSIUM CHLORIDE 750 MG/1
40 TABLET, FILM COATED, EXTENDED RELEASE ORAL ONCE
Status: COMPLETED | OUTPATIENT
Start: 2024-06-17 | End: 2024-06-17

## 2024-06-17 RX ORDER — SEMAGLUTIDE 0.68 MG/ML
INJECTION, SOLUTION SUBCUTANEOUS
Qty: 3 ML | Refills: 0 | OUTPATIENT
Start: 2024-06-17

## 2024-06-17 RX ADMIN — BUMETANIDE 1 MG: 1 TABLET ORAL at 08:34

## 2024-06-17 RX ADMIN — SERTRALINE HYDROCHLORIDE 75 MG: 50 TABLET ORAL at 08:33

## 2024-06-17 RX ADMIN — LEVETIRACETAM 1000 MG: 100 SOLUTION ORAL at 21:00

## 2024-06-17 RX ADMIN — PANTOPRAZOLE SODIUM 20 MG: 20 TABLET, DELAYED RELEASE ORAL at 08:34

## 2024-06-17 RX ADMIN — SODIUM CHLORIDE, PRESERVATIVE FREE 10 ML: 5 INJECTION INTRAVENOUS at 08:34

## 2024-06-17 RX ADMIN — LEVETIRACETAM 1000 MG: 100 SOLUTION ORAL at 08:33

## 2024-06-17 RX ADMIN — POTASSIUM CHLORIDE 40 MEQ: 750 TABLET, FILM COATED, EXTENDED RELEASE ORAL at 09:36

## 2024-06-17 RX ADMIN — INSULIN LISPRO 1 UNITS: 100 INJECTION, SOLUTION INTRAVENOUS; SUBCUTANEOUS at 17:22

## 2024-06-17 RX ADMIN — BUMETANIDE 1 MG: 1 TABLET ORAL at 21:00

## 2024-06-17 RX ADMIN — MAGNESIUM SULFATE HEPTAHYDRATE 3000 MG: 500 INJECTION, SOLUTION INTRAMUSCULAR; INTRAVENOUS at 09:42

## 2024-06-17 RX ADMIN — SODIUM CHLORIDE, PRESERVATIVE FREE 10 ML: 5 INJECTION INTRAVENOUS at 21:00

## 2024-06-17 RX ADMIN — ACETAMINOPHEN 650 MG: 325 TABLET ORAL at 05:05

## 2024-06-17 RX ADMIN — SODIUM CHLORIDE: 9 INJECTION, SOLUTION INTRAVENOUS at 06:56

## 2024-06-17 RX ADMIN — CETIRIZINE HYDROCHLORIDE 10 MG: 10 TABLET, FILM COATED ORAL at 08:33

## 2024-06-17 RX ADMIN — ATORVASTATIN CALCIUM 80 MG: 40 TABLET, FILM COATED ORAL at 21:00

## 2024-06-17 RX ADMIN — ASPIRIN 81 MG CHEWABLE TABLET 81 MG: 81 TABLET CHEWABLE at 08:34

## 2024-06-17 ASSESSMENT — PAIN DESCRIPTION - LOCATION: LOCATION: NECK

## 2024-06-17 ASSESSMENT — PAIN SCALES - GENERAL: PAINLEVEL_OUTOF10: 6

## 2024-06-18 VITALS
OXYGEN SATURATION: 98 % | HEART RATE: 88 BPM | WEIGHT: 191.8 LBS | DIASTOLIC BLOOD PRESSURE: 81 MMHG | SYSTOLIC BLOOD PRESSURE: 113 MMHG | BODY MASS INDEX: 32.74 KG/M2 | TEMPERATURE: 98.8 F | RESPIRATION RATE: 16 BRPM | HEIGHT: 64 IN

## 2024-06-18 LAB
GLUCOSE BLD STRIP.AUTO-MCNC: 157 MG/DL (ref 65–117)
GLUCOSE BLD STRIP.AUTO-MCNC: 186 MG/DL (ref 65–117)
SERVICE CMNT-IMP: ABNORMAL
SERVICE CMNT-IMP: ABNORMAL

## 2024-06-18 PROCEDURE — 82962 GLUCOSE BLOOD TEST: CPT

## 2024-06-18 PROCEDURE — 97530 THERAPEUTIC ACTIVITIES: CPT

## 2024-06-18 PROCEDURE — 6370000000 HC RX 637 (ALT 250 FOR IP): Performed by: STUDENT IN AN ORGANIZED HEALTH CARE EDUCATION/TRAINING PROGRAM

## 2024-06-18 PROCEDURE — 97535 SELF CARE MNGMENT TRAINING: CPT

## 2024-06-18 PROCEDURE — 97116 GAIT TRAINING THERAPY: CPT

## 2024-06-18 PROCEDURE — 2580000003 HC RX 258: Performed by: FAMILY MEDICINE

## 2024-06-18 PROCEDURE — 6370000000 HC RX 637 (ALT 250 FOR IP): Performed by: FAMILY MEDICINE

## 2024-06-18 RX ADMIN — CETIRIZINE HYDROCHLORIDE 10 MG: 10 TABLET, FILM COATED ORAL at 09:06

## 2024-06-18 RX ADMIN — LEVETIRACETAM 1000 MG: 100 SOLUTION ORAL at 09:05

## 2024-06-18 RX ADMIN — SODIUM CHLORIDE: 9 INJECTION, SOLUTION INTRAVENOUS at 03:55

## 2024-06-18 RX ADMIN — BUMETANIDE 1 MG: 1 TABLET ORAL at 09:06

## 2024-06-18 RX ADMIN — SERTRALINE HYDROCHLORIDE 75 MG: 50 TABLET ORAL at 09:05

## 2024-06-18 RX ADMIN — PANTOPRAZOLE SODIUM 20 MG: 20 TABLET, DELAYED RELEASE ORAL at 09:05

## 2024-06-18 RX ADMIN — ASPIRIN 81 MG CHEWABLE TABLET 81 MG: 81 TABLET CHEWABLE at 09:05

## 2024-06-18 ASSESSMENT — PAIN SCALES - GENERAL
PAINLEVEL_OUTOF10: 0

## 2024-06-18 NOTE — PLAN OF CARE
Problem: Discharge Planning  Goal: Discharge to home or other facility with appropriate resources  Outcome: Progressing  Flowsheets  Taken 6/18/2024 0906 by Anya Valdez RN  Discharge to home or other facility with appropriate resources:   Identify barriers to discharge with patient and caregiver   Arrange for needed discharge resources and transportation as appropriate  Taken 6/17/2024 2000 by Jammie Jennings RN  Discharge to home or other facility with appropriate resources:   Identify barriers to discharge with patient and caregiver   Identify discharge learning needs (meds, wound care, etc)     Problem: Chronic Conditions and Co-morbidities  Goal: Patient's chronic conditions and co-morbidity symptoms are monitored and maintained or improved  Outcome: Progressing  Flowsheets  Taken 6/18/2024 0906 by Anya Valdez RN  Care Plan - Patient's Chronic Conditions and Co-Morbidity Symptoms are Monitored and Maintained or Improved:   Monitor and assess patient's chronic conditions and comorbid symptoms for stability, deterioration, or improvement   Collaborate with multidisciplinary team to address chronic and comorbid conditions and prevent exacerbation or deterioration  Taken 6/17/2024 2000 by Jammie Jennings RN  Care Plan - Patient's Chronic Conditions and Co-Morbidity Symptoms are Monitored and Maintained or Improved: Monitor and assess patient's chronic conditions and comorbid symptoms for stability, deterioration, or improvement     Problem: Pain  Goal: Verbalizes/displays adequate comfort level or baseline comfort level  Outcome: Progressing     Problem: Nutrition Deficit:  Goal: Optimize nutritional status  Outcome: Progressing     Problem: Safety - Adult  Goal: Free from fall injury  6/18/2024 0958 by Anya Valdez RN  Outcome: Progressing  Flowsheets (Taken 6/18/2024 0906)  Free From Fall Injury: Instruct family/caregiver on patient safety  6/17/2024 2144 by Jammie Jennings RN  Outcome: 
  Problem: Discharge Planning  Goal: Discharge to home or other facility with appropriate resources  Outcome: Progressing  Flowsheets (Taken 6/16/2024 0937)  Discharge to home or other facility with appropriate resources:   Identify barriers to discharge with patient and caregiver   Arrange for needed discharge resources and transportation as appropriate     Problem: Chronic Conditions and Co-morbidities  Goal: Patient's chronic conditions and co-morbidity symptoms are monitored and maintained or improved  Outcome: Progressing  Flowsheets (Taken 6/16/2024 0937)  Care Plan - Patient's Chronic Conditions and Co-Morbidity Symptoms are Monitored and Maintained or Improved:   Monitor and assess patient's chronic conditions and comorbid symptoms for stability, deterioration, or improvement   Collaborate with multidisciplinary team to address chronic and comorbid conditions and prevent exacerbation or deterioration     Problem: Pain  Goal: Verbalizes/displays adequate comfort level or baseline comfort level  Outcome: Progressing     Problem: Nutrition Deficit:  Goal: Optimize nutritional status  Outcome: Progressing     Problem: Safety - Adult  Goal: Free from fall injury  Outcome: Progressing  Flowsheets (Taken 6/16/2024 1128)  Free From Fall Injury: Instruct family/caregiver on patient safety     Problem: Skin/Tissue Integrity  Goal: Absence of new skin breakdown  Description: 1.  Monitor for areas of redness and/or skin breakdown  2.  Assess vascular access sites hourly  3.  Every 4-6 hours minimum:  Change oxygen saturation probe site  4.  Every 4-6 hours:  If on nasal continuous positive airway pressure, respiratory therapy assess nares and determine need for appliance change or resting period.  Outcome: Progressing     
  Problem: Discharge Planning  Goal: Discharge to home or other facility with appropriate resources  Outcome: Progressing  Flowsheets (Taken 6/17/2024 0834)  Discharge to home or other facility with appropriate resources: Identify barriers to discharge with patient and caregiver     Problem: Chronic Conditions and Co-morbidities  Goal: Patient's chronic conditions and co-morbidity symptoms are monitored and maintained or improved  Outcome: Progressing  Flowsheets (Taken 6/17/2024 0834)  Care Plan - Patient's Chronic Conditions and Co-Morbidity Symptoms are Monitored and Maintained or Improved: Monitor and assess patient's chronic conditions and comorbid symptoms for stability, deterioration, or improvement     Problem: Pain  Goal: Verbalizes/displays adequate comfort level or baseline comfort level  Outcome: Progressing     Problem: Nutrition Deficit:  Goal: Optimize nutritional status  Outcome: Progressing     Problem: Safety - Adult  Goal: Free from fall injury  Outcome: Progressing  Flowsheets (Taken 6/17/2024 0834)  Free From Fall Injury: Instruct family/caregiver on patient safety     Problem: Physical Therapy - Adult  Goal: By Discharge: Performs mobility at highest level of function for planned discharge setting.  See evaluation for individualized goals.  Description: FUNCTIONAL STATUS PRIOR TO ADMISSION: Patient was modified independent using a wesley walker for functional mobility. When asked she endorsed two falls in the last 12 months. Pt has a h/o CVA with right wesley.    HOME SUPPORT PRIOR TO ADMISSION: The patient lived in a group home and reports not receiving assist with mobility but with self care.    Physical Therapy Goals  Initiated 6/14/2024  1.  Patient will move from supine to sit and sit to supine, scoot up and down, and roll side to side in bed with independence within 7 day(s).    2.  Patient will perform sit to stand with modified independence within 7 day(s).  3.  Patient will transfer from 
  Problem: Physical Therapy - Adult  Goal: By Discharge: Performs mobility at highest level of function for planned discharge setting.  See evaluation for individualized goals.  Description: FUNCTIONAL STATUS PRIOR TO ADMISSION: Patient was modified independent using a wesley walker for functional mobility. When asked she endorsed two falls in the last 12 months. Pt has a h/o CVA with right wesley.    HOME SUPPORT PRIOR TO ADMISSION: The patient lived in a group home and reports not receiving assist with mobility but with self care.    Physical Therapy Goals  Initiated 6/14/2024  1.  Patient will move from supine to sit and sit to supine, scoot up and down, and roll side to side in bed with independence within 7 day(s).    2.  Patient will perform sit to stand with modified independence within 7 day(s).  3.  Patient will transfer from bed to chair and chair to bed with modified independence using the least restrictive device within 7 day(s).  4.  Patient will ambulate with modified independence for 150 feet with the least restrictive device within 7 day(s).   5.  Patient will ascend/descend 2 stairs with one handrail(s) with modified independence within 7 day(s).   6/18/2024 1233 by Rossy Fenton, PT  Outcome: Progressing     Problem: Occupational Therapy - Adult  Goal: By Discharge: Performs self-care activities at highest level of function for planned discharge setting.  See evaluation for individualized goals.  Description: FUNCTIONAL STATUS PRIOR TO ADMISSION:  Patient was ambulatory using a wesley-walker (baseline R sided hemiplegia). She reports having supervision from group home staff for safety with ambulation.     HOME SUPPORT: Patient lived in a group home and reports being independent with ADL tasks.(Pt is a questionable historian at time of evaluation - will require clarification)    Occupational Therapy Goals:  Initiated 6/15/2024  1.  Patient will perform seated grooming routine with Supervision within 7 
  Problem: Physical Therapy - Adult  Goal: By Discharge: Performs mobility at highest level of function for planned discharge setting.  See evaluation for individualized goals.  Description: FUNCTIONAL STATUS PRIOR TO ADMISSION: Patient was modified independent using a wesley walker for functional mobility. When asked she endorsed two falls in the last 12 months. Pt has a h/o CVA with right wesley.    HOME SUPPORT PRIOR TO ADMISSION: The patient lived in a group home and reports not receiving assist with mobility but with self care.    Physical Therapy Goals  Initiated 6/14/2024  1.  Patient will move from supine to sit and sit to supine, scoot up and down, and roll side to side in bed with independence within 7 day(s).    2.  Patient will perform sit to stand with modified independence within 7 day(s).  3.  Patient will transfer from bed to chair and chair to bed with modified independence using the least restrictive device within 7 day(s).  4.  Patient will ambulate with modified independence for 150 feet with the least restrictive device within 7 day(s).   5.  Patient will ascend/descend 2 stairs with one handrail(s) with modified independence within 7 day(s).   6/18/2024 1233 by Rossy Fenton, PT  Outcome: Progressing     Problem: Occupational Therapy - Adult  Goal: By Discharge: Performs self-care activities at highest level of function for planned discharge setting.  See evaluation for individualized goals.  Description: FUNCTIONAL STATUS PRIOR TO ADMISSION:  Patient was ambulatory using a wesley-walker (baseline R sided hemiplegia). She reports having supervision from group home staff for safety with ambulation.     HOME SUPPORT: Patient lived in a group home and reports being independent with ADL tasks.(Pt is a questionable historian at time of evaluation - will require clarification)    Occupational Therapy Goals:  Initiated 6/15/2024  1.  Patient will perform seated grooming routine with Supervision within 7 
  Problem: Physical Therapy - Adult  Goal: By Discharge: Performs mobility at highest level of function for planned discharge setting.  See evaluation for individualized goals.  Description: FUNCTIONAL STATUS PRIOR TO ADMISSION: Patient was modified independent using a wesley walker for functional mobility. When asked she endorsed two falls in the last 12 months. Pt has a h/o CVA with right wesley.    HOME SUPPORT PRIOR TO ADMISSION: The patient lived in a group home and reports not receiving assist with mobility but with self care.    Physical Therapy Goals  Initiated 6/14/2024  1.  Patient will move from supine to sit and sit to supine, scoot up and down, and roll side to side in bed with independence within 7 day(s).    2.  Patient will perform sit to stand with modified independence within 7 day(s).  3.  Patient will transfer from bed to chair and chair to bed with modified independence using the least restrictive device within 7 day(s).  4.  Patient will ambulate with modified independence for 150 feet with the least restrictive device within 7 day(s).   5.  Patient will ascend/descend 2 stairs with one handrail(s) with modified independence within 7 day(s).   Outcome: Progressing   PHYSICAL THERAPY TREATMENT    Patient: Fern Jacobsen (43 y.o. female)  Date: 6/17/2024  Diagnosis: Syncope and collapse [R55]  RAFAL (acute kidney injury) (HCC) [N17.9] Syncope and collapse      Precautions: Fall Risk, Bed Alarm (impaired vision)                      ASSESSMENT:  Patient continues to benefit from skilled PT services and is progressing towards goals. Patient received in bed and most agreeable to therapy.  Moving  better on this date requiring overall less assistance.  Able to ambulate today with hem walker and CGA.  Patient reports still below her baseline of Mod I.         PLAN:  Patient continues to benefit from skilled intervention to address the above impairments.  Continue treatment per established plan of 
intact  Problem Solving: Able to problem solve independently  Insights: Appears intact  Initiation: Requires cues for some  Sequencing: Appears intact    Functional Mobility and Transfers for ADLs:  Bed Mobility:  Bed Mobility Training  Bed Mobility Training: Yes  Overall Level of Assistance: Stand-by assistance  Supine to Sit: Stand-by assistance  Scooting: Supervision     Transfers:  Transfer Training  Transfer Training: Yes  Overall Level of Assistance: Contact-guard assistance  Sit to Stand: Contact-guard assistance  Stand to Sit: Contact-guard assistance  Bed to Chair: Contact-guard assistance      Balance:     Balance  Sitting: Impaired  Sitting - Static: Good (unsupported)  Sitting - Dynamic: Good (unsupported)  Standing: Impaired  Standing - Static: Good;Constant support  Standing - Dynamic: Fair;Constant support      ADL Intervention:    Grooming: .  - Oral Care: Minimal Assistance and Standing at sink        Pain Rating:  No pain reported.    Activity Tolerance:   Fair  and requires frequent rest breaks  Please refer to the flowsheet for vital signs taken during this treatment.    After treatment:   Patient left in no apparent distress in bed and Call bell within reach    COMMUNICATION/EDUCATION:   The patient's plan of care was discussed with: registered nurse    Patient Education  Education Given To: Patient  Education Provided: Role of Therapy;Transfer Training;Energy Conservation  Education Method: Demonstration;Verbal  Barriers to Learning: Cognition  Education Outcome: Verbalized understanding;Demonstrated understanding    Thank you for this referral.  DELL ZACARIAS  Minutes: 31    Regarding student involvement in patient care:  A student participated in this treatment session. Per CMS Medicare statements and AOTA guidelines I certify that the following was true:  1. I was present and directly observed the entire session.  2. I made all skilled judgments and clinical decisions regarding 
reach      COMMUNICATION/EDUCATION:   The patient's plan of care was discussed with: occupational therapist    Patient Education  Education Given To: Patient  Education Provided: Role of Therapy;Plan of Care  Education Method: Verbal  Barriers to Learning: None  Education Outcome: Verbalized understanding      NENITA TAVERAS, PT  Minutes: 15    
       Vision/Perceptual:          Vision  Vision: Impaired       Strength:    Strength: Generally decreased, functional    Tone & Sensation:           Coordination:       Range Of Motion:  AROM: Generally decreased, functional       Functional Mobility:  Bed Mobility:     Bed Mobility Training  Bed Mobility Training: Yes  Overall Level of Assistance: Minimum assistance;Assist X1  Supine to Sit: Minimum assistance;Assist X1  Sit to Supine: Minimum assistance;Assist X1  Transfers:     Transfer Training  Transfer Training: Yes  Sit to Stand: Minimum assistance;Assist X1  Stand to Sit: Minimum assistance;Assist X1  Bed to Chair: Minimum assistance;Assist X1  Balance:               Balance  Sitting: Intact  Standing: Impaired  Standing - Static: Constant support;Good  Standing - Dynamic: Constant support;Fair  Ambulation/Gait Training:                                                                                                                                                                                                                                                                                      [facility] AM-PAC®      Basic Mobility Inpatient Short Form (6-Clicks) Version 2  How much HELP from another person do you currently need... (If the patient hasn't done an activity recently, how much help from another person do you think they would need if they tried?) Total A Lot A Little None   1.  Turning from your back to your side while in a flat bed without using bedrails? []  1 []  2 [x]  3  []  4   2.  Moving from lying on your back to sitting on the side of a flat bed without using bedrails? []  1 []  2 [x]  3  []  4   3.  Moving to and from a bed to a chair (including a wheelchair)? []  1 []  2 [x]  3  []  4   4. Standing up from a chair using your arms (e.g. wheelchair or bedside chair)? []  1 []  2 [x]  3  []  4   5.  Walking in hospital room? []  1 []  2 [x]  3  []  4   6.  Climbing 3-5 steps with 
Impaired    Range of Motion:   AROM: Generally decreased, functional (grossly decreased RUE)         Strength:  Strength: Generally decreased, functional (grossly decreased RUE)      Coordination:  Coordination: Generally decreased, functional (grossly decreased RUE)            Tone & Sensation:   Tone: Abnormal (hypertonic RUE)  Sensation: Impaired (numbness/tingling BLE)          Functional Mobility and Transfers for ADLs:    Bed Mobility:     Bed Mobility Training  Bed Mobility Training: Yes  Supine to Sit: Minimum assistance;Assist X1;Additional time  Scooting: Minimum assistance;Assist X1;Additional time    Transfers:      Transfer Training  Transfer Training: Yes  Sit to Stand: Moderate assistance;Assist X1;Additional time  Stand to Sit: Moderate assistance;Assist X1  Bed to Chair: Moderate assistance;Maximum assistance;Assist X1;Additional time                     Balance:      Balance  Sitting: Impaired  Sitting - Static: Good (unsupported)  Sitting - Dynamic: Good (unsupported);Fair (occasional)  Standing: Impaired  Standing - Static: Fair;Poor;Constant support  Standing - Dynamic: Poor;Constant support      ADL Assessment:          Feeding: Setup       Grooming: Setup  Grooming Skilled Clinical Factors: seated in bedside chair, to wash face    UE Bathing: Minimal assistance       Skin Care: Bath wipes    LE Bathing: Maximum assistance       UE Dressing: Minimal assistance       LE Dressing: Maximum assistance  LE Dressing Skilled Clinical Factors: seated EOB, to eufemia socks    Toileting: Maximum assistance                         ADL Intervention and task modifications:        [facility] AM-PACTM \"6 Clicks\"                                                       Daily Activity Inpatient Short Form  AM-PAC Daily Activity - Inpatient   How much help is needed for putting on and taking off regular lower body clothing?: A Lot  How much help is needed for bathing (which includes washing, rinsing, drying)?: 
for this referral.  DELL ZACARIAS  Minutes: 42    Regarding student involvement in patient care:  A student participated in this treatment session. Per CMS Medicare statements and AOTA guidelines I certify that the following was true:  1. I was present and directly observed the entire session.  2. I made all skilled judgments and clinical decisions regarding care.  3. I am the practitioner responsible for assessment, treatment, and documentation.

## 2024-06-18 NOTE — CARE COORDINATION
Transition of Care Plan:    RUR: 16%    CM received consult for Home Health Services. CM met with patient at bedside and asked about home health choices. Patient requested that I call her caregiver Sarahi Boykin at [phone]. CM called Sarahi Ashutosh to obtain choice. Patient's caregiver stated to [facility] and [facility]. Patient's caregiver also stated that Medicaid was pending.         06/15/24 0187   Condition of Participation: Discharge Planning   The Plan for Transition of Care is related to the following treatment goals: Home Health Services   The Patient and/or Patient Representative was provided with a Choice of Provider? Patient;Patient Representative   Name of the Patient Representative who was provided with the Choice of Provider and agrees with the Discharge Plan?  Sarahi Renanemily   The Patient and/Or Patient Representative agree with the Discharge Plan? Yes   Freedom of Choice list was provided with basic dialogue that supports the patient's individualized plan of care/goals, treatment preferences, and shares the quality data associated with the providers?  Yes     Adeline Carballo, HORTENSIAN, RN, ONC, CMSRN  Nurse Care Manager [phone]  
CM called [facility] checking on the SNF referral status.     Admissions states they do not have access to EPIC referrals, and provided CM with the Directors number [phone]. CM called and left a voicemail.     Patients chart at beginning of day 6/17 showed [facility] Medicare Community Plan, this now shows self-pay. CM reviewed the patients chart, a Medicare A and B card and [facility] Community card were scanned in on 4/30/2024. CM attempted to call both plans for plan verification and was unsuccessful. CM reached out to [facility] utilization review for guidance, CM left voicemail.     CM still unsure of patients insurance status.     CM partner spoke with Sarahi, the patients group home contact to verify insurance. Sarahi is not sure at this time of what active plans the patient has at this time and mentioned a possible Family Plan First policy.     2:40PM: CM sent SNF referral to [facility] in [facility] as requested by SNF Director. Pending review.     3:00PM: [facility] is able to accept this patient pending insurance information. CM provided the Medicare information from the scanned insurance card from April 2024. CM also sent an email to [facility] Revenue Cycle department requesting assistance finding the active policy for this patient.     Nadia Jacobsen RN/JEROMY    
Transition of Care Plan:     RUR: 16%     CM received consult for Home Health Services. CM met with patient at bedside and asked about home health choices. Patient requested that I call her caregiver Sarahi Boykin at [phone]. CM called Sarahi Ashutosh to obtain choice. Patient's caregiver stated to send referrals to [facility] and [facility]. Patient's caregiver also stated that Medicaid was pending.     [facility] - declined OON   [facility]  - pending response     CM following.    MARY Ramirez    or by [facility]   
Transition of Care Plan:    RUR: 15%  Prior Level of Functioning: Patient lives in Group Home, ambulated with wesley walker prior to admission, facility assists with ADLs as needed  Disposition: SNF  If SNF or IPR: Date FOC offered: 6/16/24  Date FOC received: 6/16/24  Accepting facility: pending  Date authorization started with reference number:   Date authorization received and expires:   Follow up appointments: TBD  DME needed: N/A  Transportation at discharge: TBD, caregiver vs transportation pending medical progress  IM/IMM Medicare/ letter given: 2nd IMM needed prior to discharge  Caregiver Contact: Sarahi Boykin, [phone]  Barriers to discharge:  placement and insurance authorization    CM contacted [facility] to obtain fax number ([phone]) so that patient can be considered for admission. CM faxed clinicals to [facility]. CM also left voicemail with admissions team with [facility] on the [place] [phone] for team to review patient for admission to facility. Patient's primary nurse stated that patient's mother would like the patient to establish care with a [facility] Endocrinologist. CM called [facility] at [phone] to assist patient with setting up an appointment, left  with practice to call CM back so that CM could assist the patient with establishing an appointment.     CM received call from Nova menjivar at [facility] and [facility], and she stated that their facility is full and unable to accept the patient.     Adeline Carballo, BSN, RN, ONC, CMSRN  Nurse Care Manager [phone]  
Transition of Care Plan:    RUR: 16%  Prior Level of Functioning: Residential Group Home  Disposition: Skilled Nursing Facility   If SNF or IPR: Date FOC offered: 6/16/24  Date FOC received: 6/16/24  Accepting facility: [facility]  Date authorization started with reference number: 6/18/24  Caregiver Contact: Sarahi Boykin, [phone]  Barriers to discharge:  Insurance Authorization     Notified Karissa ([phone]) in admissions at [facility].    Adeline Carballo, BSN, RN, ONC, CMSRN  Nurse Care Manager [phone]  
information has been updated on the AVS. And communicated to facility via [facility] app/[facility], or CC link.     Discharge instructions uploaded via CarePort.     Nursing Please include all hard scripts for controlled substances, med rec and dc summary, and AVS in packet.     Reviewed and confirmed with facility, [facility], can manage the patient care needs for the following:     Ketan with (X) only those applicable:  Medication:  [x]Medications are available at the facility  []IV Antibiotics    []Controlled Substance - hard copies available sent.  []Weekly Labs    Equipment:  []CPAP/BiPAP  []Wound Vacuum  []Bal or Urinary Device  []PICC/Central Line  []Nebulizer  []Ventilator    Treatment:  []Isolation (for MRSA, VRE, etc.)  []Surgical Drain Management  []Tracheostomy Care  []Dressing Changes  []Dialysis with transportation  []PEG Care  []Oxygen  []Daily Weights for Heart Failure    Dietary:  []Any diet limitations  []Tube Feedings   []Total Parenteral Management (TPN)    Financial Resources:  []Medicaid Application Completed    []UAI Completed and copy given to pt/family  and copy given to pt/family  []A screening has previously been completed.    []Level II Completed    [] Private pay individual who will not become   financially eligible for Medicaid within 6 months from admission to a [facility].     [] Individual refused to have screening conducted.     [x]Medicaid Application Completed    []The screening denied because it was determined individual did not need/did not qualify for nursing facility level of care.  [] Out of state residents seeking direct admission to a VA nursing facility.  [] Individuals who are inpatients of an out of state hospital, or in state or out of state veterans/ hospital and seek direct admission to a VA nursing facility  [] Individuals who are pateints or residents of a state owned/operated facility that is licensed by

## 2024-06-18 NOTE — DISCHARGE SUMMARY
Discharge Summary       PATIENT ID: Fern Jacobsen  MRN: [id number]   YOB: 1980    DATE OF ADMISSION: 6/13/2024  9:00 PM    DATE OF DISCHARGE: 06/18/24    PRIMARY CARE PROVIDER: Mary Alice Brewer PA-C     ATTENDING PHYSICIAN: Akiko Duke MD   DISCHARGING PROVIDER: Akiko Duke MD    To contact this individual call [phone] and ask the  to page.  If unavailable ask to be transferred the Adult Hospitalist Department.    CONSULTATIONS: IP CONSULT TO DIETITIAN  IP CONSULT TO GI  IP CONSULT TO DIETITIAN  IP CONSULT HOME HEALTH  IP CONSULT TO CASE MANAGEMENT    PROCEDURES/SURGERIES: * No surgery found *     ADMITTING DIAGNOSES & HOSPITAL COURSE:   HPI: \"Per H&P: Fern Jacobsen is a 43 y.o. female with a pmhx DM II, HTN, gout, dyslipidemia, past seizures, past stroke, depression, and anxiety who presents with two weeks of anorexia, nausea, and vomiting, and is being admitted for syncope.     In the ED, she was initially hypotensive to 67/43 with improvement to 108/74 after IVF.  Other VSS.  Labs showed stable normocytic anemia with hgb 11>9.8, creatinine 1.82>2.10(b/l 1.5), glucosuria, and yeast in urine. CT head negative for acute intracranial abnormality, and CT abdomen/pelvis with a fibroid.  EKG with normal sinus rhythm.       In the ED, she received 2L crystalloid this morning when in the ED, and 2L  LR now. \"        DISCHARGE DIAGNOSES / PLAN:      Hypotension 2/2 hypovolemia -- resolved  - presented with 2 weeks of vomiting and anorexia (started Ozempic for diabetes two weeks ago)  - CT abdomen/pelvis with no acute findings  - continue IVF, monitor vitals     RAFAL on CKD -- RAFAL resolved  - creatinine 2.10 on admission, now 1.34 (b/l 1.5)  - likely due to hypotension from dehydration  - avoid renal toxins   - monitor labs     Intractable nausea/vomiting  Gastroparesis improved   - CT abdomen pelvis negative for acute findings  - gastric emptying study showing abnormal gastric

## 2024-06-18 NOTE — PROGRESS NOTES
[facility]                                                                                          Hospitalist Progress Note  Akiko Duke MD  Answering service: [phone] OR [phone] from in house phone        Date of Service:  2024  NAME:  Fern Jacobsen  :  1980  MRN:  [id number]       Admission Summary:   Per H&P: Fern Jacobsen is a 43 y.o. female with a pmhx DM II, HTN, gout, dyslipidemia, past seizures, past stroke, depression, and anxiety who presents with two weeks of anorexia, nausea, and vomiting, and is being admitted for syncope.     In the ED, she was initially hypotensive to 67/43 with improvement to 108/74 after IVF.  Other VSS.  Labs showed stable normocytic anemia with hgb 11>9.8, creatinine 1.82>2.10(b/l 1.5), glucosuria, and yeast in urine. CT head negative for acute intracranial abnormality, and CT abdomen/pelvis with a fibroid.  EKG with normal sinus rhythm.       In the ED, she received 2L crystalloid this morning when in the ED, and 2L  LR now.       Interval history / Subjective:   Patient seen examined at bedside earlier tolerating diet      Assessment & Plan:     Hypotension 2/2 hypovolemia -- resolved  - presented with 2 weeks of vomiting and anorexia (started Ozempic for diabetes two weeks ago)  - CT abdomen/pelvis with no acute findings  - continue IVF, monitor vitals     RAFAL on CKD -- RAFAL resolved  - creatinine 2.10 on admission, now 1.34 (b/l 1.5)  - likely due to hypotension from dehydration  - avoid renal toxins   - monitor labs     Intractable nausea/vomiting  Gastroparesis improved   - CT abdomen pelvis negative for acute findings  - gastric emptying study showing abnormal gastric emptying, which may represent gastroparesis  - GI consulted, appreciate  recs symptoms have resolved will stop  Reglan  - dietitian consulted for gastroparesis education  - tolerating diet   - PRN zofran     Normocytic anemia  - Hgb 11 --> 
        [facility]  Hospitalist [facility]                                                                                          Hospitalist Progress Note  CEFERINO Mo NP  Answering service: [phone] OR [phone] from in house phone        Date of Service:  2024  NAME:  Fern Jacobsen  :  1980  MRN:  [id number]       Admission Summary:   Per H&P: Fern Jacobsen is a 43 y.o. female with a pmhx DM II, HTN, gout, dyslipidemia, past seizures, past stroke, depression, and anxiety who presents with two weeks of anorexia, nausea, and vomiting, and is being admitted for syncope.     In the ED, she was initially hypotensive to 67/43 with improvement to 108/74 after IVF.  Other VSS.  Labs showed stable normocytic anemia with hgb 11>9.8, creatinine 1.82>2.10(b/l 1.5), glucosuria, and yeast in urine. CT head negative for acute intracranial abnormality, and CT abdomen/pelvis with a fibroid.  EKG with normal sinus rhythm.       In the ED, she received 2L crystalloid this morning when in the ED, and 2L  LR now.       Interval history / Subjective:   Patient seen and examined, lying in bed after having returned from nuclear medicine. Complains of nausea.    Gastric emptying study showing abnormal gastric emptying, which may represent gastroparesis. GI consulted. Dietitian consulted.     Advanced to clear liquids, will monitor for tolerance.      Assessment & Plan:     Hypotension 2/2 hypovolemia -- resolved  - presented with 2 weeks of vomiting and anorexia (started Ozempic for diabetes two weeks ago)  - CT abdomen/pelvis with no acute findings  - continue IVF, monitor vitals     RAFAL on CKD -- RAFAL resolved  - creatinine 2.10 on admission, now 1.34 (b/l 1.5)  - likely due to hypotension from dehydration  - avoid renal toxins   - monitor labs     Intractable nausea/vomiting  Gastroparesis  - CT abdomen pelvis negative for acute findings  - gastric emptying study showing abnormal gastric 
   06/14/24 1502 06/14/24 1514 06/14/24 1518   Vitals   Pulse 90 98 (!) 103   Heart Rate Source  --   --   --    /78 109/73 107/70   MAP (Calculated) 94 85 82   BP Location Left upper arm Left upper arm Left upper arm   BP Method Automatic Automatic Automatic   Patient Position Supine Sitting  (EOB) Standing      06/14/24 1522   Vitals   Pulse 92   Heart Rate Source Monitor   /77   MAP (Calculated) 92   BP Location Left upper arm   BP Method Automatic   Patient Position Sitting  (post transfer)       
  Physician Progress Note      PATIENT:               TYAE DOTSON  [facility] #:                  [id number]  :                       1980  ADMIT DATE:       2024 9:00 PM  DISCH DATE:  RESPONDING  PROVIDER #:        Akiko Lindo MD          QUERY TEXT:    Patient admitted with hypotension, dehydration, nausea with vomiting due to   Ozempic use, RAFAL. Noted to have severe malnutrition per malnutrition   assessment documented by registered dietician on 24. If possible, please   document in progress notes and discharge summary if you are evaluating and /or   treating any of the following:    The medical record reflects the following:  Risk Factors:  -per 24 ED MD note, \" 43-year-old female who presents to the ED via POV   for complaint of syncope.  Patient was evaluated for abdominal pain, nausea   and vomiting earlier today, discharged home with unremarkable workup.  When   patient arrived home patient was getting out of the vehicle when she felt like   she was going to pass out.  She was let down to the ground slowly.  She has   not hit her head.  Patient did not fully pass out.  There was no seizure-like   activity observed.  Patient was brought back to the emerged part by family.    On physical exam patient is slow to respond but answering questions   appropriately.  Patient complains of generalized weakness, and diffuse   abdominal pain\".    Clinical Indicators:  -per GI consult note documented 6/15/24, \"Nausea, vomiting: Secondary to   ozempic...Abnormal gastric emptying study: This does not indicate   gastroparesis. It is likely secondary to ozempic. Should be able to come off   reglan. Altered gastric motility resolves once medication is stopped\".    -per malnutrition assessment documented 24,  Malnutrition Status:  Severe malnutrition (24 1419)  Context:  Acute Illness  Findings of the 6 clinical characteristics of malnutrition:  Energy Intake:  50% or less of estimated 
Spiritual Care Assessment/Progress Note  [facility]    Name: Fern Jacobsen MRN: [id number]    Age: 43 y.o.     Sex: female   Language: English     Date: 6/17/2024            Total Time Calculated: 35 min              Spiritual Assessment begun in [facility] MED SURG  Service Provided For: Patient  Referral/Consult From: Rounding  Encounter Overview/Reason: Initial Encounter    Spiritual beliefs:      [x] Involved in a glenn tradition/spiritual practice: Congregation     [] Supported by a glenn community:      [] Claims no spiritual orientation:      [] Seeking spiritual identity:           [] Adheres to an individual form of spirituality:      [] Not able to assess:                Identified resources for coping and support system:   Support System: Parent, Children, Family members, Friends/neighbors       [x] Prayer                  [] Devotional reading               [] Music                  [] Guided Imagery     [] Pet visits                                        [] Other: (COMMENT)     Specific area/focus of visit   Encounter:    Crisis: Type: Emotional distress  Spiritual/Emotional needs: Type: Spiritual Support, Emotional Distress  Ritual, Rites and Sacraments:    Grief, Loss, and Adjustments:    Ethics/Mediation:    Behavioral Health:    Palliative Care:    Advance Care Planning:           Narrative: The  visited the patient. The patient needed spiritual and emotional support. The  actively listened to the patient and provided care. The  ended the conversation with a prayer. The patient was comforted and strengthened.    Kinsey Madrigal M.Div., Th.M., M.A.C.E.   [facility]  Spiritual Health Services  Paging Service [phone] (CARLOS)                        
(ml/day): 1878 ml/day (1ml/kcal)    Nutrition Related Findings:   Edema: Right lower extremity, Left lower extremity, Right upper extremity      RUE Edema: +1     RLE Edema: +1  LLE Edema: +1    @  Recent Labs     06/16/24  0542 06/17/24  0617   BUN 8 11    141   K 3.4* 3.3*   * 109*   CO2 30 28   MG 1.7 1.5*       Recent Labs     06/16/24  0542 06/17/24  0617   WBC 9.0 8.3   HGB 10.1* 9.9*   HCT 31.3* 30.4*    151       Lab Results   Component Value Date/Time    SMJ9KCPW 8.7 09/07/2023 10:22 AM         Iron   Date Value Ref Range Status   06/14/2024 52 35 - 150 ug/dL Final     TIBC   Date Value Ref Range Status   06/14/2024 234 (L) 250 - 450 ug/dL Final         Last BM: 06/16/24    Wounds:   Wound Type: None      Current Nutrition Therapies:  Diet: Clear liquids  Supplements: none  Meal Intake:   Patient Vitals for the past 168 hrs:   PO Meals Eaten (%)   06/16/24 0951 76 - 100%   06/15/24 0952 76 - 100%     Supplement Intake:  No data found.  Nutrition Support: none      Anthropometric Measures:  Height: 162.6 cm (5' 4.02\")  Ideal Body Weight (IBW): 120 lbs (55 kg)       Current Body Weight: 87 kg (191 lb 12.8 oz), 159.8 % IBW. Weight Source: Bed Scale  Current BMI (kg/m2): 32.9        Weight Adjustment For: No Adjustment                 BMI Categories: Obese Class 1 (BMI 30.0-34.9)    Wt Readings from Last 10 Encounters:   06/13/24 87 kg (191 lb 12.8 oz)   06/13/24 105.6 kg (232 lb 12.9 oz)   04/30/24 101.6 kg (224 lb)   04/03/24 95.9 kg (211 lb 6.4 oz)   03/30/24 97.5 kg (214 lb 15.2 oz)   03/25/24 97.5 kg (215 lb)   01/02/24 96.6 kg (213 lb)   12/24/23 96.2 kg (212 lb)   10/16/23 98.9 kg (218 lb)   10/17/23 98.9 kg (218 lb)           Nutrition Diagnosis:   In context of acute illness or injury, Severe malnutrition related to altered GI function as evidenced by poor intake prior to admission, intake 0-25%, NPO or clear liquid status due to medical condition, weight loss greater than or equal 
and/or order of clinical lab test  Review and/or order of tests in the radiology section of CPT  Review and/or order of tests in the medicine section of CPT      I have personally and independently reviewed all pertinent labs, diagnostic studies, imaging, and have provided independent interpretation of the same.     Labs:     Recent Labs     06/13/24  2136 06/15/24  0608   WBC 9.9  9.6 7.1   HGB 9.8*  9.8* 9.8*   HCT 30.7*  30.7* 30.5*     167 153       Recent Labs     06/13/24 2136 06/14/24  0807 06/15/24  0608     144 144 146*   K 3.9  3.8 3.6 3.5     108 108 110*   CO2 27  29 27 30   BUN 36*  32* 30* 16   MG 1.7  --  1.5*       Recent Labs     06/13/24  1117 06/13/24 2136   ALT 21 17   GLOB 4.0 3.5       No results for input(s): \"INR\", \"APTT\" in the last 72 hours.    Invalid input(s): \"PTP\"   Recent Labs     06/14/24  0807   TIBC 234*      No results found for: \"RBCF\"   No results for input(s): \"PH\", \"PCO2\", \"PO2\" in the last 72 hours.  No results for input(s): \"CPK\" in the last 72 hours.    Invalid input(s): \"CPKMB\", \"CKNDX\", \"TROIQ\"  Lab Results   Component Value Date/Time    CHOL 161 01/25/2024 10:14 AM    CHOL 125 10/17/2023 06:19 AM    HDL 49 01/25/2024 10:14 AM    LDL 89 01/25/2024 10:14 AM     Lab Results   Component Value Date/Time    GLUCPOC 213 01/02/2024 11:38 AM    GLUCPOC 198 09/07/2023 10:22 AM     Notes reviewed from all clinical/nonclinical/nursing services involved in patient's clinical care. Care coordination discussions were held with appropriate clinical/nonclinical/ nursing providers based on care coordination needs.     Patients current active Medications were reviewed, considered, added and adjusted based on the clinical condition today.      Home Medications were reconciled to the best of my ability given all available resources at the time of admission. Route is PO if not otherwise noted.    Medications Reviewed:     Current Facility-Administered Medications 
Daily    [Held by provider] empagliflozin (JARDIANCE) tablet 10 mg  10 mg Oral Daily    levETIRAcetam (KEPPRA) 100 MG/ML oral solution 1,000 mg  1,000 mg Oral BID    pantoprazole (PROTONIX) tablet 20 mg  20 mg Oral Daily    sertraline (ZOLOFT) tablet 75 mg  75 mg Oral Daily    atorvastatin (LIPITOR) tablet 80 mg  80 mg Oral Nightly     ______________________________________________________________________  EXPECTED LENGTH OF STAY: Unable to retrieve estimated LOS  ACTUAL LENGTH OF STAY:          3                 Akiko Duke MD

## 2024-06-20 LAB — SERVICE CMNT-IMP: NORMAL

## 2024-06-26 RX ORDER — BUMETANIDE 1 MG/1
TABLET ORAL
Qty: 62 TABLET | Refills: 0 | Status: SHIPPED | OUTPATIENT
Start: 2024-06-26

## 2024-07-02 LAB
EKG ATRIAL RATE: 83 BPM
EKG DIAGNOSIS: NORMAL
EKG P AXIS: 39 DEGREES
EKG P-R INTERVAL: 148 MS
EKG Q-T INTERVAL: 416 MS
EKG QRS DURATION: 90 MS
EKG QTC CALCULATION (BAZETT): 488 MS
EKG R AXIS: -33 DEGREES
EKG T AXIS: 36 DEGREES
EKG VENTRICULAR RATE: 83 BPM

## 2024-07-02 RX ORDER — METHOCARBAMOL 750 MG/1
TABLET, FILM COATED ORAL
Qty: 1 TABLET | Refills: 0 | OUTPATIENT
Start: 2024-07-02

## 2024-07-03 DIAGNOSIS — I10 ESSENTIAL HYPERTENSION WITH GOAL BLOOD PRESSURE LESS THAN 130/85: ICD-10-CM

## 2024-07-03 DIAGNOSIS — E78.5 HYPERLIPIDEMIA LDL GOAL <70: ICD-10-CM

## 2024-07-03 RX ORDER — METHOCARBAMOL 750 MG/1
TABLET, FILM COATED ORAL
Qty: 186 TABLET | Refills: 0 | OUTPATIENT
Start: 2024-07-03

## 2024-07-08 RX ORDER — METHOCARBAMOL 750 MG/1
TABLET, FILM COATED ORAL
Qty: 186 TABLET | Refills: 0 | OUTPATIENT
Start: 2024-07-08

## 2024-07-10 RX ORDER — METHOCARBAMOL 750 MG/1
TABLET, FILM COATED ORAL
Qty: 186 TABLET | Refills: 0 | OUTPATIENT
Start: 2024-07-10

## 2024-07-11 ENCOUNTER — TELEPHONE (OUTPATIENT)
Facility: CLINIC | Age: 44
End: 2024-07-11

## 2024-07-12 DIAGNOSIS — Z86.73 HISTORY OF TIA (TRANSIENT ISCHEMIC ATTACK): ICD-10-CM

## 2024-07-12 DIAGNOSIS — R56.9 SEIZURES (HCC): ICD-10-CM

## 2024-07-12 DIAGNOSIS — K21.9 GERD WITHOUT ESOPHAGITIS: ICD-10-CM

## 2024-07-12 DIAGNOSIS — J30.9 ALLERGIC RHINITIS, UNSPECIFIED SEASONALITY, UNSPECIFIED TRIGGER: ICD-10-CM

## 2024-07-12 RX ORDER — ASPIRIN 81 MG/1
TABLET, CHEWABLE ORAL
Qty: 30 TABLET | Refills: 2 | Status: SHIPPED | OUTPATIENT
Start: 2024-07-12

## 2024-07-12 RX ORDER — LEVETIRACETAM 100 MG/ML
SOLUTION ORAL
Qty: 600 ML | Refills: 2 | Status: SHIPPED | OUTPATIENT
Start: 2024-07-12

## 2024-07-12 RX ORDER — CETIRIZINE HYDROCHLORIDE 10 MG/1
10 TABLET ORAL DAILY
Qty: 30 TABLET | Refills: 2 | Status: SHIPPED | OUTPATIENT
Start: 2024-07-12

## 2024-07-12 RX ORDER — ACETAMINOPHEN 160 MG
TABLET,DISINTEGRATING ORAL DAILY
Qty: 30 CAPSULE | Refills: 2 | Status: SHIPPED | OUTPATIENT
Start: 2024-07-12

## 2024-07-12 RX ORDER — PANTOPRAZOLE SODIUM 20 MG/1
20 TABLET, DELAYED RELEASE ORAL DAILY
Qty: 30 TABLET | Refills: 2 | Status: SHIPPED | OUTPATIENT
Start: 2024-07-12

## 2024-07-12 NOTE — TELEPHONE ENCOUNTER
[facility] call to tell you they need a DC order for the Robaxin because of the refusal to refill it.

## 2024-07-16 RX ORDER — SEMAGLUTIDE 0.68 MG/ML
INJECTION, SOLUTION SUBCUTANEOUS
Qty: 2 ML | Refills: 2 | Status: SHIPPED | OUTPATIENT
Start: 2024-07-16

## 2024-07-30 ENCOUNTER — OFFICE VISIT (OUTPATIENT)
Facility: CLINIC | Age: 44
End: 2024-07-30
Payer: MEDICARE

## 2024-07-30 VITALS
BODY MASS INDEX: 36.6 KG/M2 | HEIGHT: 64 IN | OXYGEN SATURATION: 100 % | SYSTOLIC BLOOD PRESSURE: 124 MMHG | DIASTOLIC BLOOD PRESSURE: 82 MMHG | TEMPERATURE: 97.6 F | HEART RATE: 102 BPM | WEIGHT: 214.4 LBS | RESPIRATION RATE: 16 BRPM

## 2024-07-30 DIAGNOSIS — E11.649 TYPE 2 DIABETES MELLITUS WITH HYPOGLYCEMIA WITHOUT COMA, WITH LONG-TERM CURRENT USE OF INSULIN (HCC): ICD-10-CM

## 2024-07-30 DIAGNOSIS — Z79.4 TYPE 2 DIABETES MELLITUS WITH HYPOGLYCEMIA WITHOUT COMA, WITH LONG-TERM CURRENT USE OF INSULIN (HCC): ICD-10-CM

## 2024-07-30 DIAGNOSIS — R01.1 HEART MURMUR: ICD-10-CM

## 2024-07-30 DIAGNOSIS — Z86.73 HISTORY OF STROKE: ICD-10-CM

## 2024-07-30 DIAGNOSIS — E55.9 VITAMIN D DEFICIENCY: ICD-10-CM

## 2024-07-30 DIAGNOSIS — Z09 HOSPITAL DISCHARGE FOLLOW-UP: Primary | ICD-10-CM

## 2024-07-30 DIAGNOSIS — R79.89 ABNORMAL TSH: ICD-10-CM

## 2024-07-30 LAB — GLUCOSE, POC: 225 MG/DL

## 2024-07-30 PROCEDURE — G8427 DOCREV CUR MEDS BY ELIG CLIN: HCPCS

## 2024-07-30 PROCEDURE — 3051F HG A1C>EQUAL 7.0%<8.0%: CPT

## 2024-07-30 PROCEDURE — 3074F SYST BP LT 130 MM HG: CPT

## 2024-07-30 PROCEDURE — 3079F DIAST BP 80-89 MM HG: CPT

## 2024-07-30 PROCEDURE — 82962 GLUCOSE BLOOD TEST: CPT

## 2024-07-30 PROCEDURE — 1036F TOBACCO NON-USER: CPT

## 2024-07-30 PROCEDURE — 99215 OFFICE O/P EST HI 40 MIN: CPT

## 2024-07-30 PROCEDURE — 1111F DSCHRG MED/CURRENT MED MERGE: CPT

## 2024-07-30 PROCEDURE — 2022F DILAT RTA XM EVC RTNOPTHY: CPT

## 2024-07-30 PROCEDURE — G8417 CALC BMI ABV UP PARAM F/U: HCPCS

## 2024-07-30 RX ORDER — GLUCOSAM/CHON-MSM1/C/MANG/BOSW 500-416.6
TABLET ORAL
Qty: 100 EACH | Refills: 5 | Status: SHIPPED | OUTPATIENT
Start: 2024-07-30

## 2024-07-30 RX ORDER — PEN NEEDLE, DIABETIC, SAFETY 30 GX3/16"
NEEDLE, DISPOSABLE MISCELLANEOUS
Qty: 300 EACH | Refills: 3 | Status: SHIPPED | OUTPATIENT
Start: 2024-07-30

## 2024-07-30 RX ORDER — GLIPIZIDE 2.5 MG/1
2.5 TABLET, EXTENDED RELEASE ORAL 2 TIMES DAILY
Qty: 60 TABLET | Refills: 3 | Status: SHIPPED | OUTPATIENT
Start: 2024-07-30

## 2024-07-30 RX ORDER — CALCIUM CITRATE/VITAMIN D3 200MG-6.25
TABLET ORAL
Qty: 400 EACH | Refills: 5 | Status: SHIPPED | OUTPATIENT
Start: 2024-07-30

## 2024-07-30 SDOH — ECONOMIC STABILITY: FOOD INSECURITY: WITHIN THE PAST 12 MONTHS, THE FOOD YOU BOUGHT JUST DIDN'T LAST AND YOU DIDN'T HAVE MONEY TO GET MORE.: NEVER TRUE

## 2024-07-30 SDOH — ECONOMIC STABILITY: INCOME INSECURITY: HOW HARD IS IT FOR YOU TO PAY FOR THE VERY BASICS LIKE FOOD, HOUSING, MEDICAL CARE, AND HEATING?: NOT HARD AT ALL

## 2024-07-30 SDOH — ECONOMIC STABILITY: FOOD INSECURITY: WITHIN THE PAST 12 MONTHS, YOU WORRIED THAT YOUR FOOD WOULD RUN OUT BEFORE YOU GOT MONEY TO BUY MORE.: NEVER TRUE

## 2024-07-30 ASSESSMENT — ENCOUNTER SYMPTOMS
EYE PAIN: 0
ABDOMINAL PAIN: 0
DIARRHEA: 0
NAUSEA: 0
BLOOD IN STOOL: 0
CHEST TIGHTNESS: 0
CONSTIPATION: 0
TROUBLE SWALLOWING: 0
BACK PAIN: 0
SORE THROAT: 0
COUGH: 0
WHEEZING: 0
VOMITING: 0
PHOTOPHOBIA: 0
SHORTNESS OF BREATH: 0

## 2024-07-30 NOTE — PROGRESS NOTES
Fern BLACK Delmar  43 y.o. female  1980  [address] [facility] [address]  [id number]     [facility] [facility]: Progress Note       Encounter Date: 7/30/2024    Patient presents with the following chief complaint(s)    Chief Complaint   Patient presents with    Follow-Up from Hospital    Diabetes    Hypertension        History provided by patient &  Neda BURTON    Assessment and Plan:   1. Hospital discharge follow-up  -     CA DISCHARGE MEDS RECONCILED W/ CURRENT OUTPATIENT MED LIST  2. Type 2 diabetes mellitus with hypoglycemia without coma, with long-term current use of insulin (LTAC, located within [facility])  -     AMB POC GLUCOSE BLOOD, BY GLUCOSE MONITORING DEVICE  -     glipiZIDE (GLUCOTROL XL) 2.5 MG extended release tablet; Take 1 tablet by mouth in the morning and at bedtime, Disp-60 tablet, R-3Normal  -     TRUE METRIX BLOOD GLUCOSE TEST strip; Used to check glucose up to 4 times daily, Disp-400 each, R-5, DAWNormal  -     TRUEplus Lancets 30G MISC; Disp-100 each, R-5, NormalTEST BLOOD SUGAR THREE TIMES A DAY  -     Insulin Pen Needle (BD AUTOSHIELD DUO) 30G X 5 MM MISC; Disp-300 each, R-3, NormalUSE TO INJECT INSULINS AS PRESCRIBED.  -     Comprehensive Metabolic Panel; Future  3. Heart murmur  -     AFL - Keagan Camp MD, Cardiology, [place]  4. Abnormal TSH  -     T4 AND TSH PANEL; Future  5. History of stroke  6. Vitamin D deficiency  -     Vitamin D 25 Hydroxy; Future     Patient's blood glucose elevated this morning at 225.  Recommend that caregiver monitor diet closely and avoiding high sugary foods especially early in the morning.  Will add in glipizide 2.5 BID for better glucose control.  Encourage patient and caregiver to monitor blood glucose levels moving forward to avoid hypoglycemia given that she is no longer on Ozempic.    Blood pressure is controlled today without Lasix or lisinopril.  Discussed importance of patient being evaluated by

## 2024-07-30 NOTE — PROGRESS NOTES
\"Have you been to the ER, urgent care clinic since your last visit?  Hospitalized since your last visit?\"    YES - When: approximately 1 month ago.  Where and Why: [facility] for \"Ozempic was making her sick and not wanting to eat\".    “Have you seen or consulted any other health care providers outside of [facility] since your last visit?”    NO    Chief Complaint   Patient presents with    Follow-Up from Hospital    Diabetes    Hypertension     /82 (Site: Left Upper Arm, Position: Sitting, Cuff Size: Large Adult)   Pulse (!) 102   Temp 97.6 °F (36.4 °C) (Oral)   Resp 16   Ht 1.626 m (5' 4\")   Wt 97.3 kg (214 lb 6.4 oz)   SpO2 100%   BMI 36.80 kg/m²           Click Here for Release of Records Request

## 2024-08-15 ENCOUNTER — OFFICE VISIT (OUTPATIENT)
Facility: CLINIC | Age: 44
End: 2024-08-15

## 2024-08-15 VITALS
DIASTOLIC BLOOD PRESSURE: 64 MMHG | HEART RATE: 101 BPM | TEMPERATURE: 97.9 F | RESPIRATION RATE: 16 BRPM | BODY MASS INDEX: 37.97 KG/M2 | OXYGEN SATURATION: 95 % | HEIGHT: 64 IN | SYSTOLIC BLOOD PRESSURE: 112 MMHG | WEIGHT: 222.4 LBS

## 2024-08-15 DIAGNOSIS — Z01.818 PREOP EXAMINATION: ICD-10-CM

## 2024-08-15 DIAGNOSIS — Z01.818 PREOP EXAMINATION: Primary | ICD-10-CM

## 2024-08-15 LAB — GLUCOSE, POC: 154 MG/DL

## 2024-08-15 ASSESSMENT — ENCOUNTER SYMPTOMS
DIARRHEA: 0
WHEEZING: 0
CHEST TIGHTNESS: 0
COUGH: 0
NAUSEA: 0
BACK PAIN: 0
BLOOD IN STOOL: 0
ABDOMINAL PAIN: 0
CONSTIPATION: 0
PHOTOPHOBIA: 0
SORE THROAT: 0
SHORTNESS OF BREATH: 0
TROUBLE SWALLOWING: 0
EYE PAIN: 0
VOMITING: 0

## 2024-08-15 NOTE — PROGRESS NOTES
Fern Jacobsen  44 y.o. female  1980  [address] [facility] [address]  [id number]     [place] [facility]: Progress Note       Encounter Date: 8/15/2024    Patient presents with the following chief complaint(s)    Chief Complaint   Patient presents with    Pre-op Exam     Cataract surgery        History provided by patient    Assessment and Plan:   1. Preop examination  -     AMB POC GLUCOSE BLOOD, BY GLUCOSE MONITORING DEVICE  -     EKG 12 Lead; Future  -     CBC; Future  -     Comprehensive Metabolic Panel; Future       Given cardiac hx without current cardiology following, would like EKG completed. Pt has cardiology appt coming. Clearance will be pending given labs & EKG.    No follow-ups on file.  History of Present Illness   Fern Jacobsen is a 44 y.o. female with past medical history listed, who presents to clinic today for a follow up for pre op evaluation    Preoperative Evaluation    Date of Exam: 8/15/2024    Fern Jacobsen is a 44 y.o. female (:1980) who presents for preoperative evaluation.   Procedure/Surgery:Right eye cataract removal surgery  Date of Procedure/Surgery: 2024  Surgeon: Dr. Ashwini Aguilar  [facility]  Primary Physician: Mary Alice Brewer PA-C  Latex Allergy: No      Recent use of: ASA    Anesthesia Complications: None  History of abnormal bleeding: None  History of Blood Transfusions: No    Hx of stroke, has caridac appointment scheduled.   No pulmonary problems, no hx of smoking        Health Maintenance  Health Maintenance Due   Topic Date Due    Diabetic foot exam  Never done    HIV screen  Never done    Diabetic retinal exam  Never done    Hepatitis C screen  Never done    Hepatitis B vaccine (1 of 3 - 19+ 3-dose series) Never done    Pneumococcal 0-64 years Vaccine (2 of 2 - PCV) 2015    Diabetic Alb to Cr ratio (uACR) test  2022    COVID-19 Vaccine (3 - - season)

## 2024-08-15 NOTE — PROGRESS NOTES
\"Have you been to the ER, urgent care clinic since your last visit?  Hospitalized since your last visit?\"    NO    “Have you seen or consulted any other health care providers outside of [facility] since your last visit?”    NO        “Have you had a colorectal cancer screening such as a colonoscopy/FIT/Cologuard?    NO    No colonoscopy on file  No cologuard on file  No FIT/FOBT on file   No flexible sigmoidoscopy on file     Chief Complaint   Patient presents with    Pre-op Exam     Cataract surgery     /64 (Site: Left Upper Arm, Position: Sitting, Cuff Size: Large Adult)   Pulse (!) 101   Temp 97.9 °F (36.6 °C) (Temporal)   Resp 16   Ht 1.626 m (5' 4\")   Wt 100.9 kg (222 lb 6.4 oz)   SpO2 95%   BMI 38.17 kg/m²         Click Here for Release of Records Request

## 2024-08-21 LAB
ALBUMIN SERPL-MCNC: 4.3 G/DL (ref 3.9–4.9)
ALP SERPL-CCNC: 134 IU/L (ref 44–121)
ALT SERPL-CCNC: 16 IU/L (ref 0–32)
AST SERPL-CCNC: 19 IU/L (ref 0–40)
BILIRUB SERPL-MCNC: <0.2 MG/DL (ref 0–1.2)
BUN SERPL-MCNC: 17 MG/DL (ref 6–24)
BUN/CREAT SERPL: 19 (ref 9–23)
CALCIUM SERPL-MCNC: 9.7 MG/DL (ref 8.7–10.2)
CHLORIDE SERPL-SCNC: 104 MMOL/L (ref 96–106)
CO2 SERPL-SCNC: 26 MMOL/L (ref 20–29)
CREAT SERPL-MCNC: 0.89 MG/DL (ref 0.57–1)
EGFRCR SERPLBLD CKD-EPI 2021: 81 ML/MIN/1.73
ERYTHROCYTE [DISTWIDTH] IN BLOOD BY AUTOMATED COUNT: 12.7 % (ref 11.7–15.4)
GLOBULIN SER CALC-MCNC: 2.6 G/DL (ref 1.5–4.5)
GLUCOSE SERPL-MCNC: 116 MG/DL (ref 70–99)
HCT VFR BLD AUTO: 30.1 % (ref 34–46.6)
HGB BLD-MCNC: 9.4 G/DL (ref 11.1–15.9)
MCH RBC QN AUTO: 25.4 PG (ref 26.6–33)
MCHC RBC AUTO-ENTMCNC: 31.2 G/DL (ref 31.5–35.7)
MCV RBC AUTO: 81 FL (ref 79–97)
PLATELET # BLD AUTO: 245 X10E3/UL (ref 150–450)
POTASSIUM SERPL-SCNC: 4.4 MMOL/L (ref 3.5–5.2)
PROT SERPL-MCNC: 6.9 G/DL (ref 6–8.5)
RBC # BLD AUTO: 3.7 X10E6/UL (ref 3.77–5.28)
SODIUM SERPL-SCNC: 148 MMOL/L (ref 134–144)
WBC # BLD AUTO: 7.4 X10E3/UL (ref 3.4–10.8)

## 2024-08-23 ENCOUNTER — HOSPITAL ENCOUNTER (OUTPATIENT)
Facility: HOSPITAL | Age: 44
End: 2024-08-23
Payer: MEDICARE

## 2024-08-23 DIAGNOSIS — Z01.818 PREOP EXAMINATION: ICD-10-CM

## 2024-08-23 LAB
EKG ATRIAL RATE: 78 BPM
EKG DIAGNOSIS: NORMAL
EKG P AXIS: 47 DEGREES
EKG P-R INTERVAL: 146 MS
EKG Q-T INTERVAL: 402 MS
EKG QRS DURATION: 86 MS
EKG QTC CALCULATION (BAZETT): 458 MS
EKG R AXIS: -36 DEGREES
EKG T AXIS: 42 DEGREES
EKG VENTRICULAR RATE: 78 BPM

## 2024-08-23 PROCEDURE — 93005 ELECTROCARDIOGRAM TRACING: CPT

## 2024-10-04 DIAGNOSIS — R56.9 SEIZURES (HCC): ICD-10-CM

## 2024-10-07 RX ORDER — LEVETIRACETAM 100 MG/ML
SOLUTION ORAL
Qty: 600 ML | Refills: 0 | Status: SHIPPED | OUTPATIENT
Start: 2024-10-07

## 2024-10-07 RX ORDER — ACETAMINOPHEN 160 MG
TABLET,DISINTEGRATING ORAL DAILY
Qty: 30 CAPSULE | Refills: 0 | Status: SHIPPED | OUTPATIENT
Start: 2024-10-07

## 2024-11-06 DIAGNOSIS — Z79.4 TYPE 2 DIABETES MELLITUS WITH HYPOGLYCEMIA WITHOUT COMA, WITH LONG-TERM CURRENT USE OF INSULIN (HCC): ICD-10-CM

## 2024-11-06 DIAGNOSIS — E11.649 TYPE 2 DIABETES MELLITUS WITH HYPOGLYCEMIA WITHOUT COMA, WITH LONG-TERM CURRENT USE OF INSULIN (HCC): ICD-10-CM

## 2024-11-07 RX ORDER — ACETAMINOPHEN 160 MG
TABLET,DISINTEGRATING ORAL DAILY
Qty: 30 CAPSULE | Refills: 0 | Status: SHIPPED | OUTPATIENT
Start: 2024-11-07

## 2024-11-07 RX ORDER — GLIPIZIDE 2.5 MG/1
2.5 TABLET, EXTENDED RELEASE ORAL 2 TIMES DAILY
Qty: 60 TABLET | Refills: 0 | Status: SHIPPED | OUTPATIENT
Start: 2024-11-07

## 2025-01-20 ENCOUNTER — APPOINTMENT (OUTPATIENT)
Facility: HOSPITAL | Age: 45
End: 2025-01-20
Payer: MEDICARE

## 2025-01-20 ENCOUNTER — HOSPITAL ENCOUNTER (EMERGENCY)
Facility: HOSPITAL | Age: 45
Discharge: HOME | End: 2025-01-20
Attending: EMERGENCY MEDICINE
Payer: MEDICARE

## 2025-01-20 VITALS
DIASTOLIC BLOOD PRESSURE: 71 MMHG | HEIGHT: 64 IN | HEART RATE: 74 BPM | WEIGHT: 220 LBS | TEMPERATURE: 97.7 F | RESPIRATION RATE: 18 BRPM | OXYGEN SATURATION: 98 % | SYSTOLIC BLOOD PRESSURE: 112 MMHG | BODY MASS INDEX: 37.56 KG/M2

## 2025-01-20 DIAGNOSIS — J01.10 ACUTE FRONTAL SINUSITIS, RECURRENCE NOT SPECIFIED: Primary | ICD-10-CM

## 2025-01-20 PROCEDURE — 73030 X-RAY EXAM OF SHOULDER: CPT

## 2025-01-20 PROCEDURE — 71045 X-RAY EXAM CHEST 1 VIEW: CPT

## 2025-01-20 PROCEDURE — 93005 ELECTROCARDIOGRAM TRACING: CPT | Performed by: EMERGENCY MEDICINE

## 2025-01-20 PROCEDURE — 6370000000 HC RX 637 (ALT 250 FOR IP): Performed by: EMERGENCY MEDICINE

## 2025-01-20 PROCEDURE — 99284 EMERGENCY DEPT VISIT MOD MDM: CPT

## 2025-01-20 RX ORDER — DOXYCYCLINE 100 MG/1
100 CAPSULE ORAL
Status: COMPLETED | OUTPATIENT
Start: 2025-01-20 | End: 2025-01-20

## 2025-01-20 RX ORDER — DOXYCYCLINE HYCLATE 100 MG
100 TABLET ORAL 2 TIMES DAILY
Qty: 20 TABLET | Refills: 0 | Status: SHIPPED | OUTPATIENT
Start: 2025-01-20 | End: 2025-01-30

## 2025-01-20 RX ORDER — ACETAMINOPHEN 500 MG
1000 TABLET ORAL
Status: COMPLETED | OUTPATIENT
Start: 2025-01-20 | End: 2025-01-20

## 2025-01-20 RX ADMIN — ACETAMINOPHEN 1000 MG: 500 TABLET ORAL at 14:05

## 2025-01-20 RX ADMIN — DOXYCYCLINE HYCLATE 100 MG: 100 CAPSULE ORAL at 14:05

## 2025-01-20 ASSESSMENT — PAIN SCALES - GENERAL
PAINLEVEL_OUTOF10: 10
PAINLEVEL_OUTOF10: 5

## 2025-01-20 ASSESSMENT — PAIN - FUNCTIONAL ASSESSMENT
PAIN_FUNCTIONAL_ASSESSMENT: 0-10
PAIN_FUNCTIONAL_ASSESSMENT: 0-10

## 2025-01-20 NOTE — ED PROVIDER NOTES
[facility] EMERGENCY DEPARTMENT  EMERGENCY DEPARTMENT HISTORY AND PHYSICAL EXAM      Date: 2025  Patient Name: Fern Jacobsen  MRN: [id number]  Birthdate 1980  Date of evaluation: 2025  Provider: Abiodun Lambert DO   Note Started: 12:22 PM EST 25    HISTORY OF PRESENT ILLNESS     Chief Complaint   Patient presents with    Multiple Complaints    Care Management     History Provided By: Patient    HPI: Fern Jacobsen is a 44-year-old female with a past medical history of depression, diabetes, edema, gout, hypertension, and PTSD who presents with complaints of bilateral foot pain, right ear pain, and headache.    PAST MEDICAL HISTORY   Past Medical History:  Past Medical History:   Diagnosis Date    Depression     Diabetes (HCC)     Edema     Gout     Herpes genitalis     Hypertension     Ill-defined condition     gout    Mood disorder (HCC)     Other ill-defined conditions(799.89)     cholesterol     Psychiatric disorder     depression    Psychiatric disorder     PTSD    PTSD (post-traumatic stress disorder)     PTSD (post-traumatic stress disorder) 2016    Seizures (HCC) 2023    Sleep disorder     Stroke (HCC)     Type 2 diabetes mellitus without complication (HCC)     UTI (lower urinary tract infection)        Past Surgical History:  Past Surgical History:   Procedure Laterality Date    GYN       X 3       Family History:  Family History   Problem Relation Age of Onset    Hypertension Mother     Coronary Art Dis Mother     Diabetes Father     Stroke Father 51        pt estimates    Kidney Disease Father         secondary to diabetes    Breast Cancer Paternal Grandmother     Breast Cancer Cousin         numerous paternal cousins       Social History:  Social History     Tobacco Use    Smoking status: Never    Smokeless tobacco: Never   Vaping Use    Vaping status: Never Used   Substance Use Topics    Alcohol use: No     Alcohol/week: 0.0 standard drinks of

## 2025-01-20 NOTE — ED TRIAGE NOTES
Pt arrived via ems from an independent living facility with 8 residents per pt, states they have her sleeping on couch, complaint of foot pain, ear pain, and headache . States she doesn't want to go back to living facility     Alert oriented and ambulatory with walker.     [address] [facility], [id number]

## 2025-01-20 NOTE — DISCHARGE INSTRUCTIONS
Thank you for choosing our Emergency Department for your care.  It is our privilege to care for you in your time of need.  In the next several days, you may receive a survey via email or mailed to your home about your experience with our team.  We would greatly appreciate you taking a few minutes to complete the survey, as we use this information to learn what we have done well and what we could be doing better. Thank you for trusting us with your care!    Below you will find a list of your tests from today's visit.   Labs and Radiology Studies  No results found for this or any previous visit (from the past 12 hour(s)).  XR SHOULDER LEFT (MIN 2 VIEWS)    Result Date: 1/20/2025  INDICATION: Left shoulder pain. Exam: Three views of the left shoulder. FINDINGS: There is no acute fracture or dislocation. Articulations are normal. Bones are well mineralized and soft tissues are normal.     No acute fracture or dislocation. . Electronically signed by Taran Hollingsworth MD    XR CHEST 1 VIEW    Result Date: 1/20/2025  INDICATION:  dyspnea COMPARISON: June 2024 FINDINGS: Single AP portable view of the chest obtained at 1244 demonstrates a stable cardiomediastinal silhouette. The lungs are clear bilaterally. No acute osseous abnormalities are seen.     No evidence of acute cardiopulmonary process. Electronically signed by Ketan Lopez MD    ------------------------------------------------------------------------------------------------------------  The evaluation and treatment you received in the Emergency Department were for an urgent problem. It is important that you follow-up with a doctor, nurse practitioner, or physician assistant to:  (1) confirm your diagnosis,  (2) re-evaluation of changes in your illness and treatment, and (3) for ongoing care. Please take your discharge instructions with you when you go to your follow-up appointment.     If you have any problem arranging a follow-up appointment, contact us!  If your

## 2025-01-21 LAB
EKG ATRIAL RATE: 78 BPM
EKG DIAGNOSIS: NORMAL
EKG P AXIS: 41 DEGREES
EKG P-R INTERVAL: 150 MS
EKG Q-T INTERVAL: 394 MS
EKG QRS DURATION: 80 MS
EKG QTC CALCULATION (BAZETT): 449 MS
EKG R AXIS: -37 DEGREES
EKG T AXIS: 33 DEGREES
EKG VENTRICULAR RATE: 78 BPM

## 2025-04-04 DIAGNOSIS — K21.9 GERD WITHOUT ESOPHAGITIS: ICD-10-CM

## 2025-04-04 RX ORDER — PANTOPRAZOLE SODIUM 20 MG/1
20 TABLET, DELAYED RELEASE ORAL DAILY
Qty: 30 TABLET | Refills: 0 | OUTPATIENT
Start: 2025-04-04

## 2025-04-24 ENCOUNTER — APPOINTMENT (OUTPATIENT)
Facility: HOSPITAL | Age: 45
End: 2025-04-24
Payer: MEDICARE

## 2025-04-24 ENCOUNTER — HOSPITAL ENCOUNTER (EMERGENCY)
Facility: HOSPITAL | Age: 45
Discharge: HOME | End: 2025-04-25
Payer: MEDICARE
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
## 2025-04-24 DIAGNOSIS — M25.562 ACUTE PAIN OF LEFT KNEE: ICD-10-CM

## 2025-04-24 PROCEDURE — 73562 X-RAY EXAM OF KNEE 3: CPT

## 2025-04-24 PROCEDURE — 99283 EMERGENCY DEPT VISIT LOW MDM: CPT

## 2025-04-24 PROCEDURE — 6370000000 HC RX 637 (ALT 250 FOR IP): Performed by: NURSE PRACTITIONER

## 2025-04-24 RX ORDER — LIDOCAINE 4 G/G
1 PATCH TOPICAL
Status: DISCONTINUED | OUTPATIENT
Start: 2025-04-24 | End: 2025-04-25 | Stop reason: HOSPADM

## 2025-04-24 RX ORDER — IBUPROFEN 800 MG/1
800 TABLET, FILM COATED ORAL
Status: COMPLETED | OUTPATIENT
Start: 2025-04-24 | End: 2025-04-24

## 2025-04-24 RX ORDER — NAPROXEN 500 MG/1
500 TABLET ORAL 2 TIMES DAILY WITH MEALS
Qty: 60 TABLET | Refills: 0 | Status: SHIPPED | OUTPATIENT
Start: 2025-04-24 | End: 2025-05-24

## 2025-04-24 RX ADMIN — IBUPROFEN 800 MG: 800 TABLET, FILM COATED ORAL at 18:02

## 2025-04-24 ASSESSMENT — PAIN - FUNCTIONAL ASSESSMENT
PAIN_FUNCTIONAL_ASSESSMENT: 0-10
PAIN_FUNCTIONAL_ASSESSMENT: 0-10

## 2025-04-24 ASSESSMENT — PAIN SCALES - GENERAL
PAINLEVEL_OUTOF10: 8
PAINLEVEL_OUTOF10: 8
PAINLEVEL_OUTOF10: 3

## 2025-04-24 NOTE — ED TRIAGE NOTES
[facility] due to fall. Pt states negative LOC, unknown thinners, negative hit head. Pt states has left knee pain. Pt had a previous stroke that affected her right side.

## 2025-04-24 NOTE — ED PROVIDER NOTES
[facility] EMERGENCY DEPT  EMERGENCY DEPARTMENT HISTORY AND PHYSICAL EXAM      Date of evaluation: 2025  Patient Name: Fern Jacobsen  Birthdate 1980  MRN: [id number]  ED Provider: CEFERINO Vilchis NP   Note Started: 5:33 PM EDT 25    HISTORY OF PRESENT ILLNESS     Chief Complaint   Patient presents with    Fall    Knee Pain       History Provided By: Patient, only     HPI: Fern Jacobsen is a 44 y.o. female with a past medical history as noted below presents to the emergency room with CC of GLF with knee pain. Patient states her left knee has been hurting over the last 2 days. She denies any specific trauma. She states today she was trying to walk when her left knee \"gave out\" causing her to fall on her back. She denies hitting her head, LOC or N/V. She states she was unable to get up by herself due to knee pain. She describes her pain as moderate to severe, 8/10 presently, aggravating factors include movement/bending, alleviating factors include none. She has not tried taking anything for her symptoms. She denies any previous knee injuries or surgeries.     PAST MEDICAL HISTORY   Past Medical History:  Past Medical History:   Diagnosis Date    Depression     Diabetes (HCC)     Edema     Gout     Herpes genitalis     Hypertension     Ill-defined condition     gout    Mood disorder     Other ill-defined conditions(799.89)     cholesterol     Psychiatric disorder     depression    Psychiatric disorder     PTSD    PTSD (post-traumatic stress disorder)     PTSD (post-traumatic stress disorder) 2016    Seizures (HCC) 2023    Sleep disorder     Stroke (HCC)     Type 2 diabetes mellitus without complication (HCC)     UTI (lower urinary tract infection)        Past Surgical History:  Past Surgical History:   Procedure Laterality Date    GYN       X 3       Family History:  Family History   Problem Relation Age of Onset    Hypertension Mother     Coronary Art Dis Mother     Diabetes Father   500 MG tablet           DISCONTINUED MEDICATIONS:  Current Discharge Medication List          I am the Primary Clinician of Record. CEFERINO Vilchis NP (electronically signed)    (Please note that parts of this dictation were completed with voice recognition software. Quite often unanticipated grammatical, syntax, homophones, and other interpretive errors are inadvertently transcribed by the computer software. Please disregards these errors. Please excuse any errors that have escaped final proofreading.)     Karuna Willams APRN - NP  04/24/25 [id number]

## 2025-04-24 NOTE — DISCHARGE INSTRUCTIONS
Thank you for choosing our Emergency Department for your care.  It is our privilege to care for you in your time of need.  In the next several days, you may receive a survey via email or mailed to your home about your experience with our team.  We would greatly appreciate you taking a few minutes to complete the survey, as we use this information to learn what we have done well and what we could be doing better. Thank you for trusting us with your care!    Below you will find a list of your tests from today's visit.   Labs and Radiology Studies  No results found for this or any previous visit (from the past 12 hours).  XR KNEE LEFT (3 VIEWS)  Result Date: 4/24/2025  INDICATION:  fall Exam: AP, lateral, oblique views of the left knee. FINDINGS: There is no acute fracture-dislocation. Articulations are normal. Bones are well-mineralized. Soft tissues are normal.     No acute fracture or dislocation. Electronically signed by Taran Hollingsworth MD    ------------------------------------------------------------------------------------------------------------  The evaluation and treatment you received in the Emergency Department were for an urgent problem. It is important that you follow-up with a doctor, nurse practitioner, or physician assistant to:  (1) confirm your diagnosis,  (2) re-evaluation of changes in your illness and treatment, and (3) for ongoing care. Please take your discharge instructions with you when you go to your follow-up appointment.     If you have any problem arranging a follow-up appointment, contact us!  If your symptoms become worse or you do not improve as expected, please return to us. We are available 24 hours a day.     If a prescription has been provided, please fill it as soon as possible to prevent a delay in treatment. If you have any questions or reservations about taking the medication due to side effects or interactions with other medications, please call your primary care provider or  contact us directly.  Again, THANK YOU for choosing us to care for YOU!

## 2025-04-25 VITALS
RESPIRATION RATE: 11 BRPM | BODY MASS INDEX: 39.27 KG/M2 | HEART RATE: 69 BPM | HEIGHT: 64 IN | TEMPERATURE: 97.9 F | SYSTOLIC BLOOD PRESSURE: 135 MMHG | OXYGEN SATURATION: 100 % | DIASTOLIC BLOOD PRESSURE: 82 MMHG | WEIGHT: 230 LBS

## 2025-04-25 NOTE — ED NOTES
Sarahi, caregiver called and updated that patient is leaving ER at this time to come home. All questions answered.